# Patient Record
Sex: MALE | Race: WHITE | NOT HISPANIC OR LATINO | Employment: OTHER | URBAN - METROPOLITAN AREA
[De-identification: names, ages, dates, MRNs, and addresses within clinical notes are randomized per-mention and may not be internally consistent; named-entity substitution may affect disease eponyms.]

---

## 2017-12-09 DIAGNOSIS — I10 ESSENTIAL HYPERTENSION: ICD-10-CM

## 2017-12-09 DIAGNOSIS — E78.2 MIXED HYPERLIPIDEMIA: ICD-10-CM

## 2017-12-11 RX ORDER — LOVASTATIN 20 MG/1
TABLET ORAL
Qty: 90 TAB | Refills: 4 | Status: SHIPPED | OUTPATIENT
Start: 2017-12-11 | End: 2019-01-07 | Stop reason: SDUPTHER

## 2017-12-11 RX ORDER — LISINOPRIL 10 MG/1
TABLET ORAL
Qty: 90 TAB | Refills: 4 | Status: SHIPPED | OUTPATIENT
Start: 2017-12-11 | End: 2019-01-07 | Stop reason: SDUPTHER

## 2017-12-16 ENCOUNTER — HOSPITAL ENCOUNTER (OUTPATIENT)
Dept: RADIOLOGY | Facility: MEDICAL CENTER | Age: 62
End: 2017-12-16
Attending: PHYSICIAN ASSISTANT
Payer: COMMERCIAL

## 2017-12-16 ENCOUNTER — OFFICE VISIT (OUTPATIENT)
Dept: URGENT CARE | Facility: CLINIC | Age: 62
End: 2017-12-16
Payer: COMMERCIAL

## 2017-12-16 VITALS
BODY MASS INDEX: 21.28 KG/M2 | OXYGEN SATURATION: 94 % | TEMPERATURE: 99.4 F | SYSTOLIC BLOOD PRESSURE: 140 MMHG | DIASTOLIC BLOOD PRESSURE: 60 MMHG | HEART RATE: 80 BPM | HEIGHT: 66 IN | WEIGHT: 132.4 LBS

## 2017-12-16 DIAGNOSIS — S09.90XA INJURY OF HEAD, INITIAL ENCOUNTER: ICD-10-CM

## 2017-12-16 DIAGNOSIS — S01.01XA LACERATION OF SCALP, INITIAL ENCOUNTER: ICD-10-CM

## 2017-12-16 PROCEDURE — 70450 CT HEAD/BRAIN W/O DYE: CPT

## 2017-12-16 PROCEDURE — 12002 RPR S/N/AX/GEN/TRNK2.6-7.5CM: CPT | Performed by: PHYSICIAN ASSISTANT

## 2017-12-16 RX ORDER — CEPHALEXIN 500 MG/1
500 TABLET ORAL 3 TIMES DAILY
Qty: 15 TAB | Refills: 0 | Status: SHIPPED | OUTPATIENT
Start: 2017-12-16 | End: 2017-12-21

## 2017-12-16 ASSESSMENT — ENCOUNTER SYMPTOMS
VOMITING: 0
NUMBNESS: 0
DIZZINESS: 0
HEADACHES: 1
ABDOMINAL PAIN: 0
LOSS OF CONSCIOUSNESS: 0
SENSORY CHANGE: 0
SHORTNESS OF BREATH: 0
TINGLING: 0
CHILLS: 0
PALPITATIONS: 0
WEAKNESS: 0
ROS SKIN COMMENTS: LACERATION TO SCALP
NAUSEA: 0
BLURRED VISION: 0
FOCAL WEAKNESS: 0
FEVER: 0
COUGH: 0
MYALGIAS: 0
DIARRHEA: 0
DOUBLE VISION: 0
DISORIENTATION: 0
EYE PAIN: 0

## 2017-12-16 NOTE — PATIENT INSTRUCTIONS
Wound Care Instructions    * After 24hrs leaving a wound uncovered helps it stay dry and heal.  If the wound isn't in an area that will get dirty or be rubbed by clothing you don't have to cover it.  If you do need to cover it do so with either an adhesive strip (band-aid) or sterile gauze and adhesive tape.  Change the dressing each day to keep the wound clean and dry.    * If you have steri strips (butterfly band-aids) placed, you need to keep those on until they fall off themselves.    * Antibiotic ointments such as Bacitracin or Neosporin may help keep the wound clean and help with scarring.  But DO NOT use these type of ointments with Dermabond as they interfere with wound healing.     * Keep wound dry for 5-7 days so the stitches or Dermabond have time to set .    * Return to the clinic in 7-10 days for stitch removal.    * You may be prescribed oral antibiotics if risk of infection is very high (such as your wound occurred over 12hrs before closure or you are a diabetic)    * You should have a current tetanus vaccination within the past ten years.    * Call your doctor if any of the following things occur as they may be signs of infection...    **the wound opens    **the wound drains pus    **red streaks develop from near the wound    **you have a fever over 101    **the wound site becomes tender or inflamed

## 2017-12-16 NOTE — PROGRESS NOTES
Subjective:      Magalis Kumari is a 62 y.o. male who presents with Head Injury (Laceration, x1 day)            Head Injury    Incident onset: 14 hours ago  The injury mechanism was a fall. There was no loss of consciousness. The volume of blood lost was minimal. The quality of the pain is described as dull (mild). The pain is mild. The pain has been constant since the injury. Associated symptoms include headaches (mild headache). Pertinent negatives include no blurred vision, disorientation, numbness, tinnitus, vomiting or weakness. He has tried nothing for the symptoms.   Laceration    The incident occurred 12 to 24 hours ago (16 hours ago). The laceration is located on the scalp. The laceration is 5 cm in size. The laceration mechanism is unknown.The pain is mild. He reports no foreign bodies present. His tetanus status is UTD.     Patient admits he was intoxicated at time of injury.    Past Medical History:   Diagnosis Date   • Stented coronary artery 2008    3 vessel     Past Surgical History:   Procedure Laterality Date   • BONE SPUR EXCISION     • STAPEDECTOMY         History reviewed. No pertinent family history.    No Known Allergies    Medications, Allergies, and current problem list reviewed today in Epic      Review of Systems   Constitutional: Negative for chills, fever and malaise/fatigue.   HENT: Negative for tinnitus.    Eyes: Negative for blurred vision, double vision and pain.   Respiratory: Negative for cough and shortness of breath.    Cardiovascular: Negative for chest pain, palpitations and leg swelling.   Gastrointestinal: Negative for abdominal pain, diarrhea, nausea and vomiting.   Musculoskeletal: Negative for myalgias.   Skin:        Laceration to scalp   Neurological: Positive for headaches (mild headache). Negative for dizziness, tingling, sensory change, focal weakness, loss of consciousness, weakness and numbness.     All other systems reviewed and are negative.         "Objective:     /60   Pulse 80   Temp 37.4 °C (99.4 °F)   Ht 1.676 m (5' 5.98\")   Wt 60.1 kg (132 lb 6.4 oz)   SpO2 94%   BMI 21.38 kg/m²      Physical Exam   Constitutional: He is oriented to person, place, and time. He appears well-developed and well-nourished. No distress.   HENT:   Head: Normocephalic. Head is with laceration. Head is without raccoon's eyes and without Jerez's sign.       Eyes: Conjunctivae and EOM are normal. Pupils are equal, round, and reactive to light.   Pulmonary/Chest: Effort normal. No respiratory distress.   Neurological: He is alert and oriented to person, place, and time. No cranial nerve deficit.   CN II-XII intact. Cerebellar function  intact. Motor coordination intact.  strength strong and symmetrical bilaterally. Proprioception intact. Strength 5/5 equal in the upper and lower extremities. Facial features symmetric with equal movement. No focal deficits. Romberg negative      Skin: Skin is warm and dry.   Psychiatric: He has a normal mood and affect. His behavior is normal. Judgment and thought content normal.         Procedure: Laceration Repair  -Risks including bleeding, nerve damage, infection, and poor cosmetic outcome discussed at length. Benefits and alternatives discussed.   -Sterile technique throughout. Wound edges prepped with Betadine.   -Local anesthesia with 2% lidocaine  -Wound irrigated with copious amounts of saline.   -Closed with 5 staples with good wound approximation  -Polysporin  placed  -Patient tolerated well    Reading Physician Reading Date Result Priority   Dhruv Kelsey M.D. 12/16/2017    Narrative       12/16/2017 1:01 PM    HISTORY/REASON FOR EXAM:  Ground-level fall with head injury.      TECHNIQUE/EXAM DESCRIPTION AND NUMBER OF VIEWS:  CT of the head without contrast.    Contiguous 5 mm axial sections were obtained from the skull base through the vertex.    Up to date radiation dose reduction adjustments have been utilized to meet " ALARA standards for radiation dose reduction.    COMPARISON:  None available    FINDINGS:    There is no evidence of extra-axial hemorrhage. No intra-axial hemorrhage is noted. There is no brain swelling or edema. No mass effect or midline shift is noted.  Mild mucosal thickening is noted in the ethmoid air cells. Skin surface staples are noted in the right frontal region.      Impression         NO ACUTE ABNORMALITIES ARE NOTED ON CT SCAN OF THE HEAD.          Assessment/Plan:     1. Injury of head, initial encounter    - CT-HEAD W/O; Future  CT of head negative. Called patient. Discussed warning signs and red flags that warrant immediate follow-up.     2. Laceration of scalp, initial encounter  - suture repair. Staples placed.  - Cephalexin 500 MG Tab; Take 1 Tab by mouth 3 times a day for 5 days.  Dispense: 15 Tab; Refill: 0  - Wound care discussed. Patient given printed instructions on wound care.    Follow-up in 7-10 days for staple removal.     Differential diagnoses, Supportive care, and indications for immediate follow-up discussed with patient.   Instructed to return to clinic or nearest emergency department for any change in condition, further concerns, or worsening of symptoms.    The patient demonstrated a good understanding and agreed with the treatment plan.    Becki Jarquin P.A.-C.

## 2017-12-16 NOTE — LETTER
December 16, 2017         Patient: Magalis Kumari   YOB: 1955   Date of Visit: 12/16/2017           To Whom it May Concern:    Magalis Kumari was seen in my clinic on 12/16/2017. He is excused from work today.    If you have any questions or concerns, please don't hesitate to call.        Sincerely,           Becki Jarquin P.A.-C.  Electronically Signed

## 2017-12-18 ENCOUNTER — PATIENT MESSAGE (OUTPATIENT)
Dept: MEDICAL GROUP | Age: 62
End: 2017-12-18

## 2017-12-18 ENCOUNTER — TELEPHONE (OUTPATIENT)
Dept: MEDICAL GROUP | Age: 62
End: 2017-12-18

## 2017-12-18 NOTE — TELEPHONE ENCOUNTER
60 is not too low. But the 146 is still too high. Just make sure to get up slowly, and not to drink any alcohol. The 10 mg of lisinopril is a low dose, so I would not reduce it any. Make an appt for a sheyla in the next few weeks

## 2017-12-18 NOTE — TELEPHONE ENCOUNTER
From: Magalis Kumari  To: Sami Solares M.D.  Sent: 12/18/2017 12:07 PM PST  Subject: Prescription Question    Hi Dr. Solares, This is a question about the blood pressure medication I am taking. On three separate occasions I have stood up quickly, gotten a little bit dizzy and lost my balance. The last time was on Friday 12-15-17 and I ended up hitting my head and getting five staples. Granted, I had been at a [employer] party and had a few glasses of wine but, on the other times no alcohol was involved. When they took my blood pressure on Saturday at urgent care it was 146/60. Isn't the 60 portion a little low? So, my question is I am wondering if the dosage of my blood pressure medication needs to be adjusted or not. Thanks.

## 2017-12-26 ENCOUNTER — OFFICE VISIT (OUTPATIENT)
Dept: URGENT CARE | Facility: CLINIC | Age: 62
End: 2017-12-26
Payer: COMMERCIAL

## 2017-12-26 VITALS
OXYGEN SATURATION: 95 % | WEIGHT: 130 LBS | TEMPERATURE: 98.4 F | RESPIRATION RATE: 18 BRPM | HEART RATE: 72 BPM | HEIGHT: 65 IN | BODY MASS INDEX: 21.66 KG/M2 | SYSTOLIC BLOOD PRESSURE: 146 MMHG | DIASTOLIC BLOOD PRESSURE: 64 MMHG

## 2017-12-26 DIAGNOSIS — Z48.02 ENCOUNTER FOR STAPLE REMOVAL: Primary | ICD-10-CM

## 2017-12-26 PROCEDURE — 99024 POSTOP FOLLOW-UP VISIT: CPT | Performed by: PHYSICIAN ASSISTANT

## 2017-12-26 NOTE — PATIENT INSTRUCTIONS
Stitches, Staples, or Adhesive Wound Closure  Health care providers use stitches (sutures), staples, and certain glue (skin adhesives) to hold skin together while it heals (wound closure). You may need this treatment after you have surgery or if you cut your skin accidentally. These methods help your skin to heal more quickly and make it less likely that you will have a scar. A wound may take several months to heal completely.  The type of wound you have determines when your wound gets closed. In most cases, the wound is closed as soon as possible (primary skin closure). Sometimes, closure is delayed so the wound can be cleaned and allowed to heal naturally. This reduces the chance of infection. Delayed closure may be needed if your wound:  · Is caused by a bite.  · Happened more than 6 hours ago.  · Involves loss of skin or the tissues under the skin.  · Has dirt or debris in it that cannot be removed.  · Is infected.  WHAT ARE THE DIFFERENT KINDS OF WOUND CLOSURES?  There are many options for wound closure. The one that your health care provider uses depends on how deep and how large your wound is.  Adhesive Glue  To use this type of glue to close a wound, your health care provider holds the edges of the wound together and paints the glue on the surface of your skin. You may need more than one layer of glue. Then the wound may be covered with a light bandage (dressing).  This type of skin closure may be used for small wounds that are not deep (superficial). Using glue for wound closure is less painful than other methods. It does not require a medicine that numbs the area (local anesthetic). This method also leaves nothing to be removed. Adhesive glue is often used for children and on facial wounds.  Adhesive glue cannot be used for wounds that are deep, uneven, or bleeding. It is not used inside of a wound.   Adhesive Strips  These strips are made of sticky (adhesive), porous paper. They are applied across your  skin edges like a regular adhesive bandage. You leave them on until they fall off.  Adhesive strips may be used to close very superficial wounds. They may also be used along with sutures to improve the closure of your skin edges.   Sutures  Sutures are the oldest method of wound closure. Sutures can be made from natural substances, such as silk, or from synthetic materials, such as nylon and steel. They can be made from a material that your body can break down as your wound heals (absorbable), or they can be made from a material that needs to be removed from your skin (nonabsorbable). They come in many different strengths and sizes.  Your health care provider attaches the sutures to a steel needle on one end. Sutures can be passed through your skin, or through the tissues beneath your skin. Then they are tied and cut. Your skin edges may be closed in one continuous stitch or in separate stitches.  Sutures are strong and can be used for all kinds of wounds. Absorbable sutures may be used to close tissues under the skin. The disadvantage of sutures is that they may cause skin reactions that lead to infection. Nonabsorbable sutures need to be removed.  Staples  When surgical staples are used to close a wound, the edges of your skin on both sides of the wound are brought close together. A staple is placed across the wound, and an instrument secures the edges together. Staples are often used to close surgical cuts (incisions).  Staples are faster to use than sutures, and they cause less skin reaction. Staples need to be removed using a tool that bends the staples away from your skin.  HOW DO I CARE FOR MY WOUND CLOSURE?  · Take medicines only as directed by your health care provider.  · If you were prescribed an antibiotic medicine for your wound, finish it all even if you start to feel better.  · Use ointments or creams only as directed by your health care provider.  · Wash your hands with soap and water before and  after touching your wound.  · Do not soak your wound in water. Do not take baths, swim, or use a hot tub until your health care provider approves.  · Ask your health care provider when you can start showering. Cover your wound if directed by your health care provider.  · Do not take out your own sutures or staples.  · Do not pick at your wound. Picking can cause an infection.  · Keep all follow-up visits as directed by your health care provider. This is important.  HOW LONG WILL I HAVE MY WOUND CLOSURE?  · Leave adhesive glue on your skin until the glue peels away.  · Leave adhesive strips on your skin until the strips fall off.  · Absorbable sutures will dissolve within several days.  · Nonabsorbable sutures and staples must be removed. The location of the wound will determine how long they stay in. This can range from several days to a couple of weeks.  WHEN SHOULD I SEEK HELP FOR MY WOUND CLOSURE?  Contact your health care provider if:  · You have a fever.  · You have chills.  · You have drainage, redness, swelling, or pain at your wound.  · There is a bad smell coming from your wound.  · The skin edges of your wound start to separate after your sutures have been removed.  · Your wound becomes thick, raised, and darker in color after your sutures come out (scarring).     This information is not intended to replace advice given to you by your health care provider. Make sure you discuss any questions you have with your health care provider.     Document Released: 09/12/2002 Document Revised: 01/08/2016 Document Reviewed: 05/27/2015  [employer] Patient Education ©2016 [employer]

## 2017-12-26 NOTE — PROGRESS NOTES
HPI:   Presents for staple removal 10 days post procedure. Laceration without pain, discharge or redness.     ROS:   no fever, no sensory loss, no weakness    Exam:   Gen: WDWN in no distress  Wound: well healing laceration. 5 staples removed without difficulty. No evidence of dehiscence or infection.  Neuro: sensory/motor intact     A/P   Laceration  Staple Removal  F/U prn

## 2018-11-13 ENCOUNTER — APPOINTMENT (OUTPATIENT)
Dept: MEDICAL GROUP | Age: 63
End: 2018-11-13
Payer: COMMERCIAL

## 2018-11-28 ENCOUNTER — OFFICE VISIT (OUTPATIENT)
Dept: MEDICAL GROUP | Facility: LAB | Age: 63
End: 2018-11-28
Payer: COMMERCIAL

## 2018-11-28 VITALS
DIASTOLIC BLOOD PRESSURE: 70 MMHG | WEIGHT: 135 LBS | HEART RATE: 84 BPM | HEIGHT: 65 IN | OXYGEN SATURATION: 94 % | SYSTOLIC BLOOD PRESSURE: 162 MMHG | TEMPERATURE: 98.5 F | RESPIRATION RATE: 16 BRPM | BODY MASS INDEX: 22.49 KG/M2

## 2018-11-28 DIAGNOSIS — M54.42 ACUTE LEFT-SIDED LOW BACK PAIN WITH LEFT-SIDED SCIATICA: ICD-10-CM

## 2018-11-28 PROCEDURE — 99214 OFFICE O/P EST MOD 30 MIN: CPT | Performed by: NURSE PRACTITIONER

## 2018-11-28 RX ORDER — METHYLPREDNISOLONE 4 MG/1
TABLET ORAL
Qty: 1 KIT | Refills: 0 | Status: SHIPPED | OUTPATIENT
Start: 2018-11-28 | End: 2019-07-15

## 2018-11-28 RX ORDER — CYCLOBENZAPRINE HCL 10 MG
10 TABLET ORAL EVERY EVENING
Qty: 30 TAB | Refills: 0 | Status: SHIPPED | OUTPATIENT
Start: 2018-11-28 | End: 2018-12-24 | Stop reason: SDUPTHER

## 2018-11-28 ASSESSMENT — PATIENT HEALTH QUESTIONNAIRE - PHQ9: CLINICAL INTERPRETATION OF PHQ2 SCORE: 0

## 2018-11-28 NOTE — PROGRESS NOTES
"Chief Complaint   Patient presents with   • Leg Pain     X 2 weeks ago/ left side   • Back Pain       HPI  Miguelito is a 64 yo male, same d access pt of Dr. Solares.  He is here with complaint of new onset left-sided low back pain that radiates down his left leg, present for 2 weeks.  Tells me that he does have a history of this about 4 years ago which resolved with Flexeril and ibuprofen.  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 Felt himself pull a muscle in his left sided low back with radiating pain down left leg about 2 weeks ago while lifting a heavy table-his pain is obviously still present.  Has tried 800 mg ibuprofen 2 x daily and resting for a week without improvement.  Pain is worse with walking particularly in left leg / thigh. No loss of bowels / bladder or leg weakness.  No other associated symptoms.    Past medical, surgical, family, and social history is reviewed and updated in Epic chart by me today.   Medications and allergies reviewed and updated in Epic chart by me today.     ROS:   As documented in history of present illness above    Exam:  Blood pressure (!) 162/70, pulse 84, temperature 36.9 °C (98.5 °F), resp. rate 16, height 1.651 m (5' 5\"), weight 61.2 kg (135 lb), SpO2 94 %.  Constitutional: Alert, no distress, plus 3 vital signs  Skin:  Warm, dry, no rashes invisible areas  Respiratory: Unlabored respiration.  Cardiovascular: Normal rate and rhythm.  Musculoskeletal: Back: Appearance: normal without bruising or deformity  Palpation: Left-sided lumbar paraspinous tenderness to palpate, no midline tenderness.  ROM: He does have full range of motion of his spine but experiences discomfort reproduced in his left anterior thigh with being lowered into a supine position.  Reflexes - Patellar 2+ bilaterally; Achilles 2+ bilaterally   Psych: Alert, pleasant, well-groomed, normal affect    Assessment / Plan / Medical Decision makin. Acute left-sided low " back pain with left-sided sciatica  -He has already failed prescription strength ibuprofen.  He was started on a Medrol Dosepak and given Flexeril for nighttime.  Discussed topical anti-inflammatories, stretches and heat versus ice.  He will follow-up with Dr. Solares within a week, sooner with any worsening.  Discussed possible imaging and physiatry/PT referral if not improving.  I encouraged him to refrain from lifting, pulling or pushing anything greater than 15 pounds until his pain has resolved.

## 2018-12-25 NOTE — TELEPHONE ENCOUNTER
Was the patient seen in the last year in this department? Yes lov 11/28/18    Does patient have an active prescription for medications requested? No     Received Request Via: Pharmacy

## 2018-12-28 RX ORDER — CYCLOBENZAPRINE HCL 10 MG
TABLET ORAL
Qty: 30 TAB | Refills: 3 | Status: SHIPPED | OUTPATIENT
Start: 2018-12-28 | End: 2019-12-02

## 2019-07-15 ENCOUNTER — HOSPITAL ENCOUNTER (OUTPATIENT)
Dept: RADIOLOGY | Facility: MEDICAL CENTER | Age: 64
End: 2019-07-15
Attending: NURSE PRACTITIONER
Payer: COMMERCIAL

## 2019-07-15 ENCOUNTER — OFFICE VISIT (OUTPATIENT)
Dept: MEDICAL GROUP | Facility: LAB | Age: 64
End: 2019-07-15
Payer: COMMERCIAL

## 2019-07-15 VITALS
HEART RATE: 92 BPM | DIASTOLIC BLOOD PRESSURE: 56 MMHG | SYSTOLIC BLOOD PRESSURE: 132 MMHG | TEMPERATURE: 97.9 F | WEIGHT: 133 LBS | BODY MASS INDEX: 22.16 KG/M2 | RESPIRATION RATE: 16 BRPM | OXYGEN SATURATION: 95 % | HEIGHT: 65 IN

## 2019-07-15 DIAGNOSIS — R05.8 PRODUCTIVE COUGH: ICD-10-CM

## 2019-07-15 DIAGNOSIS — R05.3 CHRONIC COUGH: ICD-10-CM

## 2019-07-15 DIAGNOSIS — Z12.12 SCREENING FOR COLORECTAL CANCER: ICD-10-CM

## 2019-07-15 DIAGNOSIS — R01.1 HEART MURMUR: ICD-10-CM

## 2019-07-15 DIAGNOSIS — Z12.11 SCREENING FOR COLORECTAL CANCER: ICD-10-CM

## 2019-07-15 DIAGNOSIS — I10 ESSENTIAL HYPERTENSION: ICD-10-CM

## 2019-07-15 PROCEDURE — 71046 X-RAY EXAM CHEST 2 VIEWS: CPT

## 2019-07-15 PROCEDURE — 99214 OFFICE O/P EST MOD 30 MIN: CPT | Performed by: NURSE PRACTITIONER

## 2019-07-15 RX ORDER — LOSARTAN POTASSIUM 25 MG/1
25 TABLET ORAL DAILY
Qty: 30 TAB | Refills: 5 | Status: SHIPPED | OUTPATIENT
Start: 2019-07-15 | End: 2019-08-12 | Stop reason: SDUPTHER

## 2019-07-15 ASSESSMENT — PATIENT HEALTH QUESTIONNAIRE - PHQ9: CLINICAL INTERPRETATION OF PHQ2 SCORE: 0

## 2019-07-15 NOTE — PROGRESS NOTES
"Chief Complaint   Patient presents with   • Cough     worse over the last 2 months       HPI  Miguelito is a 63-year-old est male here today with a new compliant of cough for two years, worse over the past month.  He mentions that he has been around increased dust this past month at work due to the [place].  Cough is dry, occasionally productive of white foam sputum.  Worse at nighttime.  Keeps him awake at night.  Has been sleeping in spare room for eight months bc of his cough.  No other symptoms except occasional runny nose.  Denies fever, chills or night sweats.  Tried OTC cough syrup with minimal relief.  Tried friend’s Xanax which did help him sleep.  Current smoker less than ½ pack per day.  No history of asthma or allergies.  Last chest x-ray did show emphysema . He does take lisinopril for HTN.     Past medical, surgical, family, and social history is reviewed and updated in Epic chart by me today.   Medications and allergies reviewed and updated in Epic chart by me today.     ROS:   As documented in history of present illness above    Exam:  /56 (BP Location: Left arm, Patient Position: Sitting, BP Cuff Size: Adult)   Pulse 92   Temp 36.6 °C (97.9 °F)   Resp 16   Ht 1.651 m (5' 5\")   Wt 60.3 kg (133 lb)   SpO2 95%   Constitutional: Alert, no distress, plus 3 vital signs  Skin:  Warm, dry, no rashes invisible areas  Eye: Equal, round and reactive, conjunctiva clear  ENMT: Lips without lesions, good dentition, oropharynx mildly erythematous   Neck: Trachea midline  Respiratory: Unlabored respiration, lungs clear to auscultation, no wheezes, no rhonchi  Cardiovascular: Normal rate and rhythm.  + 3/6 murmur, heard best at left 5th intercostal space  Psych: Alert, pleasant, well-groomed, normal affect    Assessment / Plan / Medical Decision makin. Productive cough  DX-CHEST-2 VIEWS    PULMONARY FUNCTION TESTS -Test requested: Complete Pulmonary Function Test   2. Chronic cough  DX-CHEST-2 VIEWS "    PULMONARY FUNCTION TESTS -Test requested: Complete Pulmonary Function Test   3. Essential hypertension  losartan (COZAAR) 25 MG Tab   4. Heart murmur  EC-ECHOCARDIOGRAM COMPLETE W/O CONT   5. Screening for colorectal cancer  REFERRAL TO GI FOR COLONOSCOPY     Discussed that Lisinopril could be the cause of his cough and will switch to losartan.  Last chest x-ray in 2016 +emphysema, will order new chest x-ray due to length of his symptoms.  Recommended pulmonary function tests for definitive diagnoses considering his smoking history and extended cough.  Recommend echocardiogram for evaluation of new murmur heard today.  F/u in three weeks to go over tests results, recheck blood pressure. If cough is persistent could consider adding ICS/LABA.

## 2019-07-30 ENCOUNTER — HOSPITAL ENCOUNTER (OUTPATIENT)
Dept: CARDIOLOGY | Facility: MEDICAL CENTER | Age: 64
End: 2019-07-30
Attending: NURSE PRACTITIONER
Payer: COMMERCIAL

## 2019-07-30 DIAGNOSIS — R01.1 HEART MURMUR: ICD-10-CM

## 2019-07-30 PROBLEM — I35.0 MODERATE AORTIC STENOSIS: Status: ACTIVE | Noted: 2019-07-30

## 2019-07-30 LAB
LV EJECT FRACT  99904: 65
LV EJECT FRACT MOD 2C 99903: 61.49
LV EJECT FRACT MOD 4C 99902: 54.32
LV EJECT FRACT MOD BP 99901: 58.34

## 2019-07-30 PROCEDURE — 93306 TTE W/DOPPLER COMPLETE: CPT

## 2019-07-30 PROCEDURE — 93306 TTE W/DOPPLER COMPLETE: CPT | Mod: 26 | Performed by: INTERNAL MEDICINE

## 2019-08-06 ENCOUNTER — OFFICE VISIT (OUTPATIENT)
Dept: MEDICAL GROUP | Facility: LAB | Age: 64
End: 2019-08-06
Payer: COMMERCIAL

## 2019-08-06 VITALS
DIASTOLIC BLOOD PRESSURE: 60 MMHG | WEIGHT: 134 LBS | HEART RATE: 72 BPM | SYSTOLIC BLOOD PRESSURE: 160 MMHG | TEMPERATURE: 97.5 F | HEIGHT: 65 IN | BODY MASS INDEX: 22.33 KG/M2 | OXYGEN SATURATION: 95 % | RESPIRATION RATE: 16 BRPM

## 2019-08-06 DIAGNOSIS — F17.200 TOBACCO DEPENDENCE: ICD-10-CM

## 2019-08-06 DIAGNOSIS — I35.0 MODERATE AORTIC STENOSIS: ICD-10-CM

## 2019-08-06 DIAGNOSIS — I10 ESSENTIAL HYPERTENSION: ICD-10-CM

## 2019-08-06 PROCEDURE — 99214 OFFICE O/P EST MOD 30 MIN: CPT | Performed by: NURSE PRACTITIONER

## 2019-08-06 NOTE — PROGRESS NOTES
"    JANET Farley is a 64-year-old established male here to follow-up on his visit from approximately 3 weeks ago in which he had a cough for the past year.  Change lisinopril to losartan.  Chest x-ray was obtained which was negative for pneumonia or any abnormalities beyond hyperinflation.  Ordered pulmonary function testing which has not occurred yet.  I also heard a new murmur at his visit a few weeks ago and echocardiogram was obtained, showing moderate aortic stenosis and he was referred to cardiology - see results below:  No prior study is available for comparison.   Normal left ventricular chamber size.  Left ventricular ejection fraction is visually estimated to be 65%.  Moderate aortic stenosis. Vmax is 3.8  m/s. Transvalvular gradients are   - Peak: 60 mmHg, Mean: 35 mmHg.  Mild to moderate aortic insufficiency.  Normal inferior vena cava size and inspiratory collapse    Daytime cough has improved but still coughing at night periodically.  Tells me his cough is 90% improved.  He has not been checking his blood pressure at home.  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  Continues to smoke.  Denies chest pain, headaches or leg swelling.    Hx of 3 stents placed 8-10 years ago [facility].     Past medical, surgical, family, and social history is reviewed and updated in Epic chart by me today.   Medications and allergies reviewed and updated in Epic chart by me today.     ROS:   As documented in history of present illness above    Exam:  /60 (BP Location: Left arm, Patient Position: Sitting, BP Cuff Size: Adult)   Pulse 72   Temp 36.4 °C (97.5 °F)   Resp 16   Ht 1.651 m (5' 5\")   Wt 60.8 kg (134 lb)   SpO2 95%   Constitutional: Alert, no distress, plus 3 vital signs  Skin:  Warm, dry, no rashes invisible areas  Eye: Equal, round and reactive, conjunctiva clear  ENMT: Lips without lesions, good dentition, oropharynx clear "    Neck: Trachea midline, no masses, no thyromegaly  Respiratory: Unlabored respiration, lungs clear to auscultation, no wheezes, no rhonchi  Cardiovascular: Normal rate and rhythm, no murmur, no edema  Abdomen: Soft, nontender, no masses or hepatosplenomegaly  Psych: Alert, pleasant, well-groomed, normal affect    Assessment / Plan / Medical Decision makin. Moderate aortic stenosis  -Reviewed his echocardiogram with him and discussed the importance of establishing care with cardiology.  He is asymptomatic.    2. Essential hypertension  -Not well controlled today.  He reports a stressful couple of days and this morning.  He took his losartan about an hour ago.  He was given a blood pressure log to check his blood pressure twice daily for the next 2 weeks and email this back to me.  He was previously on only 10 mg of lisinopril but we discussed potentially increasing losartan to 50 mg.  I gave him written parameters on his blood pressure log.  Discussed the use of the emergency medical system with onset chest pain or the worst headache of his life.  Encouraged him to stop smoking and he states he is trying.  Also encouraged a very low sodium diet, daily physical exercise and high water intake.  Follow-up with him in approximately 2 weeks, sooner with blood pressures consistently greater than 140/90 at home.    3. Tobacco dependence  -He was encouraged in the strongest way to stop smoking and he states that he has been cutting back and will try harder to quit.  Reminded him of the effects of tobacco use on his heart and peripheral vascular system.

## 2019-08-06 NOTE — LETTER
BLOOD PRESSURE LOG FOR: Magalis Kumari    DATE/TIME  AM WEIGHT BLOOD  PRESSURE PULSE TIME  PM BLOOD  PRESSURE   PULSE                                                                                                                                                                                                                                        Current Blood Pressure Medications: (dose and frequency)    MEDICATION DOSE FREQUENCY   losartan 25 mg daily                    Please tari any medication change (increase/decrease/new med) with a *.

## 2019-08-12 DIAGNOSIS — I10 ESSENTIAL HYPERTENSION: ICD-10-CM

## 2019-08-12 RX ORDER — LOSARTAN POTASSIUM 50 MG/1
50 TABLET ORAL DAILY
Qty: 30 TAB | Refills: 4 | Status: SHIPPED | OUTPATIENT
Start: 2019-08-12 | End: 2019-09-19

## 2019-09-11 ENCOUNTER — PATIENT MESSAGE (OUTPATIENT)
Dept: MEDICAL GROUP | Facility: LAB | Age: 64
End: 2019-09-11

## 2019-09-11 NOTE — PATIENT COMMUNICATION
Please call Miguelito.  His readings on average are a bit too high.  I would like for him to take 100 mg of losartan every day if he is currently only taking 50 mg.  Please ask him to continue to keep track of his blood pressure and send me numbers again in 2 weeks.  If his readings consistently fall below 100/60, have him go back to 50 mg of losartan.  Please ask if he is about to run out of his 50 mg losartan tablets.

## 2019-09-11 NOTE — TELEPHONE ENCOUNTER
----- Message from Brooklyn Delgado, Med Ass't sent at 9/11/2019  9:40 AM PDT -----  Regarding: FW: Non-Urgent Medical Question  Contact: [phone]      ----- Message -----  From: Magalis Kumari  Sent: 9/11/2019   9:22 AM PDT  To: Adrian Cordova Ma  Subject: Non-Urgent Medical Question                      Jarrett Renee,  Attached are the blood pressure readings for Miguelito.  Please give him a call at [phone].  He is concerned about the readings being higher in the morning.  Thank you.

## 2019-09-19 ENCOUNTER — OFFICE VISIT (OUTPATIENT)
Dept: CARDIOLOGY | Facility: MEDICAL CENTER | Age: 64
End: 2019-09-19
Payer: COMMERCIAL

## 2019-09-19 VITALS
WEIGHT: 133.27 LBS | SYSTOLIC BLOOD PRESSURE: 154 MMHG | BODY MASS INDEX: 22.2 KG/M2 | DIASTOLIC BLOOD PRESSURE: 62 MMHG | HEART RATE: 83 BPM | HEIGHT: 65 IN | OXYGEN SATURATION: 94 %

## 2019-09-19 DIAGNOSIS — I35.0 MODERATE AORTIC STENOSIS: ICD-10-CM

## 2019-09-19 DIAGNOSIS — I10 ESSENTIAL HYPERTENSION: ICD-10-CM

## 2019-09-19 PROCEDURE — 99406 BEHAV CHNG SMOKING 3-10 MIN: CPT | Mod: 25 | Performed by: INTERNAL MEDICINE

## 2019-09-19 PROCEDURE — 99204 OFFICE O/P NEW MOD 45 MIN: CPT | Mod: 25 | Performed by: INTERNAL MEDICINE

## 2019-09-19 PROCEDURE — 93000 ELECTROCARDIOGRAM COMPLETE: CPT | Performed by: INTERNAL MEDICINE

## 2019-09-19 RX ORDER — LOSARTAN POTASSIUM 50 MG/1
50 TABLET ORAL 2 TIMES DAILY
Qty: 60 TAB | Refills: 11 | Status: SHIPPED | OUTPATIENT
Start: 2019-09-19 | End: 2019-12-02

## 2019-09-19 NOTE — PROGRESS NOTES
Cardiology Initial Consultation Note    Date of note:    9/19/2019    Primary Care Provider: MARIA GUADALUPE Sims  Referring Provider: No ref. provider found     Patient Name: Magalis Kumari   YOB: 1955  MRN:              [id number]    Chief Complaint: Aortic stenosis    Magalis Kumari is a 64 y.o. male  patient presented today to discuss her aortic stenosis.  Patient has long-standing history of smoking, peripheral vascular disease with left femoral bruit, hypertension, hyperlipidemia, COPD was recently seen by her primary care physician for persistent cough.  His lisinopril was changed to losartan, he initially felt better but still has persistent cough at night.  Continues to smoke, few cigarettes daily.  Denies chest pain, shortness of breath, dizziness, syncope.    ROS  Positive for arthritis.  No exertional chest pain shortness of breath, no claudication.    All other systems reviewed and discussed using a comprehensive questionnaire and are negative.     Past medical history, family history, social history, allergies and labs are reviewed and updated as needed as documented below.    Past Medical History:   Diagnosis Date   • Stented coronary artery 2008    3 vessel         Past Surgical History:   Procedure Laterality Date   • BONE SPUR EXCISION     • STAPEDECTOMY           Current Outpatient Medications   Medication Sig Dispense Refill   • losartan (COZAAR) 50 MG Tab Take 1 Tab by mouth 2 Times a Day. 60 Tab 11   • lovastatin (MEVACOR) 20 MG Tab TAKE 1 TAB BY MOUTH EVERY  Tab 4   • cyclobenzaprine (FLEXERIL) 10 MG Tab TAKE 1 TABLET BY MOUTH EVERY DAY IN THE EVENING 30 Tab 3   • ibuprofen (MOTRIN) 200 MG Tab Take 200 mg by mouth every 6 hours as needed.     • aspirin (ASA) 81 MG Chew Tab chewable tablet Take 1 Tab by mouth every day. 100 Tab 11   • lisinopril (PRINIVIL) 10 MG Tab TAKE 1 TAB BY MOUTH DAILY 300 Tab 4     No current facility-administered  "medications for this visit.          Allergies   Allergen Reactions   • Seasonal Cough and Runny Nose         No family history of premature coronary disease.    Social History     Socioeconomic History   • Marital status:      Spouse name: Not on file   • Number of children: Not on file   • Years of education: Not on file   • Highest education level: Not on file   Occupational History   • Not on file   Social Needs   • Financial resource strain: Not on file   • Food insecurity:     Worry: Not on file     Inability: Not on file   • Transportation needs:     Medical: Not on file     Non-medical: Not on file   Tobacco Use   • Smoking status: Current Every Day Smoker     Packs/day: 0.50     Types: Cigarettes   • Smokeless tobacco: Never Used   Substance and Sexual Activity   • Alcohol use: Yes     Alcohol/week: 0.0 oz   • Drug use: No   • Sexual activity: Not on file   Lifestyle   • Physical activity:     Days per week: Not on file     Minutes per session: Not on file   • Stress: Not on file   Relationships   • Social connections:     Talks on phone: Not on file     Gets together: Not on file     Attends Holiness service: Not on file     Active member of club or organization: Not on file     Attends meetings of clubs or organizations: Not on file     Relationship status: Not on file   • Intimate partner violence:     Fear of current or ex partner: Not on file     Emotionally abused: Not on file     Physically abused: Not on file     Forced sexual activity: Not on file   Other Topics Concern   • Not on file   Social History Narrative   • Not on file         Physical Exam:  Ambulatory Vitals  /62 (BP Location: Left arm, Patient Position: Sitting, BP Cuff Size: Adult)   Pulse 83   Ht 1.651 m (5' 5\")   Wt 60.5 kg (133 lb 4.3 oz)   SpO2 94%    Oxygen Therapy:  Pulse Oximetry: 94 %  BP Readings from Last 4 Encounters:   09/19/19 154/62   08/06/19 160/60   07/15/19 132/56   11/28/18 (!) 162/70       "       Weight/BMI: Body mass index is 22.18 kg/m².  Wt Readings from Last 4 Encounters:   09/19/19 60.5 kg (133 lb 4.3 oz)   08/06/19 60.8 kg (134 lb)   07/15/19 60.3 kg (133 lb)   11/28/18 61.2 kg (135 lb)       General: Well appearing and in no apparent distress  Head: atrumatic  Eyes: No conjunctival pallor   ENT: normal external appearance of nose and ears  Neck: JVD absent, carotid bruits absent  Lungs: respiratory sounds  normal, additional breath sounds absent  Heart: Regular rhythm,   No palpable thrills on palpation, murmurs absent, no rubs,   Lower extremity edema absent.   Pedal pulses normal  Abdomen: soft, non tender, non distended.  Extremities/MSK: no clubbing, no cyanosis  Neurological: normal orientation, Gait normal   Psychiatric: Appropriate affect, intact judgement and insight  Skin: Warm extremities      Lab Data Review:  Lab Results   Component Value Date/Time    CHOLSTRLTOT 167 11/23/2016 07:56 AM     (H) 11/23/2016 07:56 AM    HDL 41 11/23/2016 07:56 AM    TRIGLYCERIDE 48 11/23/2016 07:56 AM       Lab Results   Component Value Date/Time    SODIUM 135 11/23/2016 07:56 AM    POTASSIUM 4.6 11/23/2016 07:56 AM    CHLORIDE 102 11/23/2016 07:56 AM    CO2 28 11/23/2016 07:56 AM    GLUCOSE 95 11/23/2016 07:56 AM    BUN 16 11/23/2016 07:56 AM    CREATININE 0.68 11/23/2016 07:56 AM     Lab Results   Component Value Date/Time    ALKPHOSPHAT 63 11/23/2016 07:56 AM    ASTSGOT 30 11/23/2016 07:56 AM    ALTSGPT 21 11/23/2016 07:56 AM    TBILIRUBIN 0.5 11/23/2016 07:56 AM      Lab Results   Component Value Date/Time    WBC 4.4 (L) 11/23/2016 07:56 AM       Cardiac Imaging and Procedures Review:    EKG dated 9/19 : My personal interpretation is sinus rhythm frequent PACs.    Echo dated 7/30/2019:   CONCLUSIONS  No prior study is available for comparison.   Normal left ventricular chamber size.  Left ventricular ejection fraction is visually estimated to be 65%.  Moderate aortic stenosis. Vmax is 3.8   m/s. Transvalvular gradients are   - Peak: 60 mmHg, Mean: 35 mmHg.  Mild to moderate aortic insufficiency.  Normal inferior vena cava size and inspiratory collapse.    Medical Decision Makin-year-old male patient with  1.  Moderate to severe aortic stenosis, NYHA class II stage III  2.  Essential hypertension  3.  Hyperlipidemia  4.  COPD  5.  Active smoker  6.  Coronary artery disease with history of PCI, 3 stents in     Patient is doing well without any symptoms.  Visually his aortic valve could be bicuspid.  Advised to have his family members screened as well.  I will have him follow-up in 3 months, repeat echocardiogram in 6 months, sooner if he has symptoms.  His blood pressure is high, increase losartan to 100 mg daily, he would like to take 50 mg twice daily I am okay with that.  Strongly encouraged to quit smoking, counseling is provided for 5 minutes.  Patient said he will try.  Continue aspirin, lovastatin.  No evidence of heart failure and examination, I do not suspect his cough is cardiac related.    Return in about 3 months (around 2019).    This note was dictated using Dragon speech recognition software.    Luiz GUITERREZ  Interventional cardiologist  [facility]  [facility], [address]  [address] [address]  Phone: [phone]  Fax: [phone]

## 2019-09-20 LAB — EKG IMPRESSION: NORMAL

## 2019-12-02 ENCOUNTER — OFFICE VISIT (OUTPATIENT)
Dept: MEDICAL GROUP | Facility: LAB | Age: 64
End: 2019-12-02
Payer: COMMERCIAL

## 2019-12-02 VITALS
RESPIRATION RATE: 16 BRPM | TEMPERATURE: 97.4 F | HEIGHT: 65 IN | OXYGEN SATURATION: 98 % | BODY MASS INDEX: 21.99 KG/M2 | SYSTOLIC BLOOD PRESSURE: 138 MMHG | WEIGHT: 132 LBS | DIASTOLIC BLOOD PRESSURE: 76 MMHG | HEART RATE: 66 BPM

## 2019-12-02 DIAGNOSIS — R05.3 CHRONIC COUGH: ICD-10-CM

## 2019-12-02 DIAGNOSIS — I10 ESSENTIAL HYPERTENSION: ICD-10-CM

## 2019-12-02 DIAGNOSIS — I35.0 MODERATE AORTIC STENOSIS: ICD-10-CM

## 2019-12-02 PROCEDURE — 99214 OFFICE O/P EST MOD 30 MIN: CPT | Performed by: NURSE PRACTITIONER

## 2019-12-02 RX ORDER — AMLODIPINE BESYLATE 5 MG/1
5 TABLET ORAL DAILY
Qty: 30 TAB | Refills: 4 | Status: SHIPPED | OUTPATIENT
Start: 2019-12-02 | End: 2020-03-30 | Stop reason: SDUPTHER

## 2019-12-02 NOTE — PROGRESS NOTES
"Chief Complaint   Patient presents with   • Cough       HPI  Miguelito is a 64-year-old established male here with complaint of persistent cough and non-stop runny nose.  Also regarding HTN and aortic stenosis.  Cough varies from dry to congested.  Lisinopril was changed to losartan several months ago and cough did improve for a few weeks but returned.  He did see cardiology regarding aortic stenosis and has a follow-up in a few weeks with echo in 3 mo.  Per cardiology note, cough likely not related to cardiac health.  Denies extreme fatigue or SOB on exertion.  Not having CP.  Denies leg swelling. BP moderately controlled in 130/80 range with 50 mg losartan bid.  Pm cough syrup helps.   Mucus is clear.    Cough present x a at least year, possibly to  Still smokes.    Denies SOB.   Very physically active at work with good exercise tolerance.      Past medical, surgical, family, and social history is reviewed and updated in Epic chart by me today.   Medications and allergies reviewed and updated in Epic chart by me today.     ROS:   As documented in history of present illness above    Exam:  /76 (BP Location: Left arm, Patient Position: Sitting, BP Cuff Size: Adult)   Pulse 66   Temp 36.3 °C (97.4 °F)   Resp 16   Ht 1.651 m (5' 5\")   Wt 59.9 kg (132 lb)   SpO2 98%   Constitutional: Alert, no distress, plus 3 vital signs  Skin:  Warm, dry, no rashes invisible areas  Eye: Equal, round and reactive, conjunctiva clear  ENMT: Lips without lesions, good dentition, oropharynx clear    Neck: Trachea midline, no masses, no thyromegaly  Respiratory: Unlabored respiration, lungs clear to auscultation, no wheezes, no rhonchi  Cardiovascular: Normal rate and rhythm. + murmur  Psych: Alert, pleasant, well-groomed, normal affect    Assessment:  #1- persistent cough  #2- HTN  #3- aoritc stenosis:     Plan / Medical Decision making:   Stop losartan.  Trial of amlodipine 5 mg at night.  May increase to 10 mg if BP is " consistently above 140/90.  If cough persists - PFT advised.  He will email me in 2 weeks with blood pressure response to discontinue addition of losartan and initiation of amlodipine as well as hopeful cough resolution.  Failed H2 blocker x 10 d (OTC)  Reviewed last cardiology consult with the patient regarding the need for a follow-up in a few weeks with cardiology and echocardiogram 6 months from last.  Discussed symptoms that could be related to aortic stenosis in the future.

## 2019-12-17 ENCOUNTER — TELEPHONE (OUTPATIENT)
Dept: CARDIOLOGY | Facility: MEDICAL CENTER | Age: 64
End: 2019-12-17

## 2019-12-17 NOTE — TELEPHONE ENCOUNTER
Spoke to patient, last time he had labs done it was at a [facility], I confirmed that was a few years ago.

## 2019-12-19 ENCOUNTER — OFFICE VISIT (OUTPATIENT)
Dept: CARDIOLOGY | Facility: MEDICAL CENTER | Age: 64
End: 2019-12-19
Payer: COMMERCIAL

## 2019-12-19 VITALS
DIASTOLIC BLOOD PRESSURE: 60 MMHG | OXYGEN SATURATION: 94 % | BODY MASS INDEX: 21.51 KG/M2 | SYSTOLIC BLOOD PRESSURE: 110 MMHG | WEIGHT: 129.08 LBS | HEIGHT: 65 IN | HEART RATE: 72 BPM

## 2019-12-19 DIAGNOSIS — I73.9 PAD (PERIPHERAL ARTERY DISEASE) (HCC): ICD-10-CM

## 2019-12-19 DIAGNOSIS — F17.200 TOBACCO DEPENDENCE: ICD-10-CM

## 2019-12-19 DIAGNOSIS — I35.0 MODERATE AORTIC STENOSIS: ICD-10-CM

## 2019-12-19 DIAGNOSIS — I25.83 CORONARY ARTERY DISEASE DUE TO LIPID RICH PLAQUE: ICD-10-CM

## 2019-12-19 DIAGNOSIS — E78.2 MIXED HYPERLIPIDEMIA: ICD-10-CM

## 2019-12-19 DIAGNOSIS — I10 ESSENTIAL HYPERTENSION: ICD-10-CM

## 2019-12-19 DIAGNOSIS — I25.10 CORONARY ARTERY DISEASE DUE TO LIPID RICH PLAQUE: ICD-10-CM

## 2019-12-19 PROCEDURE — 99214 OFFICE O/P EST MOD 30 MIN: CPT | Performed by: NURSE PRACTITIONER

## 2019-12-19 ASSESSMENT — ENCOUNTER SYMPTOMS
PALPITATIONS: 0
MYALGIAS: 0
SHORTNESS OF BREATH: 0
ORTHOPNEA: 0
COUGH: 1
PND: 0
ABDOMINAL PAIN: 0
FEVER: 0
CLAUDICATION: 0
DIZZINESS: 0

## 2019-12-19 NOTE — PROGRESS NOTES
Chief Complaint   Patient presents with   • Aortic Stenosis     Previous patient      Subjective:   Magalis Kumari is a 64 y.o. male who presents today for follow up on BP medication changes.    He is a patient of Dr. Dennis in our office.    Hx of CAD with prior stent placements X3 in '08 (unknown vessels), HTN, HLD, current smoker with persistent cough, moderate AS, and PAD.    He presents today with his wife.    He admits to continue to smoke 8-10 cigarettes daily. He is still bothered by a daily cough.    He works at the [employer] and is around dust and animal manure daily. He will retire within a year.    He has no AS symptoms at this time. Denies chest pain, syncope/near syncope/lightheadedness, or shortness of breath.    Past Medical History:   Diagnosis Date   • Stented coronary artery 2008    3 vessel     Past Surgical History:   Procedure Laterality Date   • BONE SPUR EXCISION     • STAPEDECTOMY       History reviewed. No pertinent family history.  Social History     Socioeconomic History   • Marital status:      Spouse name: Not on file   • Number of children: Not on file   • Years of education: Not on file   • Highest education level: Not on file   Occupational History   • Not on file   Social Needs   • Financial resource strain: Not on file   • Food insecurity:     Worry: Not on file     Inability: Not on file   • Transportation needs:     Medical: Not on file     Non-medical: Not on file   Tobacco Use   • Smoking status: Current Every Day Smoker     Packs/day: 0.50     Types: Cigarettes   • Smokeless tobacco: Never Used   Substance and Sexual Activity   • Alcohol use: Yes     Alcohol/week: 0.0 oz   • Drug use: No   • Sexual activity: Not on file   Lifestyle   • Physical activity:     Days per week: Not on file     Minutes per session: Not on file   • Stress: Not on file   Relationships   • Social connections:     Talks on phone: Not on file     Gets together: Not on  "file     Attends Faith service: Not on file     Active member of club or organization: Not on file     Attends meetings of clubs or organizations: Not on file     Relationship status: Not on file   • Intimate partner violence:     Fear of current or ex partner: Not on file     Emotionally abused: Not on file     Physically abused: Not on file     Forced sexual activity: Not on file   Other Topics Concern   • Not on file   Social History Narrative   • Not on file     Allergies   Allergen Reactions   • Seasonal Cough and Runny Nose     Outpatient Encounter Medications as of 12/19/2019   Medication Sig Dispense Refill   • Phenylephrine-Chlorphen-DM (ROBITUSSIN COUGH/ALLERGY PO) Take  by mouth at bedtime as needed.     • amLODIPine (NORVASC) 5 MG Tab Take 1 Tab by mouth every day. At night 30 Tab 4   • lovastatin (MEVACOR) 20 MG Tab TAKE 1 TAB BY MOUTH EVERY  Tab 4   • aspirin (ASA) 81 MG Chew Tab chewable tablet Take 1 Tab by mouth every day. 100 Tab 11   • ibuprofen (MOTRIN) 200 MG Tab Take 200 mg by mouth every 6 hours as needed.       No facility-administered encounter medications on file as of 12/19/2019.      Review of Systems   Constitutional: Negative for fever and malaise/fatigue.   Respiratory: Positive for cough. Negative for shortness of breath.    Cardiovascular: Negative for chest pain, palpitations, orthopnea, claudication, leg swelling and PND.   Gastrointestinal: Negative for abdominal pain.   Musculoskeletal: Negative for myalgias.   Neurological: Negative for dizziness.        Objective:   /60 (BP Location: Left arm, Patient Position: Sitting, BP Cuff Size: Adult)   Pulse 72   Ht 1.651 m (5' 5\")   Wt 58.6 kg (129 lb 1.3 oz)   SpO2 94%   BMI 21.48 kg/m²     Physical Exam   Constitutional: He appears well-developed and well-nourished.   HENT:   Head: Normocephalic and atraumatic.   Eyes: EOM are normal.   Neck: No JVD present.   Cardiovascular: Normal rate, regular rhythm, normal " heart sounds and intact distal pulses.   Pulmonary/Chest: Effort normal and breath sounds normal.   Musculoskeletal: Normal range of motion.         General: No edema.   Skin: Skin is warm and dry.   Psychiatric: He has a normal mood and affect.   Nursing note and vitals reviewed.      Assessment:     1. PAD (peripheral artery disease) (HCC)- left femoral bruit; diminished right dt/pt pulse     2. Coronary artery disease due to lipid rich plaque- coronary stents x3, 2008     3. Moderate aortic stenosis  EC-ECHOCARDIOGRAM COMPLETE W/O CONT   4. Mixed hyperlipidemia  LIPID PANEL    Comp Metabolic Panel   5. Essential hypertension     6. Tobacco dependence       Medical Decision Making:  Today's Assessment / Status / Plan:     1. Moderate AS  -gradients near severe  -no symptoms of AS at this time  -repeat echo in 3 months with follow up in valve clinic  -AS packets for education given to patient    2. CAD with prior stent placements  -no angina or BLANCO  -cont asa, statin  -obtain prior angiogram from [facility] today    3. HLD  -statin  -LDL goal <70 with CAD  -annual labs ordered    4. HTN  -good control on amlodipine  -taken off losartan and lisinopril for persistent cough, cough remains despite med changes    5. Smoker with cough  -recommend PCP evaluation for probable lung disease  -he will follow up on this  -he is not interested in smoking cessation despite importance    6. PAD  -prior femoral bruit  -asymptomatic  -follow clinically    FU in clinic in 3 months with AK with labs and echo    Patient verbalizes understanding and agrees with the plan of care.     Collaborating MD: Sergio FLORES

## 2020-01-16 DIAGNOSIS — E78.2 MIXED HYPERLIPIDEMIA: ICD-10-CM

## 2020-01-16 RX ORDER — LOVASTATIN 20 MG/1
TABLET ORAL
Qty: 30 TAB | Refills: 0 | Status: SHIPPED | OUTPATIENT
Start: 2020-01-16 | End: 2020-02-13

## 2020-02-13 DIAGNOSIS — E78.2 MIXED HYPERLIPIDEMIA: ICD-10-CM

## 2020-02-13 RX ORDER — LOVASTATIN 20 MG/1
TABLET ORAL
Qty: 30 TAB | Refills: 0 | Status: SHIPPED | OUTPATIENT
Start: 2020-02-13 | End: 2020-03-12

## 2020-02-13 NOTE — TELEPHONE ENCOUNTER
Received request via: Pharmacy    Was the patient seen in the last year in this department? No     Does the patient have an active prescription (recently filled or refills available) for medication(s) requested? No

## 2020-03-09 ENCOUNTER — HOSPITAL ENCOUNTER (OUTPATIENT)
Dept: LAB | Facility: MEDICAL CENTER | Age: 65
End: 2020-03-09
Attending: NURSE PRACTITIONER
Payer: COMMERCIAL

## 2020-03-09 DIAGNOSIS — E78.2 MIXED HYPERLIPIDEMIA: ICD-10-CM

## 2020-03-09 PROCEDURE — 80061 LIPID PANEL: CPT

## 2020-03-09 PROCEDURE — 80053 COMPREHEN METABOLIC PANEL: CPT

## 2020-03-09 PROCEDURE — 36415 COLL VENOUS BLD VENIPUNCTURE: CPT

## 2020-03-10 ENCOUNTER — TELEPHONE (OUTPATIENT)
Dept: CARDIOLOGY | Facility: MEDICAL CENTER | Age: 65
End: 2020-03-10

## 2020-03-10 LAB
ALBUMIN SERPL BCP-MCNC: 4 G/DL (ref 3.2–4.9)
ALBUMIN/GLOB SERPL: 0.9 G/DL
ALP SERPL-CCNC: 66 U/L (ref 30–99)
ALT SERPL-CCNC: 39 U/L (ref 2–50)
ANION GAP SERPL CALC-SCNC: 8 MMOL/L (ref 0–11.9)
AST SERPL-CCNC: 49 U/L (ref 12–45)
BILIRUB SERPL-MCNC: 0.6 MG/DL (ref 0.1–1.5)
BUN SERPL-MCNC: 18 MG/DL (ref 8–22)
CALCIUM SERPL-MCNC: 9.5 MG/DL (ref 8.5–10.5)
CHLORIDE SERPL-SCNC: 103 MMOL/L (ref 96–112)
CHOLEST SERPL-MCNC: 117 MG/DL (ref 100–199)
CO2 SERPL-SCNC: 24 MMOL/L (ref 20–33)
CREAT SERPL-MCNC: 0.72 MG/DL (ref 0.5–1.4)
FASTING STATUS PATIENT QL REPORTED: NORMAL
GLOBULIN SER CALC-MCNC: 4.3 G/DL (ref 1.9–3.5)
GLUCOSE SERPL-MCNC: 86 MG/DL (ref 65–99)
HDLC SERPL-MCNC: 29 MG/DL
LDLC SERPL CALC-MCNC: 70 MG/DL
POTASSIUM SERPL-SCNC: 4.1 MMOL/L (ref 3.6–5.5)
PROT SERPL-MCNC: 8.3 G/DL (ref 6–8.2)
SODIUM SERPL-SCNC: 135 MMOL/L (ref 135–145)
TRIGL SERPL-MCNC: 89 MG/DL (ref 0–149)

## 2020-03-10 NOTE — TELEPHONE ENCOUNTER
Received request for dental treatment from [facility].  Form completed by SC.  Faxed to [phone].  Completed fax confirmation received.    Form placed in basket for scanning.

## 2020-03-12 DIAGNOSIS — E78.2 MIXED HYPERLIPIDEMIA: ICD-10-CM

## 2020-03-12 RX ORDER — LOVASTATIN 20 MG/1
TABLET ORAL
Qty: 30 TAB | Refills: 0 | Status: SHIPPED | OUTPATIENT
Start: 2020-03-12 | End: 2020-03-30 | Stop reason: SDUPTHER

## 2020-03-24 ENCOUNTER — HOSPITAL ENCOUNTER (OUTPATIENT)
Dept: CARDIOLOGY | Facility: MEDICAL CENTER | Age: 65
End: 2020-03-24
Attending: NURSE PRACTITIONER
Payer: COMMERCIAL

## 2020-03-24 DIAGNOSIS — I35.0 MODERATE AORTIC STENOSIS: ICD-10-CM

## 2020-03-24 LAB
LV EJECT FRACT  99904: 60
LV EJECT FRACT MOD 4C 99902: 49.29

## 2020-03-24 PROCEDURE — 93306 TTE W/DOPPLER COMPLETE: CPT

## 2020-03-24 PROCEDURE — 93306 TTE W/DOPPLER COMPLETE: CPT | Mod: 26 | Performed by: INTERNAL MEDICINE

## 2020-03-27 DIAGNOSIS — Z01.810 PRE-OPERATIVE CARDIOVASCULAR EXAMINATION: ICD-10-CM

## 2020-03-27 DIAGNOSIS — I35.0 MODERATE AORTIC STENOSIS: ICD-10-CM

## 2020-03-27 DIAGNOSIS — R06.02 SHORTNESS OF BREATH: ICD-10-CM

## 2020-03-27 NOTE — PROGRESS NOTES
Spoke with patient regarding follow up in valve clinic to discuss echo results and proceed with CTS consult and further testing.     Reviewed all such appointments and details for such. Assured patient also that such details for appointments will be sent to [facility] as well.     Patient states understanding of all information, agrees with the plan, and states no further questions at this time. Provided direct contact information and encouraged return call if any questions.

## 2020-03-28 NOTE — PROGRESS NOTES
REFERRING PHYSICIAN: Luiz Dennis MD.    CONSULTING PHYSICIAN: Tolu Anderson MD, FACS.    CHIEF COMPLAINT: Cough.    HISTORY OF PRESENT ILLNESS: The patient is a 64 y.o. male with history of coronary artery disease status post percutaneous coronary intervention in 2008, hypertension, hyperlipidemia, peripheral vascular disease (left femoral bruit), current tobacco abuse and aortic stenosis.  Today, he states that he has had known aortic stenosis for many years.  He is been in his usual state of health with a dry cough that has not gotten better or worse.  He is very active at his job and has no exercise limitations. He denies chest pain/pressure, dyspnea, orthopnea, dizziness, lower extremity edema, syncope, or near syncope.       PAST MEDICAL HISTORY:   Past Medical History:   Diagnosis Date   • Stented coronary artery 2008    3 vessel       PAST SURGICAL HISTORY:   Past Surgical History:   Procedure Laterality Date   • BONE SPUR EXCISION     • STAPEDECTOMY         FAMILY HISTORY:   History reviewed. No pertinent family history.     SOCIAL HISTORY:   Social History     Socioeconomic History   • Marital status:      Spouse name: Not on file   • Number of children: Not on file   • Years of education: Not on file   • Highest education level: Not on file   Occupational History   • Not on file   Social Needs   • Financial resource strain: Not on file   • Food insecurity     Worry: Not on file     Inability: Not on file   • Transportation needs     Medical: Not on file     Non-medical: Not on file   Tobacco Use   • Smoking status: Current Every Day Smoker     Packs/day: 0.50     Types: Cigarettes   • Smokeless tobacco: Never Used   Substance and Sexual Activity   • Alcohol use: Yes     Alcohol/week: 0.0 oz   • Drug use: No   • Sexual activity: Not on file   Lifestyle   • Physical activity     Days per week: Not on file     Minutes per session: Not on file   • Stress: Not on file   Relationships   •  Social connections     Talks on phone: Not on file     Gets together: Not on file     Attends Latter-day service: Not on file     Active member of club or organization: Not on file     Attends meetings of clubs or organizations: Not on file     Relationship status: Not on file   • Intimate partner violence     Fear of current or ex partner: Not on file     Emotionally abused: Not on file     Physically abused: Not on file     Forced sexual activity: Not on file   Other Topics Concern   • Not on file   Social History Narrative   • Not on file       ALLERGIES:   Allergies   Allergen Reactions   • Seasonal Cough and Runny Nose        CURRENT MEDICATIONS:     Current Outpatient Medications:   •  lovastatin (MEVACOR) 20 MG Tab, Take 1 Tab by mouth every day., Disp: 90 Tab, Rfl: 4  •  amLODIPine (NORVASC) 5 MG Tab, Take 1 Tab by mouth every day. At night, Disp: 90 Tab, Rfl: 4  •  Phenylephrine-Chlorphen-DM (ROBITUSSIN COUGH/ALLERGY PO), Take  by mouth at bedtime as needed., Disp: , Rfl:   •  ibuprofen (MOTRIN) 200 MG Tab, Take 200 mg by mouth every 6 hours as needed., Disp: , Rfl:   •  aspirin (ASA) 81 MG Chew Tab chewable tablet, Take 1 Tab by mouth every day., Disp: 100 Tab, Rfl: 11     LABS REVIEWED:  Lab Results   Component Value Date/Time    SODIUM 135 03/09/2020 01:50 PM    POTASSIUM 4.1 03/09/2020 01:50 PM    CHLORIDE 103 03/09/2020 01:50 PM    CO2 24 03/09/2020 01:50 PM    GLUCOSE 86 03/09/2020 01:50 PM    BUN 18 03/09/2020 01:50 PM    CREATININE 0.72 03/09/2020 01:50 PM      No results found for: PROTHROMBTM, INR   Lab Results   Component Value Date/Time    WBC 4.4 (L) 11/23/2016 07:56 AM    RBC 5.18 11/23/2016 07:56 AM    HEMOGLOBIN 15.8 11/23/2016 07:56 AM    HEMATOCRIT 47.2 11/23/2016 07:56 AM    MCV 91.1 11/23/2016 07:56 AM    MCH 30.5 11/23/2016 07:56 AM    MCHC 33.5 (L) 11/23/2016 07:56 AM    MPV 9.9 11/23/2016 07:56 AM    NEUTSPOLYS 46.90 11/23/2016 07:56 AM    LYMPHOCYTES 32.00 11/23/2016 07:56 AM     "MONOCYTES 17.20 (H) 11/23/2016 07:56 AM    EOSINOPHILS 2.80 11/23/2016 07:56 AM    BASOPHILS 0.90 11/23/2016 07:56 AM        IMAGING REVIEWED AND INTERPRETED:    ECHOCARDIOGRAM: 3/24/2020 [facility]  Prior echo 7/30/19; compared to the report of the study done - there   has been progression to severe AS.   Normal left ventricular systolic function.  Left ventricular ejection fraction is visually estimated to be 60%.  Moderate to severe aortic stenosis.  Vmax is 3.99 m/s. Transvalvular gradients are - Peak: 64 mmHg, Mean: 39   mmHg.    ANGIOGRAM: None.      REVIEW OF SYSTEMS:   Review of Systems   Constitutional: Negative.    HENT: Negative.    Eyes: Negative.    Respiratory: Negative.    Cardiovascular: Negative.    Gastrointestinal: Negative.    Genitourinary: Negative.    Musculoskeletal: Negative.    Skin: Negative.    Neurological: Negative.    Endo/Heme/Allergies: Negative.    Psychiatric/Behavioral: Negative.        PHYSICAL EXAMINATION:   Physical Exam  CONSTITUTIONAL:  /70 (BP Location: Right arm, Patient Position: Sitting, BP Cuff Size: Adult)   Pulse 88   Temp 36.3 °C (97.3 °F) (Temporal)   Ht 1.676 m (5' 6\")   Wt 59.4 kg (131 lb)   SpO2 98% .    General appearance: No apparent distress, normal development.  HEENT:  Anicteric sclerae, moist conjunctivae, moist mucous membranes, poor dentition.   NECK:  Supple without jugular venous distention, without lymphadenopathy.    SKIN:   Normal skin turgor, no stasis dermatitis or ulcers noted.  RESPIRATORY:  Clear to auscultation bilaterally, respirations are regular, symmetrical and unlabored.   CARDIAC:  Regular rate and rhythm, systolic murmur grade 3 out of 6.  No carotid bruits. Peripheral pulses palpable.   GASTROINTESTINAL:  Soft, non-distended, non-tender with bowel sounds present, no hepatosplenomegaly.  EXTREMITIES:  No clubbing, cyanosis, or changes consistent with peripheral vascular disease. No peripheral edema or varicosities.  NEUROLOGIC/ " PSYCHIATRIC: Alert and oriented times three. Mood and affect normal.  MUSCULOSKELETAL:  Normal gait and station.      IMPRESSION:  Severe aortic stenosis, coronary artery disease, peripheral vascular disease, tobacco abuse, hypertension.      PLAN:  The patient is a low to intermediate risk candidate for surgical aortic valve replacement.  Considering his age, he is a good candidate for either surgical aortic valve replacement or transcatheter aortic valve replacement (TAVR).  The patient will consider his options and decide.    The procedure, its risks, benefits, potential complications and alternative treatments were discussed with the patient in detail. The operative mortality risk is approximately 2%. The STS mortality risk score is 1.5% and the morbidity and mortality risk score is 10%. The scores were discussed with patient.    Findings and recommendations have been discussed with the patient’s cardiologist, Dr. Dennis.  Thank you for this challenging consultation and participation in the patient’s care.  I will keep you apprised of all future developments.      Sincerely,      Tolu Anderson MD, FACS.

## 2020-03-30 DIAGNOSIS — E78.2 MIXED HYPERLIPIDEMIA: ICD-10-CM

## 2020-03-30 RX ORDER — LOVASTATIN 20 MG/1
20 TABLET ORAL
Qty: 90 TAB | Refills: 4 | Status: SHIPPED | OUTPATIENT
Start: 2020-03-30 | End: 2020-10-19

## 2020-03-30 RX ORDER — AMLODIPINE BESYLATE 5 MG/1
5 TABLET ORAL DAILY
Qty: 90 TAB | Refills: 4 | Status: SHIPPED | OUTPATIENT
Start: 2020-03-30 | End: 2020-10-19 | Stop reason: SDUPTHER

## 2020-03-31 ENCOUNTER — APPOINTMENT (OUTPATIENT)
Dept: CARDIOLOGY | Facility: MEDICAL CENTER | Age: 65
End: 2020-03-31
Payer: COMMERCIAL

## 2020-04-01 ENCOUNTER — OFFICE VISIT (OUTPATIENT)
Dept: CARDIOLOGY | Facility: MEDICAL CENTER | Age: 65
End: 2020-04-01
Payer: COMMERCIAL

## 2020-04-01 ENCOUNTER — DOCUMENTATION (OUTPATIENT)
Dept: CARDIOLOGY | Facility: MEDICAL CENTER | Age: 65
End: 2020-04-01

## 2020-04-01 ENCOUNTER — HOSPITAL ENCOUNTER (OUTPATIENT)
Dept: RADIOLOGY | Facility: MEDICAL CENTER | Age: 65
End: 2020-04-01
Attending: INTERNAL MEDICINE
Payer: COMMERCIAL

## 2020-04-01 ENCOUNTER — OFFICE VISIT (OUTPATIENT)
Dept: CARDIOTHORACIC SURGERY | Facility: MEDICAL CENTER | Age: 65
End: 2020-04-01
Payer: COMMERCIAL

## 2020-04-01 VITALS
HEART RATE: 88 BPM | BODY MASS INDEX: 21.05 KG/M2 | TEMPERATURE: 97.3 F | DIASTOLIC BLOOD PRESSURE: 70 MMHG | OXYGEN SATURATION: 98 % | HEIGHT: 66 IN | WEIGHT: 131 LBS | SYSTOLIC BLOOD PRESSURE: 142 MMHG

## 2020-04-01 VITALS
BODY MASS INDEX: 21.05 KG/M2 | HEART RATE: 89 BPM | HEIGHT: 66 IN | WEIGHT: 131 LBS | DIASTOLIC BLOOD PRESSURE: 62 MMHG | OXYGEN SATURATION: 94 % | SYSTOLIC BLOOD PRESSURE: 128 MMHG | RESPIRATION RATE: 12 BRPM

## 2020-04-01 DIAGNOSIS — I35.0 SEVERE AORTIC STENOSIS: ICD-10-CM

## 2020-04-01 DIAGNOSIS — R06.02 SHORTNESS OF BREATH: ICD-10-CM

## 2020-04-01 DIAGNOSIS — Z01.810 PRE-OPERATIVE CARDIOVASCULAR EXAMINATION: ICD-10-CM

## 2020-04-01 DIAGNOSIS — I35.0 MODERATE AORTIC STENOSIS: ICD-10-CM

## 2020-04-01 PROCEDURE — 93880 EXTRACRANIAL BILAT STUDY: CPT

## 2020-04-01 PROCEDURE — 99205 OFFICE O/P NEW HI 60 MIN: CPT | Performed by: THORACIC SURGERY (CARDIOTHORACIC VASCULAR SURGERY)

## 2020-04-01 PROCEDURE — 99214 OFFICE O/P EST MOD 30 MIN: CPT | Performed by: INTERNAL MEDICINE

## 2020-04-01 SDOH — HEALTH STABILITY: MENTAL HEALTH: HOW MANY STANDARD DRINKS CONTAINING ALCOHOL DO YOU HAVE ON A TYPICAL DAY?: 1 OR 2

## 2020-04-01 ASSESSMENT — ENCOUNTER SYMPTOMS
EYES NEGATIVE: 1
CONSTITUTIONAL NEGATIVE: 1
CARDIOVASCULAR NEGATIVE: 1
MUSCULOSKELETAL NEGATIVE: 1
RESPIRATORY NEGATIVE: 1
PSYCHIATRIC NEGATIVE: 1
GASTROINTESTINAL NEGATIVE: 1
NEUROLOGICAL NEGATIVE: 1

## 2020-04-01 NOTE — PROGRESS NOTES
Cardiology Follow-up Consultation Note    Date of note:    4/1/2020    Primary Care Provider: MARIA GUADALUPE Sims    Name:             Magalis Kumari   YOB: 1955  MRN:               [id number]    CC: Severe aortic stenosis    Patient ID/HPI:   64-year-old male patient with history of coronary artery disease status post PCI in 2008, peripheral vascular disease, hypertension, hyperlipidemia, COPD, current smoking here to discuss options for his severe aortic stenosis.  Initially seen by me 6 months ago he had moderate to severe aortic stenosis at the time.  Repeat echocardiogram in March showed severe aortic stenosis.  He has chronic cough and mild fatigue.  Otherwise he feels well can able to carry on his usual activities without any significant limitations.      ROS    All other systems reviewed and negative.    Past Medical History:   Diagnosis Date   • Hyperlipidemia    • Hypertension    • Stented coronary artery 2008    3 vessel         Past Surgical History:   Procedure Laterality Date   • BONE SPUR EXCISION     • STAPEDECTOMY           Current Outpatient Medications   Medication Sig Dispense Refill   • lovastatin (MEVACOR) 20 MG Tab Take 1 Tab by mouth every day. 90 Tab 4   • amLODIPine (NORVASC) 5 MG Tab Take 1 Tab by mouth every day. At night 90 Tab 4   • Phenylephrine-Chlorphen-DM (ROBITUSSIN COUGH/ALLERGY PO) Take  by mouth at bedtime as needed.     • ibuprofen (MOTRIN) 200 MG Tab Take 200 mg by mouth every 6 hours as needed.     • aspirin (ASA) 81 MG Chew Tab chewable tablet Take 1 Tab by mouth every day. 100 Tab 11     No current facility-administered medications for this visit.          Allergies   Allergen Reactions   • Seasonal Cough and Runny Nose         Family History   Problem Relation Age of Onset   • Heart Disease Mother    • Hypertension Mother    • Diabetes Mother    • No Known Problems Father    • Heart Disease Maternal Uncle    • Psychiatric Illness  Paternal Uncle    • Diabetes Paternal Uncle    • Heart Disease Paternal Uncle          Social History     Socioeconomic History   • Marital status:      Spouse name: Not on file   • Number of children: 1   • Years of education: Not on file   • Highest education level: Not on file   Occupational History   • Not on file   Social Needs   • Financial resource strain: Not on file   • Food insecurity     Worry: Not on file     Inability: Not on file   • Transportation needs     Medical: Not on file     Non-medical: Not on file   Tobacco Use   • Smoking status: Current Every Day Smoker     Packs/day: 0.50     Years: 40.00     Pack years: 20.00     Types: Cigarettes   • Smokeless tobacco: Never Used   Substance and Sexual Activity   • Alcohol use: Yes     Alcohol/week: 0.0 oz     Drinks per session: 1 or 2   • Drug use: No   • Sexual activity: Not on file   Lifestyle   • Physical activity     Days per week: Not on file     Minutes per session: Not on file   • Stress: Not on file   Relationships   • Social connections     Talks on phone: Not on file     Gets together: Not on file     Attends Judaism service: Not on file     Active member of club or organization: Not on file     Attends meetings of clubs or organizations: Not on file     Relationship status: Not on file   • Intimate partner violence     Fear of current or ex partner: Not on file     Emotionally abused: Not on file     Physically abused: Not on file     Forced sexual activity: Not on file   Other Topics Concern   • Not on file   Social History Narrative   • Not on file         Physical Exam:  Ambulatory Vitals  There were no vitals taken for this visit.   Oxygen Therapy:     BP Readings from Last 4 Encounters:   04/01/20 142/70   12/19/19 110/60   12/02/19 138/76   09/19/19 154/62       Weight/BMI: There is no height or weight on file to calculate BMI.  Wt Readings from Last 4 Encounters:   04/01/20 59.4 kg (131 lb)   12/19/19 58.6 kg (129 lb 1.3 oz)     12/02/19 59.9 kg (132 lb)   09/19/19 60.5 kg (133 lb 4.3 oz)       General: Well appearing and in no apparent distress  Head: atrumatic  Eyes: No conjunctival pallor   ENT: normal external appearance of nose and ears  Neck: JVD absent, carotid bruits absent  Lungs: respiratory sounds  normal, additional breath sounds absent  Heart: Regular rhythm,   No palpable thrills on palpation,3 x 6 systolic murmur aortic area , no rubs,   Lower extremity edema absent.   Pedal pulses normal  Abdomen: soft, non tender, non distended.  Extremities/MSK: no clubbing, no cyanosis  Neurological: normal orientation, Gait normal   Psychiatric: Appropriate affect, intact judgement and insight  Skin: Warm extremities        Lab Data Review:  Lab Results   Component Value Date/Time    CHOLSTRLTOT 117 03/09/2020 01:50 PM    LDL 70 03/09/2020 01:50 PM    HDL 29 (A) 03/09/2020 01:50 PM    TRIGLYCERIDE 89 03/09/2020 01:50 PM       Lab Results   Component Value Date/Time    SODIUM 135 03/09/2020 01:50 PM    POTASSIUM 4.1 03/09/2020 01:50 PM    CHLORIDE 103 03/09/2020 01:50 PM    CO2 24 03/09/2020 01:50 PM    GLUCOSE 86 03/09/2020 01:50 PM    BUN 18 03/09/2020 01:50 PM    CREATININE 0.72 03/09/2020 01:50 PM     Lab Results   Component Value Date/Time    ALKPHOSPHAT 66 03/09/2020 01:50 PM    ASTSGOT 49 (H) 03/09/2020 01:50 PM    ALTSGPT 39 03/09/2020 01:50 PM    TBILIRUBIN 0.6 03/09/2020 01:50 PM      Lab Results   Component Value Date/Time    WBC 4.4 (L) 11/23/2016 07:56 AM     Cardiac Imaging and Procedures Review:    EKG dated 9/19 : My personal interpretation is sinus rhythm frequent PACs.     Echo dated 7/30/2019:   CONCLUSIONS  No prior study is available for comparison.   Normal left ventricular chamber size.  Left ventricular ejection fraction is visually estimated to be 65%.  Moderate aortic stenosis. Vmax is 3.8  m/s. Transvalvular gradients are   - Peak: 60 mmHg, Mean: 35 mmHg.  Mild to moderate aortic insufficiency.  Normal  inferior vena cava size and inspiratory collapse.    Echo 3/24/2020  CONCLUSIONS  Prior echo 19; compared to the report of the study done - there   has been progression to severe AS.   Normal left ventricular systolic function.  Left ventricular ejection fraction is visually estimated to be 60%.  Moderate to severe aortic stenosis.  Vmax is 3.99 m/s. Transvalvular gradients are - Peak: 64 mmHg, Mean: 39   mmHg.     Medical Decision Makin-year-old male patient with  1. Severe aortic stenosis, NYHA class II stage C  2.  Essential hypertension  3.  Hyperlipidemia  4.  COPD  5.  Active smoker  6.  Coronary artery disease with history of PCI, 3 stents in     Impression and Plan:  Patient is minimally symptomatic from his aortic stenosis.  We will proceed with work-up.  Depending upon his CT scans, angiogram we will decide surgical versus transcatheter aortic valve replacement.  Discussed in detail regarding treatment options, risks and benefits of each procedures.  Patient is in agreement.  He is minimally symptomatic/asymptomatic from aortic stenosis from history, we will reevaluate him in about 3 months and schedule appropriate procedure.      Luiz GUTIERREZ  Interventional cardiologist  [facility], [address]  [address] [address]  Phone: [phone]  Fax: [phone]

## 2020-04-02 ENCOUNTER — HOSPITAL ENCOUNTER (OUTPATIENT)
Dept: RADIOLOGY | Facility: MEDICAL CENTER | Age: 65
End: 2020-04-02
Attending: INTERNAL MEDICINE
Payer: COMMERCIAL

## 2020-04-02 DIAGNOSIS — Z01.810 PRE-OPERATIVE CARDIOVASCULAR EXAMINATION: ICD-10-CM

## 2020-04-02 DIAGNOSIS — R06.02 SHORTNESS OF BREATH: ICD-10-CM

## 2020-04-02 DIAGNOSIS — I35.0 MODERATE AORTIC STENOSIS: ICD-10-CM

## 2020-04-02 DIAGNOSIS — I35.0 SEVERE AORTIC STENOSIS: ICD-10-CM

## 2020-04-02 PROCEDURE — 71275 CT ANGIOGRAPHY CHEST: CPT

## 2020-04-02 PROCEDURE — 74174 CTA ABD&PLVS W/CONTRAST: CPT

## 2020-04-02 PROCEDURE — 700117 HCHG RX CONTRAST REV CODE 255: Performed by: INTERNAL MEDICINE

## 2020-04-02 RX ADMIN — IOHEXOL 100 ML: 350 INJECTION, SOLUTION INTRAVENOUS at 08:23

## 2020-04-07 NOTE — PROGRESS NOTES
Valve Program Functional Assessment: 20 pre-op    KCCQ12   1a) Showering/bathin  1b) Walking 1 block on ground: 5  1c) Hurrying or joggin  2) Swellin  3) Fatigue: 6  4) Shortness of breath: 7  5) Sleep sitting up: 5  6) Limited enjoyment of life: 5  7) Spend the rest of your life with HF: 1  8a) Hobbies, recreational activities:5  8b) Working or doing household chores:5  8c) Visiting family or friends: 5    5 meter walk test  1) __4.11____ s/5m  2) __4.16____ s/5m  3) __4.23____ s/5m     Strength   1) _22_____ kg  2) _22_____ kg  3) _18_____ kg    SCHILLING ADLs  Patient independently preforms...   - Bathing: Yes   - Dressing: Yes   - Toileting: Yes   - Transferring: Yes   - Continence: Yes   - Feeding: Yes     Living Situation  Patient lives:  with spouse    Mobility Aids   Patient uses:  none    Patients post operative goal: He would like to be able to continue routine daily activities.

## 2020-04-22 ENCOUNTER — TELEPHONE (OUTPATIENT)
Dept: MEDICAL GROUP | Facility: LAB | Age: 65
End: 2020-04-22

## 2020-04-22 NOTE — TELEPHONE ENCOUNTER
----- Message from [place] Dove, Med Ass't sent at 4/22/2020 10:48 AM PDT -----  CTA results sent to PCP Thelma ACEVEDO.

## 2020-04-22 NOTE — TELEPHONE ENCOUNTER
Please call Miguelito verma f/u CT scan for lung nodules in 6 months - please schedule appt for pt to come see me before this (approx 6 mo from now).  Thank you!

## 2020-04-30 ENCOUNTER — TELEPHONE (OUTPATIENT)
Dept: CARDIOLOGY | Facility: MEDICAL CENTER | Age: 65
End: 2020-04-30

## 2020-04-30 NOTE — TELEPHONE ENCOUNTER
Spoke with patient's wife regarding proceeding with scheduling pre-TAVR cardiac cath. Discussed details of procedure.     Patient's wife states she will need to discuss with patient and return call with decision. Reviewed that 5/8 is available for patient's cath. Provided direct contact information to myself, as well as [phone] for procedure . Patients wife states that if patient has questions regarding procedure she will have him call me, or if wanting to just schedule cath he will call procedure .

## 2020-05-01 NOTE — TELEPHONE ENCOUNTER
Patient is scheduled on 5- for a Pre TAVR angio with Dr. Dennis. No meds to stop and patient to check in at 6:00am for a 7:30 procedure. Updated H&P to be done on admit by NP. Pre admit to call patient.

## 2020-05-11 ENCOUNTER — HOSPITAL ENCOUNTER (OUTPATIENT)
Facility: MEDICAL CENTER | Age: 65
End: 2020-05-11
Attending: INTERNAL MEDICINE | Admitting: INTERNAL MEDICINE
Payer: COMMERCIAL

## 2020-05-14 DIAGNOSIS — Z00.6 EXAMINATION OF PARTICIPANT IN CLINICAL TRIAL: ICD-10-CM

## 2020-05-14 DIAGNOSIS — I35.0 SEVERE AORTIC STENOSIS: ICD-10-CM

## 2020-05-18 DIAGNOSIS — Z01.812 PRE-OPERATIVE LABORATORY EXAMINATION: ICD-10-CM

## 2020-05-18 LAB — COVID ORDER STATUS COVID19: NORMAL

## 2020-05-18 PROCEDURE — C9803 HOPD COVID-19 SPEC COLLECT: HCPCS

## 2020-05-19 DIAGNOSIS — Z01.812 PRE-OPERATIVE LABORATORY EXAMINATION: ICD-10-CM

## 2020-05-19 DIAGNOSIS — Z01.810 PRE-OPERATIVE CARDIOVASCULAR EXAMINATION: ICD-10-CM

## 2020-05-19 LAB
ABO GROUP BLD: NORMAL
ALBUMIN SERPL BCP-MCNC: 3.9 G/DL (ref 3.2–4.9)
ALBUMIN/GLOB SERPL: 0.9 G/DL
ALP SERPL-CCNC: 76 U/L (ref 30–99)
ALT SERPL-CCNC: 16 U/L (ref 2–50)
ANION GAP SERPL CALC-SCNC: 11 MMOL/L (ref 7–16)
APTT PPP: 30.4 SEC (ref 24.7–36)
AST SERPL-CCNC: 23 U/L (ref 12–45)
BASOPHILS # BLD AUTO: 2.9 % (ref 0–1.8)
BASOPHILS # BLD: 0.12 K/UL (ref 0–0.12)
BILIRUB SERPL-MCNC: 0.3 MG/DL (ref 0.1–1.5)
BLD GP AB SCN SERPL QL: NORMAL
BUN SERPL-MCNC: 28 MG/DL (ref 8–22)
CALCIUM SERPL-MCNC: 9.3 MG/DL (ref 8.5–10.5)
CHLORIDE SERPL-SCNC: 105 MMOL/L (ref 96–112)
CO2 SERPL-SCNC: 23 MMOL/L (ref 20–33)
CREAT SERPL-MCNC: 0.69 MG/DL (ref 0.5–1.4)
EKG IMPRESSION: NORMAL
EOSINOPHIL # BLD AUTO: 0.17 K/UL (ref 0–0.51)
EOSINOPHIL NFR BLD: 4.3 % (ref 0–6.9)
ERYTHROCYTE [DISTWIDTH] IN BLOOD BY AUTOMATED COUNT: 49.1 FL (ref 35.9–50)
GLOBULIN SER CALC-MCNC: 4.3 G/DL (ref 1.9–3.5)
GLUCOSE SERPL-MCNC: 103 MG/DL (ref 65–99)
HCT VFR BLD AUTO: 39.2 % (ref 42–52)
HGB BLD-MCNC: 12.5 G/DL (ref 14–18)
INR PPP: 1.06 (ref 0.87–1.13)
LYMPHOCYTES # BLD AUTO: 2.18 K/UL (ref 1–4.8)
LYMPHOCYTES NFR BLD: 54.5 % (ref 22–41)
MANUAL DIFF BLD: NORMAL
MCH RBC QN AUTO: 29.1 PG (ref 27–33)
MCHC RBC AUTO-ENTMCNC: 31.9 G/DL (ref 33.7–35.3)
MCV RBC AUTO: 91.2 FL (ref 81.4–97.8)
MONOCYTES # BLD AUTO: 0.65 K/UL (ref 0–0.85)
MONOCYTES NFR BLD AUTO: 16.3 % (ref 0–13.4)
MORPHOLOGY BLD-IMP: NORMAL
NEUTROPHILS # BLD AUTO: 0.88 K/UL (ref 1.82–7.42)
NEUTROPHILS NFR BLD: 22 % (ref 44–72)
NRBC # BLD AUTO: 0 K/UL
NRBC BLD-RTO: 0 /100 WBC
NT-PROBNP SERPL IA-MCNC: 659 PG/ML (ref 0–125)
PLATELET # BLD AUTO: 180 K/UL (ref 164–446)
PLATELET BLD QL SMEAR: NORMAL
PMV BLD AUTO: 10.6 FL (ref 9–12.9)
POTASSIUM SERPL-SCNC: 5.4 MMOL/L (ref 3.6–5.5)
PROT SERPL-MCNC: 8.2 G/DL (ref 6–8.2)
PROTHROMBIN TIME: 14 SEC (ref 12–14.6)
RBC # BLD AUTO: 4.3 M/UL (ref 4.7–6.1)
RBC BLD AUTO: NORMAL
RH BLD: NORMAL
SARS-COV-2 RNA RESP QL NAA+PROBE: NOT DETECTED
SODIUM SERPL-SCNC: 139 MMOL/L (ref 135–145)
SPECIMEN SOURCE: NORMAL
WBC # BLD AUTO: 4 K/UL (ref 4.8–10.8)

## 2020-05-19 PROCEDURE — 80053 COMPREHEN METABOLIC PANEL: CPT

## 2020-05-19 PROCEDURE — 85730 THROMBOPLASTIN TIME PARTIAL: CPT

## 2020-05-19 PROCEDURE — 85610 PROTHROMBIN TIME: CPT

## 2020-05-19 PROCEDURE — 83880 ASSAY OF NATRIURETIC PEPTIDE: CPT

## 2020-05-19 PROCEDURE — 86900 BLOOD TYPING SEROLOGIC ABO: CPT

## 2020-05-19 PROCEDURE — 85007 BL SMEAR W/DIFF WBC COUNT: CPT

## 2020-05-19 PROCEDURE — 86850 RBC ANTIBODY SCREEN: CPT

## 2020-05-19 PROCEDURE — 85027 COMPLETE CBC AUTOMATED: CPT

## 2020-05-19 PROCEDURE — 36415 COLL VENOUS BLD VENIPUNCTURE: CPT

## 2020-05-19 PROCEDURE — 93010 ELECTROCARDIOGRAM REPORT: CPT | Performed by: INTERNAL MEDICINE

## 2020-05-19 PROCEDURE — 93005 ELECTROCARDIOGRAM TRACING: CPT

## 2020-05-19 PROCEDURE — 86901 BLOOD TYPING SEROLOGIC RH(D): CPT

## 2020-05-19 RX ORDER — AMOXICILLIN AND CLAVULANATE POTASSIUM 875; 125 MG/1; MG/1
1 TABLET, FILM COATED ORAL 2 TIMES DAILY
Status: ON HOLD | COMMUNITY
End: 2020-06-22

## 2020-05-19 NOTE — OR NURSING
Erin at [facility] notified that patient had dental extractions on 4/22/20 and is on second Rx of Amoxicillin from dentist.  Erin said that she would inform  Dr Dennis of the above

## 2020-05-20 ENCOUNTER — TELEPHONE (OUTPATIENT)
Dept: CARDIOLOGY | Facility: MEDICAL CENTER | Age: 65
End: 2020-05-20

## 2020-05-20 NOTE — TELEPHONE ENCOUNTER
Spoke with patient's wife who has questions about upcoming cardiac catheterization and TAVR.     She reviews that patient is scheduled for cardiac cath 5/22, and wants to confirm that she is not allowed to accompany patient to such procedure. Explained that due to the [facility] limit the spread of [facility], there is currently a restriction on all visitors within [facility] at this time.     Reviewed the expected length of time procedure and recovery may take for cardiac cath, and assured that a staff member will call wife with anticipated time patient may be available to be picked up after procedure.     Wife also has questions regarding the insurance denial she received for patients TAVR. Explained that per our authorization team, insurance denial was received with request for more information, including the name of the specific device that is anticipated to be used. Assured that such details were sent to authorizations staff, and insurance approval request has been resubmitted. Assured wife that staff will keep her updated with any new information received regarding insurance authorization for TAVR.     Explained also that once insurance approval received, our heart team will review medical records and confirm date for procedure. Explained that as of now, the tentative TAVR date is arranged for June 1, 2020. Assured that once procedure approved and date confirmed, staff will return call with confirmations and pre-op instruction review.    Wife states understanding of all information, states no further questions at this time, and very thankful for assistance today.

## 2020-05-21 ENCOUNTER — TELEPHONE (OUTPATIENT)
Dept: CARDIOLOGY | Facility: MEDICAL CENTER | Age: 65
End: 2020-05-21

## 2020-05-21 NOTE — OR NURSING
COVID-19 Pre-surgery screenin. Do you have an undiagnosed respiratory illness or symptoms such as coughing or sneezing?   a. Onset of Sx   b. Acute vs. chronic respiratory illness    NO     2. Do you have an unexplained fever greater than 100.4 degrees Fahrenheit or 38 degrees Celsius?                    NO     3. Have you had direct exposure to a patient who tested positive for Covid-19?                          NO      4. Have you traveled outside of [place] within the last 14 days?                     NO     Informed of no visitor policy

## 2020-05-21 NOTE — TELEPHONE ENCOUNTER
Left voice message that due to insurance authorization request potentially taking 14 business days, potential TAVR date has been postponed to 6/22/20. Assured that we will update promptly with any new information received. Provided direct contact information and encouraged return call if any questions.

## 2020-05-22 ENCOUNTER — APPOINTMENT (OUTPATIENT)
Dept: CARDIOLOGY | Facility: MEDICAL CENTER | Age: 65
End: 2020-05-22
Attending: INTERNAL MEDICINE
Payer: COMMERCIAL

## 2020-05-22 ENCOUNTER — HOSPITAL ENCOUNTER (OUTPATIENT)
Facility: MEDICAL CENTER | Age: 65
End: 2020-05-22
Attending: INTERNAL MEDICINE | Admitting: INTERNAL MEDICINE
Payer: COMMERCIAL

## 2020-05-22 VITALS
TEMPERATURE: 98.1 F | DIASTOLIC BLOOD PRESSURE: 83 MMHG | SYSTOLIC BLOOD PRESSURE: 163 MMHG | WEIGHT: 128.97 LBS | HEART RATE: 69 BPM | HEIGHT: 66 IN | RESPIRATION RATE: 16 BRPM | BODY MASS INDEX: 20.73 KG/M2 | OXYGEN SATURATION: 95 %

## 2020-05-22 DIAGNOSIS — I35.0 SEVERE AORTIC STENOSIS: ICD-10-CM

## 2020-05-22 DIAGNOSIS — Z01.810 PRE-OPERATIVE CARDIOVASCULAR EXAMINATION: ICD-10-CM

## 2020-05-22 PROCEDURE — 160002 HCHG RECOVERY MINUTES (STAT)

## 2020-05-22 PROCEDURE — 700101 HCHG RX REV CODE 250

## 2020-05-22 PROCEDURE — 700117 HCHG RX CONTRAST REV CODE 255: Performed by: INTERNAL MEDICINE

## 2020-05-22 PROCEDURE — 93454 CORONARY ARTERY ANGIO S&I: CPT | Mod: 26 | Performed by: INTERNAL MEDICINE

## 2020-05-22 PROCEDURE — 75630 X-RAY AORTA LEG ARTERIES: CPT | Mod: 26,59 | Performed by: INTERNAL MEDICINE

## 2020-05-22 PROCEDURE — 700111 HCHG RX REV CODE 636 W/ 250 OVERRIDE (IP)

## 2020-05-22 PROCEDURE — 99152 MOD SED SAME PHYS/QHP 5/>YRS: CPT

## 2020-05-22 PROCEDURE — 99152 MOD SED SAME PHYS/QHP 5/>YRS: CPT | Performed by: INTERNAL MEDICINE

## 2020-05-22 PROCEDURE — 700105 HCHG RX REV CODE 258: Performed by: INTERNAL MEDICINE

## 2020-05-22 RX ORDER — LIDOCAINE HYDROCHLORIDE 20 MG/ML
INJECTION, SOLUTION INFILTRATION; PERINEURAL
Status: COMPLETED
Start: 2020-05-22 | End: 2020-05-22

## 2020-05-22 RX ORDER — MIDAZOLAM HYDROCHLORIDE 1 MG/ML
INJECTION INTRAMUSCULAR; INTRAVENOUS
Status: COMPLETED
Start: 2020-05-22 | End: 2020-05-22

## 2020-05-22 RX ORDER — HEPARIN SODIUM 200 [USP'U]/100ML
INJECTION, SOLUTION INTRAVENOUS
Status: COMPLETED
Start: 2020-05-22 | End: 2020-05-22

## 2020-05-22 RX ORDER — HEPARIN SODIUM,PORCINE 1000/ML
VIAL (ML) INJECTION
Status: COMPLETED
Start: 2020-05-22 | End: 2020-05-22

## 2020-05-22 RX ORDER — VERAPAMIL HYDROCHLORIDE 2.5 MG/ML
INJECTION, SOLUTION INTRAVENOUS
Status: COMPLETED
Start: 2020-05-22 | End: 2020-05-22

## 2020-05-22 RX ORDER — SODIUM CHLORIDE 9 MG/ML
INJECTION, SOLUTION INTRAVENOUS
Status: DISCONTINUED | OUTPATIENT
Start: 2020-05-22 | End: 2020-05-22 | Stop reason: HOSPADM

## 2020-05-22 RX ADMIN — NITROGLYCERIN: 20 INJECTION INTRAVENOUS at 07:30

## 2020-05-22 RX ADMIN — LIDOCAINE HYDROCHLORIDE: 20 INJECTION, SOLUTION INFILTRATION; PERINEURAL at 08:07

## 2020-05-22 RX ADMIN — HEPARIN SODIUM: 1000 INJECTION, SOLUTION INTRAVENOUS; SUBCUTANEOUS at 08:07

## 2020-05-22 RX ADMIN — FENTANYL CITRATE 100 MCG: 0.05 INJECTION, SOLUTION INTRAMUSCULAR; INTRAVENOUS at 08:14

## 2020-05-22 RX ADMIN — SODIUM CHLORIDE: 9 INJECTION, SOLUTION INTRAVENOUS at 06:21

## 2020-05-22 RX ADMIN — HEPARIN SODIUM 2000 UNITS: 200 INJECTION, SOLUTION INTRAVENOUS at 08:12

## 2020-05-22 RX ADMIN — VERAPAMIL HYDROCHLORIDE 2.5 MG: 2.5 INJECTION, SOLUTION INTRAVENOUS at 08:07

## 2020-05-22 RX ADMIN — IOHEXOL 63 ML: 350 INJECTION, SOLUTION INTRAVENOUS at 08:20

## 2020-05-22 RX ADMIN — MIDAZOLAM HYDROCHLORIDE 2 MG: 1 INJECTION, SOLUTION INTRAMUSCULAR; INTRAVENOUS at 08:09

## 2020-05-22 ASSESSMENT — FIBROSIS 4 INDEX: FIB4 SCORE: 2.14

## 2020-05-22 ASSESSMENT — ENCOUNTER SYMPTOMS
CLAUDICATION: 0
ORTHOPNEA: 0
COUGH: 0
PALPITATIONS: 0
PND: 0

## 2020-05-22 NOTE — PROCEDURES
Cardiac Catheterization report    5/22/2020  8:36 AM    Primary Care Provider: MARIA GUADALUPE Sims    Indication for procedure: Severe valvular heart disease    Procedures performed:  ·  Coronary arteriograms  · Abdominal aortogram with iliofemoral run off     Final impression:  Non-obstructive coronary artery disease  Mild disease in iliac arteries.    Recommendations:  Guideline directed medical therapy   Cardiovascular Risk factor modification    Findings:  1.  Left main coronary artery:  Distal left main has 10-20% disease.  2.  Left anterior descending artery:  Prior stent widely patent, no significant disease in LAD. Major diagonal has 40-50% stenosis in proximal portion.  3.  Left circumflex coronary artery:  Normal. Gives One marginal branch, which has no significant disease   4.  Right coronary artery: 50% disease in proximal portion, Mid RCA has two stents with 30-40% instent restenosis.  This is a right dominant system.    Abdominal aortogram showed mild disease bilateral external iliac arteries.      EBL: <10 CC    Specimens: None    Procedure details:  The risks and benefits of cardiac catheterization and possible intervention were explained to the patient including death, heart attack, stroke, and emergency surgery.  The patient verbalized understanding and wished to proceed.  The patient was brought to the cardiac catheterization laboratory in the fasting state and prepped and draped in the usual sterile fashion.  The right wrist was locally anesthetized with lidocaine and the right radial artery was cannulated with 5/6-Danish equipment and standard radial cocktail was given.  Coronary angiography was performed using JR 4 and JL 3.5  diagnostic catheters in the usual fashion, results mentioned below.  Pig tail catheter was used to perform abdominal aortogram with iliofemoral run off.    Once all the views were obtained, all wires and catheters were removed from the patient without difficulty.   A Vasc-Band was placed over the right radial artery and the radial artery sheath was removed without difficulty.      Complications:  None     Sedation time:  I supervised moderate sedation over a trained independent nursing staff,  Sedation Start time: 08:00     Sedation Stop time: 08:19      MATT Juarez  [facility]

## 2020-05-22 NOTE — H&P
Physician H&P    Patient ID:  Magalis Kumari  [id number]  65 y.o. male  1955    History:  Primary Diagnosis: Patient presents to undergo elective coronary angiography for pre-TAVR work-up.    HPI:      65-year-old male with history of CAD status post PCI 2008, hypertension, peripheral vascular disease, hyperlipidemia, COPD, active smoker with severe aortic stenosis presents to undergo coronary angiography for pre-TAVR work-up.      Past Medical History:  has a past medical history of Dental disorder, Dental disorder, Heart valve disease, High cholesterol, Hyperlipidemia, Hypertension, and Stented coronary artery (2008).  Past Surgical History:  has a past surgical history that includes stapedectomy; bone spur excision (1993); and other cardiac surgery (2009).  Past Social History:  reports that he has been smoking cigarettes. He has a 20.00 pack-year smoking history. He has never used smokeless tobacco. He reports current alcohol use. He reports that he does not use drugs.  Past Family History:   Family History   Problem Relation Age of Onset   • Heart Disease Mother    • Hypertension Mother    • Diabetes Mother    • No Known Problems Father    • Heart Disease Maternal Uncle    • Psychiatric Illness Paternal Uncle    • Diabetes Paternal Uncle    • Heart Disease Paternal Uncle      Allergies: Seasonal    Current Medications:  Prior to Admission medications    Medication Sig Start Date End Date Taking? Authorizing Provider   amoxicillin-clavulanate (AUGMENTIN) 875-125 MG Tab Take 1 Tab by mouth 2 times a day. Oral surgery    Physician Outpatient   lovastatin (MEVACOR) 20 MG Tab Take 1 Tab by mouth every day. 3/30/20   Thelma Sanchez, A.P.N.   amLODIPine (NORVASC) 5 MG Tab Take 1 Tab by mouth every day. At night 3/30/20   Thelma Sanchez, A.P.N.   aspirin (ASA) 81 MG Chew Tab chewable tablet Take 1 Tab by mouth every day. 11/22/16   Sami Solares M.D.       Review of Systems:  Review of Systems  "  Respiratory: Negative for cough.    Cardiovascular: Negative for chest pain, palpitations, orthopnea, claudication, leg swelling and PND.     /61   Pulse 64   Temp 36.7 °C (98 °F) (Temporal)   Resp 17   Ht 1.676 m (5' 6\")   Wt 58.5 kg (128 lb 15.5 oz)   SpO2 94%     Physical Examination:  Physical Exam  Cardiovascular:      Rate and Rhythm: Normal rate and regular rhythm.      Pulses: Normal pulses.      Heart sounds: Murmur present. Systolic murmur present.   Pulmonary:      Effort: Pulmonary effort is normal.   Abdominal:      General: Abdomen is flat.   Skin:     General: Skin is warm.   Neurological:      General: No focal deficit present.      Mental Status: He is alert.   Psychiatric:         Mood and Affect: Mood normal.         Impression:    Severe aortic stenosis, NYHA class II stage C  Essential hypertension  Hyperlipidemia  COPD  Active smoker  CAD with history of PCI stents x 3 2008      Plan:      Pre TAVR cardiac cath planned for 5/22/2020    No contrast allergy    N.p.o.    The risks, benefits, and alternatives to coronary angiography with IV sedation were discussed in great detail. Specific risks mentioned include bleeding, infection, kidney damage, allergic reaction, cardiac perforation with possible tamponade requiring pericardiocentesis or possibly open heart surgery. In addition, we discussed that 10% of patients will experience small to moderate bruising at the site of the arterial puncture. Lastly, the risks of heart attack, stroke and death were discussed; the risk of major complications such as heart attack or stroke caused by the angiogram is approximately 1%; the risk of death is approximately 1 in 1000. The patient verbalized understanding of the potential complications and wishes to proceed with this procedure.    PRABHAKAR DiazNKym  5/22/2020  "

## 2020-05-22 NOTE — DISCHARGE INSTRUCTIONS
ACTIVITY: Rest and take it easy for the first 24 hours.  A responsible adult is recommended to remain with you during that time.  It is normal to feel sleepy.  We encourage you to not do anything that requires balance, judgment or coordination.    MILD FLU-LIKE SYMPTOMS ARE NORMAL. YOU MAY EXPERIENCE GENERALIZED MUSCLE ACHES, THROAT IRRITATION, HEADACHE AND/OR SOME NAUSEA.    FOR 24 HOURS DO NOT:  Drive, operate machinery or run household appliances.  Drink beer or alcoholic beverages.   Make important decisions or sign legal documents.    SPECIAL INSTRUCTIONS:       · Do not subject hand/arm to any forceful movements for 24 hours    i.e. supporting weight when rising from the chair or bed.   · Do not drive a car for 24 hours  · You may remove the dressing tomorrow  · You may shower on the day following your procedure.  Do not take a tub bath or submerge the puncture site in water for 3 days following the procedure.  · No Lifting more than 3-5 pounds with affected wrist for 5 days  · Follow up with Dr. Sin*  2-4 weeks.  · Increase fluids for 2 days post procedure.  · Continue all previous medications unless otherwise instructed.    If bleeding should occur following discharge:  · Sit down and apply firm pressure to site with your fingers for 10 minutes  · If the bleeding stops, continue to sit quietly, keeping your wrist straight for 2 hours.  Notify physician as soon as possible ( [phone])  · If bleeding does not stop after 10 minutes, or if there is a large amount of bleeding or spurting, call 911 immediately.  Do not drive yourself to the hospital.  Post Angiogram Groin Care Instructions     INSTRUCTIONS  2. Examine (look and feel) the site of your incision site TODAY so you can recognize changes that should be called to your doctor (see below).  3. Avoid straining either by lifting or pulling objects for 4-5 days. Avoid lifting over 5 pounds.   4. For at least 72 hours, if you should sneeze or  "cough, please hold pressure over your groin area.  5. If you should begin to have oozing from the catheterization site, please hold firm pressure and call your doctor's office immediately.  6. If profuse bleeding occurs from the catheterization site, hold firm pressure and call \"[phone]\" immediately for assistance.  7. Remove bandage after 24 hours.     ACTIVITY  2. Limit activity as instructed by your doctor.  3. No driving or very limited driving with frequent stops for one week.   4. If you must take a long car ride, stop every hour and walk around the car.   5. Warm showers or baths are permitted after the bandage is removed. Avoid hot showers, baths, hot tubs, and swimming for one week.    PLEASE CALL YOUR DOCTOR IF:  2. Temperature elevation occurs.  3. Catheterization site becomes reddened or begins to drain.   4. Bruising appears to be new or not resolving. The bruise may move down your leg. This is normal.  5. The small round lump in the groin increases in size.  6. Any leg numbness, aching, or discomfort (immediately).  7. Increasing discomfort in the leg at the insertion site.  8. Chest pains, even if relieved by Nitroglycerin.    MISCELLANEOUS INSTRUCTIONS  2. Bruising may occur as a result of heart catheterization. Some of the discoloration may travel down the leg, going from blue to green in color.  3. A small round lump under the catheterization site will remain for up to six weeks.  4. If any questions arise call your physician's office. You can also call the [employer] HOTLINE open 24 hours/day, 7 days/week and speak to a nurse at [phone], or toll free at [phone].   5. You should call 911 if you develop problems with breathing or chest pain.      DIET: To avoid nausea, slowly advance diet as tolerated, avoiding spicy or greasy foods for the first day.  Add more substantial food to your diet according to your physician's instructions.  Babies can be fed formula or breast milk as soon as they " are hungry.  INCREASE FLUIDS AND FIBER TO AVOID CONSTIPATION.    SURGICAL DRESSING/BATHING: Keep dressing dry for 24 hours. May remove dressing and shower after 11:00 AM on 5/23, do not need to replace dressing. Do not submerge in water or bathe for 5 days.    FOLLOW-UP APPOINTMENT:  A follow-up appointment should be arranged with your doctor; call to schedule.    You should CALL YOUR PHYSICIAN if you develop:  Fever greater than 101 degrees F.  Pain not relieved by medication, or persistent nausea or vomiting.  Excessive bleeding (blood soaking through dressing) or unexpected drainage from the wound.  Extreme redness or swelling around the incision site, drainage of pus or foul smelling drainage.  Inability to urinate or empty your bladder within 8 hours.  Problems with breathing or chest pain.    You should call 911 if you develop problems with breathing or chest pain.  If you are unable to contact your doctor or surgical center, you should go to the nearest emergency room or urgent care center.  Physician's telephone #: [phone]    If any questions arise, call your doctor.  If your doctor is not available, please feel free to call the Surgical Center at [phone].  The Center is open Monday through Friday from 7AM to 7PM.  You can also call the [employer] HOTLINE open 24 hours/day, 7 days/week and speak to a nurse at [phone], or toll free at [phone].    A registered nurse may call you a few days after your surgery to see how you are doing after your procedure.    MEDICATIONS: Resume taking daily medication.  Take prescribed pain medication with food.  If no medication is prescribed, you may take non-aspirin pain medication if needed.  PAIN MEDICATION CAN BE VERY CONSTIPATING.  Take a stool softener or laxative such as senokot, pericolace, or milk of magnesia if needed.    If your physician has prescribed pain medication that includes Acetaminophen (Tylenol), do not take additional Acetaminophen  (Tylenol) while taking the prescribed medication.    Depression / Suicide Risk    As you are discharged from this [facility] facility, it is important to learn how to keep safe from harming yourself.    Recognize the warning signs:  · Abrupt changes in personality, positive or negative- including increase in energy   · Giving away possessions  · Change in eating patterns- significant weight changes-  positive or negative  · Change in sleeping patterns- unable to sleep or sleeping all the time   · Unwillingness or inability to communicate  · Depression  · Unusual sadness, discouragement and loneliness  · Talk of wanting to die  · Neglect of personal appearance   · Rebelliousness- reckless behavior  · Withdrawal from people/activities they love  · Confusion- inability to concentrate     If you or a loved one observes any of these behaviors or has concerns about self-harm, here's what you can do:  · Talk about it- your feelings and reasons for harming yourself  · Remove any means that you might use to hurt yourself (examples: pills, rope, extension cords, firearm)  · Get professional help from the community (Mental Health, Substance Abuse, psychological counseling)  · Do not be alone:Call your Safe Contact- someone whom you trust who will be there for you.  · Call your local CRISIS HOTLINE 621-1519 or 300-054-9093  · Call your local Children's Mobile Crisis Response Team [place] (645) 135-2396 or www.Plan [employer] Drink  · Call the toll free National Suicide Prevention Hotlines   · National Suicide Prevention Lifeline 231-434-TDHK (4666)  National Hope Line Network 800-SUICIDE (642-1104)Radial Catherization Discharge Instructions

## 2020-05-22 NOTE — OR NURSING
0835: Patient arrived from cath lab s/p left heart cath with RN. Right radial access site; clean, dry, and soft. TR band in place on right wrist. Patient is alert and awake. Updated on POC. Patient tolerating PO intake.    0900: Spouse updated on POC via telephone.    1010: Air completely off TR band with no bleeding or hematoma. TR band removed and replaced with tegaderm and gauze dressing. Site remains clean, dry, and soft.    1025: Handoff report given to HEIDI Wilson. Patient transferred to Phase II. Right radial site remains clean, dry, and soft.

## 2020-05-22 NOTE — OR NURSING
Pt verbalizes readiness for discharge. Instructions reviewed with pt. Pt walked to ride with RN, refused wheelchair. Steady gait. No further needs.

## 2020-05-26 ENCOUNTER — TELEPHONE (OUTPATIENT)
Dept: CARDIOLOGY | Facility: MEDICAL CENTER | Age: 65
End: 2020-05-26

## 2020-05-26 NOTE — TELEPHONE ENCOUNTER
Received a voice message from patient's wife Christine stating that patient's insurance has provided approval for planned TAVR.     Spoke with patient reviewing that our heart team will review his case tomorrow 5/27 at valve conference to determine confirmation for TAVR procedure date.     Additionally, reviewed that COVID-19 testing, may be required to be repeated, as it is needed 5 days prior to procedure.     Assured that we will have confirmation and details when this RN returns call tomorrow 5/27.     Patient states understanding, agrees with the plan, and very thankful for reassurance today.

## 2020-05-27 ENCOUNTER — TELEPHONE (OUTPATIENT)
Dept: CARDIOLOGY | Facility: MEDICAL CENTER | Age: 65
End: 2020-05-27

## 2020-05-27 NOTE — TELEPHONE ENCOUNTER
Spoke with patient explaining that procedure has been postponed until June 22, 2020.     Assured patient that this RN will be contacting to review pre-op instructions and procedure details prior to rescheduled procedure date.     Patient states understanding, agrees with the plan, and thankful for update.

## 2020-06-03 DIAGNOSIS — Z00.6 EXAMINATION OF PARTICIPANT IN CLINICAL TRIAL: ICD-10-CM

## 2020-06-03 DIAGNOSIS — I35.0 SEVERE AORTIC STENOSIS: ICD-10-CM

## 2020-06-10 ENCOUNTER — DOCUMENTATION (OUTPATIENT)
Dept: CARDIOLOGY | Facility: MEDICAL CENTER | Age: 65
End: 2020-06-10

## 2020-06-17 ENCOUNTER — OFFICE VISIT (OUTPATIENT)
Dept: ADMISSIONS | Facility: MEDICAL CENTER | Age: 65
DRG: 267 | End: 2020-06-17
Attending: INTERNAL MEDICINE
Payer: COMMERCIAL

## 2020-06-17 DIAGNOSIS — Z01.812 PRE-OPERATIVE LABORATORY EXAMINATION: ICD-10-CM

## 2020-06-17 LAB
ABO GROUP BLD: NORMAL
ALBUMIN SERPL BCP-MCNC: 3.9 G/DL (ref 3.2–4.9)
ALBUMIN/GLOB SERPL: 0.9 G/DL
ALP SERPL-CCNC: 88 U/L (ref 30–99)
ALT SERPL-CCNC: 20 U/L (ref 2–50)
ANION GAP SERPL CALC-SCNC: 8 MMOL/L (ref 7–16)
ANISOCYTOSIS BLD QL SMEAR: ABNORMAL
APTT PPP: 32.7 SEC (ref 24.7–36)
AST SERPL-CCNC: 29 U/L (ref 12–45)
BASO STIPL BLD QL SMEAR: NORMAL
BASOPHILS # BLD AUTO: 1 % (ref 0–1.8)
BASOPHILS # BLD: 0.04 K/UL (ref 0–0.12)
BILIRUB SERPL-MCNC: 0.3 MG/DL (ref 0.1–1.5)
BLD GP AB SCN SERPL QL: NORMAL
BUN SERPL-MCNC: 19 MG/DL (ref 8–22)
CALCIUM SERPL-MCNC: 8.9 MG/DL (ref 8.5–10.5)
CHLORIDE SERPL-SCNC: 100 MMOL/L (ref 96–112)
CO2 SERPL-SCNC: 23 MMOL/L (ref 20–33)
COVID ORDER STATUS COVID19: NORMAL
CREAT SERPL-MCNC: 0.62 MG/DL (ref 0.5–1.4)
DACRYOCYTES BLD QL SMEAR: NORMAL
EOSINOPHIL # BLD AUTO: 0.11 K/UL (ref 0–0.51)
EOSINOPHIL NFR BLD: 3 % (ref 0–6.9)
ERYTHROCYTE [DISTWIDTH] IN BLOOD BY AUTOMATED COUNT: 44.7 FL (ref 35.9–50)
GLOBULIN SER CALC-MCNC: 4.3 G/DL (ref 1.9–3.5)
GLUCOSE SERPL-MCNC: 98 MG/DL (ref 65–99)
HCT VFR BLD AUTO: 40.8 % (ref 42–52)
HGB BLD-MCNC: 13.1 G/DL (ref 14–18)
INR PPP: 1.03 (ref 0.87–1.13)
LYMPHOCYTES # BLD AUTO: 2.23 K/UL (ref 1–4.8)
LYMPHOCYTES NFR BLD: 62 % (ref 22–41)
MACROCYTES BLD QL SMEAR: ABNORMAL
MANUAL DIFF BLD: NORMAL
MCH RBC QN AUTO: 28.4 PG (ref 27–33)
MCHC RBC AUTO-ENTMCNC: 32.1 G/DL (ref 33.7–35.3)
MCV RBC AUTO: 88.3 FL (ref 81.4–97.8)
MONOCYTES # BLD AUTO: 0.86 K/UL (ref 0–0.85)
MONOCYTES NFR BLD AUTO: 24 % (ref 0–13.4)
MORPHOLOGY BLD-IMP: NORMAL
NEUTROPHILS # BLD AUTO: 0.36 K/UL (ref 1.82–7.42)
NEUTROPHILS NFR BLD: 10 % (ref 44–72)
NRBC # BLD AUTO: 0 K/UL
NRBC BLD-RTO: 0 /100 WBC
NT-PROBNP SERPL IA-MCNC: 730 PG/ML (ref 0–125)
OVALOCYTES BLD QL SMEAR: NORMAL
PLATELET # BLD AUTO: 135 K/UL (ref 164–446)
PLATELET BLD QL SMEAR: NORMAL
PMV BLD AUTO: 9.8 FL (ref 9–12.9)
POIKILOCYTOSIS BLD QL SMEAR: NORMAL
POLYCHROMASIA BLD QL SMEAR: NORMAL
POTASSIUM SERPL-SCNC: 4 MMOL/L (ref 3.6–5.5)
PROT SERPL-MCNC: 8.2 G/DL (ref 6–8.2)
PROTHROMBIN TIME: 13.8 SEC (ref 12–14.6)
RBC # BLD AUTO: 4.62 M/UL (ref 4.7–6.1)
RBC BLD AUTO: PRESENT
RH BLD: NORMAL
SODIUM SERPL-SCNC: 131 MMOL/L (ref 135–145)
WBC # BLD AUTO: 3.6 K/UL (ref 4.8–10.8)

## 2020-06-17 PROCEDURE — 85730 THROMBOPLASTIN TIME PARTIAL: CPT

## 2020-06-17 PROCEDURE — 80053 COMPREHEN METABOLIC PANEL: CPT

## 2020-06-17 PROCEDURE — 86901 BLOOD TYPING SEROLOGIC RH(D): CPT

## 2020-06-17 PROCEDURE — 85007 BL SMEAR W/DIFF WBC COUNT: CPT

## 2020-06-17 PROCEDURE — 86900 BLOOD TYPING SEROLOGIC ABO: CPT

## 2020-06-17 PROCEDURE — 86850 RBC ANTIBODY SCREEN: CPT

## 2020-06-17 PROCEDURE — 83880 ASSAY OF NATRIURETIC PEPTIDE: CPT

## 2020-06-17 PROCEDURE — C9803 HOPD COVID-19 SPEC COLLECT: HCPCS

## 2020-06-17 PROCEDURE — 36415 COLL VENOUS BLD VENIPUNCTURE: CPT

## 2020-06-17 PROCEDURE — 85027 COMPLETE CBC AUTOMATED: CPT

## 2020-06-17 PROCEDURE — U0003 INFECTIOUS AGENT DETECTION BY NUCLEIC ACID (DNA OR RNA); SEVERE ACUTE RESPIRATORY SYNDROME CORONAVIRUS 2 (SARS-COV-2) (CORONAVIRUS DISEASE [COVID-19]), AMPLIFIED PROBE TECHNIQUE, MAKING USE OF HIGH THROUGHPUT TECHNOLOGIES AS DESCRIBED BY CMS-2020-01-R: HCPCS

## 2020-06-17 PROCEDURE — 85610 PROTHROMBIN TIME: CPT

## 2020-06-17 ASSESSMENT — FIBROSIS 4 INDEX: FIB4 SCORE: 2.14

## 2020-06-18 ENCOUNTER — DOCUMENTATION (OUTPATIENT)
Dept: CARDIOLOGY | Facility: MEDICAL CENTER | Age: 65
End: 2020-06-18

## 2020-06-18 LAB
SARS-COV-2 RNA RESP QL NAA+PROBE: NOTDETECTED
SPECIMEN SOURCE: NORMAL

## 2020-06-18 NOTE — PROGRESS NOTES
Valve Conference Heart Team Plan of Care: 6/17/20    Valve candidate: Yes  With Possible open heart: Yes  Access: right iliac  Valve Size: 26 + S3 ultra  Cerebral Protection: none  Anesthesia: GA  Unit: Tele  Incidental findings: none  Clearance needed prior to procedure: none      Plan of care: Proceed with TAVR as planned 6/22/20      Left voice message confirming TAVR 6/22/20. Reviewed patient to be NPO midnight prior and check in 0730. Provided direct contact information and encouraged return call to discuss in detail.

## 2020-06-19 ENCOUNTER — TELEPHONE (OUTPATIENT)
Dept: CARDIOLOGY | Facility: MEDICAL CENTER | Age: 65
End: 2020-06-19

## 2020-06-19 NOTE — TELEPHONE ENCOUNTER
Spoke with patient regarding upcoming TAVR.     Reviewed pre-op instructions in detail including NPO midnight prior and check in 0730 [facility] Surgery Check in.     Reviewed lab results as well, and assured patient no changes to current medical management recommended at this time, however this may be reviewed again during or after hospitalization.     Patient reads back all information, states understanding, state no further questions at this time, and thankful for assistance today.

## 2020-06-22 ENCOUNTER — APPOINTMENT (OUTPATIENT)
Dept: RADIOLOGY | Facility: MEDICAL CENTER | Age: 65
DRG: 267 | End: 2020-06-22
Attending: NURSE PRACTITIONER
Payer: COMMERCIAL

## 2020-06-22 ENCOUNTER — APPOINTMENT (OUTPATIENT)
Dept: CARDIOLOGY | Facility: MEDICAL CENTER | Age: 65
DRG: 267 | End: 2020-06-22
Attending: ANESTHESIOLOGY
Payer: COMMERCIAL

## 2020-06-22 ENCOUNTER — ANESTHESIA EVENT (OUTPATIENT)
Dept: SURGERY | Facility: MEDICAL CENTER | Age: 65
DRG: 267 | End: 2020-06-22
Payer: COMMERCIAL

## 2020-06-22 ENCOUNTER — HOSPITAL ENCOUNTER (INPATIENT)
Facility: MEDICAL CENTER | Age: 65
LOS: 1 days | DRG: 267 | End: 2020-06-23
Attending: INTERNAL MEDICINE | Admitting: INTERNAL MEDICINE
Payer: COMMERCIAL

## 2020-06-22 ENCOUNTER — ANESTHESIA (OUTPATIENT)
Dept: SURGERY | Facility: MEDICAL CENTER | Age: 65
DRG: 267 | End: 2020-06-22
Payer: COMMERCIAL

## 2020-06-22 DIAGNOSIS — Z95.2 S/P TAVR (TRANSCATHETER AORTIC VALVE REPLACEMENT): ICD-10-CM

## 2020-06-22 PROBLEM — R91.1 PULMONARY NODULE: Status: ACTIVE | Noted: 2020-06-22

## 2020-06-22 PROBLEM — K40.90 NON-RECURRENT UNILATERAL INGUINAL HERNIA: Status: ACTIVE | Noted: 2020-06-22

## 2020-06-22 LAB
ACT BLD: 131 SEC (ref 74–137)
ACT BLD: 301 SEC (ref 74–137)
ALBUMIN SERPL BCP-MCNC: 3.7 G/DL (ref 3.2–4.9)
ALBUMIN/GLOB SERPL: 0.9 G/DL
ALP SERPL-CCNC: 75 U/L (ref 30–99)
ALT SERPL-CCNC: 19 U/L (ref 2–50)
ANION GAP SERPL CALC-SCNC: 15 MMOL/L (ref 7–16)
APTT PPP: 31.1 SEC (ref 24.7–36)
AST SERPL-CCNC: 58 U/L (ref 12–45)
BILIRUB SERPL-MCNC: 0.5 MG/DL (ref 0.1–1.5)
BUN SERPL-MCNC: 16 MG/DL (ref 8–22)
CALCIUM SERPL-MCNC: 9 MG/DL (ref 8.5–10.5)
CHLORIDE SERPL-SCNC: 98 MMOL/L (ref 96–112)
CO2 SERPL-SCNC: 21 MMOL/L (ref 20–33)
CREAT SERPL-MCNC: 0.64 MG/DL (ref 0.5–1.4)
EKG IMPRESSION: NORMAL
ERYTHROCYTE [DISTWIDTH] IN BLOOD BY AUTOMATED COUNT: 43.6 FL (ref 35.9–50)
GLOBULIN SER CALC-MCNC: 4 G/DL (ref 1.9–3.5)
GLUCOSE SERPL-MCNC: 127 MG/DL (ref 65–99)
HCT VFR BLD AUTO: 35.6 % (ref 42–52)
HGB BLD-MCNC: 11.9 G/DL (ref 14–18)
MCH RBC QN AUTO: 29.1 PG (ref 27–33)
MCHC RBC AUTO-ENTMCNC: 33.4 G/DL (ref 33.7–35.3)
MCV RBC AUTO: 87 FL (ref 81.4–97.8)
NT-PROBNP SERPL IA-MCNC: 693 PG/ML (ref 0–125)
PLATELET # BLD AUTO: 172 K/UL (ref 164–446)
PMV BLD AUTO: 11.4 FL (ref 9–12.9)
POTASSIUM SERPL-SCNC: 4.1 MMOL/L (ref 3.6–5.5)
PROT SERPL-MCNC: 7.7 G/DL (ref 6–8.2)
RBC # BLD AUTO: 4.09 M/UL (ref 4.7–6.1)
SODIUM SERPL-SCNC: 134 MMOL/L (ref 135–145)
WBC # BLD AUTO: 3 K/UL (ref 4.8–10.8)

## 2020-06-22 PROCEDURE — 33361 REPLACE AORTIC VALVE PERQ: CPT | Mod: 62,Q0 | Performed by: INTERNAL MEDICINE

## 2020-06-22 PROCEDURE — 93010 ELECTROCARDIOGRAM REPORT: CPT | Performed by: INTERNAL MEDICINE

## 2020-06-22 PROCEDURE — 700105 HCHG RX REV CODE 258: Performed by: NURSE PRACTITIONER

## 2020-06-22 PROCEDURE — 700111 HCHG RX REV CODE 636 W/ 250 OVERRIDE (IP): Performed by: ANESTHESIOLOGY

## 2020-06-22 PROCEDURE — B24BZZ4 ULTRASONOGRAPHY OF HEART WITH AORTA, TRANSESOPHAGEAL: ICD-10-PCS | Performed by: INTERNAL MEDICINE

## 2020-06-22 PROCEDURE — 502000 HCHG MISC OR IMPLANTS RC 0278: Performed by: INTERNAL MEDICINE

## 2020-06-22 PROCEDURE — 770020 HCHG ROOM/CARE - TELE (206)

## 2020-06-22 PROCEDURE — 02RF38Z REPLACEMENT OF AORTIC VALVE WITH ZOOPLASTIC TISSUE, PERCUTANEOUS APPROACH: ICD-10-PCS | Performed by: INTERNAL MEDICINE

## 2020-06-22 PROCEDURE — C1751 CATH, INF, PER/CENT/MIDLINE: HCPCS | Performed by: INTERNAL MEDICINE

## 2020-06-22 PROCEDURE — 160048 HCHG OR STATISTICAL LEVEL 1-5: Performed by: INTERNAL MEDICINE

## 2020-06-22 PROCEDURE — 160042 HCHG SURGERY MINUTES - EA ADDL 1 MIN LEVEL 5: Performed by: INTERNAL MEDICINE

## 2020-06-22 PROCEDURE — 160036 HCHG PACU - EA ADDL 30 MINS PHASE I: Performed by: INTERNAL MEDICINE

## 2020-06-22 PROCEDURE — 503000 HCHG SUTURE, OHS: Performed by: INTERNAL MEDICINE

## 2020-06-22 PROCEDURE — C1894 INTRO/SHEATH, NON-LASER: HCPCS | Performed by: INTERNAL MEDICINE

## 2020-06-22 PROCEDURE — 85730 THROMBOPLASTIN TIME PARTIAL: CPT

## 2020-06-22 PROCEDURE — 160009 HCHG ANES TIME/MIN: Performed by: INTERNAL MEDICINE

## 2020-06-22 PROCEDURE — 93925 LOWER EXTREMITY STUDY: CPT

## 2020-06-22 PROCEDURE — 502240 HCHG MISC OR SUPPLY RC 0272: Performed by: INTERNAL MEDICINE

## 2020-06-22 PROCEDURE — 85027 COMPLETE CBC AUTOMATED: CPT

## 2020-06-22 PROCEDURE — A9270 NON-COVERED ITEM OR SERVICE: HCPCS | Performed by: INTERNAL MEDICINE

## 2020-06-22 PROCEDURE — 160031 HCHG SURGERY MINUTES - 1ST 30 MINS LEVEL 5: Performed by: INTERNAL MEDICINE

## 2020-06-22 PROCEDURE — B310ZZZ FLUOROSCOPY OF THORACIC AORTA: ICD-10-PCS | Performed by: INTERNAL MEDICINE

## 2020-06-22 PROCEDURE — 503001 HCHG PERFUSION: Performed by: INTERNAL MEDICINE

## 2020-06-22 PROCEDURE — C1725 CATH, TRANSLUMIN NON-LASER: HCPCS | Performed by: INTERNAL MEDICINE

## 2020-06-22 PROCEDURE — 700105 HCHG RX REV CODE 258: Performed by: INTERNAL MEDICINE

## 2020-06-22 PROCEDURE — 700111 HCHG RX REV CODE 636 W/ 250 OVERRIDE (IP): Performed by: INTERNAL MEDICINE

## 2020-06-22 PROCEDURE — 700102 HCHG RX REV CODE 250 W/ 637 OVERRIDE(OP): Performed by: INTERNAL MEDICINE

## 2020-06-22 PROCEDURE — 160002 HCHG RECOVERY MINUTES (STAT): Performed by: INTERNAL MEDICINE

## 2020-06-22 PROCEDURE — 93005 ELECTROCARDIOGRAM TRACING: CPT | Performed by: NURSE PRACTITIONER

## 2020-06-22 PROCEDURE — B41DZZZ FLUOROSCOPY OF AORTA AND BILATERAL LOWER EXTREMITY ARTERIES: ICD-10-PCS | Performed by: INTERNAL MEDICINE

## 2020-06-22 PROCEDURE — 71045 X-RAY EXAM CHEST 1 VIEW: CPT

## 2020-06-22 PROCEDURE — 36415 COLL VENOUS BLD VENIPUNCTURE: CPT

## 2020-06-22 PROCEDURE — 160035 HCHG PACU - 1ST 60 MINS PHASE I: Performed by: INTERNAL MEDICINE

## 2020-06-22 PROCEDURE — 700102 HCHG RX REV CODE 250 W/ 637 OVERRIDE(OP): Performed by: NURSE PRACTITIONER

## 2020-06-22 PROCEDURE — 700101 HCHG RX REV CODE 250: Performed by: ANESTHESIOLOGY

## 2020-06-22 PROCEDURE — C1883 ADAPT/EXT, PACING/NEURO LEAD: HCPCS | Performed by: INTERNAL MEDICINE

## 2020-06-22 PROCEDURE — 85347 COAGULATION TIME ACTIVATED: CPT | Mod: 91

## 2020-06-22 PROCEDURE — 93308 TTE F-UP OR LMTD: CPT

## 2020-06-22 PROCEDURE — 700111 HCHG RX REV CODE 636 W/ 250 OVERRIDE (IP): Performed by: NURSE PRACTITIONER

## 2020-06-22 PROCEDURE — 33361 REPLACE AORTIC VALVE PERQ: CPT | Mod: 62,Q0 | Performed by: THORACIC SURGERY (CARDIOTHORACIC VASCULAR SURGERY)

## 2020-06-22 PROCEDURE — 83880 ASSAY OF NATRIURETIC PEPTIDE: CPT

## 2020-06-22 PROCEDURE — 700111 HCHG RX REV CODE 636 W/ 250 OVERRIDE (IP)

## 2020-06-22 PROCEDURE — A6402 STERILE GAUZE <= 16 SQ IN: HCPCS | Performed by: INTERNAL MEDICINE

## 2020-06-22 PROCEDURE — C1769 GUIDE WIRE: HCPCS | Performed by: INTERNAL MEDICINE

## 2020-06-22 PROCEDURE — A9270 NON-COVERED ITEM OR SERVICE: HCPCS | Performed by: NURSE PRACTITIONER

## 2020-06-22 PROCEDURE — 500002 HCHG ADHESIVE, DERMABOND: Performed by: INTERNAL MEDICINE

## 2020-06-22 PROCEDURE — C1760 CLOSURE DEV, VASC: HCPCS | Performed by: INTERNAL MEDICINE

## 2020-06-22 PROCEDURE — 80053 COMPREHEN METABOLIC PANEL: CPT

## 2020-06-22 DEVICE — DEVICE CLSR 6FR HMST IMPL SLF STS PLUS ANGIOSEAL (10EA/CA): Type: IMPLANTABLE DEVICE | Status: FUNCTIONAL

## 2020-06-22 DEVICE — IMPLANTABLE DEVICE: Type: IMPLANTABLE DEVICE | Status: FUNCTIONAL

## 2020-06-22 RX ORDER — HYDRALAZINE HYDROCHLORIDE 20 MG/ML
INJECTION INTRAMUSCULAR; INTRAVENOUS
Status: COMPLETED
Start: 2020-06-22 | End: 2020-06-22

## 2020-06-22 RX ORDER — DIPHENHYDRAMINE HCL 25 MG
25 TABLET ORAL NIGHTLY PRN
Status: DISCONTINUED | OUTPATIENT
Start: 2020-06-22 | End: 2020-06-23 | Stop reason: HOSPADM

## 2020-06-22 RX ORDER — HEPARIN SODIUM 5000 [USP'U]/100ML
INJECTION, SOLUTION INTRAVENOUS CONTINUOUS
Status: DISCONTINUED | OUTPATIENT
Start: 2020-06-22 | End: 2020-06-23

## 2020-06-22 RX ORDER — HYDRALAZINE HYDROCHLORIDE 20 MG/ML
5 INJECTION INTRAMUSCULAR; INTRAVENOUS
Status: DISCONTINUED | OUTPATIENT
Start: 2020-06-22 | End: 2020-06-22 | Stop reason: HOSPADM

## 2020-06-22 RX ORDER — ONDANSETRON 2 MG/ML
4 INJECTION INTRAMUSCULAR; INTRAVENOUS
Status: COMPLETED | OUTPATIENT
Start: 2020-06-22 | End: 2020-06-22

## 2020-06-22 RX ORDER — ONDANSETRON 2 MG/ML
4 INJECTION INTRAMUSCULAR; INTRAVENOUS EVERY 4 HOURS PRN
Status: DISCONTINUED | OUTPATIENT
Start: 2020-06-22 | End: 2020-06-23 | Stop reason: HOSPADM

## 2020-06-22 RX ORDER — LOVASTATIN 20 MG/1
20 TABLET ORAL
Status: DISCONTINUED | OUTPATIENT
Start: 2020-06-22 | End: 2020-06-23 | Stop reason: HOSPADM

## 2020-06-22 RX ORDER — DIPHENHYDRAMINE HYDROCHLORIDE 50 MG/ML
12.5 INJECTION INTRAMUSCULAR; INTRAVENOUS
Status: DISCONTINUED | OUTPATIENT
Start: 2020-06-22 | End: 2020-06-22 | Stop reason: HOSPADM

## 2020-06-22 RX ORDER — HYDROMORPHONE HYDROCHLORIDE 1 MG/ML
0.4 INJECTION, SOLUTION INTRAMUSCULAR; INTRAVENOUS; SUBCUTANEOUS
Status: DISCONTINUED | OUTPATIENT
Start: 2020-06-22 | End: 2020-06-22 | Stop reason: HOSPADM

## 2020-06-22 RX ORDER — SODIUM CHLORIDE 9 MG/ML
INJECTION, SOLUTION INTRAVENOUS CONTINUOUS
Status: DISPENSED | OUTPATIENT
Start: 2020-06-22 | End: 2020-06-22

## 2020-06-22 RX ORDER — CLOPIDOGREL BISULFATE 75 MG/1
300 TABLET ORAL ONCE
Status: DISCONTINUED | OUTPATIENT
Start: 2020-06-22 | End: 2020-06-22

## 2020-06-22 RX ORDER — SODIUM CHLORIDE, SODIUM LACTATE, POTASSIUM CHLORIDE, CALCIUM CHLORIDE 600; 310; 30; 20 MG/100ML; MG/100ML; MG/100ML; MG/100ML
INJECTION, SOLUTION INTRAVENOUS CONTINUOUS
Status: DISCONTINUED | OUTPATIENT
Start: 2020-06-22 | End: 2020-06-22

## 2020-06-22 RX ORDER — MEPERIDINE HYDROCHLORIDE 25 MG/ML
6.25 INJECTION INTRAMUSCULAR; INTRAVENOUS; SUBCUTANEOUS
Status: DISCONTINUED | OUTPATIENT
Start: 2020-06-22 | End: 2020-06-22 | Stop reason: HOSPADM

## 2020-06-22 RX ORDER — HALOPERIDOL 5 MG/ML
1 INJECTION INTRAMUSCULAR
Status: DISCONTINUED | OUTPATIENT
Start: 2020-06-22 | End: 2020-06-22 | Stop reason: HOSPADM

## 2020-06-22 RX ORDER — AMLODIPINE BESYLATE 5 MG/1
5 TABLET ORAL EVERY EVENING
Status: DISCONTINUED | OUTPATIENT
Start: 2020-06-22 | End: 2020-06-23 | Stop reason: HOSPADM

## 2020-06-22 RX ORDER — HYDROMORPHONE HYDROCHLORIDE 1 MG/ML
0.1 INJECTION, SOLUTION INTRAMUSCULAR; INTRAVENOUS; SUBCUTANEOUS
Status: DISCONTINUED | OUTPATIENT
Start: 2020-06-22 | End: 2020-06-22 | Stop reason: HOSPADM

## 2020-06-22 RX ORDER — OXYCODONE HCL 5 MG/5 ML
10 SOLUTION, ORAL ORAL
Status: DISCONTINUED | OUTPATIENT
Start: 2020-06-22 | End: 2020-06-22 | Stop reason: HOSPADM

## 2020-06-22 RX ORDER — ACETAMINOPHEN 325 MG/1
650 TABLET ORAL EVERY 6 HOURS PRN
Status: DISCONTINUED | OUTPATIENT
Start: 2020-06-22 | End: 2020-06-23 | Stop reason: HOSPADM

## 2020-06-22 RX ORDER — AMOXICILLIN 250 MG
2 CAPSULE ORAL 2 TIMES DAILY PRN
Status: DISCONTINUED | OUTPATIENT
Start: 2020-06-22 | End: 2020-06-23 | Stop reason: HOSPADM

## 2020-06-22 RX ORDER — HYDROMORPHONE HYDROCHLORIDE 1 MG/ML
0.2 INJECTION, SOLUTION INTRAMUSCULAR; INTRAVENOUS; SUBCUTANEOUS
Status: DISCONTINUED | OUTPATIENT
Start: 2020-06-22 | End: 2020-06-22 | Stop reason: HOSPADM

## 2020-06-22 RX ORDER — MIDAZOLAM HYDROCHLORIDE 1 MG/ML
INJECTION INTRAMUSCULAR; INTRAVENOUS PRN
Status: DISCONTINUED | OUTPATIENT
Start: 2020-06-22 | End: 2020-06-22 | Stop reason: SURG

## 2020-06-22 RX ORDER — SODIUM CHLORIDE, SODIUM LACTATE, POTASSIUM CHLORIDE, CALCIUM CHLORIDE 600; 310; 30; 20 MG/100ML; MG/100ML; MG/100ML; MG/100ML
INJECTION, SOLUTION INTRAVENOUS CONTINUOUS
Status: DISCONTINUED | OUTPATIENT
Start: 2020-06-22 | End: 2020-06-22 | Stop reason: HOSPADM

## 2020-06-22 RX ORDER — REMIFENTANIL HYDROCHLORIDE 1 MG/ML
INJECTION, POWDER, LYOPHILIZED, FOR SOLUTION INTRAVENOUS
Status: DISCONTINUED | OUTPATIENT
Start: 2020-06-22 | End: 2020-06-22 | Stop reason: SURG

## 2020-06-22 RX ORDER — PROTAMINE SULFATE 10 MG/ML
INJECTION, SOLUTION INTRAVENOUS PRN
Status: DISCONTINUED | OUTPATIENT
Start: 2020-06-22 | End: 2020-06-22 | Stop reason: SURG

## 2020-06-22 RX ORDER — HEPARIN SODIUM 1000 [USP'U]/ML
INJECTION, SOLUTION INTRAVENOUS; SUBCUTANEOUS PRN
Status: DISCONTINUED | OUTPATIENT
Start: 2020-06-22 | End: 2020-06-22 | Stop reason: SURG

## 2020-06-22 RX ORDER — CEFAZOLIN SODIUM 1 G/3ML
INJECTION, POWDER, FOR SOLUTION INTRAMUSCULAR; INTRAVENOUS PRN
Status: DISCONTINUED | OUTPATIENT
Start: 2020-06-22 | End: 2020-06-22 | Stop reason: SURG

## 2020-06-22 RX ORDER — HYDRALAZINE HYDROCHLORIDE 20 MG/ML
10 INJECTION INTRAMUSCULAR; INTRAVENOUS
Status: DISCONTINUED | OUTPATIENT
Start: 2020-06-22 | End: 2020-06-23 | Stop reason: HOSPADM

## 2020-06-22 RX ORDER — OXYCODONE HCL 5 MG/5 ML
5 SOLUTION, ORAL ORAL
Status: DISCONTINUED | OUTPATIENT
Start: 2020-06-22 | End: 2020-06-22 | Stop reason: HOSPADM

## 2020-06-22 RX ORDER — BISACODYL 10 MG
10 SUPPOSITORY, RECTAL RECTAL
Status: DISCONTINUED | OUTPATIENT
Start: 2020-06-22 | End: 2020-06-23 | Stop reason: HOSPADM

## 2020-06-22 RX ORDER — CLOPIDOGREL BISULFATE 75 MG/1
75 TABLET ORAL DAILY
Status: DISCONTINUED | OUTPATIENT
Start: 2020-06-23 | End: 2020-06-22

## 2020-06-22 RX ORDER — DIPHENHYDRAMINE HYDROCHLORIDE 50 MG/ML
25 INJECTION INTRAMUSCULAR; INTRAVENOUS EVERY 6 HOURS PRN
Status: DISCONTINUED | OUTPATIENT
Start: 2020-06-22 | End: 2020-06-23 | Stop reason: HOSPADM

## 2020-06-22 RX ORDER — POLYETHYLENE GLYCOL 3350 17 G/17G
1 POWDER, FOR SOLUTION ORAL
Status: DISCONTINUED | OUTPATIENT
Start: 2020-06-22 | End: 2020-06-23 | Stop reason: HOSPADM

## 2020-06-22 RX ADMIN — PROTAMINE SULFATE 30 MG: 10 INJECTION, SOLUTION INTRAVENOUS at 11:54

## 2020-06-22 RX ADMIN — HALOPERIDOL LACTATE 1 MG: 5 INJECTION, SOLUTION INTRAMUSCULAR at 13:15

## 2020-06-22 RX ADMIN — AMLODIPINE BESYLATE 5 MG: 5 TABLET ORAL at 18:27

## 2020-06-22 RX ADMIN — HEPARIN SODIUM 10000 UNITS: 1000 INJECTION, SOLUTION INTRAVENOUS; SUBCUTANEOUS at 11:27

## 2020-06-22 RX ADMIN — HYDRALAZINE HYDROCHLORIDE 5 MG: 20 INJECTION INTRAMUSCULAR; INTRAVENOUS at 12:50

## 2020-06-22 RX ADMIN — HEPARIN SODIUM 900 UNITS/HR: 5000 INJECTION, SOLUTION INTRAVENOUS at 21:18

## 2020-06-22 RX ADMIN — SODIUM CHLORIDE, POTASSIUM CHLORIDE, SODIUM LACTATE AND CALCIUM CHLORIDE: 600; 310; 30; 20 INJECTION, SOLUTION INTRAVENOUS at 09:31

## 2020-06-22 RX ADMIN — POVIDONE-IODINE 15 ML: 10 SOLUTION TOPICAL at 09:31

## 2020-06-22 RX ADMIN — PROPOFOL 55 MCG/KG/MIN: 10 INJECTION, EMULSION INTRAVENOUS at 11:03

## 2020-06-22 RX ADMIN — ONDANSETRON 4 MG: 2 INJECTION INTRAMUSCULAR; INTRAVENOUS at 12:50

## 2020-06-22 RX ADMIN — CEFAZOLIN 2 G: 330 INJECTION, POWDER, FOR SOLUTION INTRAMUSCULAR; INTRAVENOUS at 11:10

## 2020-06-22 RX ADMIN — LOVASTATIN 20 MG: 20 TABLET ORAL at 21:23

## 2020-06-22 RX ADMIN — MIDAZOLAM HYDROCHLORIDE 0.5 MG: 1 INJECTION, SOLUTION INTRAMUSCULAR; INTRAVENOUS at 11:29

## 2020-06-22 RX ADMIN — FENTANYL CITRATE 25 MCG: 50 INJECTION INTRAMUSCULAR; INTRAVENOUS at 13:00

## 2020-06-22 RX ADMIN — MIDAZOLAM HYDROCHLORIDE 0.5 MG: 1 INJECTION, SOLUTION INTRAMUSCULAR; INTRAVENOUS at 11:34

## 2020-06-22 RX ADMIN — DIPHENHYDRAMINE HYDROCHLORIDE 12.5 MG: 50 INJECTION INTRAMUSCULAR; INTRAVENOUS at 13:40

## 2020-06-22 RX ADMIN — MIDAZOLAM HYDROCHLORIDE 1 MG: 1 INJECTION, SOLUTION INTRAMUSCULAR; INTRAVENOUS at 11:22

## 2020-06-22 RX ADMIN — REMIFENTANIL HYDROCHLORIDE 0.05 MCG/KG/MIN: 1 INJECTION, POWDER, LYOPHILIZED, FOR SOLUTION INTRAVENOUS at 11:03

## 2020-06-22 SDOH — HEALTH STABILITY: MENTAL HEALTH: HOW OFTEN DO YOU HAVE 6 OR MORE DRINKS ON ONE OCCASION?: NEVER

## 2020-06-22 SDOH — HEALTH STABILITY: MENTAL HEALTH: HOW OFTEN DO YOU HAVE A DRINK CONTAINING ALCOHOL?: 4 OR MORE TIMES A WEEK

## 2020-06-22 ASSESSMENT — COGNITIVE AND FUNCTIONAL STATUS - GENERAL
MOBILITY SCORE: 24
DAILY ACTIVITIY SCORE: 24
SUGGESTED CMS G CODE MODIFIER MOBILITY: CH
SUGGESTED CMS G CODE MODIFIER DAILY ACTIVITY: CH

## 2020-06-22 ASSESSMENT — LIFESTYLE VARIABLES
TOTAL SCORE: 0
TOTAL SCORE: 0
CONSUMPTION TOTAL: NEGATIVE
EVER HAD A DRINK FIRST THING IN THE MORNING TO STEADY YOUR NERVES TO GET RID OF A HANGOVER: NO
TOTAL SCORE: 0
HAVE PEOPLE ANNOYED YOU BY CRITICIZING YOUR DRINKING: NO
ON A TYPICAL DAY WHEN YOU DRINK ALCOHOL HOW MANY DRINKS DO YOU HAVE: 0
DOES PATIENT WANT TO STOP DRINKING: NO
HAVE YOU EVER FELT YOU SHOULD CUT DOWN ON YOUR DRINKING: NO
ALCOHOL_USE: NO
AVERAGE NUMBER OF DAYS PER WEEK YOU HAVE A DRINK CONTAINING ALCOHOL: 0
EVER_SMOKED: NEVER
EVER FELT BAD OR GUILTY ABOUT YOUR DRINKING: NO
HOW MANY TIMES IN THE PAST YEAR HAVE YOU HAD 5 OR MORE DRINKS IN A DAY: 0

## 2020-06-22 ASSESSMENT — PATIENT HEALTH QUESTIONNAIRE - PHQ9
SUM OF ALL RESPONSES TO PHQ9 QUESTIONS 1 AND 2: 0
2. FEELING DOWN, DEPRESSED, IRRITABLE, OR HOPELESS: NOT AT ALL
1. LITTLE INTEREST OR PLEASURE IN DOING THINGS: NOT AT ALL

## 2020-06-22 ASSESSMENT — PAIN SCALES - GENERAL: PAIN_LEVEL: 0

## 2020-06-22 ASSESSMENT — FIBROSIS 4 INDEX
FIB4 SCORE: 3.22
FIB4 SCORE: 5.18

## 2020-06-22 NOTE — ANESTHESIA PREPROCEDURE EVALUATION
"    Relevant Problems   PULMONARY   (+) Panlobular emphysema (HCC)      CARDIAC   (+) Coronary artery disease due to lipid rich plaque   (+) Essential hypertension   (+) Moderate aortic stenosis   (+) PAD (peripheral artery disease) (HCC)- left femoral bruit; diminished right dt/pt pulse     /67   Pulse 92   Temp 36.5 °C (97.7 °F) (Temporal)   Resp 18   Ht 1.676 m (5' 6\")   Wt 57.3 kg (126 lb 5.2 oz)   SpO2 93%   BMI 20.39 kg/m²     Physical Exam    Airway   Mallampati: II  TM distance: >3 FB  Neck ROM: full       Cardiovascular - normal exam  Rhythm: regular  Rate: normal  (-) murmur     Dental - normal exam           Pulmonary - normal exam  Breath sounds clear to auscultation     Abdominal    Neurological - normal exam                 Anesthesia Plan    ASA 4   ASA physical status 4 criteria: severe valve dysfunction    Plan - MAC             Induction: intravenous    Postoperative Plan: Postoperative administration of opioids is intended.    Pertinent diagnostic labs and testing reviewed    Informed Consent:    Anesthetic plan and risks discussed with patient.    Use of blood products discussed with: patient whom consented to blood products.         "

## 2020-06-22 NOTE — ANESTHESIA PROCEDURE NOTES
Arterial Line  Performed by: Simon Parsons M.D.  Authorized by: Simon Parsons M.D.     Start Time:  6/22/2020 11:05 AM  End Time:  6/22/2020 11:07 AM  Localization: surface landmarks    Patient Location:  OR  Indication: continuous blood pressure monitoring        Catheter Size:  20 G  Seldinger Technique?: Yes    Laterality:  Right  Site:  Radial artery  Line Secured:  Antimicrobial disc, tape and transparent dressing  Events: patient tolerated procedure well with no complications

## 2020-06-22 NOTE — PROGRESS NOTES
2 RN skin check complete.   Devices in place TRINI hose.  Skin assessed under devices intact.  Confirmed pressure ulcers found on NA.  New potential pressure ulcers noted on NA.  The following interventions in place, after bed rest, patient up to chair and walks often.

## 2020-06-22 NOTE — PROGRESS NOTES
Patient transferred up to unit. Patient oriented to unit and use of call light.Patient surgical site dressings in place, clean dry and intact, soft non tender to palpation. Patient alert and oriented NIH completed with no deficits noted. Patient resting comfortable in bed on bed rest.

## 2020-06-22 NOTE — OR SURGEON
Operative Report    PreOp Diagnosis: Symptomatic severe aortic stenosis, peripheral arterial disease, coronary artery disease, hypertension, hyperlipidemia, emphysema    PostOp Diagnosis: Same as above    Procedure(s):  REPLACEMENT, AORTIC VALVE, TRANSCATHETER- WITH 26 mm Jewell CP and S3 valve with +1 preparation  - Wound Class: Clean  ECHOCARDIOGRAM, - Wound Class: Clean    Surgeon(s):  AMY Juarez D.O. Athan Roumanas, M.D.(aaron)    Anesthesiologist/Type of Anesthesia:  Anesthesiologist: Simon Parsons M.D./MAC    Surgical Staff:  Circulator: Jp Garcia R.N.; Saira Mann R.N.  Perfusionist: Ashvin Ma  Scrub Person: Levon Woodruff    Specimens removed if any:  * No specimens in log *    Estimated Blood Loss: Less than 50 cc    Findings: Post procedure echo revealed acceptable gradients, correct positioning, and trivial to mild perivalvular leak    Complications: None immediate    Procedure:    Procedure: After informed consent was obtained, the patient was brought to the operating room.  He was placed in supine position on the operating table.  All bony prominences padded and joints placed in neutral position.  He received preoperative antibiotics.  Conscious sedation was commenced by the anesthesia team after placing all lines and monitoring catheters etc.  Trans-thoracic echocardiography was provided by our anesthesiologist .  Timeout was performed.    The procedure was begun by obtaining primary and secondary access in the femoral arteries.  Primary access was in the right.  Each artery was cannulated under direct ultrasound guidance.  6 Amharic sheath was inserted.  Access was obtained in the left femoral vein in like manner.  Abdominal aortogram was performed with bilateral iliofemoral runoff.  Lunderquist wire was placed in the right sheath and 2 Perclose sutures were placed in standard fashion there.  Through the left femoral vein  access, temporary pacing wire was advanced to the RV apex in usual fashion.  Through the left femoral artery, pigtail catheter was advanced to the aortic root where a root shot was performed.    The right femoral artery was then subsequently serially dilated up to 14 Wolof size.  The Jewell 14 Wolof sheath was then placed and secured with a Ethibond stitch.  Through the Jewell sheath, an AL-1 catheter was advanced to the aortic root.  Straight wire was used to cross the aortic valve.  A stiff Amplatz wire was then positioned the LV apex and the AL-1 removed.  The 26 mm Jewell Safian 3 valve was prepped in +2 fashion, with orientation confirmed, and then advanced over the stiff wire into the aortic position.  Rapid pacing was performed, aortic root angiogram shot, and after checking position of the valve, it was deployed under fluoroscopy.  Initial deployment, the valve was taken up to nominal value with 7 tosha of pressure.  Post procedure, aortic root shot revealed mild perivalvular leak.  The valve was crossed, rapid pacing performed, and the balloon was reexpanded for post dilation.  An additional cc was inflated at 8 tosha of pressure.  Postprocedure echo revealed no significant leak and good positioning with appropriate gradients.    We then proceeded with hemostasis and terminating the procedure.  The right sheath was removed and the Perclose stitches secured.  The left femoral artery was treated with an Angio-Seal device.  Pressure was held on all sites.  All pacing wires and catheters were removed.  The patient tolerated the procedure well, was taken to the PACU in stable condition.          6/22/2020 12:08 PM Robert Medley D.O.

## 2020-06-22 NOTE — ANESTHESIA QCDR
2019 [facility] Clinical Data Registry (for Quality Improvement)     Postoperative nausea/vomiting risk protocol (Adult = 18 yrs and Pediatric 3-17 yrs)- (430 and 463)  General inhalation anesthetic (NOT TIVA) with PONV risk factors: No  Provision of anti-emetic therapy with at least 2 different classes of agents: N/A  Patient DID NOT receive anti-emetic therapy and reason is documented in Medical Record: N/A    Multimodal Pain Management- (477)  Non-emergent surgery AND patient age >= 18: No  Use of Multimodal Pain Management, two or more drugs and/or interventions, NOT including systemic opioids:   Exception: Documented allergy to multiple classes of analgesics:     Smoking Abstinence (404)  Patient is current smoker (cigarette, pipe, e-cig, marijuanna): No  Elective Surgery:   Abstinence instructions provided prior to day of surgery:   Patient abstained from smoking on day of surgery:     Pre-Op Beta-Blocker in Isolated CABG (44)  Isolated CABG AND patient age >= 18: No  Beta-blocker admin within 24 hours of surgical incision:   Exception:of medical reason(s) for not administering beta blocker within 24 hours prior to surgical incision (e.g., not  indicated,other medical reason):     PACU assessment of acute postoperative pain prior to Anesthesia Care End- Applies to Patients Age = 18- (ABG7)  Initial PACU pain score is which of the following: < 7/10  Patient unable to report pain score: N/A    Post-anesthetic transfer of care checklist/protocol to PACU/ICU- (426 and 427)  Upon conclusion of case, patient transferred to which of the following locations: PACU/Non-ICU  Use of transfer checklist/protocol: Yes  Exclusion: Service Performed in Patient Hospital Room (and thus did not require transfer): N/A  Unplanned admission to ICU related to anesthesia service up through end of PACU care- (MD51)  Unplanned admission to ICU (not initially anticipated at anesthesia start time): No

## 2020-06-22 NOTE — OR NURSING
"1435-Transported to his room,AAOX4.Pt. verbalized that nausea is\" better\".Right groin site with small hematoma,manual pressure was applied for 15 min.and pressure dressing applied,no bleeding noted from puncture site.Pt's. wife was updated.  "

## 2020-06-22 NOTE — ANESTHESIA TIME REPORT
Anesthesia Start and Stop Event Times     Date Time Event    6/22/2020 1043 Ready for Procedure     1058 Anesthesia Start     1219 Anesthesia Stop        Responsible Staff  06/22/20    Name Role Begin End    Simon Parsons M.D. Anesth 1058 1219        Preop Diagnosis (Free Text):  Pre-op Diagnosis     SEVERE AORTIC STENOSIS        Preop Diagnosis (Codes):    Post op Diagnosis  Severe aortic stenosis      Premium Reason  Non-Premium    Comments: art line, tte

## 2020-06-22 NOTE — OP REPORT
DATE OF PROCEDURE: 6/22/2020    REFERRING PHYSICIAN: Thelma Sanchez    PROCEDURES performed:  1. Transcatheter aortic valve replacement.  2. Insertion and removal of temporary pacer wire    PRE PROCEDURAL DIAGNOSIS:  1. Severe symptomatic aortic stenosis, NYHA II.    Operators:  CT Surgeon: Dr.Romanaus/    Interventional cardiologist: Dr. Dennis    DESCRIPTION OF PROCEDURE:  After informed consent was signed by the   patient, the patient was brought into the operating room, was prepped and draped in   usual sterile manner.    conscious sedation and   transthoracicechocardiogram was provided by .    Primary Arterial access:   right femoral artery was cannulated using modified Seldinger technique under ultrasound guidance, a 6 Cambodian sheath was inserted.  Abdominal aortogram with bilateral iliofemoral runoff was performed by placing pigtail in descending abdominal aorta and confirmed common femoral arterial access. 2 Perclose sutures were placed, arteriotomy was serially dilated,14 Fr sheath was inserted in the femoral artery over a stiff Lunderquist wire.    Secondary arterial access:   left femoral artery was cannulated with 6 Cambodian sheath.  A pigtail catheter was advanced through 6 Cambodian sheath, aortic root angiogram was performed to determine coplanar view.    Venous Access:   left femoral vein was cannulated with 6 Cambodian sheath, a temporary pacer wire was advanced to RV apex in usual fashion.    Through Jewell self-expandable sheath, AL-1 catheter was advanced to aortic root, straight wire was used to cross the aortic valve, a stiff Amplatz wire was placed in the LV apex and AL-1 catheter was withdrawn. 26 mm Jewell Jose Manuel 3 medardo was prepped with two additional CC of volume, orientation was confirmed.  Sapient 3 valve was slowly advanced over a stiff Amplatz wire to aortic position.  Rapid pacing was achieved using temporary pacer wire, aortic root angiogram was performed and  after confirming good positioning, valve was slowly deployed under fluoroscopic guidance at nominal volume. Echocardiogram and aortic root angiogram showed mild to moderate paravalvular leak. Then valve was post dilated and additional CC of volume was pushed into balloon achieved 7 tosha pressure.  Post procedure echocardiogram showed trace to mild paravalvular leak, good valve positioning.    The patient tolerated the procedure well. At the end of procedure, all pacemaker wire,   pigtail catheters and sheaths were removed.  The primary arterial access site was closed with the PreClose system.  The secondary arterial access was closed via Angio-Seal. The patient was then extubated and transferred to PACU in stable condition.    Complications: none  Specimens: none  Estimated blood loss: <50cc      IMPRESSION:  Successful transcatheter aortic valve replacement using 26 mm Jewell marlys 3 Ultra valve with one additional CC of volume.    RECOMMENDATION:   Guideline directed medical therapy.    Luiz Dennis  Interventional cardiologist

## 2020-06-22 NOTE — ANESTHESIA POSTPROCEDURE EVALUATION
Patient: Magalis Kumari    Procedure Summary     Date:  06/22/20 Room / Location:  [place] [address] / SURGERY [facility]    Anesthesia Start:  1058 Anesthesia Stop:  1219    Procedures:       REPLACEMENT, AORTIC VALVE, TRANSCATHETER- WITH ANY ASSOCIATED PROCEDURES (N/A Groin)      ECHOCARDIOGRAM, (N/A Chest) Diagnosis:  (SEVERE AORTIC STENOSIS)    Surgeon:  Luiz Dennis M.D. Responsible Provider:  Simon Parsons M.D.    Anesthesia Type:  MAC ASA Status:  4          Final Anesthesia Type: MAC  Last vitals  BP   Blood Pressure : 126/67    Temp   36.5 °C (97.7 °F)    Pulse   Pulse: 92   Resp   18    SpO2   93 %      Anesthesia Post Evaluation    Patient location during evaluation: PACU  Patient participation: complete - patient participated  Level of consciousness: awake and alert  Pain score: 0    Airway patency: patent  Anesthetic complications: no  Cardiovascular status: hemodynamically stable  Respiratory status: acceptable  Hydration status: euvolemic    PONV: none           Nurse Pain Score: 0 (NPRS)

## 2020-06-23 ENCOUNTER — APPOINTMENT (OUTPATIENT)
Dept: RADIOLOGY | Facility: MEDICAL CENTER | Age: 65
DRG: 982 | End: 2020-06-23
Attending: EMERGENCY MEDICINE
Payer: COMMERCIAL

## 2020-06-23 ENCOUNTER — HOSPITAL ENCOUNTER (INPATIENT)
Facility: MEDICAL CENTER | Age: 65
LOS: 17 days | DRG: 982 | End: 2020-07-15
Attending: EMERGENCY MEDICINE | Admitting: INTERNAL MEDICINE
Payer: COMMERCIAL

## 2020-06-23 ENCOUNTER — NON-PROVIDER VISIT (OUTPATIENT)
Dept: CARDIOLOGY | Facility: MEDICAL CENTER | Age: 65
End: 2020-06-23
Payer: COMMERCIAL

## 2020-06-23 ENCOUNTER — TELEPHONE (OUTPATIENT)
Dept: CARDIOLOGY | Facility: MEDICAL CENTER | Age: 65
End: 2020-06-23

## 2020-06-23 ENCOUNTER — APPOINTMENT (OUTPATIENT)
Dept: CARDIOLOGY | Facility: MEDICAL CENTER | Age: 65
DRG: 267 | End: 2020-06-23
Attending: NURSE PRACTITIONER
Payer: COMMERCIAL

## 2020-06-23 ENCOUNTER — APPOINTMENT (OUTPATIENT)
Dept: RADIOLOGY | Facility: MEDICAL CENTER | Age: 65
DRG: 267 | End: 2020-06-23
Attending: NURSE PRACTITIONER
Payer: COMMERCIAL

## 2020-06-23 VITALS
HEIGHT: 66 IN | HEART RATE: 87 BPM | OXYGEN SATURATION: 92 % | RESPIRATION RATE: 18 BRPM | WEIGHT: 126.98 LBS | DIASTOLIC BLOOD PRESSURE: 97 MMHG | BODY MASS INDEX: 20.41 KG/M2 | TEMPERATURE: 98.8 F | SYSTOLIC BLOOD PRESSURE: 124 MMHG

## 2020-06-23 DIAGNOSIS — E86.0 DEHYDRATION: ICD-10-CM

## 2020-06-23 DIAGNOSIS — D69.6 THROMBOCYTOPENIA (HCC): ICD-10-CM

## 2020-06-23 DIAGNOSIS — D70.9 NEUTROPENIA, UNSPECIFIED TYPE (HCC): ICD-10-CM

## 2020-06-23 DIAGNOSIS — I49.1 PAC (PREMATURE ATRIAL CONTRACTION): ICD-10-CM

## 2020-06-23 DIAGNOSIS — Z95.2 S/P TAVR (TRANSCATHETER AORTIC VALVE REPLACEMENT): ICD-10-CM

## 2020-06-23 DIAGNOSIS — R00.0 TACHYCARDIA: ICD-10-CM

## 2020-06-23 DIAGNOSIS — M27.2 MANDIBULAR ABSCESS: ICD-10-CM

## 2020-06-23 DIAGNOSIS — D64.9 ANEMIA, UNSPECIFIED TYPE: ICD-10-CM

## 2020-06-23 DIAGNOSIS — I49.9 CARDIAC ARRHYTHMIA, UNSPECIFIED CARDIAC ARRHYTHMIA TYPE: ICD-10-CM

## 2020-06-23 DIAGNOSIS — D72.819 LEUKOPENIA, UNSPECIFIED TYPE: ICD-10-CM

## 2020-06-23 DIAGNOSIS — I49.2 JUNCTIONAL PREMATURE DEPOLARIZATION (HCC): ICD-10-CM

## 2020-06-23 PROBLEM — D61.818 PANCYTOPENIA (HCC): Status: ACTIVE | Noted: 2020-06-23

## 2020-06-23 PROBLEM — E87.20 METABOLIC ACIDOSIS: Status: ACTIVE | Noted: 2020-06-23

## 2020-06-23 LAB
ALBUMIN SERPL BCP-MCNC: 3.4 G/DL (ref 3.2–4.9)
ALBUMIN/GLOB SERPL: 0.9 G/DL
ALP SERPL-CCNC: 65 U/L (ref 30–99)
ALT SERPL-CCNC: 20 U/L (ref 2–50)
ANION GAP SERPL CALC-SCNC: 14 MMOL/L (ref 7–16)
ANION GAP SERPL CALC-SCNC: 15 MMOL/L (ref 7–16)
ANISOCYTOSIS BLD QL SMEAR: ABNORMAL
APTT PPP: 62.9 SEC (ref 24.7–36)
APTT PPP: 63 SEC (ref 24.7–36)
AST SERPL-CCNC: 63 U/L (ref 12–45)
BASOPHILS # BLD AUTO: 0 % (ref 0–1.8)
BASOPHILS # BLD: 0 K/UL (ref 0–0.12)
BILIRUB SERPL-MCNC: 0.5 MG/DL (ref 0.1–1.5)
BUN SERPL-MCNC: 21 MG/DL (ref 8–22)
BUN SERPL-MCNC: 28 MG/DL (ref 8–22)
CALCIUM SERPL-MCNC: 8.7 MG/DL (ref 8.5–10.5)
CALCIUM SERPL-MCNC: 8.7 MG/DL (ref 8.5–10.5)
CHLORIDE SERPL-SCNC: 96 MMOL/L (ref 96–112)
CHLORIDE SERPL-SCNC: 98 MMOL/L (ref 96–112)
CO2 SERPL-SCNC: 19 MMOL/L (ref 20–33)
CO2 SERPL-SCNC: 19 MMOL/L (ref 20–33)
CREAT SERPL-MCNC: 0.59 MG/DL (ref 0.5–1.4)
CREAT SERPL-MCNC: 0.8 MG/DL (ref 0.5–1.4)
EKG IMPRESSION: NORMAL
EOSINOPHIL # BLD AUTO: 0 K/UL (ref 0–0.51)
EOSINOPHIL NFR BLD: 0 % (ref 0–6.9)
ERYTHROCYTE [DISTWIDTH] IN BLOOD BY AUTOMATED COUNT: 42.2 FL (ref 35.9–50)
ERYTHROCYTE [DISTWIDTH] IN BLOOD BY AUTOMATED COUNT: 42.5 FL (ref 35.9–50)
GLOBULIN SER CALC-MCNC: 3.7 G/DL (ref 1.9–3.5)
GLUCOSE SERPL-MCNC: 124 MG/DL (ref 65–99)
GLUCOSE SERPL-MCNC: 126 MG/DL (ref 65–99)
HCT VFR BLD AUTO: 34.1 % (ref 42–52)
HCT VFR BLD AUTO: 34.7 % (ref 42–52)
HGB BLD-MCNC: 11.5 G/DL (ref 14–18)
HGB BLD-MCNC: 11.7 G/DL (ref 14–18)
LV EJECT FRACT  99904: 55
LV EJECT FRACT  99904: 60
LV EJECT FRACT MOD 2C 99903: 33.88
LV EJECT FRACT MOD 4C 99902: 40.22
LV EJECT FRACT MOD BP 99901: 39.47
LYMPHOCYTES # BLD AUTO: 0.97 K/UL (ref 1–4.8)
LYMPHOCYTES NFR BLD: 48.7 % (ref 22–41)
MAGNESIUM SERPL-MCNC: 1.5 MG/DL (ref 1.5–2.5)
MANUAL DIFF BLD: NORMAL
MCH RBC QN AUTO: 28.5 PG (ref 27–33)
MCH RBC QN AUTO: 28.7 PG (ref 27–33)
MCHC RBC AUTO-ENTMCNC: 33.7 G/DL (ref 33.7–35.3)
MCHC RBC AUTO-ENTMCNC: 33.7 G/DL (ref 33.7–35.3)
MCV RBC AUTO: 84.4 FL (ref 81.4–97.8)
MCV RBC AUTO: 85 FL (ref 81.4–97.8)
MONOCYTES # BLD AUTO: 0.37 K/UL (ref 0–0.85)
MONOCYTES NFR BLD AUTO: 18.3 % (ref 0–13.4)
MORPHOLOGY BLD-IMP: NORMAL
NEUTROPHILS # BLD AUTO: 0.64 K/UL (ref 1.82–7.42)
NEUTROPHILS NFR BLD: 29.6 % (ref 44–72)
NEUTS BAND NFR BLD MANUAL: 2.6 % (ref 0–10)
NRBC # BLD AUTO: 0 K/UL
NRBC BLD-RTO: 0 /100 WBC
NT-PROBNP SERPL IA-MCNC: 560 PG/ML (ref 0–125)
PHOSPHATE SERPL-MCNC: 2.8 MG/DL (ref 2.5–4.5)
PLATELET # BLD AUTO: 107 K/UL (ref 164–446)
PLATELET # BLD AUTO: 90 K/UL (ref 164–446)
PLATELET BLD QL SMEAR: NORMAL
PMV BLD AUTO: 11.3 FL (ref 9–12.9)
PMV BLD AUTO: 11.3 FL (ref 9–12.9)
POTASSIUM SERPL-SCNC: 3.7 MMOL/L (ref 3.6–5.5)
POTASSIUM SERPL-SCNC: 3.9 MMOL/L (ref 3.6–5.5)
PROMYELOCYTES NFR BLD MANUAL: 0.9 %
PROT SERPL-MCNC: 7.1 G/DL (ref 6–8.2)
RBC # BLD AUTO: 4.01 M/UL (ref 4.7–6.1)
RBC # BLD AUTO: 4.11 M/UL (ref 4.7–6.1)
RBC BLD AUTO: PRESENT
SODIUM SERPL-SCNC: 130 MMOL/L (ref 135–145)
SODIUM SERPL-SCNC: 131 MMOL/L (ref 135–145)
TROPONIN T SERPL-MCNC: 39 NG/L (ref 6–19)
TSH SERPL DL<=0.005 MIU/L-ACNC: 0.78 UIU/ML (ref 0.38–5.33)
WBC # BLD AUTO: 2 K/UL (ref 4.8–10.8)
WBC # BLD AUTO: 3 K/UL (ref 4.8–10.8)

## 2020-06-23 PROCEDURE — 85027 COMPLETE CBC AUTOMATED: CPT | Mod: 91

## 2020-06-23 PROCEDURE — 93306 TTE W/DOPPLER COMPLETE: CPT

## 2020-06-23 PROCEDURE — 36415 COLL VENOUS BLD VENIPUNCTURE: CPT

## 2020-06-23 PROCEDURE — 83880 ASSAY OF NATRIURETIC PEPTIDE: CPT

## 2020-06-23 PROCEDURE — 71045 X-RAY EXAM CHEST 1 VIEW: CPT

## 2020-06-23 PROCEDURE — 99220 PR INITIAL OBSERVATION CARE,LEVL III: CPT | Mod: 25 | Performed by: INTERNAL MEDICINE

## 2020-06-23 PROCEDURE — 85730 THROMBOPLASTIN TIME PARTIAL: CPT

## 2020-06-23 PROCEDURE — 93306 TTE W/DOPPLER COMPLETE: CPT | Mod: 26 | Performed by: INTERNAL MEDICINE

## 2020-06-23 PROCEDURE — 700105 HCHG RX REV CODE 258: Performed by: INTERNAL MEDICINE

## 2020-06-23 PROCEDURE — G0378 HOSPITAL OBSERVATION PER HR: HCPCS

## 2020-06-23 PROCEDURE — 93005 ELECTROCARDIOGRAM TRACING: CPT

## 2020-06-23 PROCEDURE — 84484 ASSAY OF TROPONIN QUANT: CPT

## 2020-06-23 PROCEDURE — 93010 ELECTROCARDIOGRAM REPORT: CPT | Mod: 77 | Performed by: INTERNAL MEDICINE

## 2020-06-23 PROCEDURE — 84100 ASSAY OF PHOSPHORUS: CPT

## 2020-06-23 PROCEDURE — 93005 ELECTROCARDIOGRAM TRACING: CPT | Performed by: NURSE PRACTITIONER

## 2020-06-23 PROCEDURE — 93010 ELECTROCARDIOGRAM REPORT: CPT | Performed by: INTERNAL MEDICINE

## 2020-06-23 PROCEDURE — A9270 NON-COVERED ITEM OR SERVICE: HCPCS | Performed by: INTERNAL MEDICINE

## 2020-06-23 PROCEDURE — 80048 BASIC METABOLIC PNL TOTAL CA: CPT

## 2020-06-23 PROCEDURE — A9270 NON-COVERED ITEM OR SERVICE: HCPCS | Performed by: NURSE PRACTITIONER

## 2020-06-23 PROCEDURE — 700102 HCHG RX REV CODE 250 W/ 637 OVERRIDE(OP): Performed by: INTERNAL MEDICINE

## 2020-06-23 PROCEDURE — 84443 ASSAY THYROID STIM HORMONE: CPT

## 2020-06-23 PROCEDURE — 93005 ELECTROCARDIOGRAM TRACING: CPT | Performed by: EMERGENCY MEDICINE

## 2020-06-23 PROCEDURE — 99406 BEHAV CHNG SMOKING 3-10 MIN: CPT

## 2020-06-23 PROCEDURE — 85007 BL SMEAR W/DIFF WBC COUNT: CPT

## 2020-06-23 PROCEDURE — 99407 BEHAV CHNG SMOKING > 10 MIN: CPT | Performed by: INTERNAL MEDICINE

## 2020-06-23 PROCEDURE — 99285 EMERGENCY DEPT VISIT HI MDM: CPT

## 2020-06-23 PROCEDURE — 700102 HCHG RX REV CODE 250 W/ 637 OVERRIDE(OP): Performed by: NURSE PRACTITIONER

## 2020-06-23 PROCEDURE — 80053 COMPREHEN METABOLIC PANEL: CPT

## 2020-06-23 PROCEDURE — 83735 ASSAY OF MAGNESIUM: CPT

## 2020-06-23 PROCEDURE — 85027 COMPLETE CBC AUTOMATED: CPT

## 2020-06-23 PROCEDURE — 93000 ELECTROCARDIOGRAM COMPLETE: CPT | Performed by: INTERNAL MEDICINE

## 2020-06-23 PROCEDURE — 93268 ECG RECORD/REVIEW: CPT | Performed by: INTERNAL MEDICINE

## 2020-06-23 RX ORDER — ONDANSETRON 4 MG/1
4 TABLET, ORALLY DISINTEGRATING ORAL EVERY 4 HOURS PRN
Status: DISCONTINUED | OUTPATIENT
Start: 2020-06-23 | End: 2020-07-15 | Stop reason: HOSPADM

## 2020-06-23 RX ORDER — ENALAPRILAT 1.25 MG/ML
1.25 INJECTION INTRAVENOUS EVERY 6 HOURS PRN
Status: DISCONTINUED | OUTPATIENT
Start: 2020-06-23 | End: 2020-07-15 | Stop reason: HOSPADM

## 2020-06-23 RX ORDER — BISACODYL 10 MG
10 SUPPOSITORY, RECTAL RECTAL
Status: DISCONTINUED | OUTPATIENT
Start: 2020-06-23 | End: 2020-07-15 | Stop reason: HOSPADM

## 2020-06-23 RX ORDER — SODIUM CHLORIDE 9 MG/ML
INJECTION, SOLUTION INTRAVENOUS CONTINUOUS
Status: DISCONTINUED | OUTPATIENT
Start: 2020-06-23 | End: 2020-06-26

## 2020-06-23 RX ORDER — ASPIRIN 81 MG/1
81 TABLET ORAL DAILY
Qty: 30 TAB | Refills: 11 | Status: SHIPPED | OUTPATIENT
Start: 2020-06-24 | End: 2020-06-23

## 2020-06-23 RX ORDER — ACETAMINOPHEN 325 MG/1
650 TABLET ORAL EVERY 6 HOURS PRN
Status: DISCONTINUED | OUTPATIENT
Start: 2020-06-23 | End: 2020-07-15 | Stop reason: HOSPADM

## 2020-06-23 RX ORDER — LOVASTATIN 20 MG/1
20 TABLET ORAL
Status: DISCONTINUED | OUTPATIENT
Start: 2020-06-23 | End: 2020-07-15 | Stop reason: HOSPADM

## 2020-06-23 RX ORDER — POLYETHYLENE GLYCOL 3350 17 G/17G
1 POWDER, FOR SOLUTION ORAL
Status: DISCONTINUED | OUTPATIENT
Start: 2020-06-23 | End: 2020-07-15 | Stop reason: HOSPADM

## 2020-06-23 RX ORDER — AMLODIPINE BESYLATE 5 MG/1
5 TABLET ORAL
Status: DISCONTINUED | OUTPATIENT
Start: 2020-06-23 | End: 2020-06-24

## 2020-06-23 RX ORDER — AMOXICILLIN 250 MG
2 CAPSULE ORAL 2 TIMES DAILY
Status: DISCONTINUED | OUTPATIENT
Start: 2020-06-23 | End: 2020-07-15 | Stop reason: HOSPADM

## 2020-06-23 RX ORDER — ONDANSETRON 2 MG/ML
4 INJECTION INTRAMUSCULAR; INTRAVENOUS EVERY 4 HOURS PRN
Status: DISCONTINUED | OUTPATIENT
Start: 2020-06-23 | End: 2020-07-15 | Stop reason: HOSPADM

## 2020-06-23 RX ADMIN — SODIUM CHLORIDE: 9 INJECTION, SOLUTION INTRAVENOUS at 22:25

## 2020-06-23 RX ADMIN — ASPIRIN 81 MG: 81 TABLET, COATED ORAL at 05:56

## 2020-06-23 RX ADMIN — APIXABAN 5 MG: 5 TABLET, FILM COATED ORAL at 22:24

## 2020-06-23 RX ADMIN — LOVASTATIN 20 MG: 20 TABLET ORAL at 22:24

## 2020-06-23 RX ADMIN — AMLODIPINE BESYLATE 5 MG: 5 TABLET ORAL at 22:24

## 2020-06-23 SDOH — HEALTH STABILITY: MENTAL HEALTH: HOW OFTEN DO YOU HAVE A DRINK CONTAINING ALCOHOL?: 2-3 TIMES A WEEK

## 2020-06-23 ASSESSMENT — LIFESTYLE VARIABLES
ON A TYPICAL DAY WHEN YOU DRINK ALCOHOL HOW MANY DRINKS DO YOU HAVE: 2
TOTAL SCORE: 0
EVER_SMOKED: YES
DOES PATIENT WANT TO STOP DRINKING: NO
HAVE YOU EVER FELT YOU SHOULD CUT DOWN ON YOUR DRINKING: NO
ALCOHOL_USE: YES
EVER HAD A DRINK FIRST THING IN THE MORNING TO STEADY YOUR NERVES TO GET RID OF A HANGOVER: NO
DO YOU DRINK ALCOHOL: NO
CONSUMPTION TOTAL: NEGATIVE
TOTAL SCORE: 0
EVER FELT BAD OR GUILTY ABOUT YOUR DRINKING: NO
TOTAL SCORE: 0
TOTAL SCORE: 0
HAVE PEOPLE ANNOYED YOU BY CRITICIZING YOUR DRINKING: NO
HOW MANY TIMES IN THE PAST YEAR HAVE YOU HAD 5 OR MORE DRINKS IN A DAY: 0
EVER HAD A DRINK FIRST THING IN THE MORNING TO STEADY YOUR NERVES TO GET RID OF A HANGOVER: NO
EVER FELT BAD OR GUILTY ABOUT YOUR DRINKING: NO
AVERAGE NUMBER OF DAYS PER WEEK YOU HAVE A DRINK CONTAINING ALCOHOL: 3
HAVE YOU EVER FELT YOU SHOULD CUT DOWN ON YOUR DRINKING: NO
HAVE PEOPLE ANNOYED YOU BY CRITICIZING YOUR DRINKING: NO
DOES PATIENT WANT TO STOP DRINKING: NO
TOTAL SCORE: 0
CONSUMPTION TOTAL: INCOMPLETE
TOTAL SCORE: 0

## 2020-06-23 ASSESSMENT — COGNITIVE AND FUNCTIONAL STATUS - GENERAL
SUGGESTED CMS G CODE MODIFIER MOBILITY: CH
MOBILITY SCORE: 24
DAILY ACTIVITIY SCORE: 24
SUGGESTED CMS G CODE MODIFIER DAILY ACTIVITY: CH

## 2020-06-23 ASSESSMENT — ENCOUNTER SYMPTOMS
FEVER: 0
CONSTIPATION: 0
ABDOMINAL PAIN: 0
PALPITATIONS: 0
MYALGIAS: 0
BLOOD IN STOOL: 1
WEAKNESS: 0
COUGH: 0
LOSS OF CONSCIOUSNESS: 0
HEADACHES: 0
STRIDOR: 0
CHILLS: 0
DIZZINESS: 0
SHORTNESS OF BREATH: 0
SPUTUM PRODUCTION: 0
NAUSEA: 0
VOMITING: 0
FALLS: 0
DIARRHEA: 0
DEPRESSION: 0
TINGLING: 0

## 2020-06-23 ASSESSMENT — PATIENT HEALTH QUESTIONNAIRE - PHQ9
SUM OF ALL RESPONSES TO PHQ9 QUESTIONS 1 AND 2: 0
1. LITTLE INTEREST OR PLEASURE IN DOING THINGS: NOT AT ALL

## 2020-06-23 ASSESSMENT — FIBROSIS 4 INDEX
FIB4 SCORE: 8.82
FIB4 SCORE: 10.49

## 2020-06-23 NOTE — PROGRESS NOTES
Report received at bedside, patient care assumed. Tele box on. Patient resting in bed, updated on POC, patient assisted to the bathroom, no other requests at this time. Fall precautions in place with bed in lowest position, treaded socks on, and call light within reach.

## 2020-06-23 NOTE — ED TRIAGE NOTES
Magalisugo Kumari  65 y.o. male  Chief Complaint   Patient presents with   • Rapid Heart Beat     pt presented to follow-up appt today following valve replacement surgery yesterday; pt on arrival had heart rate was 170, pt advised by MD to present to ED. pt states he was non symptomatic during episodes.    • Sent by MD       Pt to Triage via wheelchair.   Pt is alert and oriented, speaking in full sentences, follows commands and responds appropriately to questions. Resp are even and unlabored. No behavioral indicators of pain.   Pt placed in lobby. Pt educated on triage process. Pt encouraged to alert staff for any changes.

## 2020-06-23 NOTE — RESPIRATORY CARE
" COPD EDUCATION by COPD CLINICAL EDUCATOR  6/23/2020 at 9:37 AM by Mari Del Rosario, RRT     Patient reviewed by COPD education team. Patient does not have a PFT and denies ever having a PFT. Patient refuses to do bedside PFT; states he's tired. Patient also refuses COPD education at this time. Smoking Cessation Intervention and education completed, 3 minutes spent on smoking cessation education with patient    Provided smoking cessation packet with \"Tips to Quit\" and flyer for \"Free Smoking Cessation Classes\".     "

## 2020-06-23 NOTE — TELEPHONE ENCOUNTER
Patient enrolled in the 14 day [employer] Heart monitoring program per OLIVIA Guerrero. In office hookup with Baseline established.  Pending EOS.

## 2020-06-23 NOTE — DISCHARGE INSTRUCTIONS
Discharge Instructions    Discharged to home by car with friend. Discharged via wheelchair, hospital escort: Yes.  Special equipment needed: Not Applicable    Be sure to schedule a follow-up appointment with your primary care doctor or any specialists as instructed.     Discharge Plan:   Diet Plan: Discussed  Activity Level: Discussed  Confirmed Follow up Appointment: Appointment Scheduled  Confirmed Symptoms Management: Discussed  Medication Reconciliation Updated: Yes    I understand that a diet low in cholesterol, fat, and sodium is recommended for good health. Unless I have been given specific instructions below for another diet, I accept this instruction as my diet prescription.   Other diet: Heart Healthy    Special Instructions:   Transcatheter Aortic Valve Replacement (TAVR)    General Instructions:  1. Take Medication as directed.  You will likely need to take aspirin and another blood thinner (antiplatelet medication) for a period.  You'll need to take the aspirin for the rest of your life.  Be sure your doctor knows about all other medicines you take, including over-the-counter medicines and dietary supplements.    Incision Care Instructions:  1. Look and feel the site(s) of your TAVR TODAY so you can recognize changes that should be called to your doctor (see below).  2. It's normal for your incision to be bruised, itchy, or sore while it's healing.  Your incision may take a week or more to heal.  3. Bruising may occur.  Some of the discoloration may travel down the leg.  As it resolves, the color should change from blue to green to yellow-brown.  4. A small, round lump under the TAVR site may remain for up to 6 weeks.  5. Wash your incision site every day with warm water and soap.  Gently pat it dry.  Don't put powder, lotion, or ointment on the incision until it's healed.    Activity:  1. No driving for one week  2. If you must take a long car ride (not as a ), stop every hour and walk around the  car.  3. No lifting or pulling objects over 5 pounds for 4-5 days.  4. Warm showers or baths are permitted after the bandage is removed.  5. Walk regularly.  One of the best ways to get stronger is to walk.  Walk a little more each day.  Take someone with you until you feel OK to walk alone.    When to call your health care provider:  1. You develop a fever.  2. Redness, swelling, bleeding, warmth, or fluid draining at the incision site.  3. Bruising appears to be new or does not look like it is getting better.  4. The small, round lump in the groin in the area of the TAVR site increases in size.    Seek immediate medical help if you have any of the following symptoms:  1. You experience any leg numbness, aching, or discomfort  2. Chest pain or trouble breathing (call 911)  3. Sudden numbness or weakness in your face, arms, or legs (call 911)  4. Bowel movement that is bright red  5. Dizziness or fainting  6. Weight gain of more than 2 pounds in 24 hours or more than 5 pounds in 1 week  7. Swelling in your hands, feet, or ankles  8. Shortness of breath that doesn't get better when you rest  9. Pain that gets worse or doesn't go away  10. Fast or irregular pulse       · Is patient discharged on Warfarin / Coumadin?   No     Depression / Suicide Risk    As you are discharged from this [facility] facility, it is important to learn how to keep safe from harming yourself.    Recognize the warning signs:  · Abrupt changes in personality, positive or negative- including increase in energy   · Giving away possessions  · Change in eating patterns- significant weight changes-  positive or negative  · Change in sleeping patterns- unable to sleep or sleeping all the time   · Unwillingness or inability to communicate  · Depression  · Unusual sadness, discouragement and loneliness  · Talk of wanting to die  · Neglect of personal appearance   · Rebelliousness- reckless behavior  · Withdrawal from people/activities they  love  · Confusion- inability to concentrate     If you or a loved one observes any of these behaviors or has concerns about self-harm, here's what you can do:  · Talk about it- your feelings and reasons for harming yourself  · Remove any means that you might use to hurt yourself (examples: pills, rope, extension cords, firearm)  · Get professional help from the community (Mental Health, Substance Abuse, psychological counseling)  · Do not be alone:Call your Safe Contact- someone whom you trust who will be there for you.  · Call your local CRISIS HOTLINE 991-6208 or 174-058-7593  · Call your local Children's Mobile Crisis Response Team [place] (174) 311-8313 or www.[employer]  · Call the toll free National Suicide Prevention Hotlines   · National Suicide Prevention Lifeline 637-734-HXHD (5411)  · National Hope Line Network 800-SUICIDE (642-7947)

## 2020-06-23 NOTE — PROGRESS NOTES
LE arterial duplex on R side reveals some stenosis and thrombus forming. Per Dr. Dennis, start heparin gtt. Stop plavix for tomorrow. Consider vascular surgery consultation if symptoms worsen or loss of pulse occurs to right side. RN and cardiologist on night call aware of patient's results and plan of care.

## 2020-06-23 NOTE — PROGRESS NOTES
Notified by monitor room that patient appears to be going I/O of junctional. STAT EKG obtained that showed NSR. OLIVIA Guerrero notified. Plan for placement of holter monitor immediately after DC.

## 2020-06-23 NOTE — PROGRESS NOTES
Cardiology Follow Up Progress Note    Date of Service  6/22/2020    I was paged for this patient's arterial ultrasound of the right lower extremity.  He is ambulated out with no pain.  He has intact pulses.  There is no cyanosis or paleness.  He said that he feels the same he has for years.  The ultrasound showed a 75% or greater stenosis of the right common femoral artery with possible fresh thrombus formed.  We started him on heparin.  He will be seen that by the TAVR team in the morning.

## 2020-06-23 NOTE — PROGRESS NOTES
Patient discharged AOx4, PIV removed, tele monitor removed, armband removed. Discharge summary thoroughly reviewed with patient and spouse. Patient and spouse acknowledged that all questions had been addressed and verbalized understanding of provided teaching. All belongings discharged with patient. Patient discharged via wheelchair with hospital escort.      Patient and spouse instructed to go to cardiology office for placement of holter monitor. Both verbalized understanding.

## 2020-06-23 NOTE — DISCHARGE SUMMARY
PRIMARY DISCHARGE DIAGNOSIS: Status post transcatheter aortic valve replacement.    PROCEDURES:    1. Successful transcatheter aortic valve replacement (TAVR) with #26+1 ml Jewell Jose Manuel 3 ultra valve, transfemoral approach under conscious sedation on 6/22/20  2. Intraoperative transesophageal echocardiogram showing results pending  3. Echocardiogram on 6/23/20 showing Prior echo done on 06/22/2020, there is now a TAVR valve present.  Left ventricular ejection fraction is visually estimated to be 60%.  Unable to estimate pulmonary artery pressure due to an inadequate   tricuspid regurgitant jet.  Known TAVR aortic valve.  4. CXR on 6/23/20 showing clear lungs, no effusions noted  5. EKG on 6/23/20 showing SR rate of 70's    HOSPITAL COURSE: The patient is a pleasant 65 year old male with severe symptomatic aortic stenosis, PAD with post-operative stenosis and thrombosis to R leg, HLD, HTN, CAD, and emphysema with current smkoing. Due to the patient's symptoms, the patient underwent successful TAVR described as above. Post-procedure, the patient did well. They didn't require IV diuresis. They were able to ambulate without difficulty. Patient post-operatively complained of some right sided leg pain and numbness, LE arterial STAT ultrasound revealed some thrombosis and stenosis of 75%, he was started on heparin gtt and will be transition to oral anticoagulation today for discharge. His symptoms improved on heparin gtt. They will call if R leg pain occurs or decreased color/pulse to leg at home noted. No further events were noted during their stay. They are now off oxygen and are to be discharged to home with his family.      Patient did get started on oral anticoagulation for post-operative right leg thrombosis and stenosis of 75% in the common femoral. Consider post-op groin check in office on Friday with RN.    DISCHARGE MEDICATIONS:    Miguelito Kumari   Home Medication Instructions ELISSA:[id number]    Printed  on:06/23/20 1118   Medication Information                      amLODIPine (NORVASC) 5 MG Tab  Take 1 Tab by mouth every day. At night             apixaban (ELIQUIS) 5mg Tab  Take 1 Tab by mouth 2 Times a Day. Indications: Thromboembolism secondary to Atrial Fibrillation             aspirin EC 81 MG EC tablet  Take 1 Tab by mouth every day.             lovastatin (MEVACOR) 20 MG Tab  Take 1 Tab by mouth every day.               DISCHARGE INSTRUCTIONS: They are given discharge instructions on potential post-operative complications and symptoms to watch out for. Their groin sites were checked and were clean, dry, and intact. Patient or family to notify us for any complications noted on the discharge instructions. They will follow up with Wm BAKER, on Friday virtually in our cardiology office. They will not get labs before their follow up appointment. They will then follow up with Dr. Dennis with a repeat echocardiogram in one month for post TAVR assessment.

## 2020-06-24 PROBLEM — E87.20 METABOLIC ACIDOSIS: Status: RESOLVED | Noted: 2020-06-23 | Resolved: 2020-06-24

## 2020-06-24 LAB
ANION GAP SERPL CALC-SCNC: 10 MMOL/L (ref 7–16)
ANISOCYTOSIS BLD QL SMEAR: ABNORMAL
BASOPHILS # BLD AUTO: 0.9 % (ref 0–1.8)
BASOPHILS # BLD: 0.02 K/UL (ref 0–0.12)
BUN SERPL-MCNC: 28 MG/DL (ref 8–22)
CALCIUM SERPL-MCNC: 8.2 MG/DL (ref 8.5–10.5)
CHLORIDE SERPL-SCNC: 100 MMOL/L (ref 96–112)
CO2 SERPL-SCNC: 20 MMOL/L (ref 20–33)
CREAT SERPL-MCNC: 0.7 MG/DL (ref 0.5–1.4)
EOSINOPHIL # BLD AUTO: 0 K/UL (ref 0–0.51)
EOSINOPHIL NFR BLD: 0 % (ref 0–6.9)
ERYTHROCYTE [DISTWIDTH] IN BLOOD BY AUTOMATED COUNT: 43 FL (ref 35.9–50)
GLUCOSE SERPL-MCNC: 123 MG/DL (ref 65–99)
HCT VFR BLD AUTO: 31.3 % (ref 42–52)
HGB BLD-MCNC: 10.3 G/DL (ref 14–18)
LYMPHOCYTES # BLD AUTO: 1.31 K/UL (ref 1–4.8)
LYMPHOCYTES NFR BLD: 62.5 % (ref 22–41)
MANUAL DIFF BLD: NORMAL
MCH RBC QN AUTO: 28.5 PG (ref 27–33)
MCHC RBC AUTO-ENTMCNC: 32.9 G/DL (ref 33.7–35.3)
MCV RBC AUTO: 86.7 FL (ref 81.4–97.8)
METAMYELOCYTES NFR BLD MANUAL: 0.9 %
MICROCYTES BLD QL SMEAR: ABNORMAL
MONOCYTES # BLD AUTO: 0.26 K/UL (ref 0–0.85)
MONOCYTES NFR BLD AUTO: 12.5 % (ref 0–13.4)
MORPHOLOGY BLD-IMP: NORMAL
MYELOCYTES NFR BLD MANUAL: 0.9 %
NEUTROPHILS # BLD AUTO: 0.47 K/UL (ref 1.82–7.42)
NEUTROPHILS NFR BLD: 21.4 % (ref 44–72)
NEUTS BAND NFR BLD MANUAL: 0.9 % (ref 0–10)
NRBC # BLD AUTO: 0 K/UL
NRBC BLD-RTO: 0 /100 WBC
PLATELET # BLD AUTO: 79 K/UL (ref 164–446)
PLATELET BLD QL SMEAR: NORMAL
PMV BLD AUTO: 11.8 FL (ref 9–12.9)
POTASSIUM SERPL-SCNC: 3.4 MMOL/L (ref 3.6–5.5)
RBC # BLD AUTO: 3.61 M/UL (ref 4.7–6.1)
RBC BLD AUTO: PRESENT
SODIUM SERPL-SCNC: 130 MMOL/L (ref 135–145)
WBC # BLD AUTO: 2.1 K/UL (ref 4.8–10.8)

## 2020-06-24 PROCEDURE — 700102 HCHG RX REV CODE 250 W/ 637 OVERRIDE(OP): Performed by: INTERNAL MEDICINE

## 2020-06-24 PROCEDURE — 36415 COLL VENOUS BLD VENIPUNCTURE: CPT

## 2020-06-24 PROCEDURE — 85007 BL SMEAR W/DIFF WBC COUNT: CPT

## 2020-06-24 PROCEDURE — G0378 HOSPITAL OBSERVATION PER HR: HCPCS

## 2020-06-24 PROCEDURE — 99226 PR SUBSEQUENT OBSERVATION CARE,LEVEL III: CPT | Performed by: INTERNAL MEDICINE

## 2020-06-24 PROCEDURE — A9270 NON-COVERED ITEM OR SERVICE: HCPCS | Performed by: INTERNAL MEDICINE

## 2020-06-24 PROCEDURE — 80048 BASIC METABOLIC PNL TOTAL CA: CPT

## 2020-06-24 PROCEDURE — 700105 HCHG RX REV CODE 258: Performed by: INTERNAL MEDICINE

## 2020-06-24 PROCEDURE — 85027 COMPLETE CBC AUTOMATED: CPT

## 2020-06-24 RX ORDER — AMLODIPINE BESYLATE 5 MG/1
2.5 TABLET ORAL
Status: DISCONTINUED | OUTPATIENT
Start: 2020-06-24 | End: 2020-07-05

## 2020-06-24 RX ADMIN — APIXABAN 5 MG: 5 TABLET, FILM COATED ORAL at 17:12

## 2020-06-24 RX ADMIN — SODIUM CHLORIDE: 9 INJECTION, SOLUTION INTRAVENOUS at 19:05

## 2020-06-24 RX ADMIN — ASPIRIN 81 MG: 81 TABLET, COATED ORAL at 05:19

## 2020-06-24 RX ADMIN — ACETAMINOPHEN 650 MG: 325 TABLET, FILM COATED ORAL at 17:12

## 2020-06-24 RX ADMIN — ACETAMINOPHEN 650 MG: 325 TABLET, FILM COATED ORAL at 08:29

## 2020-06-24 RX ADMIN — APIXABAN 5 MG: 5 TABLET, FILM COATED ORAL at 05:19

## 2020-06-24 RX ADMIN — LOVASTATIN 20 MG: 20 TABLET ORAL at 20:23

## 2020-06-24 RX ADMIN — AMLODIPINE BESYLATE 2.5 MG: 5 TABLET ORAL at 20:23

## 2020-06-24 RX ADMIN — SODIUM CHLORIDE: 9 INJECTION, SOLUTION INTRAVENOUS at 08:29

## 2020-06-24 RX ADMIN — DOCUSATE SODIUM 50 MG AND SENNOSIDES 8.6 MG 2 TABLET: 8.6; 5 TABLET, FILM COATED ORAL at 05:19

## 2020-06-24 ASSESSMENT — ENCOUNTER SYMPTOMS
CARDIOVASCULAR NEGATIVE: 1
PSYCHIATRIC NEGATIVE: 1
EYES NEGATIVE: 1
NEUROLOGICAL NEGATIVE: 1
GASTROINTESTINAL NEGATIVE: 1
MUSCULOSKELETAL NEGATIVE: 1
RESPIRATORY NEGATIVE: 1

## 2020-06-24 ASSESSMENT — FIBROSIS 4 INDEX
FIB4 SCORE: 10.49
FIB4 SCORE: 11.95

## 2020-06-24 ASSESSMENT — PATIENT HEALTH QUESTIONNAIRE - PHQ9
1. LITTLE INTEREST OR PLEASURE IN DOING THINGS: NOT AT ALL
SUM OF ALL RESPONSES TO PHQ9 QUESTIONS 1 AND 2: 0
2. FEELING DOWN, DEPRESSED, IRRITABLE, OR HOPELESS: NOT AT ALL

## 2020-06-24 NOTE — ASSESSMENT & PLAN NOTE
S/P TAVR on 6/22/2020   . Continue statin  Per cardiology start B12 and folate supplements  Echocardiogram shows no vegetations  Cardiology is following

## 2020-06-24 NOTE — ED NOTES
Med rec complete per pt. Per pt, was told 2 days ago to stop aspirin and will be starting eliquis (pt hasn't taken yet). No ABX taken in the last 2 weeks. NKDA

## 2020-06-24 NOTE — ED PROVIDER NOTES
ED Provider Note    CHIEF COMPLAINT  Chief Complaint   Patient presents with   • Rapid Heart Beat     pt presented to follow-up appt today following valve replacement surgery yesterday; pt on arrival had heart rate was 170, pt advised by MD to present to ED. pt states he was non symptomatic during episodes.    • Sent by MD     Patient is accompanied by his wife    JANET Kumari is a 65 y.o. male with a history of dyslipidemia, hypertension, and coronary artery disease who had an aortic valve replacement yesterday and presents from cardiology clinic for evaluation of rapid heartbeat.    Patient and wife give a history.  Patient's wife states that they were discharged from the hospital today and went over to cardiology clinic to have a Holter monitor placed for PVCs that were noted during the hospitalization.  The patient had walked around quite a bit prior to arriving to the clinic and required a wheelchair to get into the room but denies having had dizziness, chest pain or shortness of breath.    The office staff checked the patient's heart rate and found it to be rapid so they obtained a pulse oximeter which measured the heart rate at 170.  An EKG was obtained but by that time the tachycardia had resolved and did not show up on the EKG.      ALLERGIES  Allergies   Allergen Reactions   • Seasonal Cough and Runny Nose       CURRENT MEDICATIONS  Home Medications     Reviewed by Tabitha Murcia R.N. (Registered Nurse) on 06/23/20 at 1653  Med List Status: Complete   Medication Last Dose Status   amLODIPine (NORVASC) 5 MG Tab  Active   apixaban (ELIQUIS) 5mg Tab  Active   lovastatin (MEVACOR) 20 MG Tab  Active                PAST MEDICAL HISTORY   has a past medical history of Dental disorder, Dental disorder, Heart valve disease, High cholesterol, Hyperlipidemia, Hypertension, and Stented coronary artery (2008).    SURGICAL HISTORY   has a past surgical history that includes stapedectomy (1980s); bone  spur excision (1993); other cardiac surgery (2009); angiogram (05/22/2020); transcatheter aortic valve replacement (N/A, 6/22/2020); and waylon (N/A, 6/22/2020).    SOCIAL HISTORY  Social History     Tobacco Use   • Smoking status: Current Every Day Smoker     Packs/day: 0.50     Years: 45.00     Pack years: 22.50     Types: Cigarettes   • Smokeless tobacco: Never Used   Substance and Sexual Activity   • Alcohol use: Yes     Alcohol/week: 0.0 oz     Frequency: 2-3 times a week     Drinks per session: 1 or 2     Binge frequency: Never     Comment: 1-2 per day   • Drug use: No   • Sexual activity: Not on file       Smoker  Drinks ETOH    REVIEW OF SYSTEMS  See HPI for further details.  All other systems are negative except as above in HPI.    PHYSICAL EXAM  VITAL SIGNS: /61   Pulse 100   Temp 37.2 °C (98.9 °F) (Temporal)   Resp 14   Wt 57.2 kg (126 lb)   SpO2 94%   BMI 20.34 kg/m²     General:  WDWN, nontoxic appearing in NAD; A+Ox3; V/S as above  Skin: warm and dry; good color; no rash  HEENT: NCAT; EOMs intact; PERRL; no scleral icterus   Neck: FROM, no JVD  Cardiovascular: Regular heart rate and rhythm.  No murmurs, rubs, or gallops; pulses 2+ bilaterally radially and DP areas  Lungs: Clear to auscultation with decreased air movement bilaterally.  No wheezes, rhonchi, or rales.   Abdomen: BS present; soft; NTND; no rebound, guarding, or rigidity.  No organomegaly or pulsatile mass  Extremities: CHILDRESS x 4; Ace wrap over the right radial area; hematoma noted in the right inguinal region with no active bleeding;  no pedal edema; neg Apurva's  Neurologic: CNs III-XII grossly intact; speech clear; distal sensation intact; strength 5/5 UE/LEs  Psychiatric: Appropriate affect, normal mood    LABS  Results for orders placed or performed during the hospital encounter of 06/23/20   CBC WITH DIFFERENTIAL   Result Value Ref Range    WBC 2.0 (LL) 4.8 - 10.8 K/uL    RBC 4.11 (L) 4.70 - 6.10 M/uL    Hemoglobin 11.7  (L) 14.0 - 18.0 g/dL    Hematocrit 34.7 (L) 42.0 - 52.0 %    MCV 84.4 81.4 - 97.8 fL    MCH 28.5 27.0 - 33.0 pg    MCHC 33.7 33.7 - 35.3 g/dL    RDW 42.5 35.9 - 50.0 fL    Platelet Count 90 (L) 164 - 446 K/uL    MPV 11.3 9.0 - 12.9 fL    Nucleated RBC 0.00 /100 WBC    NRBC (Absolute) 0.00 K/uL   Basic Metabolic Panel   Result Value Ref Range    Sodium 130 (L) 135 - 145 mmol/L    Potassium 3.9 3.6 - 5.5 mmol/L    Chloride 96 96 - 112 mmol/L    Co2 19 (L) 20 - 33 mmol/L    Glucose 124 (H) 65 - 99 mg/dL    Bun 28 (H) 8 - 22 mg/dL    Creatinine 0.80 0.50 - 1.40 mg/dL    Calcium 8.7 8.5 - 10.5 mg/dL    Anion Gap 15.0 7.0 - 16.0   MAGNESIUM   Result Value Ref Range    Magnesium 1.5 1.5 - 2.5 mg/dL   PHOSPHORUS   Result Value Ref Range    Phosphorus 2.8 2.5 - 4.5 mg/dL   TROPONIN   Result Value Ref Range    Troponin T 39 (H) 6 - 19 ng/L   ESTIMATED GFR   Result Value Ref Range    GFR If African American >60 >60 mL/min/1.73 m 2    GFR If Non African American >60 >60 mL/min/1.73 m 2   EKG (NOW)   Result Value Ref Range    Report       [facility] Emergency Dept.    Test Date:  2020  Pt Name:    FADIA LANDIS              Department: ER  MRN:        [id number]                      Room:  Gender:     Male                         Technician: [id number]  :        1955                   Requested By:ER TRIAGE PROTOCOL  Order #:    [id number]                    Reading MD: SAIGE SALGADO MD    Measurements  Intervals                                Axis  Rate:       91                           P:          74  CT:         140                          QRS:        81  QRSD:       84                           T:          63  QT:         344  QTc:        424    Interpretive Statements  SINUS RHYTHM  VENTRICULAR PREMATURE COMPLEX  BORDERLINE RIGHT AXIS DEVIATION  MINIMAL ST DEPRESSION, INFERIOR LEADS  Compared to ECG 2020 16:06:40  Ventricular premature complex(es) now present  ST (T wave) deviation  now present  Sinus arrhythmia no longer present  T-wave abnormality no longer present  Electro nically Signed On 6- 17:34:35 PDT by JEANNE TOLEDO MD           IMAGING  DX-CHEST-PORTABLE (1 VIEW)   Final Result      No acute cardiac or pulmonary abnormalities are identified.        MEDICAL RECORD  I have reviewed patient's medical record and pertinent results are listed below.      COURSE & MEDICAL DECISION MAKING  I have reviewed any medical record information, laboratory studies and radiographic results as noted.    Magalis Kumari is a 65 y.o. male who presents for evaluation of asymptomatic tachycardia.    Appropriate PPE was worn at all times while interacting with the patient, including goggles, N95 mask, and surgical mask.    EKG from the outpatient office was reviewed.  No tachycardia noted.    EKG obtained here demonstrated a PVC but no tachycardia    7:10 PM  CBC is resulted and demonstrates leukopenia, stable anemia, thrombocytopenia (and absolute neutropenia on 6/17 of 360.)  Chemistry panel, TSH and troponin are still pending.  Dr. Dobson will follow up on these results  Patient will need admission for telemetry monitoring and further work-up.  Paging the hospitalist.    7:18 PM  Discussed with Dr. Rodriguez who agrees to hospitalize the patient.  Pt advised.    FINAL IMPRESSION  1. Tachycardia     2. Leukopenia, unspecified type     3. Neutropenia, unspecified type (HCC)     4. Anemia, unspecified type     5. Thrombocytopenia (HCC)       Electronically signed by: Jeanne Toledo M.D., 6/23/2020 5:34 PM

## 2020-06-24 NOTE — PROGRESS NOTES
Pt arrived to tele 7 via fauzia with ACLS RN. Pt walked self to bed. Tele monitor placed and monitor room called. Rhythm verified. VSS. Pt resting comfortably in bed. Will administer medications per MAR and continue to monitor.

## 2020-06-24 NOTE — ASSESSMENT & PLAN NOTE
Continue Norvasc    Added lisinopril 10mg po daily 7/7    Atenolol dose decreased due to bradycardia

## 2020-06-24 NOTE — ASSESSMENT & PLAN NOTE
Heart rate has been stable, and in sinus rhythm  BUN was elevated on admission, resolved with hydration  Monitor on telemetry

## 2020-06-24 NOTE — PROGRESS NOTES
[facility] Medicine Daily Progress Note    Date of Service  6/24/2020    Chief Complaint  65 y.o. male admitted 6/23/2020 with tachycardia.  He had a TAVR on 6/22/2020, and was discharged home on 6/23/2020.  He returned to the ER due to persistent tachycardia.    He has a history of pancytopenia which is worsening, and has been hesitant to do a bone marrow biopsy.  He has tobacco dependence, essential hypertension, hyperlipidemia.    Hospital Course  Home Eliquis and aspirin were resumed.  Heart rate has been stable.    Consultants/Specialty  None    Code Status  Full code    Disposition  Home when stable    Review of Systems  Review of Systems   Constitutional: Positive for malaise/fatigue.   HENT: Negative.    Eyes: Negative.    Respiratory: Negative.    Cardiovascular: Negative.    Gastrointestinal: Negative.    Genitourinary: Negative.    Musculoskeletal: Negative.    Skin: Negative.    Neurological: Negative.    Endo/Heme/Allergies: Negative.    Psychiatric/Behavioral: Negative.         Physical Exam  Temp:  [36.2 °C (97.1 °F)-37.9 °C (100.2 °F)] 37.9 °C (100.2 °F)  Pulse:  [] 93  Resp:  [14-29] 22  BP: ()/(59-72) 99/60  SpO2:  [90 %-97 %] 92 %    Physical Exam  Constitutional:       General: He is not in acute distress.     Appearance: He is not ill-appearing.   HENT:      Head: Normocephalic.      Nose: Nose normal.      Mouth/Throat:      Mouth: Mucous membranes are moist.   Eyes:      Pupils: Pupils are equal, round, and reactive to light.   Neck:      Musculoskeletal: Normal range of motion.   Cardiovascular:      Rate and Rhythm: Normal rate.   Pulmonary:      Effort: Pulmonary effort is normal.   Abdominal:      Palpations: Abdomen is soft.   Musculoskeletal:      Right lower leg: No edema.      Left lower leg: No edema.   Skin:     General: Skin is warm and dry.   Neurological:      General: No focal deficit present.      Mental Status: He is alert.   Psychiatric:         Mood and Affect: Mood  normal.         Behavior: Behavior normal.         Fluids    Intake/Output Summary (Last 24 hours) at 6/24/2020 1128  Last data filed at 6/24/2020 0900  Gross per 24 hour   Intake 120 ml   Output --   Net 120 ml       Laboratory  Recent Labs     06/23/20 0326 06/23/20 1816 06/24/20  0411   WBC 3.0* 2.0* 2.1*   RBC 4.01* 4.11* 3.61*   HEMOGLOBIN 11.5* 11.7* 10.3*   HEMATOCRIT 34.1* 34.7* 31.3*   MCV 85.0 84.4 86.7   MCH 28.7 28.5 28.5   MCHC 33.7 33.7 32.9*   RDW 42.2 42.5 43.0   PLATELETCT 107* 90* 79*   MPV 11.3 11.3 11.8     Recent Labs     06/23/20 0326 06/23/20 1816 06/24/20  0411   SODIUM 131* 130* 130*   POTASSIUM 3.7 3.9 3.4*   CHLORIDE 98 96 100   CO2 19* 19* 20   GLUCOSE 126* 124* 123*   BUN 21 28* 28*   CREATININE 0.59 0.80 0.70   CALCIUM 8.7 8.7 8.2*     Recent Labs     06/22/20 2116 06/23/20 0326 06/23/20  1037   APTT 31.1 62.9* 63.0*               Imaging  DX-CHEST-PORTABLE (1 VIEW)   Final Result      No acute cardiac or pulmonary abnormalities are identified.           Assessment/Plan  * Tachycardia- (present on admission)  Assessment & Plan  Heart rate has been stable, and in sinus rhythm  BUN was elevated on admission, continue IV hydration  Monitor on telemetry    Pancytopenia (HCC)- (present on admission)  Assessment & Plan  Appears to have worsened over the last few months  Patient has been hesitant to have a bone marrow biopsy  Aspirin and Eliquis were continued  Recommend outpatient hematology follow-up    S/P TAVR (transcatheter aortic valve replacement)- (present on admission)  Assessment & Plan  S/P TAVR on 6/22/2020  Resume outpatient cardiology follow-up    Mixed hyperlipidemia- (present on admission)  Assessment & Plan  Continue home lovastatin    Essential hypertension- (present on admission)  Assessment & Plan  Blood pressure has been on the low side  Continue Norvasc at a lower dose with parameters    Panlobular emphysema (HCC)- (present on admission)  Assessment & Plan  History  of tobacco use  Chest x-ray from admission shows no acute cardiopulmonary process    Tobacco dependence- (present on admission)  Assessment & Plan  Patient has been counseled on tobacco cessation  Nicotine replacement options were provided       VTE prophylaxis: On Eliquis

## 2020-06-24 NOTE — PROGRESS NOTES
Lab called with critical absolute neutrophil count of 0.47. Critical lab result read back.    Dr. Gupta notified of critical lab result at 0805.  Critical lab result read back by Dr. Gupta.    Will place on neutropenic precautions.

## 2020-06-24 NOTE — RESPIRATORY CARE
COPD EDUCATION by COPD CLINICAL EDUCATOR  6/24/2020 at 8:11 AM by Mari Del Rosario, RRT     Patient seen by COPD educator 6/23/2020.

## 2020-06-24 NOTE — PROGRESS NOTES
2 RN Skin Check    2 RN skin check complete with HEIDI Paul  Devices in place: n/a.  Skin assessed under devices: N\A.  Confirmed pressure ulcers found on: n/a.  New potential pressure ulcers noted on n/a . Wound consult placed N/A.  The following interventions in place Pillows.

## 2020-06-24 NOTE — H&P
Hospital Medicine History & Physical Note    Date of Service  6/23/2020    Primary Care Physician  MARIA GUADALUPE Sims    Code Status  Full Code    Chief Complaint  Chief Complaint   Patient presents with   • Rapid Heart Beat     pt presented to follow-up appt today following valve replacement surgery yesterday; pt on arrival had heart rate was 170, pt advised by MD to present to ED. pt states he was non symptomatic during episodes.    • Sent by MD       History of Presenting Illness  65 y.o. male who presented 6/23/2020 with tachycardia.  Patient did just have a aortic valve replacement done yesterday, was sent home later in the day.  He was noted to have an arrhythmia during the hospital stay so we did follow-up with cardiology today.  The plan was to place a monitor on him.  While there, he was noted to have a fast heart rate, EKG was done however it had resolved.  Deemed transient however it was advised he had closer monitoring so he presented to the emergency department.  Upon arrival, labs were obtained which showed pancytopenia which is worsening.  I did ask him if he had any bleeding in his stool or urine, he stated yesterday he did note some brown blood in his stool once however this resolved this morning.  He does state he has had good oral intake but does complain of some fatigue.  He denied any fever, chills, shortness of breath.  He also denied any palpitations.  I did discussed the case including labs with the ER physician.    Review of Systems  Review of Systems   Constitutional: Positive for malaise/fatigue. Negative for chills and fever.   HENT: Negative for congestion.    Respiratory: Negative for cough, sputum production, shortness of breath and stridor.    Cardiovascular: Negative for chest pain, palpitations and leg swelling.   Gastrointestinal: Positive for blood in stool (once). Negative for abdominal pain, constipation, diarrhea, nausea and vomiting.   Genitourinary: Negative for  dysuria and urgency.   Musculoskeletal: Negative for falls and myalgias.   Neurological: Negative for dizziness, tingling, loss of consciousness, weakness and headaches.   Psychiatric/Behavioral: Negative for depression and suicidal ideas.   All other systems reviewed and are negative.      Past Medical History   has a past medical history of Dental disorder, Dental disorder, Heart valve disease, High cholesterol, Hyperlipidemia, Hypertension, and Stented coronary artery (2008).    Surgical History   has a past surgical history that includes stapedectomy (1980s); bone spur excision (1993); other cardiac surgery (2009); angiogram (05/22/2020); transcatheter aortic valve replacement (N/A, 6/22/2020); and waylon (N/A, 6/22/2020).     Family History  family history includes Diabetes in his mother and paternal uncle; Heart Disease in his maternal uncle, mother, and paternal uncle; Hypertension in his mother; No Known Problems in his father; Psychiatric Illness in his paternal uncle.     Social History   reports that he has been smoking cigarettes. He has a 22.50 pack-year smoking history. He has never used smokeless tobacco. He reports current alcohol use. He reports that he does not use drugs.    Allergies  Allergies   Allergen Reactions   • Seasonal Cough and Runny Nose       Medications  Prior to Admission Medications   Prescriptions Last Dose Informant Patient Reported? Taking?   amLODIPine (NORVASC) 5 MG Tab 6/22/2020 at  Patient No No   Sig: Take 1 Tab by mouth every day. At night   apixaban (ELIQUIS) 5mg Tab NEW RX at NEW RX  No No   Sig: Take 1 Tab by mouth 2 Times a Day. Indications: Thromboembolism secondary to Atrial Fibrillation   aspirin 81 MG tablet Stopped at 6/21/20  Yes Yes   Sig: Take 81 mg by mouth every day.   lovastatin (MEVACOR) 20 MG Tab 6/22/2020 at  Patient No No   Sig: Take 1 Tab by mouth every day.      Facility-Administered Medications: None       Physical Exam  Temp:  [37.2 °C (98.9 °F)]  37.2 °C (98.9 °F)  Pulse:  [] 82  Resp:  [14-29] 29  BP: (110-137)/(61-69) 117/69  SpO2:  [93 %-97 %] 94 %    Physical Exam  Vitals signs and nursing note reviewed.   Constitutional:       General: He is not in acute distress.     Appearance: He is well-developed. He is not toxic-appearing or diaphoretic.   HENT:      Head: Normocephalic and atraumatic.      Right Ear: External ear normal.      Left Ear: External ear normal.      Nose: Nose normal. No congestion or rhinorrhea.      Mouth/Throat:      Mouth: Mucous membranes are dry.      Pharynx: No oropharyngeal exudate.   Eyes:      General:         Right eye: No discharge.         Left eye: No discharge.      Extraocular Movements: Extraocular movements intact.   Neck:      Musculoskeletal: Normal range of motion and neck supple. No edema or erythema.      Trachea: No tracheal deviation.   Cardiovascular:      Rate and Rhythm: Normal rate. Rhythm irregular.      Heart sounds: Murmur present. No friction rub. No gallop.    Pulmonary:      Effort: Pulmonary effort is normal. No respiratory distress.      Breath sounds: Normal breath sounds. No stridor. No wheezing or rales.   Chest:      Chest wall: No tenderness.   Abdominal:      General: Bowel sounds are normal. There is no distension.      Palpations: Abdomen is soft.      Tenderness: There is no abdominal tenderness.   Musculoskeletal: Normal range of motion.         General: No tenderness.      Right lower leg: No edema.      Left lower leg: No edema.   Lymphadenopathy:      Cervical: No cervical adenopathy.   Skin:     General: Skin is warm and dry.      Coloration: Skin is not jaundiced.      Findings: No erythema or rash.   Neurological:      General: No focal deficit present.      Mental Status: He is alert and oriented to person, place, and time.      Cranial Nerves: No cranial nerve deficit.   Psychiatric:         Mood and Affect: Mood normal.         Behavior: Behavior normal.         Thought  Content: Thought content normal.         Judgment: Judgment normal.         Laboratory:  Recent Labs     06/22/20  1225 06/23/20  0326 06/23/20  1816   WBC 3.0* 3.0* 2.0*   RBC 4.09* 4.01* 4.11*   HEMOGLOBIN 11.9* 11.5* 11.7*   HEMATOCRIT 35.6* 34.1* 34.7*   MCV 87.0 85.0 84.4   MCH 29.1 28.7 28.5   MCHC 33.4* 33.7 33.7   RDW 43.6 42.2 42.5   PLATELETCT 172 107* 90*   MPV 11.4 11.3 11.3     Recent Labs     06/22/20  1811 06/23/20  0326 06/23/20  1816   SODIUM 134* 131* 130*   POTASSIUM 4.1 3.7 3.9   CHLORIDE 98 98 96   CO2 21 19* 19*   GLUCOSE 127* 126* 124*   BUN 16 21 28*   CREATININE 0.64 0.59 0.80   CALCIUM 9.0 8.7 8.7     Recent Labs     06/22/20  1811 06/23/20  0326 06/23/20  1816   ALTSGPT 19 20  --    ASTSGOT 58* 63*  --    ALKPHOSPHAT 75 65  --    TBILIRUBIN 0.5 0.5  --    GLUCOSE 127* 126* 124*     Recent Labs     06/22/20  2116 06/23/20  0326 06/23/20  1037   APTT 31.1 62.9* 63.0*     Recent Labs     06/22/20  1811 06/23/20  0326   NTPROBNP 693* 560*         Recent Labs     06/23/20  1816   TROPONINT 39*       Imaging:  DX-CHEST-PORTABLE (1 VIEW)   Final Result      No acute cardiac or pulmonary abnormalities are identified.            Assessment/Plan:    * Tachycardia- (present on admission)  Assessment & Plan  -Initially on my exam, he was in sinus rhythm with a normal rate, I did go back in to check on him at that point in time he did have mild tachycardia with some irregularity, it would go back and forth between possible A. fib as well as sinus  -Continue to monitor on telemetry  -Possibly due to dehydration    Pancytopenia (HCC)- (present on admission)  Assessment & Plan  -Somewhat chronic but it is worsening  -No sign of infection  -I did discuss the possibility of a bone marrow biopsy however he is hesitant, I did tell him I would not order it yet, he can discuss it with my partner tomorrow, may require outpatient bone marrow biopsy  -At this point in time, I will continue his Eliquis and  aspirin  -Repeat CBC in the morning    S/P TAVR (transcatheter aortic valve replacement)- (present on admission)  Assessment & Plan  -Still with some fatigue but no shortness of breath, normal chest x-ray  -Continue outpatient follow-up    Mixed hyperlipidemia- (present on admission)  Assessment & Plan  -Continue home statin    Essential hypertension- (present on admission)  Assessment & Plan  -Continue home amlodipine  -PRN enalapril  -Adjust as needed    Metabolic acidosis- (present on admission)  Assessment & Plan  -Due to dehydration  -Start IV fluids  -Repeat BMP in the morning    Panlobular emphysema (HCC)- (present on admission)  Assessment & Plan  -I did personally review his chest x-ray, noted no acute cardiopulmonary process    Tobacco dependence- (present on admission)  Assessment & Plan  -Tobacco cessation counseling and education provided for more than 11 minutes. Nicotine replacement options provided including patch, and further medical treatments including Wellbutrin and chantix.  As well as over the counter options of lozenges and gum  -He denied the need for a patch

## 2020-06-24 NOTE — ED NOTES
Lab called with critical result of WBC 2.0 at 1851. Critical lab result read back to lab.   Dr. Alberts notified of critical lab result at 1853.  Critical lab result read back by Dr. Alberts.

## 2020-06-24 NOTE — ASSESSMENT & PLAN NOTE
Present for a few months, unclear etiology    abx as per id    Hematology/oncology were consulted 6/28/2020  Bone marrow biopsy - results reviewed by me- does not state anything conclusive      Transfuse plt on 7/3- to prepare for surgery on mandible

## 2020-06-25 LAB
ANION GAP SERPL CALC-SCNC: 11 MMOL/L (ref 7–16)
ANISOCYTOSIS BLD QL SMEAR: ABNORMAL
BASO STIPL BLD QL SMEAR: NORMAL
BASOPHILS # BLD AUTO: 2.6 % (ref 0–1.8)
BASOPHILS # BLD: 0.07 K/UL (ref 0–0.12)
BUN SERPL-MCNC: 20 MG/DL (ref 8–22)
CALCIUM SERPL-MCNC: 7.7 MG/DL (ref 8.5–10.5)
CHLORIDE SERPL-SCNC: 103 MMOL/L (ref 96–112)
CO2 SERPL-SCNC: 19 MMOL/L (ref 20–33)
CREAT SERPL-MCNC: 0.53 MG/DL (ref 0.5–1.4)
EKG IMPRESSION: NORMAL
EOSINOPHIL # BLD AUTO: 0 K/UL (ref 0–0.51)
EOSINOPHIL NFR BLD: 0 % (ref 0–6.9)
ERYTHROCYTE [DISTWIDTH] IN BLOOD BY AUTOMATED COUNT: 43.6 FL (ref 35.9–50)
ERYTHROCYTE [SEDIMENTATION RATE] IN BLOOD BY WESTERGREN METHOD: 45 MM/HOUR (ref 0–20)
FIBRINOGEN PPP-MCNC: 344 MG/DL (ref 215–460)
FOLATE SERPL-MCNC: 19.4 NG/ML
GLUCOSE SERPL-MCNC: 123 MG/DL (ref 65–99)
HCT VFR BLD AUTO: 29.4 % (ref 42–52)
HGB BLD-MCNC: 9.7 G/DL (ref 14–18)
LYMPHOCYTES # BLD AUTO: 1.53 K/UL (ref 1–4.8)
LYMPHOCYTES NFR BLD: 58.8 % (ref 22–41)
MANUAL DIFF BLD: NORMAL
MCH RBC QN AUTO: 28.6 PG (ref 27–33)
MCHC RBC AUTO-ENTMCNC: 33 G/DL (ref 33.7–35.3)
MCV RBC AUTO: 86.7 FL (ref 81.4–97.8)
MICROCYTES BLD QL SMEAR: ABNORMAL
MONOCYTES # BLD AUTO: 0.48 K/UL (ref 0–0.85)
MONOCYTES NFR BLD AUTO: 18.4 % (ref 0–13.4)
MORPHOLOGY BLD-IMP: NORMAL
NEUTROPHILS # BLD AUTO: 0.53 K/UL (ref 1.82–7.42)
NEUTROPHILS NFR BLD: 20.2 % (ref 44–72)
NRBC # BLD AUTO: 0 K/UL
NRBC BLD-RTO: 0 /100 WBC
OVALOCYTES BLD QL SMEAR: NORMAL
PLATELET # BLD AUTO: 57 K/UL (ref 164–446)
PLATELET BLD QL SMEAR: NORMAL
PMV BLD AUTO: 13 FL (ref 9–12.9)
POIKILOCYTOSIS BLD QL SMEAR: NORMAL
POLYCHROMASIA BLD QL SMEAR: NORMAL
POTASSIUM SERPL-SCNC: 3.2 MMOL/L (ref 3.6–5.5)
RBC # BLD AUTO: 3.39 M/UL (ref 4.7–6.1)
RBC BLD AUTO: PRESENT
RHEUMATOID FACT SER IA-ACNC: 40 IU/ML (ref 0–14)
SODIUM SERPL-SCNC: 133 MMOL/L (ref 135–145)
TROPONIN T SERPL-MCNC: 34 NG/L (ref 6–19)
VIT B12 SERPL-MCNC: 428 PG/ML (ref 211–911)
WBC # BLD AUTO: 2.6 K/UL (ref 4.8–10.8)

## 2020-06-25 PROCEDURE — 86038 ANTINUCLEAR ANTIBODIES: CPT

## 2020-06-25 PROCEDURE — 700105 HCHG RX REV CODE 258: Performed by: INTERNAL MEDICINE

## 2020-06-25 PROCEDURE — 82746 ASSAY OF FOLIC ACID SERUM: CPT

## 2020-06-25 PROCEDURE — 84484 ASSAY OF TROPONIN QUANT: CPT

## 2020-06-25 PROCEDURE — 85652 RBC SED RATE AUTOMATED: CPT

## 2020-06-25 PROCEDURE — A9270 NON-COVERED ITEM OR SERVICE: HCPCS | Performed by: INTERNAL MEDICINE

## 2020-06-25 PROCEDURE — G0378 HOSPITAL OBSERVATION PER HR: HCPCS

## 2020-06-25 PROCEDURE — 85384 FIBRINOGEN ACTIVITY: CPT

## 2020-06-25 PROCEDURE — 85007 BL SMEAR W/DIFF WBC COUNT: CPT

## 2020-06-25 PROCEDURE — 85027 COMPLETE CBC AUTOMATED: CPT

## 2020-06-25 PROCEDURE — 82607 VITAMIN B-12: CPT

## 2020-06-25 PROCEDURE — 700102 HCHG RX REV CODE 250 W/ 637 OVERRIDE(OP): Performed by: INTERNAL MEDICINE

## 2020-06-25 PROCEDURE — 99226 PR SUBSEQUENT OBSERVATION CARE,LEVEL III: CPT | Performed by: INTERNAL MEDICINE

## 2020-06-25 PROCEDURE — 36415 COLL VENOUS BLD VENIPUNCTURE: CPT

## 2020-06-25 PROCEDURE — 84155 ASSAY OF PROTEIN SERUM: CPT

## 2020-06-25 PROCEDURE — 84165 PROTEIN E-PHORESIS SERUM: CPT

## 2020-06-25 PROCEDURE — 86431 RHEUMATOID FACTOR QUANT: CPT

## 2020-06-25 PROCEDURE — 80048 BASIC METABOLIC PNL TOTAL CA: CPT

## 2020-06-25 RX ORDER — ZOLPIDEM TARTRATE 5 MG/1
5 TABLET ORAL NIGHTLY PRN
Status: DISCONTINUED | OUTPATIENT
Start: 2020-06-25 | End: 2020-07-15 | Stop reason: HOSPADM

## 2020-06-25 RX ORDER — TRAZODONE HYDROCHLORIDE 50 MG/1
50 TABLET ORAL NIGHTLY PRN
Status: COMPLETED | OUTPATIENT
Start: 2020-06-25 | End: 2020-06-25

## 2020-06-25 RX ADMIN — SODIUM CHLORIDE: 9 INJECTION, SOLUTION INTRAVENOUS at 04:43

## 2020-06-25 RX ADMIN — ASPIRIN 81 MG: 81 TABLET, COATED ORAL at 04:43

## 2020-06-25 RX ADMIN — TRAZODONE HYDROCHLORIDE 50 MG: 50 TABLET ORAL at 20:51

## 2020-06-25 RX ADMIN — SODIUM CHLORIDE: 9 INJECTION, SOLUTION INTRAVENOUS at 17:20

## 2020-06-25 RX ADMIN — LOVASTATIN 20 MG: 20 TABLET ORAL at 20:52

## 2020-06-25 RX ADMIN — APIXABAN 5 MG: 5 TABLET, FILM COATED ORAL at 17:20

## 2020-06-25 RX ADMIN — APIXABAN 5 MG: 5 TABLET, FILM COATED ORAL at 04:43

## 2020-06-25 ASSESSMENT — FIBROSIS 4 INDEX: FIB4 SCORE: 16.56

## 2020-06-25 NOTE — PROGRESS NOTES
Assumed care this am from night shift RN. Patient awake and in the bathroom, no complaints of pain or signs of distress. Patient AxO 4, O2 @ room air, VSS. Call bell and personal items within reach, bed locked in low position. Hourly rounding in place. Tele box in place, monitor room notified.

## 2020-06-25 NOTE — PROGRESS NOTES
Received bedside report from RN, pt care assumed, VSS, pt assessment complete. Pt AAOx4, 0/10 pain at this time. No signs of acute distress noted at this time. POC discussed with pt and verbalizes no questions. Pt denies any additional needs at this time. Bed in lowest position, pt educated on fall risk and verbalized understanding, call light within reach, hourly rounding initiated.

## 2020-06-25 NOTE — CARE PLAN
Problem: Bowel/Gastric:  Goal: Normal bowel function is maintained or improved  Outcome: PROGRESSING AS EXPECTED  Intervention: Educate patient and significant other/support system about diet, fluid intake, medications and activity to promote bowel function  Note: Patient maintaining adequate hydration and oral intake. Patient reports BM in the last 24 hrs. Bowel protocol in place.        Problem: Knowledge Deficit  Goal: Knowledge of disease process/condition, treatment plan, diagnostic tests, and medications will improve  Outcome: PROGRESSING AS EXPECTED  Intervention: Explain information regarding disease process/condition, treatment plan, diagnostic tests, and medications and document in education  Note: Plan of care explained to patient. Patient verbalized understanding and has no further questions.

## 2020-06-25 NOTE — DISCHARGE PLANNING
Care Transition Team Assessment    RN KAY spoke with patient to complete the transition assessment.  Patient lives in a 1st floor apartment with his wife in [place]. Verified that information on the face sheet is correct.  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  He uses the [facility] on [address] for his medications.    He denies any difficulty with ADLs/IADLs.      DC Plan:  Patient plans on going home w/ no needs; unsure if he will have any further needs on dc.  Is going to have a bone marrow biopsy tomorrow and may need outpatient services pending the results of that.    Information Source  Orientation : Oriented x 4  Information Given By: Patient  Who is responsible for making decisions for patient? : Patient    Readmission Evaluation  Is this a readmission?: Yes - unplanned readmission    Elopement Risk  Legal Hold: No  Ambulatory or Self Mobile in Wheelchair: Yes  Disoriented: No  Psychiatric Symptoms: None  History of Wandering: No  Elopement this Admit: No  Vocalizing Wanting to Leave: No  Displays Behaviors, Body Language Wanting to Leave: No-Not at Risk for Elopement  Elopement Risk: Not at Risk for Elopement    Interdisciplinary Discharge Planning  Primary Care Physician: OLIVIA Pulido  Lives with - Patient's Self Care Capacity: Spouse  Patient or legal guardian wants to designate a caregiver (see row info): No  Support Systems: Spouse / Significant Other  Housing / Facility: 1 Story Apartment / Condo  Do You Take your Prescribed Medications Regularly: Yes  Able to Return to Previous ADL's: Yes  Mobility Issues: No  Prior Services: None  Patient Expects to be Discharged to:: home  Assistance Needed: No  Durable Medical Equipment: Not Applicable    Discharge Preparedness  What is your plan after discharge?: Home with help  What are your discharge supports?: Spouse  Prior Functional Level: Ambulatory, Drives Self, Independent with Activities of  Daily Living, Independent with Medication Management  Difficulity with ADLs: None  Difficulity with IADLs: None    Functional Assesment  Prior Functional Level: Ambulatory, Drives Self, Independent with Activities of Daily Living, Independent with Medication Management    Finances  Financial Barriers to Discharge: No  Prescription Coverage: Yes    Vision / Hearing Impairment  Vision Impairment : No  Right Eye Vision: Impaired, Wears Glasses  Left Eye Vision: Impaired, Wears Glasses  Hearing Impairment : No         Advance Directive  Advance Directive?: None  Advance Directive offered?: AD Booklet refused    Domestic Abuse  Have you ever been the victim of abuse or violence?: No  Physical Abuse or Sexual Abuse: No  Verbal Abuse or Emotional Abuse: No  Possible Abuse Reported to:: Not Applicable    Psychological Assessment  History of Substance Abuse: None  History of Psychiatric Problems: No  Non-compliant with Treatment: No    Discharge Risks or Barriers  Discharge risks or barriers?: No    Anticipated Discharge Information  Anticipated discharge disposition: Discharge needs currently unknown  Discharge Address: [place]  [address]  Discharge Contact Phone Number: [phone]

## 2020-06-25 NOTE — PROGRESS NOTES
[facility] Medicine Daily Progress Note    Date of Service  6/25/2020    Chief Complaint  65 y.o. male admitted 6/23/2020 with tachycardia.  He had a TAVR on 6/22/2020, and was discharged home on 6/23/2020.  He returned to the ER due to persistent tachycardia.     He has a history of pancytopenia which is worsening, and has been hesitant to do a bone marrow biopsy.  He has tobacco dependence, essential hypertension, hyperlipidemia.     Hospital Course  Home Eliquis and aspirin were resumed.  Heart rate has been stable.  Continues to have a decline in hemoglobin and platelets.  WBCs have stabilized, ANC is 0.53 today (from 0.47)     Consultants/Specialty  None     Code Status  Full code     Disposition  Home when stable     Review of Systems  Review of Systems   Constitutional: Positive for malaise/fatigue.   HENT: Negative.    Eyes: Negative.    Respiratory: Negative.    Cardiovascular: Negative.    Gastrointestinal: Negative.    Genitourinary: Negative.    Musculoskeletal: Negative.    Skin: Negative.    Neurological: Negative.    Endo/Heme/Allergies: Negative.    Psychiatric/Behavioral: Negative.         Physical Exam  Temp:  [36.7 °C (98 °F)-38.5 °C (101.3 °F)] 37 °C (98.6 °F)  Pulse:  [78-85] 80  Resp:  [18-19] 18  BP: (104-137)/(58-85) 118/62  SpO2:  [91 %-95 %] 95 %    Physical Exam  Constitutional:       General: He is not in acute distress.     Appearance: He is not ill-appearing.   HENT:      Head: Normocephalic.      Nose: Nose normal.      Mouth/Throat:      Mouth: Mucous membranes are moist.   Eyes:      Pupils: Pupils are equal, round, and reactive to light.   Neck:      Musculoskeletal: Normal range of motion.   Cardiovascular:      Rate and Rhythm: Normal rate.   Pulmonary:      Effort: Pulmonary effort is normal.   Abdominal:      Palpations: Abdomen is soft.   Musculoskeletal:      Right lower leg: No edema.      Left lower leg: No edema.   Skin:     General: Skin is warm and dry.   Neurological:       General: No focal deficit present.      Mental Status: He is alert.   Psychiatric:         Mood and Affect: Mood normal.         Behavior: Behavior normal.     Fluids    Intake/Output Summary (Last 24 hours) at 6/25/2020 1425  Last data filed at 6/25/2020 1300  Gross per 24 hour   Intake 1640 ml   Output --   Net 1640 ml       Laboratory  Recent Labs     06/23/20  1816 06/24/20  0411 06/25/20  0359   WBC 2.0* 2.1* 2.6*   RBC 4.11* 3.61* 3.39*   HEMOGLOBIN 11.7* 10.3* 9.7*   HEMATOCRIT 34.7* 31.3* 29.4*   MCV 84.4 86.7 86.7   MCH 28.5 28.5 28.6   MCHC 33.7 32.9* 33.0*   RDW 42.5 43.0 43.6   PLATELETCT 90* 79* 57*   MPV 11.3 11.8 13.0*     Recent Labs     06/23/20  1816 06/24/20  0411 06/25/20  0359   SODIUM 130* 130* 133*   POTASSIUM 3.9 3.4* 3.2*   CHLORIDE 96 100 103   CO2 19* 20 19*   GLUCOSE 124* 123* 123*   BUN 28* 28* 20   CREATININE 0.80 0.70 0.53   CALCIUM 8.7 8.2* 7.7*     Recent Labs     06/22/20  2116 06/23/20  0326 06/23/20  1037   APTT 31.1 62.9* 63.0*               Imaging  DX-CHEST-PORTABLE (1 VIEW)   Final Result      No acute cardiac or pulmonary abnormalities are identified.           Assessment/Plan  * Tachycardia- (present on admission)  Assessment & Plan  Heart rate has been stable, and in sinus rhythm  BUN was elevated on admission, resolved with hydration  Monitor on telemetry    Pancytopenia (HCC)- (present on admission)  Assessment & Plan  Present for a few months, unclear etiology  Briefly discussed with Dr. Chacon (hematology/oncology)  Fibrinogen, B12, folic acid, SPEP, ESR, RF, SEEMA have been ordered  Complete abdominal ultrasound has been ordered for tomorrow morning  Follow CMP and CBC    S/P TAVR (transcatheter aortic valve replacement)- (present on admission)  Assessment & Plan  S/P TAVR on 6/22/2020  On aspirin, Eliquis, statin  Resume outpatient cardiology follow-up    Mixed hyperlipidemia- (present on admission)  Assessment & Plan  Continue home lovastatin    Essential hypertension-  (present on admission)  Assessment & Plan  Blood pressure has been on the low side  Continue Norvasc at a lower dose with parameters    Panlobular emphysema (HCC)- (present on admission)  Assessment & Plan  History of tobacco use  Chest x-ray from admission shows no acute cardiopulmonary process    Tobacco dependence- (present on admission)  Assessment & Plan  Patient has been counseled on tobacco cessation  Nicotine replacement options were provided       VTE prophylaxis: On Eliquis

## 2020-06-26 ENCOUNTER — APPOINTMENT (OUTPATIENT)
Dept: RADIOLOGY | Facility: MEDICAL CENTER | Age: 65
DRG: 982 | End: 2020-06-26
Attending: INTERNAL MEDICINE
Payer: COMMERCIAL

## 2020-06-26 PROBLEM — R00.0 TACHYCARDIA: Status: RESOLVED | Noted: 2020-06-23 | Resolved: 2020-06-26

## 2020-06-26 LAB
ALBUMIN SERPL BCP-MCNC: 2.6 G/DL (ref 3.2–4.9)
ALBUMIN/GLOB SERPL: 0.8 G/DL
ALP SERPL-CCNC: 43 U/L (ref 30–99)
ALT SERPL-CCNC: 15 U/L (ref 2–50)
ANION GAP SERPL CALC-SCNC: 5 MMOL/L (ref 7–16)
ANISOCYTOSIS BLD QL SMEAR: ABNORMAL
AST SERPL-CCNC: 37 U/L (ref 12–45)
BASO STIPL BLD QL SMEAR: NORMAL
BASOPHILS # BLD AUTO: 1 % (ref 0–1.8)
BASOPHILS # BLD: 0.03 K/UL (ref 0–0.12)
BILIRUB SERPL-MCNC: 0.6 MG/DL (ref 0.1–1.5)
BUN SERPL-MCNC: 15 MG/DL (ref 8–22)
CALCIUM SERPL-MCNC: 7.8 MG/DL (ref 8.5–10.5)
CHLORIDE SERPL-SCNC: 104 MMOL/L (ref 96–112)
CO2 SERPL-SCNC: 20 MMOL/L (ref 20–33)
CREAT SERPL-MCNC: 0.5 MG/DL (ref 0.5–1.4)
EOSINOPHIL # BLD AUTO: 0 K/UL (ref 0–0.51)
EOSINOPHIL NFR BLD: 0 % (ref 0–6.9)
ERYTHROCYTE [DISTWIDTH] IN BLOOD BY AUTOMATED COUNT: 42.6 FL (ref 35.9–50)
GLOBULIN SER CALC-MCNC: 3.2 G/DL (ref 1.9–3.5)
GLUCOSE SERPL-MCNC: 118 MG/DL (ref 65–99)
HCT VFR BLD AUTO: 25.5 % (ref 42–52)
HGB BLD-MCNC: 8.6 G/DL (ref 14–18)
LYMPHOCYTES # BLD AUTO: 1.2 K/UL (ref 1–4.8)
LYMPHOCYTES NFR BLD: 47.9 % (ref 22–41)
MACROCYTES BLD QL SMEAR: ABNORMAL
MAGNESIUM SERPL-MCNC: 1.4 MG/DL (ref 1.5–2.5)
MANUAL DIFF BLD: NORMAL
MCH RBC QN AUTO: 28.6 PG (ref 27–33)
MCHC RBC AUTO-ENTMCNC: 33.7 G/DL (ref 33.7–35.3)
MCV RBC AUTO: 84.7 FL (ref 81.4–97.8)
MICROCYTES BLD QL SMEAR: ABNORMAL
MONOCYTES # BLD AUTO: 0.34 K/UL (ref 0–0.85)
MONOCYTES NFR BLD AUTO: 13.5 % (ref 0–13.4)
MORPHOLOGY BLD-IMP: NORMAL
MYELOCYTES NFR BLD MANUAL: 2.1 %
NEUTROPHILS # BLD AUTO: 0.86 K/UL (ref 1.82–7.42)
NEUTROPHILS NFR BLD: 34.4 % (ref 44–72)
NRBC # BLD AUTO: 0 K/UL
NRBC BLD-RTO: 0 /100 WBC
OVALOCYTES BLD QL SMEAR: NORMAL
PLATELET # BLD AUTO: 45 K/UL (ref 164–446)
PLATELET BLD QL SMEAR: NORMAL
PLATELETS.RETICULATED NFR BLD AUTO: 7.8 K/UL (ref 0.6–13.1)
PMV BLD AUTO: 11.5 FL (ref 9–12.9)
POIKILOCYTOSIS BLD QL SMEAR: NORMAL
POLYCHROMASIA BLD QL SMEAR: NORMAL
POTASSIUM SERPL-SCNC: 2.9 MMOL/L (ref 3.6–5.5)
POTASSIUM SERPL-SCNC: 4 MMOL/L (ref 3.6–5.5)
PROMYELOCYTES NFR BLD MANUAL: 1 %
PROT SERPL-MCNC: 5.8 G/DL (ref 6–8.2)
RBC # BLD AUTO: 3.01 M/UL (ref 4.7–6.1)
RBC BLD AUTO: PRESENT
SODIUM SERPL-SCNC: 129 MMOL/L (ref 135–145)
WBC # BLD AUTO: 2.5 K/UL (ref 4.8–10.8)

## 2020-06-26 PROCEDURE — 96365 THER/PROPH/DIAG IV INF INIT: CPT

## 2020-06-26 PROCEDURE — 85007 BL SMEAR W/DIFF WBC COUNT: CPT

## 2020-06-26 PROCEDURE — A9270 NON-COVERED ITEM OR SERVICE: HCPCS | Performed by: INTERNAL MEDICINE

## 2020-06-26 PROCEDURE — 96367 TX/PROPH/DG ADDL SEQ IV INF: CPT

## 2020-06-26 PROCEDURE — 700102 HCHG RX REV CODE 250 W/ 637 OVERRIDE(OP): Performed by: INTERNAL MEDICINE

## 2020-06-26 PROCEDURE — 93926 LOWER EXTREMITY STUDY: CPT | Mod: 26,RT | Performed by: INTERNAL MEDICINE

## 2020-06-26 PROCEDURE — G0378 HOSPITAL OBSERVATION PER HR: HCPCS

## 2020-06-26 PROCEDURE — 96366 THER/PROPH/DIAG IV INF ADDON: CPT

## 2020-06-26 PROCEDURE — 36415 COLL VENOUS BLD VENIPUNCTURE: CPT

## 2020-06-26 PROCEDURE — 87040 BLOOD CULTURE FOR BACTERIA: CPT | Mod: 91

## 2020-06-26 PROCEDURE — 84132 ASSAY OF SERUM POTASSIUM: CPT

## 2020-06-26 PROCEDURE — 85055 RETICULATED PLATELET ASSAY: CPT

## 2020-06-26 PROCEDURE — 83735 ASSAY OF MAGNESIUM: CPT

## 2020-06-26 PROCEDURE — 85027 COMPLETE CBC AUTOMATED: CPT

## 2020-06-26 PROCEDURE — 700111 HCHG RX REV CODE 636 W/ 250 OVERRIDE (IP): Performed by: NURSE PRACTITIONER

## 2020-06-26 PROCEDURE — 76700 US EXAM ABDOM COMPLETE: CPT

## 2020-06-26 PROCEDURE — 700105 HCHG RX REV CODE 258: Performed by: INTERNAL MEDICINE

## 2020-06-26 PROCEDURE — 80053 COMPREHEN METABOLIC PANEL: CPT

## 2020-06-26 PROCEDURE — 93926 LOWER EXTREMITY STUDY: CPT | Mod: RT

## 2020-06-26 PROCEDURE — 99217 PR OBSERVATION CARE DISCHARGE: CPT | Performed by: INTERNAL MEDICINE

## 2020-06-26 RX ORDER — POTASSIUM CHLORIDE 20 MEQ/1
40 TABLET, EXTENDED RELEASE ORAL ONCE
Status: COMPLETED | OUTPATIENT
Start: 2020-06-26 | End: 2020-06-26

## 2020-06-26 RX ORDER — MAGNESIUM SULFATE HEPTAHYDRATE 40 MG/ML
4 INJECTION, SOLUTION INTRAVENOUS ONCE
Status: COMPLETED | OUTPATIENT
Start: 2020-06-26 | End: 2020-06-26

## 2020-06-26 RX ORDER — POTASSIUM CHLORIDE 7.45 MG/ML
10 INJECTION INTRAVENOUS
Status: COMPLETED | OUTPATIENT
Start: 2020-06-26 | End: 2020-06-26

## 2020-06-26 RX ADMIN — LOVASTATIN 20 MG: 20 TABLET ORAL at 20:28

## 2020-06-26 RX ADMIN — POTASSIUM CHLORIDE 40 MEQ: 1500 TABLET, EXTENDED RELEASE ORAL at 11:55

## 2020-06-26 RX ADMIN — POTASSIUM CHLORIDE 10 MEQ: 7.46 INJECTION, SOLUTION INTRAVENOUS at 13:03

## 2020-06-26 RX ADMIN — MAGNESIUM SULFATE IN WATER 4 G: 40 INJECTION, SOLUTION INTRAVENOUS at 16:56

## 2020-06-26 RX ADMIN — ACETAMINOPHEN 650 MG: 325 TABLET, FILM COATED ORAL at 20:28

## 2020-06-26 RX ADMIN — SODIUM CHLORIDE: 9 INJECTION, SOLUTION INTRAVENOUS at 03:43

## 2020-06-26 RX ADMIN — POTASSIUM CHLORIDE 10 MEQ: 7.46 INJECTION, SOLUTION INTRAVENOUS at 14:31

## 2020-06-26 RX ADMIN — AMLODIPINE BESYLATE 2.5 MG: 5 TABLET ORAL at 20:27

## 2020-06-26 RX ADMIN — POTASSIUM CHLORIDE 10 MEQ: 7.46 INJECTION, SOLUTION INTRAVENOUS at 11:56

## 2020-06-26 ASSESSMENT — ENCOUNTER SYMPTOMS
STRIDOR: 0
CHOKING: 0
COUGH: 0
SHORTNESS OF BREATH: 0
WHEEZING: 0
APNEA: 0
CHEST TIGHTNESS: 0

## 2020-06-26 NOTE — PROGRESS NOTES
Cardiology Follow Up Progress Note    Date of Service  6/26/2020    Attending Physician  Jacki Gupta M.D.    Chief Complaint   Asymptomatic tachycardia, was sent from cardiology office to ER, patient is status post transcatheter aortic valve replacement 6/22/2020, labs revealed worsening pancytopenia    HPI  Miguelito Kumari is a 65 y.o. male admitted 6/23/2020 with asymptomatic tachycardia, heart rate 170s, he was discharged on 6/22/2020 , went over to cardiology clinic to have a Holter monitor placed for PVCs that were noted during the hospitalization, he had walk quite a bit prior to arriving to the clinic and required a wheelchair to get in the room but denies having any dizziness chest pain shortness of breath, staff checked the patient's heart rate and pulse oximetry measured the heart rate at 170, EKG was obtained but by that time tachycardia had resolved and did not capture on EKG..      Past medical history significant for recent transcatheter aortic valve replacement 6/22/2020, hyperlipidemia, hypertension, emphysema, tobacco use.    Interim Events    6/26/20 no overnight cardiac events, sinus rhythm rare PVCs, denies any symptoms, encouraged ambulation prior to discharge, wishes to go home today with close follow-up with cardiology next week, bilateral groin site ecchymotic, bruised, mild hematoma, stable.    Review of Systems  Review of Systems   Respiratory: Negative for apnea, cough, choking, chest tightness, shortness of breath, wheezing and stridor.    Cardiovascular: Negative for chest pain and leg swelling.       Vital signs in last 24 hours  Temp:  [37 °C (98.6 °F)-37.7 °C (99.8 °F)] 37.1 °C (98.8 °F)  Pulse:  [71-87] 72  Resp:  [16-18] 17  BP: ()/(62-70) 112/64  SpO2:  [93 %-97 %] 97 %    Physical Exam  Physical Exam  Cardiovascular:      Rate and Rhythm: Normal rate and regular rhythm.   Pulmonary:      Effort: Pulmonary effort is normal.   Abdominal:      General: Abdomen is flat.    Skin:     General: Skin is warm.      Comments: Bilateral groin bruising, ecchymotic, soft to touch, good circulation, denies pain with activity.   Neurological:      General: No focal deficit present.      Mental Status: He is alert.   Psychiatric:         Mood and Affect: Mood normal.         Lab Review  Lab Results   Component Value Date/Time    WBC 2.5 (LL) 06/26/2020 03:17 AM    RBC 3.01 (L) 06/26/2020 03:17 AM    HEMOGLOBIN 8.6 (L) 06/26/2020 03:17 AM    HEMATOCRIT 25.5 (L) 06/26/2020 03:17 AM    MCV 84.7 06/26/2020 03:17 AM    MCH 28.6 06/26/2020 03:17 AM    MCHC 33.7 06/26/2020 03:17 AM    MPV 11.5 06/26/2020 03:17 AM      Lab Results   Component Value Date/Time    SODIUM 129 (L) 06/26/2020 03:17 AM    POTASSIUM 2.9 (L) 06/26/2020 03:17 AM    CHLORIDE 104 06/26/2020 03:17 AM    CO2 20 06/26/2020 03:17 AM    GLUCOSE 118 (H) 06/26/2020 03:17 AM    BUN 15 06/26/2020 03:17 AM    CREATININE 0.50 06/26/2020 03:17 AM      Lab Results   Component Value Date/Time    ASTSGOT 37 06/26/2020 03:17 AM    ALTSGPT 15 06/26/2020 03:17 AM     Lab Results   Component Value Date/Time    CHOLSTRLTOT 117 03/09/2020 01:50 PM    LDL 70 03/09/2020 01:50 PM    HDL 29 (A) 03/09/2020 01:50 PM    TRIGLYCERIDE 89 03/09/2020 01:50 PM    TROPONINT 34 (H) 06/25/2020 01:49 PM       No results for input(s): NTPROBNP in the last 72 hours.    Cardiac Imaging and Procedures Review  EKG: Sinus rhythm, PVCs    Echocardiogram:   6/23/2020  Left ventricular ejection fraction is visually estimated to be 60%.  Unable to estimate pulmonary artery pressure due to an inadequate   tricuspid regurgitant jet.  Known TAVR aortic valve.     Cardiac Catheterization: 5/22/2020 non-obstructive coronary artery disease  Mild disease in iliac arteries.      Findings:  1.  Left main coronary artery:  Distal left main has 10-20% disease.  2.  Left anterior descending artery:  Prior stent widely patent, no significant disease in LAD. Major diagonal has 40-50%  stenosis in proximal portion.  3.  Left circumflex coronary artery:  Normal. Gives One marginal branch, which has no significant disease   4.  Right coronary artery: 50% disease in proximal portion, Mid RCA has two stents with 30-40% instent restenosis.  This is a right dominant system.     Abdominal aortogram showed mild disease bilateral external iliac arteries.      Imaging  Chest X-Ray:  No acute cardiac or pulmonary abnormalities are identified.    Stress Test:  n/a    Assessment/Plan    Symptomatic severe aortic stenosis, NYHA class II  -Status post transcatheter aortic valve replacement 6/22/2020 (#26+1 mL Jewell marlys 3 ultra valve )  -Postop course significant for right common femoral artery nonocclusive thrombus likely from Perclose devices without any evidence of limb ischemia, to be treated conservatively, was started on Eliquis, currently on hold secondary to thrombocytopenia, will evaluate as an outpatient.  -Repeat CBC in 3 days, will arrange  -Plan for repeat ultrasound of the lower extremity as an outpatient.      Hyponatremia  -Stop IV fluids  -Repeat labs on Monday    Hypokalemia  -Replete    Leukopenia, repeat labs on Monday, follow-up with hematologist next week, appreciate primary team's assist.  Neutropenia, as above  Anemia, as above no active bleeding  Thrombocytopenia, no active bleeding  -Continue holding aspirin and Eliquis on discharge.    Peripheral arterial disease   -stable repeat   -ultrasound as an outpatient      CAD  -no obstructive CAD based on angiography 5/22/2020    Hypertension  - well controlled, this afternoon 125/70, on amlodipine    Hyperlipidemia  - LDL 70, on Mevacor    Emphysema    Tobacco abuse  - well-versed on the importance of stop smoking.      Plan for discharge this afternoon  Replete potassium prior to discharge  Repeat magnesium prior to discharge  Repeat CBC, CMP in 3 days  Ambulate prior to discharge  Follow-up appointment with our cardiology office on  7/1/2020 at 10:45 AM            Please contact me with any questions.    MARÍA Diaz.   Cardiologist, [facility]  [phone]

## 2020-06-26 NOTE — PROGRESS NOTES
Assumed care this am from night shift RN. Patient getting abdominal ultrasound. Patient AxO 4, O2 @ room air, VSS. Call bell and personal items within reach, bed locked in low position. Hourly rounding in place. Tele box in place, monitor room notified.

## 2020-06-26 NOTE — PROGRESS NOTES
Brief post tavr care note:  Platelets continues to trend down. Please stop aspirin.  Check limited ultrasound of right groin to follow on clot femoral artery if there is resolution, eliquis can be stopped.   None

## 2020-06-26 NOTE — CARE PLAN
Problem: Bowel/Gastric:  Goal: Normal bowel function is maintained or improved  Outcome: PROGRESSING AS EXPECTED  Intervention: Educate patient and significant other/support system about signs and symptoms of constipation and interventions to implement  Note: Patient maintaining adequate hydration and oral intake. Patient reports BM in the last 24 hrs. Bowel protocol in place.        Problem: Knowledge Deficit  Goal: Knowledge of disease process/condition, treatment plan, diagnostic tests, and medications will improve  Outcome: PROGRESSING AS EXPECTED  Intervention: Explain information regarding disease process/condition, treatment plan, diagnostic tests, and medications and document in education  Note: Plan of care explained to patient. Patient verbalized understanding and has no further questions.

## 2020-06-26 NOTE — PROGRESS NOTES
Critical lab of platelets 45 reported to MD Espinoza. No new orders placed, will continue to monitor.

## 2020-06-26 NOTE — DISCHARGE SUMMARY
Discharge Summary    CHIEF COMPLAINT ON ADMISSION  Chief Complaint   Patient presents with   • Rapid Heart Beat     pt presented to follow-up appt today following valve replacement surgery yesterday; pt on arrival had heart rate was 170, pt advised by MD to present to ED. pt states he was non symptomatic during episodes.    • Sent by MD       Reason for Admission  irregular heart rate     Admission Date  6/23/2020    CODE STATUS  Full Code    HPI & HOSPITAL COURSE  65 y.o. male admitted 6/23/2020 with tachycardia.  He had a TAVR on 6/22/2020, and was discharged home on 6/23/2020.  He returned to the ER due to persistent tachycardia.     He has a history of pancytopenia which is worsening, and has been hesitant to do a bone marrow biopsy. He has tobacco dependence, essential hypertension, hyperlipidemia.     Home Eliquis and aspirin were initially resumed, discontinued today.  Heart rate has been stable.      Tachycardia  Patient's heart rate has been stable.  He received IV fluid hydration.  He was evaluated by cardiology.  He has been cleared for home discharge with outpatient cardiology follow-up.  Aspirin and Eliquis have been stopped.    Pancytopenia  Patient has a known history of pancytopenia.  His platelets and hemoglobin dropped after aggressive hydration for suspected dehydration and elevated BUN.  Patient stated he has has not had hematology/oncology work-up as an outpatient.  This was briefly discussed with oncology.  Abdominal ultrasound from 6/25/2020 showed no acute abnormalities.  Patient has been hesitant to have a bone marrow biopsy, is agreeable to having it done as an outpatient.  Meanwhile, SPEP, smooth muscle antibodies, SEEMA, rheumatoid factor, B12 and folate levels, fibrinogen labs were ordered.  Patient has been advised to follow-up with his primary care physician on Monday for follow-up on labs and and further hematological work-up.    Patient wishes to go home and states that he will  follow-up with his primary care physician on Monday, as well as hematology/oncology within the next few days as soon as possible.    Therefore, he is discharged in fair and stable condition to home with close outpatient follow-up.    Discharge Date  6/26/20    FOLLOW UP ITEMS POST DISCHARGE  Labs to be done by primary care physician on June 29, 2020.  Follow-up with primary care physician on June 29, 2020.  Follow-up with hematology as soon as possible within 1 week.    DISCHARGE DIAGNOSES  Principal Problem (Resolved):    Tachycardia POA: Yes  Active Problems:    Pancytopenia (HCC) POA: Yes    Essential hypertension POA: Yes    Mixed hyperlipidemia POA: Yes    S/P TAVR (transcatheter aortic valve replacement) POA: Yes      Overview: Echo 2019    Tobacco dependence POA: Yes    Panlobular emphysema (HCC) POA: Yes      FOLLOW UP  Future Appointments   Date Time Provider Department Center   7/1/2020 10:45 AM Violeta Villagomez P.A.-C. [facility] None   10/19/2020  7:00 AM MARIA GUADALUPE Sims [facility] None     No follow-up provider specified.    MEDICATIONS ON DISCHARGE     Medication List      CONTINUE taking these medications      Instructions   amLODIPine 5 MG Tabs  Commonly known as:  NORVASC   Doctor's comments:  Stopping losartan  Take 1 Tab by mouth every day. At night  Dose:  5 mg     lovastatin 20 MG Tabs  Commonly known as:  MEVACOR   Take 1 Tab by mouth every day.  Dose:  20 mg        STOP taking these medications    apixaban 5mg Tabs  Commonly known as:  ELIQUIS     aspirin 81 MG tablet            Allergies  Allergies   Allergen Reactions   • Seasonal Cough and Runny Nose       DIET  Orders Placed This Encounter   Procedures   • Diet Order Cardiac     Standing Status:   Standing     Number of Occurrences:   1     Order Specific Question:   Diet:     Answer:   Cardiac [6]       ACTIVITY  Light duty.  Per cardiology recommendations      CONSULTATIONS  Cardiology    PROCEDURES  None    LABORATORY  Lab Results    Component Value Date    SODIUM 129 (L) 06/26/2020    POTASSIUM 2.9 (L) 06/26/2020    CHLORIDE 104 06/26/2020    CO2 20 06/26/2020    GLUCOSE 118 (H) 06/26/2020    BUN 15 06/26/2020    CREATININE 0.50 06/26/2020        Lab Results   Component Value Date    WBC 2.5 (LL) 06/26/2020    HEMOGLOBIN 8.6 (L) 06/26/2020    HEMATOCRIT 25.5 (L) 06/26/2020    PLATELETCT 45 (LL) 06/26/2020        Total time of the discharge process exceeds 35 minutes.

## 2020-06-27 ENCOUNTER — APPOINTMENT (OUTPATIENT)
Dept: RADIOLOGY | Facility: MEDICAL CENTER | Age: 65
DRG: 982 | End: 2020-06-27
Attending: INTERNAL MEDICINE
Payer: COMMERCIAL

## 2020-06-27 PROBLEM — R50.9 FEVER: Status: ACTIVE | Noted: 2020-06-27

## 2020-06-27 LAB
ALBUMIN SERPL BCP-MCNC: 2.9 G/DL (ref 3.2–4.9)
ALBUMIN/GLOB SERPL: 0.9 G/DL
ALP SERPL-CCNC: 50 U/L (ref 30–99)
ALT SERPL-CCNC: 20 U/L (ref 2–50)
ANION GAP SERPL CALC-SCNC: 8 MMOL/L (ref 7–16)
ANISOCYTOSIS BLD QL SMEAR: ABNORMAL
APPEARANCE UR: CLEAR
AST SERPL-CCNC: 43 U/L (ref 12–45)
BACTERIA #/AREA URNS HPF: NEGATIVE /HPF
BASOPHILS # BLD AUTO: 0.9 % (ref 0–1.8)
BASOPHILS # BLD: 0.02 K/UL (ref 0–0.12)
BILIRUB SERPL-MCNC: 0.7 MG/DL (ref 0.1–1.5)
BILIRUB UR QL STRIP.AUTO: NEGATIVE
BUN SERPL-MCNC: 12 MG/DL (ref 8–22)
CALCIUM SERPL-MCNC: 7.9 MG/DL (ref 8.5–10.5)
CHLORIDE SERPL-SCNC: 102 MMOL/L (ref 96–112)
CO2 SERPL-SCNC: 22 MMOL/L (ref 20–33)
COLOR UR: ABNORMAL
CREAT SERPL-MCNC: 0.52 MG/DL (ref 0.5–1.4)
EOSINOPHIL # BLD AUTO: 0 K/UL (ref 0–0.51)
EOSINOPHIL NFR BLD: 0 % (ref 0–6.9)
EPI CELLS #/AREA URNS HPF: NEGATIVE /HPF
ERYTHROCYTE [DISTWIDTH] IN BLOOD BY AUTOMATED COUNT: 43.1 FL (ref 35.9–50)
GLOBULIN SER CALC-MCNC: 3.4 G/DL (ref 1.9–3.5)
GLUCOSE SERPL-MCNC: 120 MG/DL (ref 65–99)
GLUCOSE UR STRIP.AUTO-MCNC: NEGATIVE MG/DL
HCT VFR BLD AUTO: 29.3 % (ref 42–52)
HGB BLD-MCNC: 9.9 G/DL (ref 14–18)
HYALINE CASTS #/AREA URNS LPF: ABNORMAL /LPF
KETONES UR STRIP.AUTO-MCNC: NEGATIVE MG/DL
LEUKOCYTE ESTERASE UR QL STRIP.AUTO: NEGATIVE
LYMPHOCYTES # BLD AUTO: 1.02 K/UL (ref 1–4.8)
LYMPHOCYTES NFR BLD: 50.9 % (ref 22–41)
MANUAL DIFF BLD: NORMAL
MCH RBC QN AUTO: 28.9 PG (ref 27–33)
MCHC RBC AUTO-ENTMCNC: 33.8 G/DL (ref 33.7–35.3)
MCV RBC AUTO: 85.4 FL (ref 81.4–97.8)
METAMYELOCYTES NFR BLD MANUAL: 0.9 %
MICRO URNS: ABNORMAL
MICROCYTES BLD QL SMEAR: ABNORMAL
MONOCYTES # BLD AUTO: 0.33 K/UL (ref 0–0.85)
MONOCYTES NFR BLD AUTO: 16.7 % (ref 0–13.4)
MORPHOLOGY BLD-IMP: NORMAL
MYELOCYTES NFR BLD MANUAL: 0.9 %
NEUTROPHILS # BLD AUTO: 0.58 K/UL (ref 1.82–7.42)
NEUTROPHILS NFR BLD: 28.9 % (ref 44–72)
NITRITE UR QL STRIP.AUTO: NEGATIVE
NRBC # BLD AUTO: 0 K/UL
NRBC BLD-RTO: 0 /100 WBC
NUCLEAR IGG SER QL IA: NORMAL
PH UR STRIP.AUTO: 5.5 [PH] (ref 5–8)
PLATELET # BLD AUTO: 45 K/UL (ref 164–446)
PLATELET BLD QL SMEAR: NORMAL
PLATELETS.RETICULATED NFR BLD AUTO: 10.7 K/UL (ref 0.6–13.1)
PMV BLD AUTO: 10.9 FL (ref 9–12.9)
POLYCHROMASIA BLD QL SMEAR: NORMAL
POTASSIUM SERPL-SCNC: 3.5 MMOL/L (ref 3.6–5.5)
PROMYELOCYTES NFR BLD MANUAL: 0.9 %
PROT SERPL-MCNC: 6.3 G/DL (ref 6–8.2)
PROT UR QL STRIP: 100 MG/DL
RBC # BLD AUTO: 3.43 M/UL (ref 4.7–6.1)
RBC # URNS HPF: ABNORMAL /HPF
RBC BLD AUTO: PRESENT
RBC UR QL AUTO: ABNORMAL
SODIUM SERPL-SCNC: 132 MMOL/L (ref 135–145)
SP GR UR STRIP.AUTO: 1.02
UROBILINOGEN UR STRIP.AUTO-MCNC: 1 MG/DL
WBC # BLD AUTO: 2 K/UL (ref 4.8–10.8)
WBC #/AREA URNS HPF: ABNORMAL /HPF

## 2020-06-27 PROCEDURE — 71046 X-RAY EXAM CHEST 2 VIEWS: CPT

## 2020-06-27 PROCEDURE — 700105 HCHG RX REV CODE 258: Performed by: INTERNAL MEDICINE

## 2020-06-27 PROCEDURE — 700102 HCHG RX REV CODE 250 W/ 637 OVERRIDE(OP): Performed by: HOSPITALIST

## 2020-06-27 PROCEDURE — A9270 NON-COVERED ITEM OR SERVICE: HCPCS | Performed by: NURSE PRACTITIONER

## 2020-06-27 PROCEDURE — A9270 NON-COVERED ITEM OR SERVICE: HCPCS | Performed by: INTERNAL MEDICINE

## 2020-06-27 PROCEDURE — 99205 OFFICE O/P NEW HI 60 MIN: CPT | Performed by: INTERNAL MEDICINE

## 2020-06-27 PROCEDURE — 96367 TX/PROPH/DG ADDL SEQ IV INF: CPT

## 2020-06-27 PROCEDURE — 700111 HCHG RX REV CODE 636 W/ 250 OVERRIDE (IP): Performed by: INTERNAL MEDICINE

## 2020-06-27 PROCEDURE — 700105 HCHG RX REV CODE 258

## 2020-06-27 PROCEDURE — G0378 HOSPITAL OBSERVATION PER HR: HCPCS

## 2020-06-27 PROCEDURE — 85055 RETICULATED PLATELET ASSAY: CPT

## 2020-06-27 PROCEDURE — 36415 COLL VENOUS BLD VENIPUNCTURE: CPT

## 2020-06-27 PROCEDURE — 80053 COMPREHEN METABOLIC PANEL: CPT

## 2020-06-27 PROCEDURE — 85027 COMPLETE CBC AUTOMATED: CPT

## 2020-06-27 PROCEDURE — 85007 BL SMEAR W/DIFF WBC COUNT: CPT

## 2020-06-27 PROCEDURE — 700102 HCHG RX REV CODE 250 W/ 637 OVERRIDE(OP): Performed by: INTERNAL MEDICINE

## 2020-06-27 PROCEDURE — 700102 HCHG RX REV CODE 250 W/ 637 OVERRIDE(OP): Performed by: NURSE PRACTITIONER

## 2020-06-27 PROCEDURE — 87799 DETECT AGENT NOS DNA QUANT: CPT

## 2020-06-27 PROCEDURE — 99226 PR SUBSEQUENT OBSERVATION CARE,LEVEL III: CPT | Performed by: INTERNAL MEDICINE

## 2020-06-27 PROCEDURE — A9270 NON-COVERED ITEM OR SERVICE: HCPCS | Performed by: HOSPITALIST

## 2020-06-27 PROCEDURE — 81001 URINALYSIS AUTO W/SCOPE: CPT

## 2020-06-27 RX ORDER — SODIUM CHLORIDE 9 MG/ML
INJECTION, SOLUTION INTRAVENOUS
Status: COMPLETED
Start: 2020-06-27 | End: 2020-06-27

## 2020-06-27 RX ORDER — ACETAMINOPHEN 325 MG/1
650 TABLET ORAL ONCE
Status: COMPLETED | OUTPATIENT
Start: 2020-06-27 | End: 2020-06-27

## 2020-06-27 RX ORDER — POTASSIUM CHLORIDE 20 MEQ/1
40 TABLET, EXTENDED RELEASE ORAL ONCE
Status: COMPLETED | OUTPATIENT
Start: 2020-06-27 | End: 2020-06-27

## 2020-06-27 RX ADMIN — ACETAMINOPHEN 650 MG: 325 TABLET, FILM COATED ORAL at 02:01

## 2020-06-27 RX ADMIN — LOVASTATIN 20 MG: 20 TABLET ORAL at 21:21

## 2020-06-27 RX ADMIN — ACETAMINOPHEN 650 MG: 325 TABLET, FILM COATED ORAL at 03:23

## 2020-06-27 RX ADMIN — ACETAMINOPHEN 650 MG: 325 TABLET, FILM COATED ORAL at 15:12

## 2020-06-27 RX ADMIN — AMLODIPINE BESYLATE 2.5 MG: 5 TABLET ORAL at 21:21

## 2020-06-27 RX ADMIN — CEFEPIME 2 G: 2 INJECTION, POWDER, FOR SOLUTION INTRAVENOUS at 14:22

## 2020-06-27 RX ADMIN — ACETAMINOPHEN 650 MG: 325 TABLET, FILM COATED ORAL at 23:25

## 2020-06-27 RX ADMIN — CEFEPIME 2 G: 2 INJECTION, POWDER, FOR SOLUTION INTRAVENOUS at 21:22

## 2020-06-27 RX ADMIN — ZOLPIDEM TARTRATE 5 MG: 5 TABLET ORAL at 21:21

## 2020-06-27 RX ADMIN — SODIUM CHLORIDE 250 ML: 9 INJECTION, SOLUTION INTRAVENOUS at 21:22

## 2020-06-27 RX ADMIN — POTASSIUM CHLORIDE 40 MEQ: 1500 TABLET, EXTENDED RELEASE ORAL at 13:29

## 2020-06-27 ASSESSMENT — ENCOUNTER SYMPTOMS
PSYCHIATRIC NEGATIVE: 1
WEAKNESS: 0
MYALGIAS: 0
CHILLS: 1
HEADACHES: 0
FEVER: 1
CONSTIPATION: 0
NEUROLOGICAL NEGATIVE: 1
ABDOMINAL PAIN: 0
VOMITING: 0
COUGH: 1
MUSCULOSKELETAL NEGATIVE: 1
NAUSEA: 0
CARDIOVASCULAR NEGATIVE: 1
RESPIRATORY NEGATIVE: 1
NERVOUS/ANXIOUS: 1
GASTROINTESTINAL NEGATIVE: 1
SPUTUM PRODUCTION: 0
DOUBLE VISION: 0
BLURRED VISION: 0
CONSTITUTIONAL NEGATIVE: 1
DIARRHEA: 0
EYES NEGATIVE: 1
SHORTNESS OF BREATH: 0

## 2020-06-27 NOTE — PROGRESS NOTES
Pt had no labs ordered was suppose to leave 6-26-20 evening, was febrile all night. Informed  New lab orders.

## 2020-06-27 NOTE — PROGRESS NOTES
Hospital Medicine Daily Progress Note    Date of Service  6/27/2020    Chief Complaint  65 y.o. male admitted 6/23/2020 with tachycardia.  He had a TAVR on 6/22/2020, and was discharged home on 6/23/2020.  He returned to the ER due to persistent tachycardia.     He has a history of pancytopenia which is worsening, and has been hesitant to do a bone marrow biopsy.  He has tobacco dependence, essential hypertension, hyperlipidemia.     Hospital Course  Home Eliquis and aspirin were discontinued.  He received aggressive hydration, his heart rate stabilized, he continued to have decline in his hemoglobin and platelets. WBC stabilized.  I briefly discussed his pancytopenia with hem/onc.  Abdominal ultrasound from 6/25/2020 showed no acute abnormalities.  SPEP, smooth muscle antibodies, SEEMA, rheumatoid factor, B12 and folate levels, fibrinogen were ordered.  Patient wished to go home and agreed follow-up with hematology for bone marrow biopsy and further work-up.  He was discharged to go home on 6/26/2020.    Interval Problem Update  Prior to discharge last night, patient was noted to have a fever of 103.6.  Discharge was held. Fever resolved after a dose of Tylenol.  His ANC was 0.58, hemoglobin and platelets were stable overnight.  Heart rate and blood pressure was stable.      B12, folate, and fibrinogen levels were normal.  Rheumatoid factor was 40, ESR was 45, LFTs were unremarkable.    Consultants/Specialty  Cardiology  Infectious disease    Code Status  Full code    Disposition  Home when stable    Review of Systems  Review of Systems   Constitutional: Negative.    HENT: Negative.    Eyes: Negative.    Respiratory: Negative.    Cardiovascular: Negative.    Gastrointestinal: Negative.    Genitourinary: Negative.    Musculoskeletal: Negative.    Skin: Negative.    Neurological: Negative.    Endo/Heme/Allergies: Negative.    Psychiatric/Behavioral: Negative.         Physical Exam  Temp:  [36.5 °C (97.7 °F)-39.8 °C  (103.6 °F)] 39.2 °C (102.5 °F)  Pulse:  [] 109  Resp:  [17-20] 20  BP: (124-160)/(55-92) 160/84  SpO2:  [91 %-95 %] 91 %    Physical Exam  Constitutional:       General: He is not in acute distress.     Appearance: He is not ill-appearing.   HENT:      Head: Normocephalic.      Nose: Nose normal.      Mouth/Throat:      Mouth: Mucous membranes are moist.     Comments: Lump underneath chin area  Eyes:      Pupils: Pupils are equal, round, and reactive to light.   Neck:      Musculoskeletal: Normal range of motion.   Cardiovascular:      Rate and Rhythm: Normal rate.   Pulmonary:      Effort: Pulmonary effort is normal.   Abdominal:      Palpations: Abdomen is soft.   Musculoskeletal:      Right lower leg: No edema.      Left lower leg: No edema.   Skin:     General: Hematoma in groin area  Neurological:      General: No focal deficit present.      Mental Status: He is alert.   Psychiatric:         Mood and Affect: Mood normal.         Behavior: Behavior normal.   Fluids    Intake/Output Summary (Last 24 hours) at 6/27/2020 1646  Last data filed at 6/27/2020 0900  Gross per 24 hour   Intake 120 ml   Output --   Net 120 ml       Laboratory  Recent Labs     06/25/20  0359 06/26/20  0317 06/27/20  0610   WBC 2.6* 2.5* 2.0*   RBC 3.39* 3.01* 3.43*   HEMOGLOBIN 9.7* 8.6* 9.9*   HEMATOCRIT 29.4* 25.5* 29.3*   MCV 86.7 84.7 85.4   MCH 28.6 28.6 28.9   MCHC 33.0* 33.7 33.8   RDW 43.6 42.6 43.1   PLATELETCT 57* 45* 45*   MPV 13.0* 11.5 10.9     Recent Labs     06/25/20  0359 06/26/20  0317 06/26/20  1658 06/27/20  0610   SODIUM 133* 129*  --  132*   POTASSIUM 3.2* 2.9* 4.0 3.5*   CHLORIDE 103 104  --  102   CO2 19* 20  --  22   GLUCOSE 123* 118*  --  120*   BUN 20 15  --  12   CREATININE 0.53 0.50  --  0.52   CALCIUM 7.7* 7.8*  --  7.9*                 Imaging  DX-CHEST-2 VIEWS   Final Result      No acute cardiopulmonary abnormality.      US-EXTREMITY ARTERY LOWER UNILAT RIGHT   Final Result      US-ABDOMEN COMPLETE  SURVEY   Final Result      Prominent trabeculated bladder wall, suggesting possible chronic bladder outlet obstruction. Unremarkable findings otherwise.         DX-CHEST-PORTABLE (1 VIEW)   Final Result      No acute cardiac or pulmonary abnormalities are identified.           Assessment/Plan  * Fever  Assessment & Plan  Episode of fever last night, unknown etiology  Blood cultures x2, UA, chest x-ray ordered  Infectious disease were consulted, cefepime was empirically started  IR has been consulted to evaluate and possibly aspirate lump underneath the skin  Labs for HIV, CMV, and parvovirus were sent by ID  Monitor closely    Pancytopenia (HCC)- (present on admission)  Assessment & Plan  Present for a few months, unclear etiology  Briefly discussed with Dr. Chacon (hematology/oncology)  Fibrinogen, B12, folic acid were normal.  RF was 40, ESR was 45  SPEP, SEEMA , and smooth muscle antibodies are pending  Bone marrow biopsy has been ordered for tomorrow  Abdominal ultrasound was unremarkable     S/P TAVR (transcatheter aortic valve replacement)- (present on admission)  Assessment & Plan  S/P TAVR on 6/22/2020  Eliquis and aspirin was stopped .  Continue statin  Per cardiology start B12 and folate supplements  Resume outpatient cardiology follow-up    Mixed hyperlipidemia- (present on admission)  Assessment & Plan  Continue home Lovastatin    Essential hypertension- (present on admission)  Assessment & Plan  Blood pressure has been on the low side  Continue Norvasc at a lower dose with parameters    Panlobular emphysema (HCC)- (present on admission)  Assessment & Plan  History of tobacco use  Chest x-ray from admission shows no acute cardiopulmonary process    Tobacco dependence- (present on admission)  Assessment & Plan  Patient has been counseled on tobacco cessation  Nicotine replacement options were provided       VTE prophylaxis: SCD's

## 2020-06-27 NOTE — PROGRESS NOTES
Pt temp 103.6, Tylenol given, Ice packs under arms behind neck and in between feet. Will reassess in half an hour

## 2020-06-27 NOTE — PROGRESS NOTES
I received page from RN that patient found to have significant fever of 103.6 and he received Tylenol and his fever resolved.  He does not have any shortness of breath or any source of infection.  I ordered blood culture.  I am holding antibiotic for now if he continued to have fever due to his low neutrophil count he may need antibiotic.

## 2020-06-27 NOTE — CONSULTS
Consults   INFECTIOUS DISEASES INPATIENT CONSULT NOTE     Date of Service: 6/27/2020    Consult Requested By: Jacki Gupta M.D.    Reason for Consultation: Fevers, pancytopenia     History of Present Illness:   Magalis Kumari is a 65 y.o.  admitted 6/23/2020. Pt has a past medical history of TAVR on 6/22/2020 and pancytopenia which he has had some work-up but no bone marrow biopsy as yet.  He has had a decreased WBC since 2016 with neutropenia.  He also has some mild anemia and thrombocytopenia that is also chronic but worsened recently.  He presented the ER complaining of tachycardia the day after he been sent home after his TAVR surgery.     Hospital Course:   Patient was admitted and was initially febrile and then again spiked a fever to 103.6 on 6/26.  Initial work-up for pancytopenia was started and plan was to complete this with heme-onc as an outpatient.  Blood cultures were obtained 6/26.  Ultrasound abdomen on 6/25 with trabeculated bladder wall suggesting possible chronic bladder outlet obstruction but otherwise unremarkable.  Ultrasound lower extremity with noted stenosis of the right common femoral artery report is known no evidence of hematoma or pseudoaneurysm in right groin.  Chest x-rays have been unremarkable.  He is not been on any antibiotics and plan was for discharge and follow-up as an outpatient but he again spiked fevers and also reported soaking sweats last night.      Review Of Systems:  Review of Systems   Constitutional: Positive for chills, fever and malaise/fatigue.   HENT: Negative for hearing loss and tinnitus.    Eyes: Negative for blurred vision and double vision.   Respiratory: Positive for cough. Negative for sputum production and shortness of breath.    Cardiovascular: Negative for chest pain and leg swelling.   Gastrointestinal: Negative for abdominal pain, constipation, diarrhea, nausea and vomiting.   Genitourinary: Negative for dysuria.   Musculoskeletal: Negative  for joint pain and myalgias.   Skin: Negative for rash.   Neurological: Negative for weakness and headaches.   Psychiatric/Behavioral: The patient is nervous/anxious.          PMH:   Past Medical History:   Diagnosis Date   • Dental disorder     upper lower  dentures   • Dental disorder     dental extraction 4/22/20   • Heart valve disease     aortic   • High cholesterol    • Hyperlipidemia    • Hypertension    • Stented coronary artery 2008    3 vessel       PSH:  Past Surgical History:   Procedure Laterality Date   • TRANSCATHETER AORTIC VALVE REPLACEMENT N/A 6/22/2020    Procedure: REPLACEMENT, AORTIC VALVE, TRANSCATHETER- WITH ANY ASSOCIATED PROCEDURES;  Surgeon: Luiz Dennis M.D.;  Location: SURGERY [facility];  Service: Cardiac   • JOON N/A 6/22/2020    Procedure: ECHOCARDIOGRAM,;  Surgeon: Luiz Dennis M.D.;  Location: SURGERY [facility];  Service: Cardiac   • ANGIOGRAM  05/22/2020   • OTHER CARDIAC SURGERY  2009    stents x3   • BONE SPUR EXCISION  1993    left ankle   • STAPEDECTOMY  1980s       FAMILY HX:  Family History   Problem Relation Age of Onset   • Heart Disease Mother    • Hypertension Mother    • Diabetes Mother    • No Known Problems Father    • Heart Disease Maternal Uncle    • Psychiatric Illness Paternal Uncle    • Diabetes Paternal Uncle    • Heart Disease Paternal Uncle        SOCIAL HX:  Social History     Socioeconomic History   • Marital status:      Spouse name: Not on file   • Number of children: 1   • Years of education: Not on file   • Highest education level: Not on file   Occupational History   • Not on file   Social Needs   • Financial resource strain: Not on file   • Food insecurity     Worry: Not on file     Inability: Not on file   • Transportation needs     Medical: Not on file     Non-medical: Not on file   Tobacco Use   • Smoking status: Current Every Day Smoker     Packs/day: 0.50     Years: 45.00     Pack years: 22.50     Types: Cigarettes      • Smokeless tobacco: Never Used   Substance and Sexual Activity   • Alcohol use: Yes     Alcohol/week: 0.0 oz     Frequency: 2-3 times a week     Drinks per session: 1 or 2     Binge frequency: Never     Comment: 1-2 per day   • Drug use: No   • Sexual activity: Not on file   Lifestyle   • Physical activity     Days per week: Not on file     Minutes per session: Not on file   • Stress: Not on file   Relationships   • Social connections     Talks on phone: Not on file     Gets together: Not on file     Attends Evangelical service: Not on file     Active member of club or organization: Not on file     Attends meetings of clubs or organizations: Not on file     Relationship status: Not on file   • Intimate partner violence     Fear of current or ex partner: Not on file     Emotionally abused: Not on file     Physically abused: Not on file     Forced sexual activity: Not on file   Other Topics Concern   • Not on file   Social History Narrative   • Not on file     Social History     Tobacco Use   Smoking Status Current Every Day Smoker   • Packs/day: 0.50   • Years: 45.00   • Pack years: 22.50   • Types: Cigarettes   Smokeless Tobacco Never Used     Social History     Substance and Sexual Activity   Alcohol Use Yes   • Alcohol/week: 0.0 oz   • Frequency: 2-3 times a week   • Drinks per session: 1 or 2   • Binge frequency: Never    Comment: 1-2 per day       Allergies/Intolerances:  Allergies   Allergen Reactions   • Seasonal Cough and Runny Nose       History reviewed with the patient     Other Current Medications:    Current Facility-Administered Medications:   •  potassium chloride SA (Kdur) tablet 40 mEq, 40 mEq, Oral, Once, Jovan Lopez, A.P.N.  •  zolpidem (AMBIEN) tablet 5 mg, 5 mg, Oral, HS PRN, Jacki Gupta M.D.  •  amLODIPine (NORVASC) tablet 2.5 mg, 2.5 mg, Oral, QHS, Jacki Gupta M.D., 2.5 mg at 06/26/20 2027  •  lovastatin (MEVACOR) tablet 20 mg, 20 mg, Oral, QHS, Delio Rodriguez D.O., 20 mg at 06/26/20  "  •  senna-docusate (PERICOLACE or SENOKOT S) 8.6-50 MG per tablet 2 Tab, 2 Tab, Oral, BID, 2 Tab at 20 0519 **AND** polyethylene glycol/lytes (MIRALAX) PACKET 1 Packet, 1 Packet, Oral, QDAY PRN **AND** magnesium hydroxide (MILK OF MAGNESIA) suspension 30 mL, 30 mL, Oral, QDAY PRN **AND** bisacodyl (DULCOLAX) suppository 10 mg, 10 mg, Rectal, QDAY PRN, Delio Rodriguez D.O.  •  acetaminophen (TYLENOL) tablet 650 mg, 650 mg, Oral, Q6HRS PRN, KENNEDI DobbsO., 650 mg at 20 0201  •  enalaprilat (VASOTEC) injection 1.25 mg, 1.25 mg, Intravenous, Q6HRS PRN, Delio Rodriguez D.O.  •  ondansetron (ZOFRAN) syringe/vial injection 4 mg, 4 mg, Intravenous, Q4HRS PRN, Delio Rodriguez D.O.  •  ondansetron (ZOFRAN ODT) dispertab 4 mg, 4 mg, Oral, Q4HRS PRN, Delio Rodriguez D.O.  [unfilled]    Most Recent Vital Signs:  /77   Pulse 91   Temp 37.4 °C (99.3 °F) (Temporal)   Resp 18   Ht 1.676 m (5' 6\")   Wt 61.3 kg (135 lb 2.3 oz)   SpO2 94%   BMI 21.81 kg/m²   Temp  Av.4 °C (99.4 °F)  Min: 36.2 °C (97.1 °F)  Max: 39.8 °C (103.6 °F)    Physical Exam:  Physical Exam   Constitutional: He is oriented to person, place, and time and well-developed, well-nourished, and in no distress. No distress.   HENT:   Head: Normocephalic and atraumatic.   Right Ear: External ear normal.   Left Ear: External ear normal.   Nose: Nose normal.   Lump below chin, tender to palpation, no significant warmth or drainage   Eyes: Pupils are equal, round, and reactive to light. Conjunctivae and EOM are normal.   Cardiovascular: Normal rate and regular rhythm.   Murmur heard.  Pulmonary/Chest: Effort normal and breath sounds normal.   Abdominal: Soft. Bowel sounds are normal. He exhibits no distension. There is no abdominal tenderness. There is no rebound and no guarding.   Musculoskeletal:         General: Tenderness and edema present.      Comments: Groin with bruising and edema secondary to his prior " procedure, mild tenderness, no warmth or drainage   Neurological: He is alert and oriented to person, place, and time.   Skin: Skin is warm and dry. He is not diaphoretic.   Psychiatric: Affect and judgment normal.           Pertinent Lab Results:  Recent Labs     06/25/20  0359 06/26/20 0317 06/27/20  0610   WBC 2.6* 2.5* 2.0*      Recent Labs     06/25/20  0359 06/26/20 0317 06/27/20  0610   HEMOGLOBIN 9.7* 8.6* 9.9*   HEMATOCRIT 29.4* 25.5* 29.3*   MCV 86.7 84.7 85.4   MCH 28.6 28.6 28.9   MACROCYTOSIS  --  1+  --    ANISOCYTOSIS 1+ 1+ 1+   PLATELETCT 57* 45* 45*         Recent Labs     06/25/20  0359 06/26/20 0317 06/26/20  1658 06/27/20  0610   SODIUM 133* 129*  --  132*   POTASSIUM 3.2* 2.9* 4.0 3.5*   CHLORIDE 103 104  --  102   CO2 19* 20  --  22   CREATININE 0.53 0.50  --  0.52        Recent Labs     06/26/20 0317 06/27/20  0610   ALBUMIN 2.6* 2.9*        Pertinent Micro:  Results     Procedure Component Value Units Date/Time    URINALYSIS [[id number]]  (Abnormal) Collected:  06/27/20 1230    Order Status:  Completed Specimen:  Urine, Clean Catch Updated:  06/27/20 1257     Color DK Yellow     Character Clear     Specific Gravity 1.019     Ph 5.5     Glucose Negative mg/dL      Ketones Negative mg/dL      Protein 100 mg/dL      Bilirubin Negative     Urobilinogen, Urine 1.0     Nitrite Negative     Leukocyte Esterase Negative     Occult Blood Small     Micro Urine Req Microscopic    Narrative:       [id number] MARYLUAMIRAH MARILEE MOHSEN    BLOOD CULTURE [[id number]] Collected:  06/26/20 2146    Order Status:  Completed Specimen:  Blood from Peripheral Updated:  06/27/20 0815     Significant Indicator NEG     Source BLD     Site PERIPHERAL     Culture Result No Growth  Note: Blood cultures are incubated for 5 days and  are monitored continuously.Positive blood cultures  are called to the RN and reported as soon as  they are identified.      Narrative:       Protective  Per Hospital Policy: Only change  "Specimen Src: to \"Line\" if  specified by physician order.  Left Forearm/Arm    BLOOD CULTURE [[id number]] Collected:  06/26/20 2159    Order Status:  Completed Specimen:  Blood from Peripheral Updated:  06/27/20 0815     Significant Indicator NEG     Source BLD     Site PERIPHERAL     Culture Result No Growth  Note: Blood cultures are incubated for 5 days and  are monitored continuously.Positive blood cultures  are called to the RN and reported as soon as  they are identified.      Narrative:       Protective  Per Hospital Policy: Only change Specimen Src: to \"Line\" if  specified by physician order.  Right Forearm/Arm        No results found for: BLOODCULTU, BLDCULT, BCHOLD     Studies:  Dx-chest-2 Views    Result Date: 6/27/2020 6/27/2020 11:36 AM HISTORY/REASON FOR EXAM:  Fever TECHNIQUE/EXAM DESCRIPTION AND NUMBER OF VIEWS: Two views of the chest. COMPARISON:  6/23/2020. FINDINGS: LUNGS: Slight hyperinflation. Chronic slightly hyperdense nodule in the right midlung, likely benign granuloma. No focal consolidation. No effusions. PNEUMOTHORAX: None. LINES AND TUBES: None. MEDIASTINUM: No cardiomegaly. TAVR. LAD stent. Atherosclerosis. MUSCULOSKELETAL STRUCTURES: No acute displaced fracture.     No acute cardiopulmonary abnormality.    Dx-chest-portable (1 View)    Result Date: 6/23/2020 6/23/2020 5:44 PM HISTORY/REASON FOR EXAM:  tachycardia. TECHNIQUE/EXAM DESCRIPTION AND NUMBER OF VIEWS: Single portable view of the chest. COMPARISON: Exam from 6:42 AM FINDINGS: Heart size is within normal limits. No pulmonary infiltrates or consolidations are noted. No pleural abnormalities are noted. There is an old left seventh rib fracture.     No acute cardiac or pulmonary abnormalities are identified.    Dx-chest-portable (1 View)    Result Date: 6/23/2020 6/23/2020 6:05 AM HISTORY/REASON FOR EXAM: Postop TAVR. TECHNIQUE/EXAM DESCRIPTION AND NUMBER OF VIEWS: Single portable view of the chest. COMPARISON: 6/22/2020 " FINDINGS: LUNGS: Clear. No effusions. PNEUMOTHORAX: None. LINES AND TUBES: None. MEDIASTINUM: Stable cardiac silhouette. MUSCULOSKELETAL STRUCTURES: Unchanged.     Clear lungs. No pleural effusions.    Dx-chest-portable (1 View)    Result Date: 6/22/2020 6/22/2020 12:50 PM HISTORY/REASON FOR EXAM:  Status post aortic valve replacement. TECHNIQUE/EXAM DESCRIPTION AND NUMBER OF VIEWS: Single portable view of the chest. COMPARISON: 7/15/2019 FINDINGS: Single portable view of the chest demonstrates a normal cardiac silhouette and mediastinal contours. Calcification is in the aorta. There is emphysematous change. The lungs are hyperexpanded. No confluent opacity, pleural fluid, or pneumothorax. No suspicious bony lesions.     No acute findings status post TAVR    Us-abdomen Complete Survey    Result Date: 6/26/2020 6/26/2020 6:27 AM HISTORY/REASON FOR EXAM:  Abnormal Labs Pain TECHNIQUE/EXAM DESCRIPTION AND NUMBER OF VIEWS:  Complete abdomen survey. COMPARISON: None FINDINGS: The liver is normal in contour. There is no evidence of solid mass lesion. The liver measures 14.57 cm. The gallbladder is normal. There is no evidence of cholelithiasis. The gallbladder wall thickness measures 2.50 mm. There is no pericholecystic fluid. The common duct measures 3.40 mm. The visualized pancreas is unremarkable. The visualized aorta is normal in caliber. Intrahepatic IVC is patent. The portal vein is patent with hepatopetal flow. The MPV measures 1.22 cm. The right kidney measures 11.19 cm. The left kidney measures 11.17 cm. There is no hydronephrosis. The spleen measures 10.41 cm maximally. The bladder is trabeculated, which could indicate chronic bladder outlet obstruction. There is no ascites.     Prominent trabeculated bladder wall, suggesting possible chronic bladder outlet obstruction. Unremarkable findings otherwise.     Us-extremity Artery Lower Bilat    Result Date: 6/22/2020  Lower Extremity  Arterial Duplex Report   Vascular Laboratory  CONCLUSIONS  Right.  Echolucent thrombus partially fills the common femoral artery causing a  high grade stenosis. Velocities are consistent with > 75% stenosis.  Mild plaque visualized in the superficial femoral and popliteal arteries  without evidence of hemodynamically significant stenosis.  Monophasic waveforms demonstrated throughout the right lower extremity.  Left.  Mild plaque is seen in the common femoral, femoral, and popliteal arteries  with no elevated velocities to indicate hemodynamically significant  stenosis.  FADIA LANDIS  Exam Date:     2020 17:34  Room #:     Inpatient  Priority:     Stat  Ht (in):             Wt (lb):  Ordering Physician:        ANTHONY PIPER  Referring Physician:       ANTHONY PIPER  Sonographer:               Ewa Curtis RVT  Study Type:                Complete Bilateral  Technical Quality:         Adequate  Age:    65    Gender:     M  MRN:    [id number]  :    1955      BSA:  Indications:     Pain in leg, unspecified  CPT Codes:       40295  ICD Codes:       M79.606  History:         Pain in legs s/p TAVR. No prior duplex.  Limitations:                RIGHT  Waveform        Peak Systolic Velocity (cm/s)                  Prox    Prox-Mid  Mid    Mid-Dist  Distal  Monophasic                        307              143     CFA  Monophasic      55                                         PFA  Monophasic      53                51               34      SFA  Monophasic                        30                       POP  Monophasic      22                                 32      AT  Monophasic      19                                 11      PT  Monophasic      37                                 20      EVGENY                LEFT  Waveform        Peak Systolic Velocity (cm/s)                  Prox    Prox-Mid  Mid    Mid-Dist  Distal  Triphasic                         187                       CFA  Biphasic        78                                         PFA  Biphasic        103               142              106     SFA  Biphasic                          57                       POP  Biphasic        83                                 110     AT  Biphasic        76                                 24      PT  Biphasic        61                                 24      EVGENY  FINDINGS  Right.  Echolucent material consistent with thrombus partially fills the common  femoral artery causing a high grade stenosis. Stenosis of the common  femoral artery. Velocities are consistent with > 75% stenosis.  Mild plaque visualized in the femoral and popliteal arteries without  evidence of hemodynamic significance.  Monophasic waveforms demonstrated throughout the right lower extremity.  Left.  Mild plaque is seen in the common femoral, femoral, and popliteal arteries  with no elevated velocities to indicate hemodynamic significance.  Waveforms are biphasic throughout the left lower extremity.  Demetria Narvaez  (Electronically Signed)  Final Date:      22 June 2020                   18:45  Amended:         22 June 2020 19:58    Us-extremity Artery Lower Unilat Right    Result Date: 6/26/2020  Lower Extremity  Arterial Duplex Report  Vascular Laboratory  CONCLUSIONS  Pinching of the common femoral artery from perclose device closure.  Velcoity consistent with >75% stenosis. Intraluminal clot seen in prior  study is not readily seen.  FADIA LANDIS  Exam Date:     06/26/2020 07:49  Room #:     Inpatient  Priority:     Routine  Ht (in):             Wt (lb):  Ordering Physician:        MELVIN SIMONS  Referring Physician:       MELVIN SIMONS  Sonographer:               Blank Calles RVT  Study Type:                Limited Unilateral  Technical Quality:         Adequate  Age:    65    Gender:     M  MRN:     [id number]  :    1955      BSA:  Indications:     Unspecified atherosclerosis of native arteries of                   extremities, right leg  CPT Codes:       69286  ICD Codes:       I70.201  History:         Right common femoral steosis.  Limitations:                RIGHT  Waveform        Peak Systolic Velocity (cm/s)                  Prox    Prox-Mid  Mid    Mid-Dist  Distal  Monophasic      42                384              230     CFA  Biphasic        97                                         PFA  Biphasic        55                                         SFA                                                             POP                                                             AT                                                             PT                                                             EVGENY                LEFT  Waveform        Peak Systolic Velocity (cm/s)                  Prox    Prox-Mid  Mid    Mid-Dist  Distal                                                             CFA                                                             PFA                                                             SFA                                                             POP                                                             AT                                                             PT                                                             EVGENY  FINDINGS  Limited evaluation of the right common femoral artery known stenosis.  Right.  Stenosis of the mid common femoral artery. Velocities are consistent with >  75%stenosis.  Distal flow is turbulent and damped.  No evidence of hematoma or psuedoaneuysm seen at the right groin.  Luiz Dennis MD  (Electronically Signed)  Final Date:      2020                   08:49    Ec-echocardiogram Complete W/o Cont    Result Date: 2020  Transthoracic Echo Report Echocardiography Laboratory CONCLUSIONS Prior echo done on  2020, there is now a TAVR valve present. Left ventricular ejection fraction is visually estimated to be 60%. Unable to estimate pulmonary artery pressure due to an inadequate tricuspid regurgitant jet. Known TAVR aortic valve. FADIA LANDIS Exam Date:         2020                    03:46 Exam Location:     Inpatient Priority:          Routine Ordering Physician:        ANTHONY PIPER Referring Physician:       [id number] Cintron Sonographer:               JOJO Ash Age:    65     Gender:    Mal                           e MRN:    [id number] :    1955 BSA:    1.64   Ht (in):    66     Wt (lb):    126 Exam Type:     Complete Indications:     Prosthetic Valve ICD Codes:       V43.3 CPT Codes:       15791 BP:   142    /   83     HR:   70 Technical Quality:       Fair MEASUREMENTS  (Male / Female) Normal Values 2D ECHO LV Diastolic Diameter PLAX        6 cm                  4.2 - 5.9 / 3.9 - 5.3 cm LV Systolic Diameter PLAX         3.7 cm                2.1 - 4.0 cm IVS Diastolic Thickness           0.71 cm               LVPW Diastolic Thickness          0.7 cm                LVOT Diameter                     1.5 cm                Estimated LV Ejection Fraction    60 %                  LV Ejection Fraction MOD BP       39.5 %                >= 55  % LV Ejection Fraction MOD 4C       40.2 %                LV Ejection Fraction MOD 2C       33.9 %                IVC Diameter                      1.8 cm                DOPPLER AV Peak Velocity                  0.99 m/s              AV Peak Gradient                  3.9 mmHg              AV Mean Gradient                  2.7 mmHg              LVOT Peak Velocity                1.1 m/s               AV Area Cont Eq vti               1.9 cm2               Mitral E Point Velocity           0.63 m/s              Mitral E to A Ratio               1.5                   MV Pressure Half Time             75.3 ms               MV Area PHT                        2.9 cm2               MV Deceleration Time              260 ms                * Indicates values subject to auto-interpretation LV EF:  60    % FINDINGS Left Ventricle Left ventricle is mildly dilated. Normal left ventricular wall thickness. Normal left ventricular systolic function. Left ventricular ejection fraction is visually estimated to be 60%. Normal regional wall motion. Normal diastolic function. Right Ventricle Normal right ventricular size. Normal right ventricular systolic function. Right Atrium Normal right atrial size. Normal inferior vena cava size and inspiratory collapse. Left Atrium Normal left atrial size. Left atrial volume index is 26 mL/sq m. Mitral Valve Calcification of the mitral valve leaflets. No mitral stenosis. Trace mitral regurgitation. Aortic Valve Known TAVR aortic valve. Vmax is 1 m/s. Transvalvular gradients are - Peak: 3 mmHg, Mean: 2 mmHg. Tricuspid Valve Structurally normal tricuspid valve. No tricuspid stenosis. Trace tricuspid regurgitation. Right atrial pressure is estimated to be 8 mmHg. Unable to estimate pulmonary artery pressure due to an inadequate tricuspid regurgitant jet. Pulmonic Valve The pulmonic valve is not well visualized. Pericardium No pericardial effusion seen. Aorta Normal aortic root for body surface area. Ascending aorta diameter is 2.4 cm. Brenden Hasnen MD (Electronically Signed) Final Date:     23 June 2020                 06:41    Ec-echocardiogram Ltd W/o Cont    Result Date: 6/23/2020  Transthoracic Echo Report Echocardiography Laboratory CONCLUSIONS Intraoperative TTE during TAVR.  Baseline images show mildly reduced LV function with EF =55%.  Calcified aortic valve leaflets. Severe aortic stenosis.  Post deployment images show preserved biventricular function.  A 26 mm Jewell Jose Manuel Ultra stented bioprosthetic valve is seen in the aortic position with normal leaflet motion, trace significant paravalvular leak, mean gradent of 5 mm Hg  and calculated effective orifice area of 2.5 cm2.  Findings communicated at the time of exam. FADIA LANDIS Exam Date:         2020                    11:17 Exam Location:     Inpatient Priority:          Routine Ordering Physician:        SIMON PARSONS Referring Physician: Sonographer:               Antwan La RDCS Age:    65     Gender:    Jordy                           e MRN:    [id number] :    1955 BSA:    1.64   Ht (in):    66     Wt (lb):    126 Exam Type:     Limited Indications:     Pre-Op Surgery ICD Codes:       V72.83 CPT Codes:       26222 BP:          /          HR: Technical Quality:       Fair MEASUREMENTS  (Male / Female) Normal Values 2D ECHO Estimated LV Ejection Fraction    55 %                  * Indicates values subject to auto-interpretation LV EF:  55    % FINDINGS Left Ventricle The left ventricle was normal in size and function. Right Ventricle The right ventricle was normal in size and function. Right Atrium The right atrium is normal in size. Left Atrium The left atrium is normal in size. Mitral Valve Structurally normal mitral valve without significant stenosis or regurgitation. Aortic Valve Calcified aortic valve leaflets. Severe aortic stenosis. Tricuspid Valve Structurally normal tricuspid valve without significant stenosis or regurgitation. Pulmonic Valve Structurally normal pulmonic valve without significant stenosis or regurgitation. Pericardium Normal pericardium without effusion. Aorta The aortic root is normal. Simon Parsons (Electronically Signed) Final Date:     2020                 16:26 Amended:        2020 16:29      ASSESSMENT/PLAN:     65 y.o.  admitted 2020. Pt has a past medical history of TAVR on 2020 and pancytopenia which he has had some work-up but no bone marrow biopsy as yet.  He has had a decreased WBC since 2016 with neutropenia.  He also has some mild anemia and thrombocytopenia that is also chronic but worsened  recently.  He presented the ER complaining of tachycardia the day after he been sent home after his TAVR surgery.     Hospital Course:   Patient was admitted and was initially febrile and 6/20 4-1.3 and then again spiked a fever to 103.6 on 6/26.  Initial work-up for pancytopenia was started and plan was to complete this with heme-onc as an outpatient.  Blood cultures were obtained 6/26.  Ultrasound abdomen on 6/25 with trabeculated bladder wall suggesting possible chronic bladder outlet obstruction but otherwise unremarkable.  Ultrasound lower extremity with noted stenosis of the right common femoral artery report is known - no evidence of hematoma or pseudoaneurysm in right groin.  Chest x-rays have been unremarkable.  He is not been on any antibiotics and plan was for discharge and follow-up as an outpatient but he again spiked fevers and also reported soaking sweats last night.    Problem List    Neutropenic fever-  today-concern for malignancy and/or infection  -Work-up initiated by hospitalist with SPEP, smooth muscle antibodies, SEEMA, rheumatoid factor, B12 and folate levels as well as a fibrinogen.   -ESR 45  - RA elevated at 40  -Folate and B12 within normal limits  -UA unremarkable in 6/27  -Blood cultures on 6/26 no growth to date  -We will send additional work-up with HIV, CMV and parvovirus    Pancytopenia-unknown etiology    Lump below chin, tender, prior infection   -Reports oral surgery at the end of April and he was on antibiotics due to infection for approximately 1 month.  There had resolved but now with new swelling  Antibiotic allergies: None known     Plan     --- Initiated cefepime  --- Follow-up blood cultures  --- Image lump below her chin and may need drainage-this is done please obtain cultures  --- If fevers continue will need CT chest abdomen pelvis  --- Recommend heme-onc consult and bone marrow biopsy  --- Follow-up HIV screen, CMV PCR and parvovirus PCR      Plan of care  discussed with YESSENIA Gupta M.D.. Will continue to follow    Ruthie Avitia M.D.

## 2020-06-27 NOTE — CARE PLAN
Problem: Communication  Goal: The ability to communicate needs accurately and effectively will improve  Outcome: PROGRESSING AS EXPECTED     Problem: Safety  Goal: Will remain free from injury  Outcome: PROGRESSING AS EXPECTED  Goal: Will remain free from falls  Outcome: PROGRESSING AS EXPECTED     Problem: Infection  Goal: Will remain free from infection  Outcome: PROGRESSING AS EXPECTED     Problem: Discharge Barriers/Planning  Goal: Patient's continuum of care needs will be met  Outcome: PROGRESSING SLOWER THAN EXPECTED

## 2020-06-28 ENCOUNTER — APPOINTMENT (OUTPATIENT)
Dept: RADIOLOGY | Facility: MEDICAL CENTER | Age: 65
DRG: 982 | End: 2020-06-28
Attending: INTERNAL MEDICINE
Payer: COMMERCIAL

## 2020-06-28 LAB
ANION GAP SERPL CALC-SCNC: 8 MMOL/L (ref 7–16)
ANISOCYTOSIS BLD QL SMEAR: ABNORMAL
BASOPHILS # BLD AUTO: 0.9 % (ref 0–1.8)
BASOPHILS # BLD: 0.02 K/UL (ref 0–0.12)
BUN SERPL-MCNC: 12 MG/DL (ref 8–22)
CALCIUM SERPL-MCNC: 8.2 MG/DL (ref 8.5–10.5)
CHLORIDE SERPL-SCNC: 99 MMOL/L (ref 96–112)
CO2 SERPL-SCNC: 22 MMOL/L (ref 20–33)
CREAT SERPL-MCNC: 0.66 MG/DL (ref 0.5–1.4)
EOSINOPHIL # BLD AUTO: 0 K/UL (ref 0–0.51)
EOSINOPHIL NFR BLD: 0 % (ref 0–6.9)
ERYTHROCYTE [DISTWIDTH] IN BLOOD BY AUTOMATED COUNT: 42.9 FL (ref 35.9–50)
GLUCOSE SERPL-MCNC: 116 MG/DL (ref 65–99)
HCT VFR BLD AUTO: 28.2 % (ref 42–52)
HGB BLD-MCNC: 9.5 G/DL (ref 14–18)
HIV 1+2 AB+HIV1 P24 AG SERPL QL IA: NORMAL
LYMPHOCYTES # BLD AUTO: 2.03 K/UL (ref 1–4.8)
LYMPHOCYTES NFR BLD: 81.2 % (ref 22–41)
MACROCYTES BLD QL SMEAR: ABNORMAL
MAGNESIUM SERPL-MCNC: 1.4 MG/DL (ref 1.5–2.5)
MANUAL DIFF BLD: NORMAL
MCH RBC QN AUTO: 28.4 PG (ref 27–33)
MCHC RBC AUTO-ENTMCNC: 33.7 G/DL (ref 33.7–35.3)
MCV RBC AUTO: 84.4 FL (ref 81.4–97.8)
MICROCYTES BLD QL SMEAR: ABNORMAL
MONOCYTES # BLD AUTO: 0.3 K/UL (ref 0–0.85)
MONOCYTES NFR BLD AUTO: 12 % (ref 0–13.4)
MORPHOLOGY BLD-IMP: NORMAL
NEUTROPHILS # BLD AUTO: 0.15 K/UL (ref 1.82–7.42)
NEUTROPHILS NFR BLD: 6 % (ref 44–72)
NRBC # BLD AUTO: 0 K/UL
NRBC BLD-RTO: 0 /100 WBC
OVALOCYTES BLD QL SMEAR: NORMAL
PLATELET # BLD AUTO: 45 K/UL (ref 164–446)
PLATELET BLD QL SMEAR: NORMAL
PLATELETS.RETICULATED NFR BLD AUTO: 12.4 K/UL (ref 0.6–13.1)
POIKILOCYTOSIS BLD QL SMEAR: NORMAL
POTASSIUM SERPL-SCNC: 3.8 MMOL/L (ref 3.6–5.5)
RBC # BLD AUTO: 3.34 M/UL (ref 4.7–6.1)
RBC BLD AUTO: PRESENT
SMUDGE CELLS BLD QL SMEAR: NORMAL
SODIUM SERPL-SCNC: 129 MMOL/L (ref 135–145)
WBC # BLD AUTO: 2.5 K/UL (ref 4.8–10.8)

## 2020-06-28 PROCEDURE — 96366 THER/PROPH/DIAG IV INF ADDON: CPT

## 2020-06-28 PROCEDURE — 36415 COLL VENOUS BLD VENIPUNCTURE: CPT

## 2020-06-28 PROCEDURE — 700105 HCHG RX REV CODE 258: Performed by: INTERNAL MEDICINE

## 2020-06-28 PROCEDURE — 83735 ASSAY OF MAGNESIUM: CPT

## 2020-06-28 PROCEDURE — 700111 HCHG RX REV CODE 636 W/ 250 OVERRIDE (IP): Performed by: INTERNAL MEDICINE

## 2020-06-28 PROCEDURE — G0475 HIV COMBINATION ASSAY: HCPCS

## 2020-06-28 PROCEDURE — 99233 SBSQ HOSP IP/OBS HIGH 50: CPT | Performed by: INTERNAL MEDICINE

## 2020-06-28 PROCEDURE — A9270 NON-COVERED ITEM OR SERVICE: HCPCS | Performed by: INTERNAL MEDICINE

## 2020-06-28 PROCEDURE — 700117 HCHG RX CONTRAST REV CODE 255: Performed by: INTERNAL MEDICINE

## 2020-06-28 PROCEDURE — 700102 HCHG RX REV CODE 250 W/ 637 OVERRIDE(OP): Performed by: INTERNAL MEDICINE

## 2020-06-28 PROCEDURE — 85007 BL SMEAR W/DIFF WBC COUNT: CPT

## 2020-06-28 PROCEDURE — 700111 HCHG RX REV CODE 636 W/ 250 OVERRIDE (IP): Performed by: NURSE PRACTITIONER

## 2020-06-28 PROCEDURE — 74177 CT ABD & PELVIS W/CONTRAST: CPT

## 2020-06-28 PROCEDURE — 85055 RETICULATED PLATELET ASSAY: CPT

## 2020-06-28 PROCEDURE — 96367 TX/PROPH/DG ADDL SEQ IV INF: CPT

## 2020-06-28 PROCEDURE — 70491 CT SOFT TISSUE NECK W/DYE: CPT

## 2020-06-28 PROCEDURE — 80048 BASIC METABOLIC PNL TOTAL CA: CPT

## 2020-06-28 PROCEDURE — 770020 HCHG ROOM/CARE - TELE (206)

## 2020-06-28 PROCEDURE — 85027 COMPLETE CBC AUTOMATED: CPT

## 2020-06-28 PROCEDURE — 87799 DETECT AGENT NOS DNA QUANT: CPT

## 2020-06-28 RX ORDER — MAGNESIUM SULFATE HEPTAHYDRATE 40 MG/ML
4 INJECTION, SOLUTION INTRAVENOUS ONCE
Status: COMPLETED | OUTPATIENT
Start: 2020-06-28 | End: 2020-06-28

## 2020-06-28 RX ORDER — SODIUM CHLORIDE, SODIUM LACTATE, POTASSIUM CHLORIDE, CALCIUM CHLORIDE 600; 310; 30; 20 MG/100ML; MG/100ML; MG/100ML; MG/100ML
INJECTION, SOLUTION INTRAVENOUS CONTINUOUS
Status: DISCONTINUED | OUTPATIENT
Start: 2020-06-28 | End: 2020-06-30

## 2020-06-28 RX ORDER — SODIUM CHLORIDE 9 MG/ML
INJECTION, SOLUTION INTRAVENOUS
Status: COMPLETED
Start: 2020-06-28 | End: 2020-06-28

## 2020-06-28 RX ADMIN — VANCOMYCIN HYDROCHLORIDE 1500 MG: 500 INJECTION, POWDER, LYOPHILIZED, FOR SOLUTION INTRAVENOUS at 18:02

## 2020-06-28 RX ADMIN — ACETAMINOPHEN 650 MG: 325 TABLET, FILM COATED ORAL at 12:03

## 2020-06-28 RX ADMIN — CEFEPIME 2 G: 2 INJECTION, POWDER, FOR SOLUTION INTRAVENOUS at 05:03

## 2020-06-28 RX ADMIN — AMLODIPINE BESYLATE 2.5 MG: 5 TABLET ORAL at 21:15

## 2020-06-28 RX ADMIN — LOVASTATIN 20 MG: 20 TABLET ORAL at 21:15

## 2020-06-28 RX ADMIN — CEFEPIME 2 G: 2 INJECTION, POWDER, FOR SOLUTION INTRAVENOUS at 14:15

## 2020-06-28 RX ADMIN — SODIUM CHLORIDE, POTASSIUM CHLORIDE, SODIUM LACTATE AND CALCIUM CHLORIDE: 600; 310; 30; 20 INJECTION, SOLUTION INTRAVENOUS at 16:50

## 2020-06-28 RX ADMIN — ZOLPIDEM TARTRATE 5 MG: 5 TABLET ORAL at 21:15

## 2020-06-28 RX ADMIN — IOHEXOL 80 ML: 350 INJECTION, SOLUTION INTRAVENOUS at 16:52

## 2020-06-28 RX ADMIN — MAGNESIUM SULFATE IN WATER 4 G: 40 INJECTION, SOLUTION INTRAVENOUS at 16:52

## 2020-06-28 RX ADMIN — CEFEPIME 2 G: 2 INJECTION, POWDER, FOR SOLUTION INTRAVENOUS at 21:14

## 2020-06-28 ASSESSMENT — ENCOUNTER SYMPTOMS
CHEST TIGHTNESS: 0
NAUSEA: 0
CHILLS: 1
VOMITING: 0
CHOKING: 0
MYALGIAS: 1
SHORTNESS OF BREATH: 0
STRIDOR: 0
DIARRHEA: 0
APNEA: 0
ABDOMINAL PAIN: 0
CONSTIPATION: 0
WHEEZING: 0
FEVER: 1
SPUTUM PRODUCTION: 0
COUGH: 0

## 2020-06-28 ASSESSMENT — FIBROSIS 4 INDEX: FIB4 SCORE: 14.32

## 2020-06-28 NOTE — PROGRESS NOTES
"Pharmacy Vancomycin Kinetics     Magalis Kumari is a 65 y.o. male on vancomycin day # 1 for an indication of febrile neutropenia.    Anticipated Duration of Therapy:  TBD, remains neutropenic    Current dosing:  · Vancomycin 1500 mg IV loading dose on   · Vancomycin 1100 mg IV every 12 hours    History of Present Illness:   Admitted on 2020 for tachycardia, found to be pancytopenic with ongoing fevers.  Treatment initiated for febrile neutropenia on  with cefepime and ID consulted.  Fevers continuing despite cefepime - vancomycin added today.  Infectious and oncologic workup ongoing.  Pertinent past medical history per chart.    Antimicrobial history for admission:   · Cefepime  - current  · Vancomycin  - current    Allergies: Seasonal     Concerns for renal function: No significant concerns noted    Pertinent cultures to date:   ·  Blood NGTD    MRSA nares swab ordered: N/A    Recent Labs     20  0610 20  0412   WBC 2.5* 2.0* 2.5*   NEUTSPOLYS 34.40* 28.90* 6.00*     Recent Labs     20  0610 20  0412   BUN 15 12 12   CREATININE 0.50 0.52 0.66   ALBUMIN 2.6* 2.9*  --      No results for input(s): VANCOTROUGH, VANCOPEAK, VANCORANDOM in the last 72 hours.    Intake/Output Summary (Last 24 hours) at 2020 1626  Last data filed at 2020 0343  Gross per 24 hour   Intake 240 ml   Output --   Net 240 ml      /63   Pulse 100   Temp 37.9 °C (100.3 °F) (Temporal)   Resp 16   Ht 1.676 m (5' 6\")   Wt 61.3 kg (135 lb 2.3 oz)   SpO2 91%  Temp (24hrs), Av.7 °C (99.9 °F), Min:36.7 °C (98 °F), Max:39.2 °C (102.5 °F)      Assessment and Plan:  1. Dosing regimen and changes: New start vancomycin   2. Next vancomycin level: In 2-3 days, not yet ordered  3. Goal trough: 16-20 mcg/mL for indication of febrile neutropenia  4. Comments and plan: New start vancomycin for ongoing fevers despite cefepime.  Worsening neutropenia.  ID " consulted.  Pharmacy to follow daily during therapy.    Thank you!  Kemi Diop, PharmD, BCCCP

## 2020-06-28 NOTE — PROGRESS NOTES
Hospital Medicine Daily Progress Note    Date of Service  6/28/2020    Chief Complaint  65 y.o. male admitted 6/23/2020 with tachycardia.  He had a TAVR on 6/22/2020, and was discharged home on 6/23/2020.  He returned to the ER due to persistent tachycardia.     He has a history of pancytopenia which is worsening, and has been hesitant to do a bone marrow biopsy.  He has tobacco dependence, essential hypertension, hyperlipidemia.     Hospital Course  Home Eliquis and aspirin were discontinued.  He received aggressive hydration, his heart rate stabilized, he continued to have decline in his hemoglobin and platelets. WBC stabilized.  I briefly discussed his pancytopenia with hem/onc.  Abdominal ultrasound from 6/25/2020 showed no acute abnormalities.  SPEP, smooth muscle antibodies, SEEMA, rheumatoid factor, B12 and folate levels, fibrinogen were ordered.  Patient wished to go home and agreed follow-up with hematology for bone marrow biopsy and further work-up.  He was discharged to go home on 6/26/2020, but developed a fever of 103.6.    Infectious disease were consulted due to fever and neutropenia.  Cefepime was empirically started    B12, folate, and fibrinogen levels were normal.  Rheumatoid factor was 40, ESR was 45, LFTs were unremarkable, SEEMA was undetectable, HIV was nonreactive.     Interval Problem Update  ANC has has reduced to 0.15 today.  Continues to have intermittent fever.  Blood cultures x2 from 6/26/2020 have been negative.  Chest x-ray from 6/27/2020 was unremarkable.  UA showed no signs of UTI.  CT soft tissue neck has been ordered to evaluate mass underneath chin (CT chest abdomen and pelvis have also been added).    Consultants/Specialty  Cardiology  Infectious disease  Oncology     Code Status  Full code     Disposition  Home when stable     Review of Systems  Review of Systems   Constitutional: Negative.    HENT: Negative.    Eyes: Negative.    Respiratory: Negative.    Cardiovascular: Negative.     Gastrointestinal: Negative.    Genitourinary: Negative.    Musculoskeletal: Negative.    Skin: Negative.    Neurological: Negative.    Endo/Heme/Allergies: Negative.    Psychiatric/Behavioral: Negative.      Physical Exam  Temp:  [36.7 °C (98 °F)-39.2 °C (102.5 °F)] 37.9 °C (100.3 °F)  Pulse:  [] 100  Resp:  [16-22] 16  BP: (120-160)/(62-84) 120/63  SpO2:  [90 %-93 %] 91 %    Constitutional:       General: He is not in acute distress.     Appearance: He is not ill-appearing.   HENT:      Head: Normocephalic.      Nose: Nose normal.      Mouth/Throat:      Mouth: Mucous membranes are moist.     Comments: Lump underneath chin area  Eyes:      Pupils: Pupils are equal, round, and reactive to light.   Neck:      Musculoskeletal: Normal range of motion.   Cardiovascular:      Rate and Rhythm: Normal rate.   Pulmonary:      Effort: Pulmonary effort is normal.   Abdominal:      Palpations: Abdomen is soft.   Musculoskeletal:      Right lower leg: No edema.      Left lower leg: No edema.   Skin:     General: Hematoma in groin area  Neurological:      General: No focal deficit present.      Mental Status: He is alert.   Psychiatric:         Mood and Affect: Mood normal.         Behavior: Behavior normal.     Fluids    Intake/Output Summary (Last 24 hours) at 6/28/2020 1350  Last data filed at 6/28/2020 0343  Gross per 24 hour   Intake 240 ml   Output --   Net 240 ml       Laboratory  Recent Labs     06/26/20  0317 06/27/20  0610 06/28/20  0412   WBC 2.5* 2.0* 2.5*   RBC 3.01* 3.43* 3.34*   HEMOGLOBIN 8.6* 9.9* 9.5*   HEMATOCRIT 25.5* 29.3* 28.2*   MCV 84.7 85.4 84.4   MCH 28.6 28.9 28.4   MCHC 33.7 33.8 33.7   RDW 42.6 43.1 42.9   PLATELETCT 45* 45* 45*   MPV 11.5 10.9  --      Recent Labs     06/26/20  0317 06/26/20  1658 06/27/20  0610 06/28/20  0412   SODIUM 129*  --  132* 129*   POTASSIUM 2.9* 4.0 3.5* 3.8   CHLORIDE 104  --  102 99   CO2 20  --  22 22   GLUCOSE 118*  --  120* 116*   BUN 15  --  12 12    CREATININE 0.50  --  0.52 0.66   CALCIUM 7.8*  --  7.9* 8.2*                   Imaging  DX-CHEST-2 VIEWS   Final Result      No acute cardiopulmonary abnormality.      US-EXTREMITY ARTERY LOWER UNILAT RIGHT   Final Result      US-ABDOMEN COMPLETE SURVEY   Final Result      Prominent trabeculated bladder wall, suggesting possible chronic bladder outlet obstruction. Unremarkable findings otherwise.         DX-CHEST-PORTABLE (1 VIEW)   Final Result      No acute cardiac or pulmonary abnormalities are identified.      IR-CONSULT AND TREAT    (Results Pending)   CT-SOFT TISSUE NECK WITH    (Results Pending)   CT-CHEST,ABDOMEN,PELVIS WITH    (Results Pending)        Assessment/Plan  * Fever  Assessment & Plan  Fever with neutropenia. Blood cultures x2, UA, chest x-ray were unremarkable   Infectious disease were consulted, cefepime was empirically started 6/27/2020  IR has been consulted to evaluate and possibly aspirate lump underneath the skin  CT soft tissue and neck, chest abdomen and pelvis with contrast has been ordered  HIV was non-reactive, CMV and parvovirus are pending  Monitor closely    Pancytopenia (HCC)- (present on admission)  Assessment & Plan  Present for a few months, unclear etiology  Briefly discussed with Dr. Chacon (hematology/oncology) a few days ago  LFT's, fibrinogen, B12, folic acid, SEEMA were normal.  RF was 40, ESR was 45  SPEP, and smooth muscle antibodies are pending  Infectious disease were consulted, cefepime started  ANC has further reduced today (0.15)  Bone marrow biopsy has been ordered   Abdominal ultrasound was unremarkable   Oncology have been consulted for input    S/P TAVR (transcatheter aortic valve replacement)- (present on admission)  Assessment & Plan  S/P TAVR on 6/22/2020  Eliquis and aspirin was stopped .  Continue statin  Per cardiology start B12 and folate supplements  Resume outpatient cardiology follow-up    Mixed hyperlipidemia- (present on admission)  Assessment &  Plan  Continue home Lovastatin    Essential hypertension- (present on admission)  Assessment & Plan  Blood pressure has been on the low side  Continue Norvasc at a lower dose with parameters    Panlobular emphysema (HCC)- (present on admission)  Assessment & Plan  History of tobacco use  Chest x-ray from admission shows no acute cardiopulmonary process    Tobacco dependence- (present on admission)  Assessment & Plan  Patient has been counseled on tobacco cessation  Nicotine replacement options were provided       VTE prophylaxis: SCD's

## 2020-06-28 NOTE — PROGRESS NOTES
Received bedside report from RN, pt care assumed, VSS, pt assessment complete. Pt AAOx4 with no complaint of pain at this time. No signs of acute distress noted at this time. POC discussed with pt and verbalizes no questions. Pt denies any additional needs at this time. Bed in lowest position, patient up self, pt educated on fall risk and verbalized understanding, call light within reach, hourly rounding initiated.

## 2020-06-28 NOTE — PROGRESS NOTES
Cardiology Follow Up Progress Note    Date of Service  6/28/2020    Attending Physician  Jacki Gupta M.D.    Chief Complaint   Asymptomatic tachycardia, was sent from cardiology office to ER, patient is status post transcatheter aortic valve replacement 6/22/2020, labs revealed worsening pancytopenia    HPI  Miguelito Kumari is a 65 y.o. male admitted 6/23/2020 with asymptomatic tachycardia, heart rate 170s, he was discharged on 6/22/2020 , went over to cardiology clinic to have a Holter monitor placed for PVCs that were noted during the hospitalization, he had walk quite a bit prior to arriving to the clinic and required a wheelchair to get in the room but denies having any dizziness chest pain shortness of breath, staff checked the patient's heart rate and pulse oximetry measured the heart rate at 170, EKG was obtained but by that time tachycardia had resolved and did not capture on EKG..      Past medical history significant for recent transcatheter aortic valve replacement 6/22/2020, hyperlipidemia, hypertension, emphysema, tobacco use.    Interim Events    6/28/20 discharge on hold secondary to neutropenic fever, appreciate ID, plan for CT soft tissue neck, chest, abdomen, pelvis, likely bone marrow biopsy in a.m., hematology consult pending.    6/26/20 no overnight cardiac events, sinus rhythm rare PVCs, denies any symptoms, encouraged ambulation prior to discharge, wishes to go home today with close follow-up with cardiology next week, bilateral groin site ecchymotic, bruised, mild hematoma, stable.    Review of Systems  Review of Systems   Respiratory: Negative for apnea, cough, choking, chest tightness, shortness of breath, wheezing and stridor.    Cardiovascular: Negative for chest pain and leg swelling.       Vital signs in last 24 hours  Temp:  [36.7 °C (98 °F)-39.2 °C (102.5 °F)] 37.9 °C (100.3 °F)  Pulse:  [] 100  Resp:  [16-22] 16  BP: (120-160)/(62-84) 120/63  SpO2:  [90 %-93 %] 91  %    Physical Exam  Physical Exam  Cardiovascular:      Rate and Rhythm: Normal rate and regular rhythm.   Pulmonary:      Effort: Pulmonary effort is normal.   Abdominal:      General: Abdomen is flat.   Skin:     General: Skin is warm.      Comments: Bilateral groin bruising, ecchymotic, soft to touch, good circulation, denies pain with activity.   Neurological:      General: No focal deficit present.      Mental Status: He is alert.   Psychiatric:         Mood and Affect: Mood normal.         Lab Review  Lab Results   Component Value Date/Time    WBC 2.5 (LL) 06/28/2020 04:12 AM    RBC 3.34 (L) 06/28/2020 04:12 AM    HEMOGLOBIN 9.5 (L) 06/28/2020 04:12 AM    HEMATOCRIT 28.2 (L) 06/28/2020 04:12 AM    MCV 84.4 06/28/2020 04:12 AM    MCH 28.4 06/28/2020 04:12 AM    MCHC 33.7 06/28/2020 04:12 AM    MPV 10.9 06/27/2020 06:10 AM      Lab Results   Component Value Date/Time    SODIUM 129 (L) 06/28/2020 04:12 AM    POTASSIUM 3.8 06/28/2020 04:12 AM    CHLORIDE 99 06/28/2020 04:12 AM    CO2 22 06/28/2020 04:12 AM    GLUCOSE 116 (H) 06/28/2020 04:12 AM    BUN 12 06/28/2020 04:12 AM    CREATININE 0.66 06/28/2020 04:12 AM      Lab Results   Component Value Date/Time    ASTSGOT 43 06/27/2020 06:10 AM    ALTSGPT 20 06/27/2020 06:10 AM     Lab Results   Component Value Date/Time    CHOLSTRLTOT 117 03/09/2020 01:50 PM    LDL 70 03/09/2020 01:50 PM    HDL 29 (A) 03/09/2020 01:50 PM    TRIGLYCERIDE 89 03/09/2020 01:50 PM    TROPONINT 34 (H) 06/25/2020 01:49 PM       No results for input(s): NTPROBNP in the last 72 hours.    Cardiac Imaging and Procedures Review  EKG: Sinus rhythm, PVCs    Echocardiogram:   6/23/2020  Left ventricular ejection fraction is visually estimated to be 60%.  Unable to estimate pulmonary artery pressure due to an inadequate   tricuspid regurgitant jet.  Known TAVR aortic valve.     Cardiac Catheterization: 5/22/2020 non-obstructive coronary artery disease  Mild disease in iliac  arteries.      Findings:  1.  Left main coronary artery:  Distal left main has 10-20% disease.  2.  Left anterior descending artery:  Prior stent widely patent, no significant disease in LAD. Major diagonal has 40-50% stenosis in proximal portion.  3.  Left circumflex coronary artery:  Normal. Gives One marginal branch, which has no significant disease   4.  Right coronary artery: 50% disease in proximal portion, Mid RCA has two stents with 30-40% instent restenosis.  This is a right dominant system.     Abdominal aortogram showed mild disease bilateral external iliac arteries.      Imaging  Chest X-Ray:  No acute cardiac or pulmonary abnormalities are identified.    Stress Test:  n/a    Assessment/Plan    Discharge on hold secondary to neutropenic fever, appreciate ID, plan for CT soft tissue neck, chest, abdomen, pelvis, likely bone marrow biopsy in a.m., hematology consult pending. replete MG    Symptomatic severe aortic stenosis, NYHA class II  -Status post transcatheter aortic valve replacement 6/22/2020 (#26+1 mL Jewell marlys 3 ultra valve )  -Postop course significant for right common femoral artery nonocclusive thrombus likely from Perclose devices without any evidence of limb ischemia, to be treated conservatively, was started on Eliquis, currently on hold secondary to thrombocytopenia, will evaluate as an outpatient.  -Repeat CBC in 3 days, will arrange  -Plan for repeat ultrasound of the lower extremity as an outpatient.      CAD  -no obstructive CAD based on angiography 5/22/2020    Hypertension  - well controlled, this afternoon 120/60, on amlodipine    Hyperlipidemia  - LDL 70, on Mevacor    Emphysema    Tobacco abuse  - well-versed on the importance of stop smoking.              Please contact me with any questions.    MARÍA Diaz.   Cardiologist, [facility]  [phone]

## 2020-06-28 NOTE — CARE PLAN
Problem: Communication  Goal: The ability to communicate needs accurately and effectively will improve  Outcome: PROGRESSING AS EXPECTED     Problem: Safety  Goal: Will remain free from injury  Outcome: PROGRESSING AS EXPECTED  Goal: Will remain free from falls  Outcome: PROGRESSING AS EXPECTED     Problem: Infection  Goal: Will remain free from infection  Outcome: PROGRESSING AS EXPECTED     Problem: Venous Thromboembolism (VTW)/Deep Vein Thrombosis (DVT) Prevention:  Goal: Patient will participate in Venous Thrombosis (VTE)/Deep Vein Thrombosis (DVT)Prevention Measures  Outcome: PROGRESSING AS EXPECTED     Problem: Bowel/Gastric:  Goal: Normal bowel function is maintained or improved  Outcome: PROGRESSING AS EXPECTED  Goal: Will not experience complications related to bowel motility  Outcome: PROGRESSING AS EXPECTED     Problem: Knowledge Deficit  Goal: Knowledge of disease process/condition, treatment plan, diagnostic tests, and medications will improve  Outcome: PROGRESSING AS EXPECTED  Goal: Knowledge of the prescribed therapeutic regimen will improve  Outcome: PROGRESSING AS EXPECTED     Problem: Discharge Barriers/Planning  Goal: Patient's continuum of care needs will be met  Outcome: PROGRESSING AS EXPECTED     Problem: Psychosocial Needs:  Goal: Level of anxiety will decrease  Outcome: MET

## 2020-06-28 NOTE — PROGRESS NOTES
"INTERVENTIONAL RADIOLOGY PROCEDURE CONSULT - SHORT NOTE    Procedure Requested: EVALUATE MASS UNDER CHIN \"CONSULT AND TREAT\"    Date of request: 6/28/2020    Date of consultation: 6/28/2020    Requesting Provider: KATH    Summary:  FEVERS, [email or web address] UNDER CHIN.     Imaging Findings:  NO CURRENT IMAGING OF NECK/FACE    Interventional Radiology Recommendation:  CT OF THE NECK SOFT TISSUES WITH CONTRAST TO EVALUATE \"LUMP\" UNDER CHIN.        6/28/2020 10:42 AM Arsenio Tineo M.D.      "

## 2020-06-28 NOTE — PROGRESS NOTES
"2330: Notified by CNA that patient's temperature was 102.5 F oral and 102 F temporal, PRN tylenol administered. Patient denies chills, does not appear to be diaphoretic.  0012: Recheck of temp is 100.8 temporal. Patient reports feeling \"a little warm\", ice packs administered.  0115: Recheck of temp is 98.3 temporal. Patient reports feeling more comfortable.  "

## 2020-06-28 NOTE — PROGRESS NOTES
Infectious Disease Progress Note    Author: Ruthie Avitia M.D. Date & Time of service: 2020  12:58 PM    Chief Complaint:  Fevers, pancytopenia      Interval History:    Review of Systems:  Review of Systems   Constitutional: Positive for chills, fever and malaise/fatigue.   Respiratory: Negative for cough, sputum production and wheezing.    Cardiovascular: Negative for chest pain.   Gastrointestinal: Negative for abdominal pain, constipation, diarrhea, nausea and vomiting.   Musculoskeletal: Positive for myalgias.       Hemodynamics:  Temp (24hrs), Av.9 °C (100.3 °F), Min:36.7 °C (98 °F), Max:39.2 °C (102.5 °F)  Temperature: 37.9 °C (100.2 °F)(rn notified)  Pulse  Av.4  Min: 55  Max: 109   Blood Pressure : 124/62       Physical Exam:  Physical Exam  Constitutional:       Appearance: Normal appearance.   HENT:      Head:      Comments: Lump under chin, soft, mild tenderness palpation, erythema, no warmth, no drainage  Cardiovascular:      Rate and Rhythm: Normal rate and regular rhythm.      Heart sounds: Normal heart sounds.   Pulmonary:      Effort: Pulmonary effort is normal.      Breath sounds: Normal breath sounds.   Abdominal:      General: Abdomen is flat. Bowel sounds are normal.      Palpations: Abdomen is soft.   Musculoskeletal: Normal range of motion.      Right lower leg: No edema.      Left lower leg: No edema.   Skin:     General: Skin is warm and dry.   Neurological:      General: No focal deficit present.      Mental Status: He is alert and oriented to person, place, and time.   Psychiatric:         Mood and Affect: Mood normal.         Behavior: Behavior normal.         Meds:    Current Facility-Administered Medications:   •  cefepime  •  zolpidem  •  amLODIPine  •  lovastatin  •  senna-docusate **AND** polyethylene glycol/lytes **AND** magnesium hydroxide **AND** bisacodyl  •  acetaminophen  •  enalaprilat  •  ondansetron  •  ondansetron    Labs:  Recent Labs      06/26/20 0317 06/27/20  0610 06/28/20  0412   WBC 2.5* 2.0* 2.5*   RBC 3.01* 3.43* 3.34*   HEMOGLOBIN 8.6* 9.9* 9.5*   HEMATOCRIT 25.5* 29.3* 28.2*   MCV 84.7 85.4 84.4   MCH 28.6 28.9 28.4   RDW 42.6 43.1 42.9   PLATELETCT 45* 45* 45*   MPV 11.5 10.9  --    NEUTSPOLYS 34.40* 28.90* 6.00*   LYMPHOCYTES 47.90* 50.90* 81.20*   MONOCYTES 13.50* 16.70* 12.00   EOSINOPHILS 0.00 0.00 0.00   BASOPHILS 1.00 0.90 0.90   RBCMORPHOLO Present Present Present     Recent Labs     06/26/20 0317 06/26/20  1658 06/27/20  0610 06/28/20  0412   SODIUM 129*  --  132* 129*   POTASSIUM 2.9* 4.0 3.5* 3.8   CHLORIDE 104  --  102 99   CO2 20  --  22 22   GLUCOSE 118*  --  120* 116*   BUN 15  --  12 12     Recent Labs     06/26/20 0317 06/27/20 0610 06/28/20  0412   ALBUMIN 2.6* 2.9*  --    TBILIRUBIN 0.6 0.7  --    ALKPHOSPHAT 43 50  --    TOTPROTEIN 5.8* 6.3  --    ALTSGPT 15 20  --    ASTSGOT 37 43  --    CREATININE 0.50 0.52 0.66       Imaging:  Dx-chest-2 Views    Result Date: 6/27/2020 6/27/2020 11:36 AM HISTORY/REASON FOR EXAM:  Fever TECHNIQUE/EXAM DESCRIPTION AND NUMBER OF VIEWS: Two views of the chest. COMPARISON:  6/23/2020. FINDINGS: LUNGS: Slight hyperinflation. Chronic slightly hyperdense nodule in the right midlung, likely benign granuloma. No focal consolidation. No effusions. PNEUMOTHORAX: None. LINES AND TUBES: None. MEDIASTINUM: No cardiomegaly. TAVR. LAD stent. Atherosclerosis. MUSCULOSKELETAL STRUCTURES: No acute displaced fracture.     No acute cardiopulmonary abnormality.    Dx-chest-portable (1 View)    Result Date: 6/23/2020 6/23/2020 5:44 PM HISTORY/REASON FOR EXAM:  tachycardia. TECHNIQUE/EXAM DESCRIPTION AND NUMBER OF VIEWS: Single portable view of the chest. COMPARISON: Exam from 6:42 AM FINDINGS: Heart size is within normal limits. No pulmonary infiltrates or consolidations are noted. No pleural abnormalities are noted. There is an old left seventh rib fracture.     No acute cardiac or pulmonary  abnormalities are identified.    Dx-chest-portable (1 View)    Result Date: 6/23/2020 6/23/2020 6:05 AM HISTORY/REASON FOR EXAM: Postop TAVR. TECHNIQUE/EXAM DESCRIPTION AND NUMBER OF VIEWS: Single portable view of the chest. COMPARISON: 6/22/2020 FINDINGS: LUNGS: Clear. No effusions. PNEUMOTHORAX: None. LINES AND TUBES: None. MEDIASTINUM: Stable cardiac silhouette. MUSCULOSKELETAL STRUCTURES: Unchanged.     Clear lungs. No pleural effusions.    Dx-chest-portable (1 View)    Result Date: 6/22/2020 6/22/2020 12:50 PM HISTORY/REASON FOR EXAM:  Status post aortic valve replacement. TECHNIQUE/EXAM DESCRIPTION AND NUMBER OF VIEWS: Single portable view of the chest. COMPARISON: 7/15/2019 FINDINGS: Single portable view of the chest demonstrates a normal cardiac silhouette and mediastinal contours. Calcification is in the aorta. There is emphysematous change. The lungs are hyperexpanded. No confluent opacity, pleural fluid, or pneumothorax. No suspicious bony lesions.     No acute findings status post TAVR    Us-abdomen Complete Survey    Result Date: 6/26/2020 6/26/2020 6:27 AM HISTORY/REASON FOR EXAM:  Abnormal Labs Pain TECHNIQUE/EXAM DESCRIPTION AND NUMBER OF VIEWS:  Complete abdomen survey. COMPARISON: None FINDINGS: The liver is normal in contour. There is no evidence of solid mass lesion. The liver measures 14.57 cm. The gallbladder is normal. There is no evidence of cholelithiasis. The gallbladder wall thickness measures 2.50 mm. There is no pericholecystic fluid. The common duct measures 3.40 mm. The visualized pancreas is unremarkable. The visualized aorta is normal in caliber. Intrahepatic IVC is patent. The portal vein is patent with hepatopetal flow. The MPV measures 1.22 cm. The right kidney measures 11.19 cm. The left kidney measures 11.17 cm. There is no hydronephrosis. The spleen measures 10.41 cm maximally. The bladder is trabeculated, which could indicate chronic bladder outlet obstruction. There is no  ascites.     Prominent trabeculated bladder wall, suggesting possible chronic bladder outlet obstruction. Unremarkable findings otherwise.     Us-extremity Artery Lower Bilat    Result Date: 2020  Lower Extremity  Arterial Duplex Report  Vascular Laboratory  CONCLUSIONS  Right.  Echolucent thrombus partially fills the common femoral artery causing a  high grade stenosis. Velocities are consistent with > 75% stenosis.  Mild plaque visualized in the superficial femoral and popliteal arteries  without evidence of hemodynamically significant stenosis.  Monophasic waveforms demonstrated throughout the right lower extremity.  Left.  Mild plaque is seen in the common femoral, femoral, and popliteal arteries  with no elevated velocities to indicate hemodynamically significant  stenosis.  FADIA LANDIS  Exam Date:     2020 17:34  Room #:     Inpatient  Priority:     Stat  Ht (in):             Wt (lb):  Ordering Physician:        ANTHONY PIPER  Referring Physician:       ANTHONY PIPER  Sonographer:               Ewa Curtis RVT  Study Type:                Complete Bilateral  Technical Quality:         Adequate  Age:    65    Gender:     M  MRN:    [id number]  :    1955      BSA:  Indications:     Pain in leg, unspecified  CPT Codes:       95571  ICD Codes:       M79.606  History:         Pain in legs s/p TAVR. No prior duplex.  Limitations:                RIGHT  Waveform        Peak Systolic Velocity (cm/s)                  Prox    Prox-Mid  Mid    Mid-Dist  Distal  Monophasic                        307              143     CFA  Monophasic      55                                         PFA  Monophasic      53                51               34      SFA  Monophasic                        30                       POP  Monophasic      22                                 32      AT  Monophasic      19                                 11       PT  Monophasic      37                                 20      EVGENY                LEFT  Waveform        Peak Systolic Velocity (cm/s)                  Prox    Prox-Mid  Mid    Mid-Dist  Distal  Triphasic                         187                      CFA  Biphasic        78                                         PFA  Biphasic        103               142              106     SFA  Biphasic                          57                       POP  Biphasic        83                                 110     AT  Biphasic        76                                 24      PT  Biphasic        61                                 24      EVGNEY  FINDINGS  Right.  Echolucent material consistent with thrombus partially fills the common  femoral artery causing a high grade stenosis. Stenosis of the common  femoral artery. Velocities are consistent with > 75% stenosis.  Mild plaque visualized in the femoral and popliteal arteries without  evidence of hemodynamic significance.  Monophasic waveforms demonstrated throughout the right lower extremity.  Left.  Mild plaque is seen in the common femoral, femoral, and popliteal arteries  with no elevated velocities to indicate hemodynamic significance.  Waveforms are biphasic throughout the left lower extremity.  Demetria Narvaez  (Electronically Signed)  Final Date:      22 June 2020                   18:45  Amended:         22 June 2020 19:58    Us-extremity Artery Lower Unilat Right    Result Date: 6/26/2020  Lower Extremity  Arterial Duplex Report  Vascular Laboratory  CONCLUSIONS  Pinching of the common femoral artery from perclose device closure.  Velcoity consistent with >75% stenosis. Intraluminal clot seen in prior  study is not readily seen.  FADIA LANDIS  Exam Date:     06/26/2020 07:49  Room #:     Inpatient  Priority:     Routine  Ht (in):             Wt (lb):  Ordering Physician:        MELVIN SIMONS  Referring Physician:        MELVIN SIMONS  Sonographer:               Blank Calles RVT  Study Type:                Limited Unilateral  Technical Quality:         Adequate  Age:    65    Gender:     M  MRN:    [id number]  :    1955      BSA:  Indications:     Unspecified atherosclerosis of native arteries of                   extremities, right leg  CPT Codes:       47311  ICD Codes:       I70.201  History:         Right common femoral steosis.  Limitations:                RIGHT  Waveform        Peak Systolic Velocity (cm/s)                  Prox    Prox-Mid  Mid    Mid-Dist  Distal  Monophasic      42                384              230     CFA  Biphasic        97                                         PFA  Biphasic        55                                         SFA                                                             POP                                                             AT                                                             PT                                                             EVGENY                LEFT  Waveform        Peak Systolic Velocity (cm/s)                  Prox    Prox-Mid  Mid    Mid-Dist  Distal                                                             CFA                                                             PFA                                                             SFA                                                             POP                                                             AT                                                             PT                                                             EVGENY  FINDINGS  Limited evaluation of the right common femoral artery known stenosis.  Right.  Stenosis of the mid common femoral artery. Velocities are consistent with >  75%stenosis.  Distal flow is turbulent and damped.  No evidence of hematoma or psuedoaneuysm seen at the right groin.   Luiz Dennis MD  (Electronically Signed)  Final Date:      2020                   08:49    Ec-echocardiogram Complete W/o Cont    Result Date: 2020  Transthoracic Echo Report Echocardiography Laboratory CONCLUSIONS Prior echo done on 2020, there is now a TAVR valve present. Left ventricular ejection fraction is visually estimated to be 60%. Unable to estimate pulmonary artery pressure due to an inadequate tricuspid regurgitant jet. Known TAVR aortic valve. FADIA LANDIS Exam Date:         2020                    03:46 Exam Location:     Inpatient Priority:          Routine Ordering Physician:        ANTHONY PIPER Referring Physician:       [id number] Case Sonographer:               JOJO Ash Age:    65     Gender:    Mal                           e MRN:    [id number] :    1955 BSA:    1.64   Ht (in):    66     Wt (lb):    126 Exam Type:     Complete Indications:     Prosthetic Valve ICD Codes:       V43.3 CPT Codes:       43046 BP:   142    /   83     HR:   70 Technical Quality:       Fair MEASUREMENTS  (Male / Female) Normal Values 2D ECHO LV Diastolic Diameter PLAX        6 cm                  4.2 - 5.9 / 3.9 - 5.3 cm LV Systolic Diameter PLAX         3.7 cm                2.1 - 4.0 cm IVS Diastolic Thickness           0.71 cm               LVPW Diastolic Thickness          0.7 cm                LVOT Diameter                     1.5 cm                Estimated LV Ejection Fraction    60 %                  LV Ejection Fraction MOD BP       39.5 %                >= 55  % LV Ejection Fraction MOD 4C       40.2 %                LV Ejection Fraction MOD 2C       33.9 %                IVC Diameter                      1.8 cm                DOPPLER AV Peak Velocity                  0.99 m/s              AV Peak Gradient                  3.9 mmHg              AV Mean Gradient                  2.7 mmHg              LVOT Peak Velocity                1.1 m/s                AV Area Cont Eq vti               1.9 cm2               Mitral E Point Velocity           0.63 m/s              Mitral E to A Ratio               1.5                   MV Pressure Half Time             75.3 ms               MV Area PHT                       2.9 cm2               MV Deceleration Time              260 ms                * Indicates values subject to auto-interpretation LV EF:  60    % FINDINGS Left Ventricle Left ventricle is mildly dilated. Normal left ventricular wall thickness. Normal left ventricular systolic function. Left ventricular ejection fraction is visually estimated to be 60%. Normal regional wall motion. Normal diastolic function. Right Ventricle Normal right ventricular size. Normal right ventricular systolic function. Right Atrium Normal right atrial size. Normal inferior vena cava size and inspiratory collapse. Left Atrium Normal left atrial size. Left atrial volume index is 26 mL/sq m. Mitral Valve Calcification of the mitral valve leaflets. No mitral stenosis. Trace mitral regurgitation. Aortic Valve Known TAVR aortic valve. Vmax is 1 m/s. Transvalvular gradients are - Peak: 3 mmHg, Mean: 2 mmHg. Tricuspid Valve Structurally normal tricuspid valve. No tricuspid stenosis. Trace tricuspid regurgitation. Right atrial pressure is estimated to be 8 mmHg. Unable to estimate pulmonary artery pressure due to an inadequate tricuspid regurgitant jet. Pulmonic Valve The pulmonic valve is not well visualized. Pericardium No pericardial effusion seen. Aorta Normal aortic root for body surface area. Ascending aorta diameter is 2.4 cm. Brenden Hansen MD (Electronically Signed) Final Date:     23 June 2020                 06:41    Ec-echocardiogram Ltd W/o Cont    Result Date: 6/23/2020  Transthoracic Echo Report Echocardiography Laboratory CONCLUSIONS Intraoperative TTE during TAVR.  Baseline images show mildly reduced LV function with EF =55%.  Calcified aortic valve leaflets. Severe aortic  stenosis.  Post deployment images show preserved biventricular function.  A 26 mm Jewell Jose Manuel Ultra stented bioprosthetic valve is seen in the aortic position with normal leaflet motion, trace significant paravalvular leak, mean gradent of 5 mm Hg and calculated effective orifice area of 2.5 cm2.  Findings communicated at the time of exam. FADIA LANDIS Exam Date:         2020                    11:17 Exam Location:     Inpatient Priority:          Routine Ordering Physician:        SIMON PARSONS Referring Physician: Sonographer:               Antwan La RDCS Age:    65     Gender:    Mal                           e MRN:    [id number] :    1955 BSA:    1.64   Ht (in):    66     Wt (lb):    126 Exam Type:     Limited Indications:     Pre-Op Surgery ICD Codes:       V72.83 CPT Codes:       08398 BP:          /          HR: Technical Quality:       Fair MEASUREMENTS  (Male / Female) Normal Values 2D ECHO Estimated LV Ejection Fraction    55 %                  * Indicates values subject to auto-interpretation LV EF:  55    % FINDINGS Left Ventricle The left ventricle was normal in size and function. Right Ventricle The right ventricle was normal in size and function. Right Atrium The right atrium is normal in size. Left Atrium The left atrium is normal in size. Mitral Valve Structurally normal mitral valve without significant stenosis or regurgitation. Aortic Valve Calcified aortic valve leaflets. Severe aortic stenosis. Tricuspid Valve Structurally normal tricuspid valve without significant stenosis or regurgitation. Pulmonic Valve Structurally normal pulmonic valve without significant stenosis or regurgitation. Pericardium Normal pericardium without effusion. Aorta The aortic root is normal. Simon Parsons (Electronically Signed) Final Date:     2020                 16:26 Amended:        2020 16:29      Micro:  Results     Procedure Component Value Units Date/Time    FLUID  "CULTURE W/GRAM STAIN [[id number]]     Order Status:  No result Specimen:  Other Body Fluid     URINALYSIS [[id number]]  (Abnormal) Collected:  06/27/20 1230    Order Status:  Completed Specimen:  Urine, Clean Catch Updated:  06/27/20 1257     Color DK Yellow     Character Clear     Specific Gravity 1.019     Ph 5.5     Glucose Negative mg/dL      Ketones Negative mg/dL      Protein 100 mg/dL      Bilirubin Negative     Urobilinogen, Urine 1.0     Nitrite Negative     Leukocyte Esterase Negative     Occult Blood Small     Micro Urine Req Microscopic    Narrative:       [id number] JANET CONNOLLY    BLOOD CULTURE [[id number]] Collected:  06/26/20 2146    Order Status:  Completed Specimen:  Blood from Peripheral Updated:  06/27/20 0815     Significant Indicator NEG     Source BLD     Site PERIPHERAL     Culture Result No Growth  Note: Blood cultures are incubated for 5 days and  are monitored continuously.Positive blood cultures  are called to the RN and reported as soon as  they are identified.      Narrative:       Protective  Per Hospital Policy: Only change Specimen Src: to \"Line\" if  specified by physician order.  Left Forearm/Arm    BLOOD CULTURE [[id number]] Collected:  06/26/20 2159    Order Status:  Completed Specimen:  Blood from Peripheral Updated:  06/27/20 0815     Significant Indicator NEG     Source BLD     Site PERIPHERAL     Culture Result No Growth  Note: Blood cultures are incubated for 5 days and  are monitored continuously.Positive blood cultures  are called to the RN and reported as soon as  they are identified.      Narrative:       Protective  Per Hospital Policy: Only change Specimen Src: to \"Line\" if  specified by physician order.  Right Forearm/Arm          Assessment:  Active Hospital Problems    Diagnosis   • *Fever [R50.9]   • Pancytopenia (HCC) [D61.818]   • S/P TAVR (transcatheter aortic valve replacement) [Z95.2]   • Essential hypertension [I10]   • Mixed hyperlipidemia [E78.2]   • " Panlobular emphysema (HCC) [J43.1]   • Tobacco dependence [F17.200]     Interval 24 hours:      102.5 overnight , 100.4 today, O2 RA  Labs reviewed  Studies reviewed  Micro reviewed    Plan is for CT neck to assess lump under chin and will also obtain CT chest abdomen and pelvis to further evaluate for lymphadenopathy or malignancy.     Pt with no specific complaints.  He continues to have fevers so will add vancomycin to cefepime for now.      ASSESSMENT/PLAN:      65 y.o.  admitted 6/23/2020. Pt has a past medical history of TAVR on 6/22/2020 and pancytopenia which he has had some work-up but no bone marrow biopsy as yet.  He has had a decreased WBC since 2016 with neutropenia.  He also has some mild anemia and thrombocytopenia that is also chronic but worsened recently.  He presented the ER complaining of tachycardia the day after he been sent home after his TAVR surgery.      Hospital Course:   Patient was admitted and was initially febrile and 6/20 4-1.3 and then again spiked a fever to 103.6 on 6/26.  Initial work-up for pancytopenia was started and plan was to complete this with heme-onc as an outpatient.  Blood cultures were obtained 6/26.  Ultrasound abdomen on 6/25 with trabeculated bladder wall suggesting possible chronic bladder outlet obstruction but otherwise unremarkable.  Ultrasound lower extremity with noted stenosis of the right common femoral artery report is known - no evidence of hematoma or pseudoaneurysm in right groin.  Chest x-rays have been unremarkable.  He is not been on any antibiotics and plan was for discharge and follow-up as an outpatient but he again spiked fevers and also reported soaking sweats last night.     Problem List     Neutropenic fever- ANC dropped to 150 -concern for malignancy and/or infection  -Work-up initiated by hospitalist with SPEP, smooth muscle antibodies, SEEMA, rheumatoid factor, B12 and folate levels as well as a fibrinogen.   -ESR 45  - RA elevated at  40  -Folate and B12 within normal limits  -UA unremarkable in 6/27  -Blood cultures on 6/26 no growth to date  -We will send additional work-up with HIV, CMV and parvovirus  -HIV negative      Pancytopenia-unknown etiology     Lump below chin, tender, prior infection   -Reports oral surgery at the end of April and he was on antibiotics due to infection for approximately 1 month.  There had resolved but now with new swelling  Antibiotic allergies: None known      Plan      --- Continue cefepime, will add vancomycin as fevers ongoing  --- Follow-up blood cultures  --- Image chain/lower jaw  and may need drainage- if this is done please obtain cultures  --- CT chest/abdomen/pelvis  --- Recommend heme-onc consult and bone marrow biopsy  --- Follow-up CMV PCR and parvovirus PCR - pending         Plan of care discussed with Dr. Gupta. Will continue to follow

## 2020-06-28 NOTE — CONSULTS
DATE OF SERVICE:  06/28/2020    INPATIENT ONCOLOGY CONSULTATION    REASON FOR CONSULT:  Pancytopenia.    HISTORY OF PRESENT ILLNESS:  The patient is a very nice 65-year-old man with a   history of hypertension, hyperlipidemia, CAD, PAD, seasonal allergies, and   aortic stenosis treated with TAVR on 06/22/2020, who has been admitted now for   neutropenic fever in the setting of pancytopenia.  We have been asked to   consult on the case.    Recently on 06/22/2020, he came to our hospital and had a TAVR procedure for   severe aortic stenosis.  We did have some blood work done on that day that   showed a white blood cell count of 3.0, hemoglobin 11.9, hematocrit 35.6% with   platelets of 172.  He actually had blood work done on 06/17/2020 that showed   he actually had similar blood counts, although his ANC was 0.36.    He underwent the procedure successfully.  It sounds like he developed a right   common femoral artery nonocclusive thrombus related to the procedure and was   put on the Eliquis.  He was doing well in the postop setting as an outpatient   on 06/23/2020 when he followed up with his cardiologist.  He was found to be   tachycardic with a spell of his heart rate being 170s.  This was transient and   it did resolve.  They could not find any arrhythmia on EKG.  It was decided   to admit him to the hospital on 06/23/2020 for further monitoring and   surveillance.  He was admitted to the hospital and since that time, his white   blood cell count has ranged between 2-3, with an ANC ranging between   0.15-0.86.  He also was noted to have platelet counts that were dropping.  His   platelets on the day of admission on 06/23/2020 were 107.  Since that time,   they have dropped down steadily to about 45.    While he was in the hospital, he underwent some a preliminary workup.  He had   an ultrasound of the abdomen on 06/26/2020 that was unremarkable with a normal   spleen and liver.  He had some prominent trabeculation  of the bladder wall.    He had an ESR that was elevated at 45, a rheumatoid factor that was elevated   at 40, but a negative SEEMA.  His vitamin B12 was normal at 428.  His folate was   normal at 20.  His TSH was normal at 0.776.  He had HIV antibodies, which   were negative.    The plan was to set him up with an outpatient bone marrow biopsy for further   workup.  He was discharged from the hospital on 06/26/2020 and was   asymptomatic and feeling well.    Within 24 hours, he came back to the hospital with neutropenic fever as well   as a very high white blood cell count.  His temperature was up to 103.6 and he   was admitted for neutropenic fever workup.  He had an infectious disease   consult and they have started him on cefepime and vancomycin.  He has a   swollen area under his chin.  He says that he had oral surgery done in April   and at times, he has had some swelling in this area.  Right now, he has a very   swollen lymph node in this area that is very tender.  He is going to get a CT   scan of the neck done later today.    Given his pancytopenia, I have been asked to consult on the case.    Looking back in his lab history, we have some lab work done in 11/2016 that   showed a normal CBC with a white count of 4.4, ANC 2.0, hemoglobin 15.8,   platelets 214 with a normal differential.  We do not have any labs done on him   until 05/19/2020 and his white count was 4.0, but his ANC was 0.88 with 22%   neutrophils, 55% lymphocytes, 16% monocytes, 4% eosinophils, 3% basophils with   a hemoglobin of 12.5 and platelets of 180.  By 06/17/2020, his white count   was 3.6 with an ANC of 0.36 with 10% neutrophils, 62% lymphocytes, 24%   monocytes, with a hemoglobin of 13.1 and platelets of 135.    PAST MEDICAL HISTORY:  1.  Hypertension.  2.  Hyperlipidemia.  3.  Coronary artery disease -- he had a history of a stent in 2008.  4.  Severe aortic stenosis -- treated with TAVR procedure on 06/22/2020.  5.  Seasonal  allergies.  6.  Peripheral arterial disease.    PAST SURGICAL HISTORY:  1.  Stapedectomy..  2.  Bone spur removal.  3.  TAVR repair on 06/22/2020.  4.  Oral surgery.    ALLERGIES:  No known drug allergies.    MEDICATIONS:  Here in the hospital:  1.  Amlodipine 2.5 mg at bedtime.  2.  Cefepime 2 g IV q.8 hours.  3.  Lovastatin 20 mg at bedtime.  4.  Vancomycin IV.  5.  Bowel regimen p.r.n.  6.  Ondansetron p.r.n.  7.  Ambien p.r.n.    FAMILY HISTORY:  He denies any family history of cancers.    SOCIAL HISTORY:  He was born in [place].  He lives in [place] with   his wife, Christine.  He has no children.  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  He smoked for 45 years at half a pack per day and continues to smoke.    He drinks 2-3 days per week, and with each session usually has 1-2 drinks of   beer or wine.  He denies any marijuana or IV drug use or illicit drug use.    REVIEW OF SYSTEMS:  The review of systems is positive for illness listed above   with the swollen, tender area below his chin in the midline, but really no   other symptoms or complaints.  A complete review of systems per the AMA and   CMS criteria was negative.    PHYSICAL EXAMINATION:  VITAL SIGNS:  His height is 5 feet 6 inches, his weight is 135 pounds, his   T-max is 102.5, pulse of 100, blood pressure 120/63, respirations 16, satting   91% on room air.  GENERAL:  No acute distress.  Pleasant gentleman, appears stated age.  HEENT:  NCAT.  Sclerae are anicteric.  Conjunctivae clear.  His oropharynx is   clear.  He is edentulous.  No mucositis or ulcerations.  NECK:  Supple, nontender, no elevated JVP, no carotid bruits, no thyromegaly.  CHEST:  Clear to auscultation and percussion bilaterally.  No wheeze, rales,   or rhonchi.  CARDIOVASCULAR:  Regular rate and rhythm.  No murmurs, gallops or rubs.    Normal S1, S2.  ABDOMEN:  Soft, nontender, nondistended.  No hepatosplenomegaly.  No guarding,   rebound, no masses.  LYMPH NODES:  No  supraclavicular, axillary or inguinal lymphadenopathy.  He   has no cervical lymph nodes, but has a submandibular lymph node in the midline   that is somewhat swollen to 2-3 cm and is tender to the touch.  EXTREMITIES:  No cyanosis, clubbing or edema.  Full range of motion.  No joint   swelling.  SKIN:  No rashes, bruising, petechiae, ulcerations, nonhealing wounds.  NEUROLOGIC:  Cranial nerves II-XII are intact.  He has intact sensation to   light touch throughout.  He moves all 4 extremities appropriately.  PSYCHIATRIC:  He is alert and oriented x3.  He has normal mood and affect and   concentration.    LABORATORY DATA:  White blood cell count 2.5, hemoglobin 9.5, hematocrit   28.2%, platelets 45 with 6% neutrophils, 81% lymphocytes, 12% monocytes, 0%   eosinophils with an ANC of 0.15.    Sodium 129, potassium 3.8, chloride 99, CO2 of 22, BUN 12, creatinine 0.66,   glucose 116, calcium 8.2, AST 43, ALT 20, alkaline phosphatase 50, bilirubin   0.7, albumin 2.9, protein 6.3, magnesium 1.4.  Urinalysis is negative for   nitrite or leukocyte esterase.    ASSESSMENT AND PLAN:  The patient a very nice 65-year-old man with a history   of hypertension, hyperlipidemia, coronary artery disease, severe aortic   stenosis (treated with transcatheter aortic valve replacement on 06/22/2020),    seasonal allergies, and peripheral arterial disease, who has unexplained   pancytopenia at least dating back to 05/2020.  The pancytopenia has been   somewhat progressive over this time.  Since his admission on 06/23/2020, his   white count has ranged between 2-3 with an ANC of 0.15-0.86 with platelet   counts ranging between 45 and 107 (with a gradual decline over the past couple   of weeks).  He has been admitted now with neutropenic fever.  He is on   broad-spectrum antibiotics.  We have been asked to consult on the case.    I spent some time talking with the patient and his wife about what is going   on.  He has pancytopenia, but most  prominent feature of this is the low white   blood cell count.  There is a broad differential in terms of etiology.  I   think ultimately we will need a bone marrow biopsy to rule out an underlying   bone marrow disorder.  We talked about different bone marrow disorders.  He   understands that our goal is to get the bone marrow biopsy done on Monday.    Hopefully, by Wednesday, we will have some preliminary results and be able to   formulate a plan in terms of what might be going on in the bone marrow.    Otherwise, he needs to remain in the hospital until that time on IV   antibiotics with close monitoring.    The suspicion is that he may have an underlying bone marrow disorder.  I guess   it could be an autoimmune process going on.  His rheumatoid factor was   elevated at 40, but he has no other syndromic symptoms including no swollen   joints or unusual rashes.  He could have an autoimmune neutropenia, but this   will also be in the setting of an autoimmune thrombocytopenia.  This would be   somewhat of a diagnosis of exclusion if the bone marrow biopsy was totally   normal.    At this point, he will get the bone marrow biopsy done tomorrow.  He will stay   on broad spectrum antibiotics for his neutropenic fever.  We will continue to   follow the cultures.    In terms of neutropenic fever, he has a swollen, tender lymph node in the   submandibular area.  He does not have any abscess or abnormality in the mouth.    We will see what the CT scan of the neck shows.  My guess is this is the   most likely source of the infection.  We will continue to follow along.    Infectious disease is seeing him.    Thank you very much for the referral of this nice gentleman.       ____________________________________     MD LUCIA Hughes / JAMAR    DD:  06/28/2020 16:14:36  DT:  06/28/2020 16:58:27    D#:  [id number]  Job#:  [id number]

## 2020-06-29 ENCOUNTER — APPOINTMENT (OUTPATIENT)
Dept: RADIOLOGY | Facility: MEDICAL CENTER | Age: 65
DRG: 982 | End: 2020-06-29
Attending: INTERNAL MEDICINE
Payer: COMMERCIAL

## 2020-06-29 ENCOUNTER — APPOINTMENT (OUTPATIENT)
Dept: CARDIOLOGY | Facility: MEDICAL CENTER | Age: 65
DRG: 982 | End: 2020-06-29
Attending: INTERNAL MEDICINE
Payer: COMMERCIAL

## 2020-06-29 LAB
ALBUMIN SERPL ELPH-MCNC: 2.67 G/DL (ref 3.75–5.01)
ALPHA1 GLOB SERPL ELPH-MCNC: 0.45 G/DL (ref 0.19–0.46)
ALPHA2 GLOB SERPL ELPH-MCNC: 0.49 G/DL (ref 0.48–1.05)
ANION GAP SERPL CALC-SCNC: 12 MMOL/L (ref 7–16)
ANISOCYTOSIS BLD QL SMEAR: ABNORMAL
B-GLOBULIN SERPL ELPH-MCNC: 0.8 G/DL (ref 0.48–1.1)
BACTERIA WND AEROBE CULT: NORMAL
BASOPHILS # BLD AUTO: 0 % (ref 0–1.8)
BASOPHILS # BLD: 0 K/UL (ref 0–0.12)
BUN SERPL-MCNC: 16 MG/DL (ref 8–22)
BURR CELLS BLD QL SMEAR: NORMAL
CALCIUM SERPL-MCNC: 7.7 MG/DL (ref 8.5–10.5)
CHLORIDE SERPL-SCNC: 97 MMOL/L (ref 96–112)
CO2 SERPL-SCNC: 21 MMOL/L (ref 20–33)
CREAT SERPL-MCNC: 0.63 MG/DL (ref 0.5–1.4)
EOSINOPHIL # BLD AUTO: 0 K/UL (ref 0–0.51)
EOSINOPHIL NFR BLD: 0 % (ref 0–6.9)
ERYTHROCYTE [DISTWIDTH] IN BLOOD BY AUTOMATED COUNT: 43.3 FL (ref 35.9–50)
GAMMA GLOB SERPL ELPH-MCNC: 1.48 G/DL (ref 0.62–1.51)
GLUCOSE SERPL-MCNC: 122 MG/DL (ref 65–99)
GRAM STN SPEC: NORMAL
HCT VFR BLD AUTO: 26.7 % (ref 42–52)
HGB BLD-MCNC: 8.8 G/DL (ref 14–18)
INTERPRETATION SERPL IFE-IMP: ABNORMAL
LV EJECT FRACT  99904: 65
LV EJECT FRACT MOD 2C 99903: 51.34
LV EJECT FRACT MOD 4C 99902: 56.94
LV EJECT FRACT MOD BP 99901: 56.09
LYMPHOCYTES # BLD AUTO: 2.45 K/UL (ref 1–4.8)
LYMPHOCYTES NFR BLD: 66.1 % (ref 22–41)
MACROCYTES BLD QL SMEAR: ABNORMAL
MANUAL DIFF BLD: NORMAL
MCH RBC QN AUTO: 28.2 PG (ref 27–33)
MCHC RBC AUTO-ENTMCNC: 33 G/DL (ref 33.7–35.3)
MCV RBC AUTO: 85.6 FL (ref 81.4–97.8)
MICROCYTES BLD QL SMEAR: ABNORMAL
MONOCLON BAND OBS SERPL: ABNORMAL
MONOCYTES # BLD AUTO: 0.58 K/UL (ref 0–0.85)
MONOCYTES NFR BLD AUTO: 15.6 % (ref 0–13.4)
MORPHOLOGY BLD-IMP: NORMAL
NEUTROPHILS # BLD AUTO: 0.58 K/UL (ref 1.82–7.42)
NEUTROPHILS NFR BLD: 15.6 % (ref 44–72)
NRBC # BLD AUTO: 0 K/UL
NRBC BLD-RTO: 0 /100 WBC
OVALOCYTES BLD QL SMEAR: NORMAL
PATH REV: NORMAL
PATH REV: NORMAL
PATHOLOGY CONSULT NOTE: NORMAL
PATHOLOGY STUDY: ABNORMAL
PLATELET # BLD AUTO: 56 K/UL (ref 164–446)
POIKILOCYTOSIS BLD QL SMEAR: NORMAL
POLYCHROMASIA BLD QL SMEAR: NORMAL
POTASSIUM SERPL-SCNC: 3.6 MMOL/L (ref 3.6–5.5)
PROT SERPL-MCNC: 5.9 G/DL (ref 6.3–8.2)
RBC # BLD AUTO: 3.12 M/UL (ref 4.7–6.1)
RBC BLD AUTO: NORMAL
SCHISTOCYTES BLD QL SMEAR: NORMAL
SIGNIFICANT IND 70042: NORMAL
SIGNIFICANT IND 70042: NORMAL
SITE SITE: NORMAL
SITE SITE: NORMAL
SMUDGE CELLS BLD QL SMEAR: NORMAL
SODIUM SERPL-SCNC: 130 MMOL/L (ref 135–145)
SOURCE SOURCE: NORMAL
SOURCE SOURCE: NORMAL
WBC # BLD AUTO: 3.7 K/UL (ref 4.8–10.8)
WBC OTHER NFR BLD MANUAL: 2.8 %

## 2020-06-29 PROCEDURE — 88369 M/PHMTRC ALYSISHQUANT/SEMIQ: CPT

## 2020-06-29 PROCEDURE — 88341 IMHCHEM/IMCYTCHM EA ADD ANTB: CPT | Mod: 91

## 2020-06-29 PROCEDURE — A9270 NON-COVERED ITEM OR SERVICE: HCPCS | Performed by: INTERNAL MEDICINE

## 2020-06-29 PROCEDURE — 38222 DX BONE MARROW BX & ASPIR: CPT | Performed by: HOSPITALIST

## 2020-06-29 PROCEDURE — 36415 COLL VENOUS BLD VENIPUNCTURE: CPT

## 2020-06-29 PROCEDURE — 99233 SBSQ HOSP IP/OBS HIGH 50: CPT | Performed by: INTERNAL MEDICINE

## 2020-06-29 PROCEDURE — 88323 CONSLTJ&REPRT MATRL PREP SLD: CPT

## 2020-06-29 PROCEDURE — 87205 SMEAR GRAM STAIN: CPT

## 2020-06-29 PROCEDURE — 88313 SPECIAL STAINS GROUP 2: CPT | Mod: 91

## 2020-06-29 PROCEDURE — 99152 MOD SED SAME PHYS/QHP 5/>YRS: CPT | Mod: 59 | Performed by: HOSPITALIST

## 2020-06-29 PROCEDURE — 10160 PNXR ASPIR ABSC HMTMA BULLA: CPT

## 2020-06-29 PROCEDURE — 93306 TTE W/DOPPLER COMPLETE: CPT | Mod: 26 | Performed by: INTERNAL MEDICINE

## 2020-06-29 PROCEDURE — 700102 HCHG RX REV CODE 250 W/ 637 OVERRIDE(OP): Performed by: INTERNAL MEDICINE

## 2020-06-29 PROCEDURE — 700111 HCHG RX REV CODE 636 W/ 250 OVERRIDE (IP)

## 2020-06-29 PROCEDURE — 94770 HCHG CO2 EXPIRED GAS DETERMINATION: CPT

## 2020-06-29 PROCEDURE — 80048 BASIC METABOLIC PNL TOTAL CA: CPT

## 2020-06-29 PROCEDURE — 88360 TUMOR IMMUNOHISTOCHEM/MANUAL: CPT

## 2020-06-29 PROCEDURE — 88342 IMHCHEM/IMCYTCHM 1ST ANTB: CPT

## 2020-06-29 PROCEDURE — 85007 BL SMEAR W/DIFF WBC COUNT: CPT

## 2020-06-29 PROCEDURE — 88311 DECALCIFY TISSUE: CPT

## 2020-06-29 PROCEDURE — 87075 CULTR BACTERIA EXCEPT BLOOD: CPT

## 2020-06-29 PROCEDURE — 93306 TTE W/DOPPLER COMPLETE: CPT

## 2020-06-29 PROCEDURE — 700105 HCHG RX REV CODE 258: Performed by: INTERNAL MEDICINE

## 2020-06-29 PROCEDURE — 88305 TISSUE EXAM BY PATHOLOGIST: CPT

## 2020-06-29 PROCEDURE — 85027 COMPLETE CBC AUTOMATED: CPT

## 2020-06-29 PROCEDURE — 07DR3ZX EXTRACTION OF ILIAC BONE MARROW, PERCUTANEOUS APPROACH, DIAGNOSTIC: ICD-10-PCS | Performed by: HOSPITALIST

## 2020-06-29 PROCEDURE — 700111 HCHG RX REV CODE 636 W/ 250 OVERRIDE (IP): Performed by: INTERNAL MEDICINE

## 2020-06-29 PROCEDURE — 94760 N-INVAS EAR/PLS OXIMETRY 1: CPT

## 2020-06-29 PROCEDURE — 88341 IMHCHEM/IMCYTCHM EA ADD ANTB: CPT

## 2020-06-29 PROCEDURE — 88368 INSITU HYBRIDIZATION MANUAL: CPT

## 2020-06-29 PROCEDURE — 770020 HCHG ROOM/CARE - TELE (206)

## 2020-06-29 PROCEDURE — 87070 CULTURE OTHR SPECIMN AEROBIC: CPT

## 2020-06-29 PROCEDURE — 80500 HCHG CLINICAL PATH CONSULT-LIMITED: CPT

## 2020-06-29 RX ORDER — MIDAZOLAM HYDROCHLORIDE 1 MG/ML
INJECTION INTRAMUSCULAR; INTRAVENOUS
Status: COMPLETED
Start: 2020-06-29 | End: 2020-06-29

## 2020-06-29 RX ORDER — OXYCODONE HYDROCHLORIDE 10 MG/1
10 TABLET ORAL
Status: ACTIVE | OUTPATIENT
Start: 2020-06-29 | End: 2020-06-30

## 2020-06-29 RX ORDER — OXYCODONE HYDROCHLORIDE 5 MG/1
5 TABLET ORAL
Status: DISPENSED | OUTPATIENT
Start: 2020-06-29 | End: 2020-06-30

## 2020-06-29 RX ORDER — MIDAZOLAM HYDROCHLORIDE 1 MG/ML
2-5 INJECTION INTRAMUSCULAR; INTRAVENOUS ONCE
Status: COMPLETED | OUTPATIENT
Start: 2020-06-29 | End: 2020-06-29

## 2020-06-29 RX ADMIN — FENTANYL CITRATE 50 MCG: 50 INJECTION, SOLUTION INTRAMUSCULAR; INTRAVENOUS at 12:39

## 2020-06-29 RX ADMIN — MIDAZOLAM HYDROCHLORIDE 2 MG: 1 INJECTION INTRAMUSCULAR; INTRAVENOUS at 12:39

## 2020-06-29 RX ADMIN — SODIUM CHLORIDE, POTASSIUM CHLORIDE, SODIUM LACTATE AND CALCIUM CHLORIDE: 600; 310; 30; 20 INJECTION, SOLUTION INTRAVENOUS at 21:23

## 2020-06-29 RX ADMIN — LOVASTATIN 20 MG: 20 TABLET ORAL at 20:05

## 2020-06-29 RX ADMIN — CEFEPIME 2 G: 2 INJECTION, POWDER, FOR SOLUTION INTRAVENOUS at 15:31

## 2020-06-29 RX ADMIN — CEFEPIME 2 G: 2 INJECTION, POWDER, FOR SOLUTION INTRAVENOUS at 21:23

## 2020-06-29 RX ADMIN — MIDAZOLAM HYDROCHLORIDE 2 MG: 1 INJECTION, SOLUTION INTRAMUSCULAR; INTRAVENOUS at 12:39

## 2020-06-29 RX ADMIN — VANCOMYCIN HYDROCHLORIDE 1100 MG: 500 INJECTION, POWDER, LYOPHILIZED, FOR SOLUTION INTRAVENOUS at 17:42

## 2020-06-29 RX ADMIN — ACETAMINOPHEN 650 MG: 325 TABLET, FILM COATED ORAL at 00:28

## 2020-06-29 RX ADMIN — CEFEPIME 2 G: 2 INJECTION, POWDER, FOR SOLUTION INTRAVENOUS at 05:07

## 2020-06-29 RX ADMIN — AMLODIPINE BESYLATE 2.5 MG: 5 TABLET ORAL at 20:05

## 2020-06-29 RX ADMIN — ACETAMINOPHEN 650 MG: 325 TABLET, FILM COATED ORAL at 20:08

## 2020-06-29 RX ADMIN — VANCOMYCIN HYDROCHLORIDE 1100 MG: 500 INJECTION, POWDER, LYOPHILIZED, FOR SOLUTION INTRAVENOUS at 05:39

## 2020-06-29 ASSESSMENT — ENCOUNTER SYMPTOMS
CHILLS: 1
HEADACHES: 0
CONSTIPATION: 0
COUGH: 0
NAUSEA: 0
VOMITING: 0
WHEEZING: 0
SPUTUM PRODUCTION: 0
FEVER: 1
DIARRHEA: 0
MYALGIAS: 1
DIZZINESS: 0
ABDOMINAL PAIN: 0

## 2020-06-29 ASSESSMENT — FIBROSIS 4 INDEX: FIB4 SCORE: 11.5

## 2020-06-29 NOTE — PROGRESS NOTES
Infectious Disease Progress Note    Author: Vera Berry M.D. Date & Time of service: 2020  8:24 AM    Chief Complaint:  Fevers, pancytopenia      Interval History:   T-max 102.9 WBC 3.7 patient remains neutropenic .  CT of the neck shows a 2.9 cm collection adjacent to the fracture site of the mid mandibular body.  An infected hematoma cannot be ruled out.  CT scan shows possible splenic infarcts and gallbladder wall thickening with pericholecystic fluid.  Patient continues to have fevers and chills.  He states his chin lesion is more painful and has increased in size.  Plan for bone marrow biopsy today    review of Systems:  Review of Systems   Constitutional: Positive for chills, fever and malaise/fatigue.   Respiratory: Negative for cough, sputum production and wheezing.    Cardiovascular: Negative for chest pain.   Gastrointestinal: Negative for abdominal pain, constipation, diarrhea, nausea and vomiting.   Genitourinary: Negative for dysuria.   Musculoskeletal: Positive for myalgias.   Neurological: Negative for dizziness and headaches.   All other systems reviewed and are negative.      Hemodynamics:  Temp (24hrs), Av.9 °C (100.2 °F), Min:36.9 °C (98.4 °F), Max:39.4 °C (102.9 °F)  Temperature: 37.5 °C (99.5 °F)  Pulse  Av.8  Min: 55  Max: 110   Blood Pressure : 139/70       Physical Exam:  Physical Exam  Vitals signs and nursing note reviewed.   Constitutional:       Appearance: Normal appearance.   HENT:      Head:      Comments: Lump under chin, soft, mild tenderness palpation, erythema, no warmth, no drainage     Mouth/Throat:      Mouth: Mucous membranes are moist.      Pharynx: No oropharyngeal exudate.   Eyes:      Extraocular Movements: Extraocular movements intact.      Conjunctiva/sclera: Conjunctivae normal.      Pupils: Pupils are equal, round, and reactive to light.   Cardiovascular:      Rate and Rhythm: Normal rate and regular rhythm.      Heart sounds: Normal  heart sounds.   Pulmonary:      Effort: Pulmonary effort is normal.      Breath sounds: Normal breath sounds.   Abdominal:      General: Abdomen is flat. Bowel sounds are normal.      Palpations: Abdomen is soft.   Musculoskeletal: Normal range of motion.      Right lower leg: No edema.      Left lower leg: No edema.   Skin:     General: Skin is warm and dry.   Neurological:      General: No focal deficit present.      Mental Status: He is alert and oriented to person, place, and time.   Psychiatric:         Mood and Affect: Mood normal.         Behavior: Behavior normal.         Meds:    Current Facility-Administered Medications:   •  MD Alert...Vancomycin per Pharmacy  •  LR  •  vancomycin  •  cefepime  •  zolpidem  •  amLODIPine  •  lovastatin  •  senna-docusate **AND** polyethylene glycol/lytes **AND** magnesium hydroxide **AND** bisacodyl  •  acetaminophen  •  enalaprilat  •  ondansetron  •  ondansetron    Labs:  Recent Labs     06/27/20 0610 06/28/20 0412 06/29/20 0411   WBC 2.0* 2.5* 3.7*   RBC 3.43* 3.34* 3.12*   HEMOGLOBIN 9.9* 9.5* 8.8*   HEMATOCRIT 29.3* 28.2* 26.7*   MCV 85.4 84.4 85.6   MCH 28.9 28.4 28.2   RDW 43.1 42.9 43.3   PLATELETCT 45* 45* 56*   MPV 10.9  --   --    NEUTSPOLYS 28.90* 6.00* 15.60*   LYMPHOCYTES 50.90* 81.20* 66.10*   MONOCYTES 16.70* 12.00 15.60*   EOSINOPHILS 0.00 0.00 0.00   BASOPHILS 0.90 0.90 0.00   RBCMORPHOLO Present Present See Note     Recent Labs     06/27/20  0610 06/28/20  0412 06/29/20  0411   SODIUM 132* 129* 130*   POTASSIUM 3.5* 3.8 3.6   CHLORIDE 102 99 97   CO2 22 22 21   GLUCOSE 120* 116* 122*   BUN 12 12 16     Recent Labs     06/27/20  0610 06/28/20  0412 06/29/20 0411   ALBUMIN 2.9*  --   --    TBILIRUBIN 0.7  --   --    ALKPHOSPHAT 50  --   --    TOTPROTEIN 6.3  --   --    ALTSGPT 20  --   --    ASTSGOT 43  --   --    CREATININE 0.52 0.66 0.63       Imaging:  Dx-chest-2 Views    Result Date: 6/27/2020 6/27/2020 11:36 AM HISTORY/REASON FOR EXAM:  Fever  TECHNIQUE/EXAM DESCRIPTION AND NUMBER OF VIEWS: Two views of the chest. COMPARISON:  6/23/2020. FINDINGS: LUNGS: Slight hyperinflation. Chronic slightly hyperdense nodule in the right midlung, likely benign granuloma. No focal consolidation. No effusions. PNEUMOTHORAX: None. LINES AND TUBES: None. MEDIASTINUM: No cardiomegaly. TAVR. LAD stent. Atherosclerosis. MUSCULOSKELETAL STRUCTURES: No acute displaced fracture.     No acute cardiopulmonary abnormality.    Dx-chest-portable (1 View)    Result Date: 6/23/2020 6/23/2020 5:44 PM HISTORY/REASON FOR EXAM:  tachycardia. TECHNIQUE/EXAM DESCRIPTION AND NUMBER OF VIEWS: Single portable view of the chest. COMPARISON: Exam from 6:42 AM FINDINGS: Heart size is within normal limits. No pulmonary infiltrates or consolidations are noted. No pleural abnormalities are noted. There is an old left seventh rib fracture.     No acute cardiac or pulmonary abnormalities are identified.    Dx-chest-portable (1 View)    Result Date: 6/23/2020 6/23/2020 6:05 AM HISTORY/REASON FOR EXAM: Postop TAVR. TECHNIQUE/EXAM DESCRIPTION AND NUMBER OF VIEWS: Single portable view of the chest. COMPARISON: 6/22/2020 FINDINGS: LUNGS: Clear. No effusions. PNEUMOTHORAX: None. LINES AND TUBES: None. MEDIASTINUM: Stable cardiac silhouette. MUSCULOSKELETAL STRUCTURES: Unchanged.     Clear lungs. No pleural effusions.    Dx-chest-portable (1 View)    Result Date: 6/22/2020 6/22/2020 12:50 PM HISTORY/REASON FOR EXAM:  Status post aortic valve replacement. TECHNIQUE/EXAM DESCRIPTION AND NUMBER OF VIEWS: Single portable view of the chest. COMPARISON: 7/15/2019 FINDINGS: Single portable view of the chest demonstrates a normal cardiac silhouette and mediastinal contours. Calcification is in the aorta. There is emphysematous change. The lungs are hyperexpanded. No confluent opacity, pleural fluid, or pneumothorax. No suspicious bony lesions.     No acute findings status post TAVR    Us-abdomen Complete  Survey    Result Date: 6/26/2020 6/26/2020 6:27 AM HISTORY/REASON FOR EXAM:  Abnormal Labs Pain TECHNIQUE/EXAM DESCRIPTION AND NUMBER OF VIEWS:  Complete abdomen survey. COMPARISON: None FINDINGS: The liver is normal in contour. There is no evidence of solid mass lesion. The liver measures 14.57 cm. The gallbladder is normal. There is no evidence of cholelithiasis. The gallbladder wall thickness measures 2.50 mm. There is no pericholecystic fluid. The common duct measures 3.40 mm. The visualized pancreas is unremarkable. The visualized aorta is normal in caliber. Intrahepatic IVC is patent. The portal vein is patent with hepatopetal flow. The MPV measures 1.22 cm. The right kidney measures 11.19 cm. The left kidney measures 11.17 cm. There is no hydronephrosis. The spleen measures 10.41 cm maximally. The bladder is trabeculated, which could indicate chronic bladder outlet obstruction. There is no ascites.     Prominent trabeculated bladder wall, suggesting possible chronic bladder outlet obstruction. Unremarkable findings otherwise.     Us-extremity Artery Lower Bilat    Result Date: 6/22/2020  Lower Extremity  Arterial Duplex Report  Vascular Laboratory  CONCLUSIONS  Right.  Echolucent thrombus partially fills the common femoral artery causing a  high grade stenosis. Velocities are consistent with > 75% stenosis.  Mild plaque visualized in the superficial femoral and popliteal arteries  without evidence of hemodynamically significant stenosis.  Monophasic waveforms demonstrated throughout the right lower extremity.  Left.  Mild plaque is seen in the common femoral, femoral, and popliteal arteries  with no elevated velocities to indicate hemodynamically significant  stenosis.  FADIA LANDIS  Exam Date:     06/22/2020 17:34  Room #:     Inpatient  Priority:     Stat  Ht (in):             Wt (lb):  Ordering Physician:        ANTHONY PIPER  Referring Physician:       VERONIQUE  ANTHONY MACARIO  Sonographer:               Ewa Curtis RVT  Study Type:                Complete Bilateral  Technical Quality:         Adequate  Age:    65    Gender:     M  MRN:    [id number]  :    1955      BSA:  Indications:     Pain in leg, unspecified  CPT Codes:       69026  ICD Codes:       M79.606  History:         Pain in legs s/p TAVR. No prior duplex.  Limitations:                RIGHT  Waveform        Peak Systolic Velocity (cm/s)                  Prox    Prox-Mid  Mid    Mid-Dist  Distal  Monophasic                        307              143     CFA  Monophasic      55                                         PFA  Monophasic      53                51               34      SFA  Monophasic                        30                       POP  Monophasic      22                                 32      AT  Monophasic      19                                 11      PT  Monophasic      37                                 20      EVGENY                LEFT  Waveform        Peak Systolic Velocity (cm/s)                  Prox    Prox-Mid  Mid    Mid-Dist  Distal  Triphasic                         187                      CFA  Biphasic        78                                         PFA  Biphasic        103               142              106     SFA  Biphasic                          57                       POP  Biphasic        83                                 110     AT  Biphasic        76                                 24      PT  Biphasic        61                                 24      EVGENY  FINDINGS  Right.  Echolucent material consistent with thrombus partially fills the common  femoral artery causing a high grade stenosis. Stenosis of the common  femoral artery. Velocities are consistent with > 75% stenosis.  Mild plaque visualized in the femoral and popliteal arteries without  evidence of hemodynamic significance.  Monophasic waveforms demonstrated throughout the right  lower extremity.  Left.  Mild plaque is seen in the common femoral, femoral, and popliteal arteries  with no elevated velocities to indicate hemodynamic significance.  Waveforms are biphasic throughout the left lower extremity.  Demetria Narvaez  (Electronically Signed)  Final Date:      2020                   18:45  Amended:         2020 19:58    Us-extremity Artery Lower Unilat Right    Result Date: 2020  Lower Extremity  Arterial Duplex Report  Vascular Laboratory  CONCLUSIONS  Pinching of the common femoral artery from perclose device closure.  Velcoity consistent with >75% stenosis. Intraluminal clot seen in prior  study is not readily seen.  FADIA LANDIS  Exam Date:     2020 07:49  Room #:     Inpatient  Priority:     Routine  Ht (in):             Wt (lb):  Ordering Physician:        MELVIN SIMONS  Referring Physician:       MELVIN SIMONS  Sonographer:               Blank Calles RVT  Study Type:                Limited Unilateral  Technical Quality:         Adequate  Age:    65    Gender:     M  MRN:    [id number]  :    1955      BSA:  Indications:     Unspecified atherosclerosis of native arteries of                   extremities, right leg  CPT Codes:       61348  ICD Codes:       I70.201  History:         Right common femoral steosis.  Limitations:                RIGHT  Waveform        Peak Systolic Velocity (cm/s)                  Prox    Prox-Mid  Mid    Mid-Dist  Distal  Monophasic      42                384              230     CFA  Biphasic        97                                         PFA  Biphasic        55                                         SFA                                                             POP                                                             AT                                                             PT                                                              EVGENY                LEFT  Waveform        Peak Systolic Velocity (cm/s)                  Prox    Prox-Mid  Mid    Mid-Dist  Distal                                                             CFA                                                             PFA                                                             SFA                                                             POP                                                             AT                                                             PT                                                             EVGENY  FINDINGS  Limited evaluation of the right common femoral artery known stenosis.  Right.  Stenosis of the mid common femoral artery. Velocities are consistent with >  75%stenosis.  Distal flow is turbulent and damped.  No evidence of hematoma or psuedoaneuysm seen at the right groin.  Luiz Dennis MD  (Electronically Signed)  Final Date:      2020                   08:49    Ec-echocardiogram Complete W/o Cont    Result Date: 2020  Transthoracic Echo Report Echocardiography Laboratory CONCLUSIONS Prior echo done on 2020, there is now a TAVR valve present. Left ventricular ejection fraction is visually estimated to be 60%. Unable to estimate pulmonary artery pressure due to an inadequate tricuspid regurgitant jet. Known TAVR aortic valve. FADIA LANDIS Exam Date:         2020                    03:46 Exam Location:     Inpatient Priority:          Routine Ordering Physician:        ANTHONY PIPER Referring Physician:       [id number] Cintron Sonographer:               JOJO Ash Age:    65     Gender:    Mal                           e MRN:    [id number] :    1955 BSA:    1.64   Ht (in):    66     Wt (lb):    126 Exam Type:     Complete Indications:     Prosthetic Valve ICD Codes:       V43.3 CPT Codes:       58661 BP:   142    /   83     HR:   70 Technical  Quality:       Fair MEASUREMENTS  (Male / Female) Normal Values 2D ECHO LV Diastolic Diameter PLAX        6 cm                  4.2 - 5.9 / 3.9 - 5.3 cm LV Systolic Diameter PLAX         3.7 cm                2.1 - 4.0 cm IVS Diastolic Thickness           0.71 cm               LVPW Diastolic Thickness          0.7 cm                LVOT Diameter                     1.5 cm                Estimated LV Ejection Fraction    60 %                  LV Ejection Fraction MOD BP       39.5 %                >= 55  % LV Ejection Fraction MOD 4C       40.2 %                LV Ejection Fraction MOD 2C       33.9 %                IVC Diameter                      1.8 cm                DOPPLER AV Peak Velocity                  0.99 m/s              AV Peak Gradient                  3.9 mmHg              AV Mean Gradient                  2.7 mmHg              LVOT Peak Velocity                1.1 m/s               AV Area Cont Eq vti               1.9 cm2               Mitral E Point Velocity           0.63 m/s              Mitral E to A Ratio               1.5                   MV Pressure Half Time             75.3 ms               MV Area PHT                       2.9 cm2               MV Deceleration Time              260 ms                * Indicates values subject to auto-interpretation LV EF:  60    % FINDINGS Left Ventricle Left ventricle is mildly dilated. Normal left ventricular wall thickness. Normal left ventricular systolic function. Left ventricular ejection fraction is visually estimated to be 60%. Normal regional wall motion. Normal diastolic function. Right Ventricle Normal right ventricular size. Normal right ventricular systolic function. Right Atrium Normal right atrial size. Normal inferior vena cava size and inspiratory collapse. Left Atrium Normal left atrial size. Left atrial volume index is 26 mL/sq m. Mitral Valve Calcification of the mitral valve leaflets. No mitral stenosis. Trace mitral regurgitation.  Aortic Valve Known TAVR aortic valve. Vmax is 1 m/s. Transvalvular gradients are - Peak: 3 mmHg, Mean: 2 mmHg. Tricuspid Valve Structurally normal tricuspid valve. No tricuspid stenosis. Trace tricuspid regurgitation. Right atrial pressure is estimated to be 8 mmHg. Unable to estimate pulmonary artery pressure due to an inadequate tricuspid regurgitant jet. Pulmonic Valve The pulmonic valve is not well visualized. Pericardium No pericardial effusion seen. Aorta Normal aortic root for body surface area. Ascending aorta diameter is 2.4 cm. Brenden Hansen MD (Electronically Signed) Final Date:     2020                 06:41    Ec-echocardiogram Ltd W/o Cont    Result Date: 2020  Transthoracic Echo Report Echocardiography Laboratory CONCLUSIONS Intraoperative TTE during TAVR.  Baseline images show mildly reduced LV function with EF =55%.  Calcified aortic valve leaflets. Severe aortic stenosis.  Post deployment images show preserved biventricular function.  A 26 mm Jewell Jose Manuel Ultra stented bioprosthetic valve is seen in the aortic position with normal leaflet motion, trace significant paravalvular leak, mean gradent of 5 mm Hg and calculated effective orifice area of 2.5 cm2.  Findings communicated at the time of exam. FADIA LANDIS Exam Date:         2020                    11:17 Exam Location:     Inpatient Priority:          Routine Ordering Physician:        CLEOPATRA JONES Referring Physician: Sonographer:               Antwan La RDCS Age:    65     Gender:    Mal                           e MRN:    [id number] :    1955 BSA:    1.64   Ht (in):    66     Wt (lb):    126 Exam Type:     Limited Indications:     Pre-Op Surgery ICD Codes:       V72.83 CPT Codes:       37477 BP:          /          HR: Technical Quality:       Fair MEASUREMENTS  (Male / Female) Normal Values 2D ECHO Estimated LV Ejection Fraction    55 %                  * Indicates values subject to  auto-interpretation LV EF:  55    % FINDINGS Left Ventricle The left ventricle was normal in size and function. Right Ventricle The right ventricle was normal in size and function. Right Atrium The right atrium is normal in size. Left Atrium The left atrium is normal in size. Mitral Valve Structurally normal mitral valve without significant stenosis or regurgitation. Aortic Valve Calcified aortic valve leaflets. Severe aortic stenosis. Tricuspid Valve Structurally normal tricuspid valve without significant stenosis or regurgitation. Pulmonic Valve Structurally normal pulmonic valve without significant stenosis or regurgitation. Pericardium Normal pericardium without effusion. Aorta The aortic root is normal. Simon X Issa (Electronically Signed) Final Date:     23 June 2020                 16:26 Amended:        23 June 2020 16:29      Micro:  Results     Procedure Component Value Units Date/Time    FLUID CULTURE W/GRAM STAIN [[id number]]     Order Status:  No result Specimen:  Other Body Fluid     URINALYSIS [[id number]]  (Abnormal) Collected:  06/27/20 1230    Order Status:  Completed Specimen:  Urine, Clean Catch Updated:  06/27/20 1257     Color DK Yellow     Character Clear     Specific Gravity 1.019     Ph 5.5     Glucose Negative mg/dL      Ketones Negative mg/dL      Protein 100 mg/dL      Bilirubin Negative     Urobilinogen, Urine 1.0     Nitrite Negative     Leukocyte Esterase Negative     Occult Blood Small     Micro Urine Req Microscopic    Narrative:       [id number] JANET CONNOLLY    BLOOD CULTURE [[id number]] Collected:  06/26/20 2146    Order Status:  Completed Specimen:  Blood from Peripheral Updated:  06/27/20 0815     Significant Indicator NEG     Source BLD     Site PERIPHERAL     Culture Result No Growth  Note: Blood cultures are incubated for 5 days and  are monitored continuously.Positive blood cultures  are called to the RN and reported as soon as  they are identified.      Narrative:    "    Protective  Per Hospital Policy: Only change Specimen Src: to \"Line\" if  specified by physician order.  Left Forearm/Arm    BLOOD CULTURE [[id number]] Collected:  06/26/20 2159    Order Status:  Completed Specimen:  Blood from Peripheral Updated:  06/27/20 0815     Significant Indicator NEG     Source BLD     Site PERIPHERAL     Culture Result No Growth  Note: Blood cultures are incubated for 5 days and  are monitored continuously.Positive blood cultures  are called to the RN and reported as soon as  they are identified.      Narrative:       Protective  Per Hospital Policy: Only change Specimen Src: to \"Line\" if  specified by physician order.  Right Forearm/Arm          Assessment:  Active Hospital Problems    Diagnosis   • *Fever [R50.9]   • Pancytopenia (HCC) [D61.818]   • S/P TAVR (transcatheter aortic valve replacement) [Z95.2]   • Essential hypertension [I10]   • Mixed hyperlipidemia [E78.2]   • Panlobular emphysema (HCC) [J43.1]   • Tobacco dependence [F17.200]          ASSESSMENT/PLAN:      65 y.o.  admitted 6/23/2020. Pt has a past medical history of TAVR on 6/22/2020 and pancytopenia which he has had some work-up but no bone marrow biopsy as yet.  He has had a decreased WBC since 2016 with neutropenia.  He also has some mild anemia and thrombocytopenia that is also chronic but worsened recently.  He presented the ER complaining of tachycardia the day after he been sent home after his TAVR surgery.      Hospital Course:   Patient was admitted and was initially febrile and 6/20 4-1.3 and then again spiked a fever to 103.6 on 6/26.  Initial work-up for pancytopenia was started and plan was to complete this with heme-onc as an outpatient.  Blood cultures were obtained 6/26.  Ultrasound abdomen on 6/25 with trabeculated bladder wall suggesting possible chronic bladder outlet obstruction but otherwise unremarkable.  Ultrasound lower extremity with noted stenosis of the right common femoral artery report is " known - no evidence of hematoma or pseudoaneurysm in right groin.  Chest x-rays have been unremarkable.  He is not been on any antibiotics and plan was for discharge and follow-up as an outpatient but he again spiked fevers and also reported soaking sweats last night.     Problem List   Neutropenic fever- ANC dropped to 150 -concern for malignancy and/or infection  Remains febrile T-max 102.9  -Work-up initiated by hospitalist with SPEP, smooth muscle antibodies, SEEMA, rheumatoid factor, B12 and folate levels as well as a fibrinogen.   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)  TTE-negative  S/p TAVR on 6/22/2020 for severe aortic stenosis  Patient needs a JOON given TAVR and concern for splenic infarcts to definitively rule out infective endocarditis    Pancytopenia-unknown etiology  Evaluated by oncology-plan for bone marrow biopsy  CMV PCR- pending  Parvovirus PCR- pending    Lump below chin, tender, prior infection   Fracture of mid mandibular body  Abscess  Possible infected hematoma-2.9 cm collection subjacent to fracture site noted on CT scan  Recommend drainage if feasible  -Reports oral surgery at the end of April and he was on antibiotics (augmentin) due to infection for approximately 1 month.  There had resolved but now with new swelling       Plan of care discussed with Dr. Gupta. Will continue to follow

## 2020-06-29 NOTE — PROGRESS NOTES
[facility] Daily Progress Note    Date of Service  6/29/2020    Chief Complaint  65 y.o. male admitted 6/23/2020 with tachycardia.  He had a TAVR on 6/22/2020, and was discharged home on 6/23/2020.  He returned to the ER due to persistent tachycardia.     He has a history of pancytopenia which is worsening, and has been hesitant to do a bone marrow biopsy.  He has tobacco dependence, essential hypertension, hyperlipidemia.     Hospital Course  Home Eliquis and aspirin were discontinued.  Heart rate stabilized. He continued to have decline in his hemoglobin and platelets. WBC stabilized. Briefly discussed with hem/onc.  Abdominal ultrasound from 6/25/2020 showed no acute abnormalities.  SPEP, smooth muscle antibodies, SEEMA, rheumatoid factor, B12 and folate levels, fibrinogen were ordered.  Patient wished to go home and agreed follow-up with hematology for bone marrow biopsy and further work-up.  He was discharged to go home on 6/26/2020, but developed a fever of 103.6.     Infectious disease were consulted on 6/27/2020 due to fever and neutropenia, Cefepime was empirically started.     B12, folate, and fibrinogen levels were normal.  Rheumatoid factor was 40, ESR was 45, LFTs were unremarkable, SEEMA was undetectable, HIV was nonreactive.      Interval Problem Update  ANC is 0.58 today.  Continues to have intermittent fever. UA showed no signs of UTI. Blood cultures x 2 have been negative.  CT soft tissue neck from 6/28/2020 showed acute versus subacute fracture of the mid mandibular body, 2.9 cm collection adjacent to the fracture site possible hematoma.  IR were consulted for needle biopsy.  CT chest/abdomen/pelvis from 6/28/2020 showed hypodensities in the spleen with concern for splenic infarct.    ID are concerned about endocarditis (recent TAVR).  Discussed with cardiology for JOON.  Per cardiology it may be too soon for endocarditis from recent TAVR, echocardiogram was  recommended.     Consultants/Specialty  Cardiology  Infectious disease  Oncology     Code Status  Full code     Disposition  Home when stable     Review of Systems  Review of Systems   Constitutional: Negative.    HENT: Negative.    Eyes: Negative.    Respiratory: Negative.    Cardiovascular: Negative.    Gastrointestinal: Negative.    Genitourinary: Negative.    Musculoskeletal: Negative.    Skin: Negative.    Neurological: Negative.    Endo/Heme/Allergies: Negative.    Psychiatric/Behavioral: Negative.         Physical Exam  Temp:  [36.9 °C (98.4 °F)-39.4 °C (102.9 °F)] 37.6 °C (99.6 °F)  Pulse:  [] 98  Resp:  [16-30] 30  BP: ()/(55-74) 114/74  SpO2:  [91 %-98 %] 98 %    Constitutional:       General: He is not in acute distress.     Appearance: He is not ill-appearing.   HENT:      Head: Normocephalic.      Nose: Nose normal.      Mouth/Throat:      Mouth: Mucous membranes are moist.     Comments: Enlarging mass/lump underneath chin area  Eyes:      Pupils: Pupils are equal, round, and reactive to light.   Neck:      Musculoskeletal: Normal range of motion.   Cardiovascular:      Rate and Rhythm: Normal rate.   Pulmonary:      Effort: Pulmonary effort is normal.   Abdominal:      Palpations: Abdomen is soft.   Musculoskeletal:      Right lower leg: No edema.      Left lower leg: No edema.   Skin:     General: Hematoma in groin area  Neurological:      General: No focal deficit present.      Mental Status: He is alert.   Psychiatric:         Mood and Affect: Mood normal.         Behavior: Behavior normal.     Fluids    Intake/Output Summary (Last 24 hours) at 6/29/2020 1302  Last data filed at 6/29/2020 0800  Gross per 24 hour   Intake 1760 ml   Output --   Net 1760 ml       Laboratory  Recent Labs     06/27/20  0610 06/28/20  0412 06/29/20  0411   WBC 2.0* 2.5* 3.7*   RBC 3.43* 3.34* 3.12*   HEMOGLOBIN 9.9* 9.5* 8.8*   HEMATOCRIT 29.3* 28.2* 26.7*   MCV 85.4 84.4 85.6   MCH 28.9 28.4 28.2   MCHC 33.8  33.7 33.0*   RDW 43.1 42.9 43.3   PLATELETCT 45* 45* 56*   MPV 10.9  --   --      Recent Labs     06/27/20  0610 06/28/20  0412 06/29/20  0411   SODIUM 132* 129* 130*   POTASSIUM 3.5* 3.8 3.6   CHLORIDE 102 99 97   CO2 22 22 21   GLUCOSE 120* 116* 122*   BUN 12 12 16   CREATININE 0.52 0.66 0.63   CALCIUM 7.9* 8.2* 7.7*                   Imaging  CT-SOFT TISSUE NECK WITH   Final Result         1. Acute versus subacute fracture of the mid mandibular body.   2. A 2.9 cm collection subjacent to the fracture site, likely hematoma. Infected hematoma can be considered in the appropriate clinical settings.   3. No cervical lymphadenopathy.   4. Moderate mucosal thickening of the left maxillary sinus, likely odontogenic in nature, related to impacted remaining maxillary tooth.      CT-CHEST,ABDOMEN,PELVIS WITH   Final Result      Hypodensities in the spleen worrisome for splenic infarcts.      Trace nonspecific free fluid in the pelvis.      2.2 x 1.7 cm left common femoral artery pseudoaneurysm.      Bowel containing right inguinal hernia without evidence of obstruction.      Atherosclerotic plaque within an ectatic aorta.      Gallbladder wall thickening/pericholecystic fluid. Further evaluation can be performed with right upper quadrant ultrasound.      Small hypodense left renal lesions are too small to characterize but likely represent small cysts.      Emphysematous changes.      Irregular nodular opacity along the minor fissure measuring 8 mm.      4.7 mm right middle lobe pulmonary nodule.      Mild bibasilar and lingular atelectasis.      Emphysematous changes.      Sequelae of prior granulomatous exposure.      Right hilar lymph node is borderline enlarged and nonspecific, possibly reactive..      Enlarged periportal lymph nodes are nonspecific.      Fleischner Society pulmonary nodule recommendations:   Low Risk: CT at 3-6 months, then consider CT at 18-24 months      High Risk: CT at 3-6 months, then at 18-24  months      Comments: Use most suspicious nodule as guide to management. Follow-up intervals may vary according to size and risk.      Low Risk - Minimal or absent history of smoking and of other known risk factors.      High Risk - History of smoking or of other known risk factors.      Note: These recommendations do not apply to lung cancer screening, patients with immunosuppression, or patients with known primary cancer.      Fleischner Society 2017 Guidelines for Management of Incidentally Detected Pulmonary Nodules in Adults      DX-CHEST-2 VIEWS   Final Result      No acute cardiopulmonary abnormality.      US-EXTREMITY ARTERY LOWER UNILAT RIGHT   Final Result      US-ABDOMEN COMPLETE SURVEY   Final Result      Prominent trabeculated bladder wall, suggesting possible chronic bladder outlet obstruction. Unremarkable findings otherwise.         DX-CHEST-PORTABLE (1 VIEW)   Final Result      No acute cardiac or pulmonary abnormalities are identified.      CT-NEEDLE BX-LYMPH NODE    (Results Pending)        Assessment/Plan  * Fever  Assessment & Plan  Fever with neutropenia. Blood cultures x2, UA, chest x-ray were unremarkable   Infectious disease were consulted, cefepime was empirically started 6/27/2020  HIV was non-reactive, CMV and parvovirus are pending  Cardiology think it is likely to soon for endocarditis from recent TAVR  Patient to have needle biopsy of mass underneath chin today  Echocardiogram ordered  Monitor closely    Pancytopenia (HCC)- (present on admission)  Assessment & Plan  Present for a few months, unclear etiology  Briefly discussed with Dr. Chacon (hematology/oncology) a few days ago.  LFT's, fibrinogen, B12, folic acid, SEEMA were normal.  RF was 40, ESR was 45  SPEP was unremarkable, smooth muscle antibodies are pending  Infectious disease were consulted, cefepime started 6/27/2020  Hematology/oncology were consulted 6/28/2020  Bone marrow biopsy has been ordered today  Will await further  recommendations    S/P TAVR (transcatheter aortic valve replacement)- (present on admission)  Assessment & Plan  S/P TAVR on 6/22/2020  Eliquis and aspirin was stopped . Continue statin  Per cardiology start B12 and folate supplements  Echocardiogram ordered  Cardiology is following    Mixed hyperlipidemia- (present on admission)  Assessment & Plan  Continue home Lovastatin    Essential hypertension- (present on admission)  Assessment & Plan  Blood pressure has been on the low side  Continue Norvasc at a lower dose with parameters    Panlobular emphysema (HCC)- (present on admission)  Assessment & Plan  History of tobacco use  Chest x-ray from admission shows no acute cardiopulmonary process    Tobacco dependence- (present on admission)  Assessment & Plan  Patient has been counseled on tobacco cessation  Nicotine replacement options were provided      VTE prophylaxis: SCD's

## 2020-06-29 NOTE — OR SURGEON
Immediate Post- Operative Note        PostOp Diagnosis: Submandibular Abscess      Procedure(s): Ultrasound guided Aspiration Yielded PUS      Estimated Blood Loss: Less than 5 ml        Complications: None            6/29/2020     1:26 PM     Barrera Jenkins M.D.

## 2020-06-29 NOTE — CARE PLAN
Problem: Safety  Goal: Will remain free from injury  Outcome: PROGRESSING AS EXPECTED  Note: Patient's risk for injury and falls assessed. Appropriate safety precautions in place. Patient educated to utilize call light for needs. Patient verbalizes understanding.      Problem: Bowel/Gastric:  Goal: Will not experience complications related to bowel motility  Outcome: PROGRESSING AS EXPECTED  Flowsheets  Taken 6/28/2020 0800 by Sandy Freeman R.N.  Last BM: 06/28/20  Taken 6/28/2020 1650 by Sandy Freeman RFIDEL.  Number of Times Stooled: 1  Note: Patient bowel function is assessed. Education provided on diet, fluid intake and activities that promote bowel function. Toileting at scheduled intervals in place for patient. Patient verbalizes understanding.

## 2020-06-29 NOTE — PROGRESS NOTES
Pt underwent an US guided submandibular fluid aspiration performed by Dr Jenkins. No sedation given. No adverse reactions detected. Patient appeared to tolerate procedure well. 2 mls pink, thick fluid aspirated from area, sent to microbiology. Bandaid to procedure site cdi. Report given to Adilene GORDON, patient transported by this RN to [place] [id number].

## 2020-06-29 NOTE — PROGRESS NOTES
Monitor Summary    .12/.08/.32  Sinus rhythm with sinus arrhythmia  HR     12 hour chart check complete

## 2020-06-29 NOTE — PROGRESS NOTES
Handoff report received. Assumed patient care. PT is reclined in bed, AAOx4, no complaints of pain or discomfort. Tele box is on, POC discussed with PT and all questions addressed. Safety precautions in place. Call light and personal belongings within reach. Educated to call for assistance if needed.

## 2020-06-29 NOTE — PROCEDURES
REFERRING PHYSICIAN:  DR. Chacon     INDICATION FOR PROCEDURE:  Pancytopenia       PROCEDURE PERFORMED:  Bone marrow aspiration and biopsy with conscious    sedation.     Informed consent was obtained from a fully awake patient.  The patient accepts   risks, benefits and alternatives.     NARRATIVE:  After informed consent was obtained, the patient was placed in a    prone position.  He was monitored according to conscious sedation protocol,    the patient was then premedicated with 2 mg of Versed and 50 mcg of fentanyl.     The left posterior iliac crest was identified, dressed, draped, and    sterilized in the usual surgical fashion.  Using sterile technique the    patient's skin was then anesthetized with 1% lidocaine down to the periosteum.   The patient was then approached with a bone marrow aspiration needle and a    non-heparinized as well as heparinized sample was obtained without difficulty.   Following, the patient was approached with a [employer]  bone marrow    biopsy needle, the bone marrow was biopsied without difficulty.    A sufficient sample was obtained and given to the histology tech.    The patient tolerated the procedure well.    Estimated time of Conscious Sedation Procedure:  14 minutes.  COMPLICATIONS:  None.     ESTIMATED BLOOD LOSS:  Minimal.     The patient is monitored after procedure on the Telemetry Unit

## 2020-06-29 NOTE — PROGRESS NOTES
Late entry:    Patient spiked a fever at 0020 of 102.7 oral. Patient administered tylenol with a 1 hour follow-up oral temp of 102.9f. Patients sides, arms and chest packed with ice packs.    Follow-up temp at 0330 is 99.8f.

## 2020-06-29 NOTE — PROGRESS NOTES
"Pharmacy Kinetics 65 y.o. male on vancomycin day # 2 2020    Currently on Vancomycin 1100 mg IV q12h  Provider specified end date: TBD    Indication for Treatment: Neuropenic fever with unknown source of infx    Pertinent history per medical record: Admitted on 2020 for tachycardia, found to be pancytopenic with ongoing fevers, along with a submandibular mass. Treatment initiated for febrile neutropenia on  with cefepime and ID consulted. Fevers continuing despite cefepime - vancomycin added . Infectious and oncologic workup ongoing - bone marrow biopsy and submandibular fluid aspiration on . He has no prior hx of vancomycin dosing found in Epic.    Other antibiotics: Cefepime 2 g IV q8h    Allergies: Seasonal     Concerns for renal function: age, IV contrast     Pertinent cultures to date:    Blood x2: NGTD   Submandibular fluid: in process      Recent Labs     20  0610 20  0412 20  0411   WBC 2.0* 2.5* 3.7*   NEUTSPOLYS 28.90* 6.00* 15.60*     Recent Labs     20  0610 20  0412 20  0411   BUN 12 12 16   CREATININE 0.52 0.66 0.63   ALBUMIN 2.9*  --   --        Intake/Output Summary (Last 24 hours) at 2020 1507  Last data filed at 2020 0800  Gross per 24 hour   Intake 1760 ml   Output --   Net 1760 ml      /74   Pulse 98   Temp 37.6 °C (99.6 °F) (Temporal)   Resp (!) 30   Ht 1.676 m (5' 6\")   Wt 60.4 kg (133 lb 2.5 oz)   SpO2 98%  Temp (24hrs), Av.8 °C (100.1 °F), Min:36.9 °C (98.4 °F), Max:39.4 °C (102.9 °F)      A/P   1. Vancomycin dose change: Continue 1100 mg IV q12h (@ 0600 & 1800)  2. Next vancomycin level: Tomorrow at 0530, before the 4th total dose  3. Goal trough: 16-20 mcg/mL  4. Comments: Pt still febrile overnight (39.4C) but resolved with acetaminophen. Pt's BP is soft and he is intermittently tachycardic with increased respirations. He is currently requiring 2L O2, which is new. Pancytopenia continues, but " ANC improved today at 580 (was 150 yesterday). BM biopsy and aspiration of chin fluid collection today. Await results to help guide therapy. Will check a trough tomorrow as he approaches steady state    Esperanza Méndez, PharmD, BCPS

## 2020-06-30 LAB
ANION GAP SERPL CALC-SCNC: 9 MMOL/L (ref 7–16)
ANISOCYTOSIS BLD QL SMEAR: ABNORMAL
BASOPHILS # BLD AUTO: 0 % (ref 0–1.8)
BASOPHILS # BLD: 0 K/UL (ref 0–0.12)
BUN SERPL-MCNC: 15 MG/DL (ref 8–22)
CALCIUM SERPL-MCNC: 7.7 MG/DL (ref 8.5–10.5)
CHLORIDE SERPL-SCNC: 99 MMOL/L (ref 96–112)
CO2 SERPL-SCNC: 22 MMOL/L (ref 20–33)
CREAT SERPL-MCNC: 0.53 MG/DL (ref 0.5–1.4)
EOSINOPHIL # BLD AUTO: 0 K/UL (ref 0–0.51)
EOSINOPHIL NFR BLD: 0 % (ref 0–6.9)
ERYTHROCYTE [DISTWIDTH] IN BLOOD BY AUTOMATED COUNT: 44.9 FL (ref 35.9–50)
GLUCOSE SERPL-MCNC: 114 MG/DL (ref 65–99)
HCT VFR BLD AUTO: 26.4 % (ref 42–52)
HGB BLD-MCNC: 8.8 G/DL (ref 14–18)
LYMPHOCYTES # BLD AUTO: 3.02 K/UL (ref 1–4.8)
LYMPHOCYTES NFR BLD: 67 % (ref 22–41)
MACROCYTES BLD QL SMEAR: ABNORMAL
MANUAL DIFF BLD: NORMAL
MCH RBC QN AUTO: 28.5 PG (ref 27–33)
MCHC RBC AUTO-ENTMCNC: 33.3 G/DL (ref 33.7–35.3)
MCV RBC AUTO: 85.4 FL (ref 81.4–97.8)
METAMYELOCYTES NFR BLD MANUAL: 0.9 %
MICROCYTES BLD QL SMEAR: ABNORMAL
MONOCYTES # BLD AUTO: 0.56 K/UL (ref 0–0.85)
MONOCYTES NFR BLD AUTO: 12.5 % (ref 0–13.4)
MORPHOLOGY BLD-IMP: NORMAL
NEUTROPHILS # BLD AUTO: 0.89 K/UL (ref 1.82–7.42)
NEUTROPHILS NFR BLD: 18.8 % (ref 44–72)
NEUTS BAND NFR BLD MANUAL: 0.9 % (ref 0–10)
NRBC # BLD AUTO: 0 K/UL
NRBC BLD-RTO: 0 /100 WBC
OVALOCYTES BLD QL SMEAR: NORMAL
PLATELET # BLD AUTO: 52 K/UL (ref 164–446)
PLATELET BLD QL SMEAR: NORMAL
POIKILOCYTOSIS BLD QL SMEAR: NORMAL
POTASSIUM SERPL-SCNC: 3.3 MMOL/L (ref 3.6–5.5)
RBC # BLD AUTO: 3.09 M/UL (ref 4.7–6.1)
RBC BLD AUTO: PRESENT
SODIUM SERPL-SCNC: 130 MMOL/L (ref 135–145)
VANCOMYCIN TROUGH SERPL-MCNC: 7.8 UG/ML (ref 10–20)
WBC # BLD AUTO: 4.5 K/UL (ref 4.8–10.8)

## 2020-06-30 PROCEDURE — 700105 HCHG RX REV CODE 258: Performed by: HOSPITALIST

## 2020-06-30 PROCEDURE — A9270 NON-COVERED ITEM OR SERVICE: HCPCS | Performed by: HOSPITALIST

## 2020-06-30 PROCEDURE — 36415 COLL VENOUS BLD VENIPUNCTURE: CPT

## 2020-06-30 PROCEDURE — A9270 NON-COVERED ITEM OR SERVICE: HCPCS | Performed by: INTERNAL MEDICINE

## 2020-06-30 PROCEDURE — 85007 BL SMEAR W/DIFF WBC COUNT: CPT

## 2020-06-30 PROCEDURE — 700102 HCHG RX REV CODE 250 W/ 637 OVERRIDE(OP): Performed by: INTERNAL MEDICINE

## 2020-06-30 PROCEDURE — 99233 SBSQ HOSP IP/OBS HIGH 50: CPT | Performed by: INTERNAL MEDICINE

## 2020-06-30 PROCEDURE — 700111 HCHG RX REV CODE 636 W/ 250 OVERRIDE (IP): Performed by: INTERNAL MEDICINE

## 2020-06-30 PROCEDURE — 700102 HCHG RX REV CODE 250 W/ 637 OVERRIDE(OP): Performed by: RADIOLOGY

## 2020-06-30 PROCEDURE — 700105 HCHG RX REV CODE 258: Performed by: INTERNAL MEDICINE

## 2020-06-30 PROCEDURE — A9270 NON-COVERED ITEM OR SERVICE: HCPCS | Performed by: RADIOLOGY

## 2020-06-30 PROCEDURE — 80048 BASIC METABOLIC PNL TOTAL CA: CPT

## 2020-06-30 PROCEDURE — 700102 HCHG RX REV CODE 250 W/ 637 OVERRIDE(OP): Performed by: HOSPITALIST

## 2020-06-30 PROCEDURE — 80202 ASSAY OF VANCOMYCIN: CPT

## 2020-06-30 PROCEDURE — 99233 SBSQ HOSP IP/OBS HIGH 50: CPT | Performed by: HOSPITALIST

## 2020-06-30 PROCEDURE — 770020 HCHG ROOM/CARE - TELE (206)

## 2020-06-30 PROCEDURE — 700111 HCHG RX REV CODE 636 W/ 250 OVERRIDE (IP): Performed by: HOSPITALIST

## 2020-06-30 PROCEDURE — 85027 COMPLETE CBC AUTOMATED: CPT

## 2020-06-30 RX ORDER — POTASSIUM CHLORIDE 20 MEQ/1
40 TABLET, EXTENDED RELEASE ORAL ONCE
Status: COMPLETED | OUTPATIENT
Start: 2020-06-30 | End: 2020-06-30

## 2020-06-30 RX ADMIN — CEFEPIME 2 G: 2 INJECTION, POWDER, FOR SOLUTION INTRAVENOUS at 13:10

## 2020-06-30 RX ADMIN — POTASSIUM CHLORIDE 40 MEQ: 1500 TABLET, EXTENDED RELEASE ORAL at 11:40

## 2020-06-30 RX ADMIN — ZOLPIDEM TARTRATE 5 MG: 5 TABLET ORAL at 21:52

## 2020-06-30 RX ADMIN — ACETAMINOPHEN 650 MG: 325 TABLET, FILM COATED ORAL at 21:52

## 2020-06-30 RX ADMIN — LOVASTATIN 20 MG: 20 TABLET ORAL at 21:52

## 2020-06-30 RX ADMIN — VANCOMYCIN HYDROCHLORIDE 1250 MG: 500 INJECTION, POWDER, LYOPHILIZED, FOR SOLUTION INTRAVENOUS at 16:26

## 2020-06-30 RX ADMIN — OXYCODONE HYDROCHLORIDE 5 MG: 5 TABLET ORAL at 13:17

## 2020-06-30 RX ADMIN — CEFEPIME 2 G: 2 INJECTION, POWDER, FOR SOLUTION INTRAVENOUS at 05:27

## 2020-06-30 RX ADMIN — CEFEPIME 2 G: 2 INJECTION, POWDER, FOR SOLUTION INTRAVENOUS at 21:52

## 2020-06-30 RX ADMIN — VANCOMYCIN HYDROCHLORIDE 1100 MG: 500 INJECTION, POWDER, LYOPHILIZED, FOR SOLUTION INTRAVENOUS at 06:07

## 2020-06-30 RX ADMIN — AMLODIPINE BESYLATE 2.5 MG: 5 TABLET ORAL at 21:52

## 2020-06-30 ASSESSMENT — ENCOUNTER SYMPTOMS
COUGH: 0
TREMORS: 0
ORTHOPNEA: 0
DIARRHEA: 0
PHOTOPHOBIA: 0
FEVER: 1
WHEEZING: 0
BACK PAIN: 0
CHILLS: 1
CHILLS: 0
DOUBLE VISION: 0
BLURRED VISION: 0
HEARTBURN: 0
MYALGIAS: 0
MYALGIAS: 1
NECK PAIN: 0
CLAUDICATION: 0
ABDOMINAL PAIN: 0
DIZZINESS: 0
PALPITATIONS: 0
CONSTIPATION: 0
WEIGHT LOSS: 0
DEPRESSION: 0
BRUISES/BLEEDS EASILY: 0
HEADACHES: 0
SPUTUM PRODUCTION: 0
NAUSEA: 0
VOMITING: 0
FOCAL WEAKNESS: 0

## 2020-06-30 ASSESSMENT — PATIENT HEALTH QUESTIONNAIRE - PHQ9
1. LITTLE INTEREST OR PLEASURE IN DOING THINGS: NOT AT ALL
2. FEELING DOWN, DEPRESSED, IRRITABLE, OR HOPELESS: NOT AT ALL
SUM OF ALL RESPONSES TO PHQ9 QUESTIONS 1 AND 2: 0

## 2020-06-30 ASSESSMENT — FIBROSIS 4 INDEX: FIB4 SCORE: 12.39

## 2020-06-30 ASSESSMENT — LIFESTYLE VARIABLES: SUBSTANCE_ABUSE: 0

## 2020-06-30 NOTE — CARE PLAN
Problem: Communication  Goal: The ability to communicate needs accurately and effectively will improve  Outcome: PROGRESSING AS EXPECTED     Problem: Safety  Goal: Will remain free from injury  Outcome: PROGRESSING AS EXPECTED     Problem: Infection  Goal: Will remain free from infection  Outcome: PROGRESSING AS EXPECTED     Problem: Venous Thromboembolism (VTW)/Deep Vein Thrombosis (DVT) Prevention:  Goal: Patient will participate in Venous Thrombosis (VTE)/Deep Vein Thrombosis (DVT)Prevention Measures  Outcome: PROGRESSING AS EXPECTED     Problem: Bowel/Gastric:  Goal: Normal bowel function is maintained or improved  Outcome: PROGRESSING AS EXPECTED     Problem: Knowledge Deficit  Goal: Knowledge of disease process/condition, treatment plan, diagnostic tests, and medications will improve  Outcome: PROGRESSING AS EXPECTED

## 2020-06-30 NOTE — PROGRESS NOTES
[facility] Medicine Daily Progress Note    Date of Service  6/30/2020    Chief Complaint  65 y.o. male admitted 6/23/2020 with tachycardia.  He had a TAVR on 6/22/2020, and was discharged home on 6/23/2020.  He returned to the ER due to persistent tachycardia.     He has a history of pancytopenia which is worsening, and has been hesitant to do a bone marrow biopsy.  He has tobacco dependence, essential hypertension, hyperlipidemia.       Hospital Course    Home Eliquis and aspirin were discontinued.  Heart rate stabilized. He continued to have decline in his hemoglobin and platelets. WBC stabilized. Briefly discussed with hem/onc.  Abdominal ultrasound from 6/25/2020 showed no acute abnormalities.  SPEP, smooth muscle antibodies, SEEMA, rheumatoid factor, B12 and folate levels, fibrinogen were ordered.  Patient wished to go home and agreed follow-up with hematology for bone marrow biopsy and further work-up.  He was discharged to go home on 6/26/2020, but developed a fever of 103.6.     Infectious disease were consulted on 6/27/2020 due to fever and neutropenia, Cefepime was empirically started.     B12, folate, and fibrinogen levels were normal.  Rheumatoid factor was 40, ESR was 45, LFTs were unremarkable, SEEMA was undetectable, HIV was nonreactive.      Interval Problem Update    Case d/w with Dr. Berry of ID    Low Potassium of 3.3    Sodium 130    Patient states states he feels great    Fever last night at 8 PM    Leukopenia but no longer neutropenic    Low plts 52, no signs of bleeding      Chin fluid collection has been drained and cultures negative to date      echoCardiogram shows no vegetations on valves    Consultants/Specialty  ID    Code Status  full    Disposition  home    Review of Systems  Review of Systems   Constitutional: Positive for fever. Negative for chills, malaise/fatigue and weight loss.   HENT: Negative for ear discharge, hearing loss and tinnitus.    Eyes: Negative for blurred vision, double  vision and photophobia.   Respiratory: Negative for cough and sputum production.    Cardiovascular: Negative for palpitations, orthopnea and claudication.   Gastrointestinal: Negative for abdominal pain, heartburn, nausea and vomiting.   Genitourinary: Negative for frequency and urgency.   Musculoskeletal: Negative for back pain, myalgias and neck pain.   Neurological: Negative for dizziness, tremors, focal weakness and headaches.   Endo/Heme/Allergies: Does not bruise/bleed easily.   Psychiatric/Behavioral: Negative for depression and substance abuse.        Physical Exam  Temp:  [36.7 °C (98.1 °F)-38.8 °C (101.9 °F)] 37.2 °C (99 °F)  Pulse:  [] 77  Resp:  [18-30] 18  BP: (106-138)/(62-74) 121/66  SpO2:  [90 %-98 %] 93 %    Physical Exam  Constitutional:       Appearance: Normal appearance. He is not diaphoretic.   HENT:      Head: Atraumatic.      Mouth/Throat:      Mouth: Mucous membranes are moist.   Eyes:      General: No scleral icterus.     Extraocular Movements: Extraocular movements intact.      Pupils: Pupils are equal, round, and reactive to light.   Neck:      Musculoskeletal: Normal range of motion and neck supple.   Cardiovascular:      Rate and Rhythm: Normal rate.      Heart sounds: No murmur. No gallop.    Pulmonary:      Effort: Pulmonary effort is normal. No respiratory distress.      Breath sounds: No stridor. No wheezing.   Chest:      Chest wall: No tenderness.   Abdominal:      General: Abdomen is flat. There is no distension.      Tenderness: There is no guarding.      Hernia: No hernia is present.   Musculoskeletal: Normal range of motion.         General: No swelling, tenderness, deformity or signs of injury.      Right lower leg: No edema.   Skin:     General: Skin is warm.      Capillary Refill: Capillary refill takes 2 to 3 seconds.      Coloration: Skin is not jaundiced or pale.      Findings: No bruising or rash.   Neurological:      General: No focal deficit present.      Mental  Status: He is alert and oriented to person, place, and time.      Cranial Nerves: No cranial nerve deficit.      Deep Tendon Reflexes: Reflexes normal.   Psychiatric:         Mood and Affect: Mood normal.         Fluids    Intake/Output Summary (Last 24 hours) at 6/30/2020 1027  Last data filed at 6/29/2020 2153  Gross per 24 hour   Intake 1430 ml   Output --   Net 1430 ml       Laboratory  Recent Labs     06/28/20 0412 06/29/20  0411   WBC 2.5* 3.7*   RBC 3.34* 3.12*   HEMOGLOBIN 9.5* 8.8*   HEMATOCRIT 28.2* 26.7*   MCV 84.4 85.6   MCH 28.4 28.2   MCHC 33.7 33.0*   RDW 42.9 43.3   PLATELETCT 45* 56*     Recent Labs     06/28/20 0412 06/29/20 0411 06/30/20  0550   SODIUM 129* 130* 130*   POTASSIUM 3.8 3.6 3.3*   CHLORIDE 99 97 99   CO2 22 21 22   GLUCOSE 116* 122* 114*   BUN 12 16 15   CREATININE 0.66 0.63 0.53   CALCIUM 8.2* 7.7* 7.7*                   Imaging  EC-ECHOCARDIOGRAM COMPLETE W/O CONT   Final Result      IR-CYST ASPIRATION-MISC   Final Result         ULTRASOUND GUIDED ASPIRATION OF SUBMANDIBULAR ABSCESS..      CT-SOFT TISSUE NECK WITH   Final Result         1. Acute versus subacute fracture of the mid mandibular body.   2. A 2.9 cm collection subjacent to the fracture site, likely hematoma. Infected hematoma can be considered in the appropriate clinical settings.   3. No cervical lymphadenopathy.   4. Moderate mucosal thickening of the left maxillary sinus, likely odontogenic in nature, related to impacted remaining maxillary tooth.      CT-CHEST,ABDOMEN,PELVIS WITH   Final Result      Hypodensities in the spleen worrisome for splenic infarcts.      Trace nonspecific free fluid in the pelvis.      2.2 x 1.7 cm left common femoral artery pseudoaneurysm.      Bowel containing right inguinal hernia without evidence of obstruction.      Atherosclerotic plaque within an ectatic aorta.      Gallbladder wall thickening/pericholecystic fluid. Further evaluation can be performed with right upper quadrant  ultrasound.      Small hypodense left renal lesions are too small to characterize but likely represent small cysts.      Emphysematous changes.      Irregular nodular opacity along the minor fissure measuring 8 mm.      4.7 mm right middle lobe pulmonary nodule.      Mild bibasilar and lingular atelectasis.      Emphysematous changes.      Sequelae of prior granulomatous exposure.      Right hilar lymph node is borderline enlarged and nonspecific, possibly reactive..      Enlarged periportal lymph nodes are nonspecific.      Fleischner Society pulmonary nodule recommendations:   Low Risk: CT at 3-6 months, then consider CT at 18-24 months      High Risk: CT at 3-6 months, then at 18-24 months      Comments: Use most suspicious nodule as guide to management. Follow-up intervals may vary according to size and risk.      Low Risk - Minimal or absent history of smoking and of other known risk factors.      High Risk - History of smoking or of other known risk factors.      Note: These recommendations do not apply to lung cancer screening, patients with immunosuppression, or patients with known primary cancer.      Fleischner Society 2017 Guidelines for Management of Incidentally Detected Pulmonary Nodules in Adults      DX-CHEST-2 VIEWS   Final Result      No acute cardiopulmonary abnormality.      US-EXTREMITY ARTERY LOWER UNILAT RIGHT   Final Result      US-ABDOMEN COMPLETE SURVEY   Final Result      Prominent trabeculated bladder wall, suggesting possible chronic bladder outlet obstruction. Unremarkable findings otherwise.         DX-CHEST-PORTABLE (1 VIEW)   Final Result      No acute cardiac or pulmonary abnormalities are identified.           Assessment/Plan  * Fever- (present on admission)  Assessment & Plan  Fever with neutropenia ALthough neutropenia has resolved    Still febrile    Follow all cultures    Discussed with MEMO Berry    Pancytopenia (HCC)- (present on admission)  Assessment & Plan  Present  for a few months, unclear etiology  Briefly discussed with Dr. Chacon (hematology/oncology) a few days ago.  LFT's, fibrinogen, B12, folic acid, SEEMA were normal.  RF was 40, ESR was 45  SPEP was unremarkable, smooth muscle antibodies are pending  Infectious disease were consulted, cefepime started 6/27/2020  Hematology/oncology were consulted 6/28/2020  Bone marrow biopsy done  Will await further recommendations    S/P TAVR (transcatheter aortic valve replacement)- (present on admission)  Assessment & Plan  S/P TAVR on 6/22/2020   . Continue statin  Per cardiology start B12 and folate supplements  Echocardiogram shows no vegetations  Cardiology is following    Mixed hyperlipidemia- (present on admission)  Assessment & Plan  Continue home Lovastatin    Essential hypertension- (present on admission)  Assessment & Plan    Continue Norvasc    Panlobular emphysema (HCC)- (present on admission)  Assessment & Plan  History of tobacco use  Chest x-ray from admission shows no acute cardiopulmonary process    Tobacco dependence- (present on admission)  Assessment & Plan  Patient has been counseled on tobacco cessation  Nicotine replacement options were provided       VTE prophylaxis: scd      Check cm cbc, bmp

## 2020-06-30 NOTE — PROGRESS NOTES
"Pharmacy Kinetics 65 y.o. 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 Treatment initiated for febrile neutropenia on  with cefepime and ID consulted**. Fevers continuing despite cefepime - vancomycin added . Infectious and oncologic workup ongoing - bone marrow biopsy and submandibular fluid aspiration on . He has no prior hx of vancomycin dosing found in Epic. ID suspects infected hematoma w/ concern for malignancy.     Other antibiotics: Cefepime 2 g IV q8h     Allergies: Seasonal      Concerns for renal function: age, IV contrast      Pertinent cultures to date:    Blood x2: NGTD   Submandibular fluid: in process    MRSA nares swab if pneumonia is a concern: N/A    Recent Labs     20  0412 20  0411 20  0550   WBC 2.5* 3.7* 4.5*   NEUTSPOLYS 6.00* 15.60* 18.80*   BANDSSTABS  --   --  0.90     Recent Labs     20  0412 20  0411 20  0550   BUN 12 16 15   CREATININE 0.66 0.63 0.53     Recent Labs     20  0550   VANCOTROUGH 7.8*       Intake/Output Summary (Last 24 hours) at 2020 1126  Last data filed at 2020 2153  Gross per 24 hour   Intake 1430 ml   Output --   Net 1430 ml      /66   Pulse 77   Temp 37.2 °C (99 °F) (Temporal)   Resp 18   Ht 1.676 m (5' 6\")   Wt 60.4 kg (133 lb 2.5 oz)   SpO2 93%  Temp (24hrs), Av.6 °C (99.6 °F), Min:36.7 °C (98.1 °F), Max:38.8 °C (101.9 °F)      A/P   1. Vancomycin dose change: 1250mg IV q12h  (0600, 1800)  2. Next vancomycin level: ~2-3 days  (not ordered)  3. Goal trough: 16-20 mcg/mL  4. Comments: No leukocytosis, leukopenia improving but had a fever overnight. Source of " infection is still under investigation - continue higher trough goal. Renal function appears stable per renal indices. Trough level is subtherapeutic - increase to dose to ~20mg/kg per protocol (250mg incremental dosing protocol). Recommend a repeat trough level in a few days to assess accumulation and dose appropriateness. Pharmacy to continue to follow ID recommendations.      Isi Scott, PharmD., BCPS

## 2020-06-30 NOTE — PROGRESS NOTES
Report received. Assumed care. Pt in bed awake. A/O x4. VSS. Responds appropriately. Denies pain, SOB. Assessment complete. Tele monitor on. Dressings to bilateral groin area in place, intact with scant serosanguaneous drainage. Dressing under the chin place, cdi. Bruising/hematoma noted to bilateral hips and arms. Discussed POC, monitor VS/labs, IV antbx, lab results, mobility, safety, DC planning, pt verbalizes understanding. Call light and belongings within reach.  Bed in the lowest position. Treaded socks in place. Hourly rounding in progress. Will continue to monitor .

## 2020-06-30 NOTE — CARE PLAN
Problem: Safety  Goal: Will remain free from injury  Outcome: PROGRESSING AS EXPECTED  Note: Treaded socks in place, bed in the lowest position, call light and belongings within reach, pt call for assistance appropriately      Problem: Knowledge Deficit  Goal: Knowledge of disease process/condition, treatment plan, diagnostic tests, and medications will improve  Outcome: PROGRESSING AS EXPECTED  Note: Educated pt on POC, all questions answered, hourly rounding in progress

## 2020-06-30 NOTE — PROGRESS NOTES
Infectious Disease Progress Note    Author: Vera Berry M.D. Date & Time of service: 2020  7:57 AM    Chief Complaint:  Fevers, pancytopenia      Interval History:   T-max 102.9 WBC 3.7 patient remains neutropenic .  CT of the neck shows a 2.9 cm collection adjacent to the fracture site of the mid mandibular body.  An infected hematoma cannot be ruled out.  CT scan shows possible splenic infarcts and gallbladder wall thickening with pericholecystic fluid.  Patient continues to have fevers and chills.  He states his chin lesion is more painful and has increased in size.  Plan for bone marrow biopsy today   T-max 101.9, remains febrile.  Had bone biopsy yesterday and aspiration of submandibular abscess.  He did not sleep well overnight as he was very warm.  No new symptoms today    review of Systems:  Review of Systems   Constitutional: Positive for chills, fever and malaise/fatigue.   Respiratory: Negative for cough, sputum production and wheezing.    Cardiovascular: Negative for chest pain.   Gastrointestinal: Negative for abdominal pain, constipation, diarrhea, nausea and vomiting.   Genitourinary: Negative for dysuria.   Musculoskeletal: Positive for myalgias.   Neurological: Negative for dizziness and headaches.   All other systems reviewed and are negative.      Hemodynamics:  Temp (24hrs), Av.6 °C (99.7 °F), Min:36.7 °C (98.1 °F), Max:38.8 °C (101.9 °F)  Temperature: 36.7 °C (98.1 °F)  Pulse  Av  Min: 55  Max: 110   Blood Pressure : 138/62       Physical Exam:  Physical Exam  Vitals signs and nursing note reviewed.   Constitutional:       Appearance: Normal appearance.   HENT:      Head:      Comments: Lump under chin, soft, mild tenderness palpation, erythema, no warmth, no active drainage     Mouth/Throat:      Mouth: Mucous membranes are moist.      Pharynx: No oropharyngeal exudate.   Eyes:      Extraocular Movements: Extraocular movements intact.      Conjunctiva/sclera:  Conjunctivae normal.      Pupils: Pupils are equal, round, and reactive to light.   Cardiovascular:      Rate and Rhythm: Normal rate and regular rhythm.      Heart sounds: Normal heart sounds.   Pulmonary:      Effort: Pulmonary effort is normal.      Breath sounds: Normal breath sounds.   Abdominal:      General: Abdomen is flat. Bowel sounds are normal.      Palpations: Abdomen is soft.   Musculoskeletal: Normal range of motion.      Right lower leg: No edema.      Left lower leg: No edema.   Skin:     General: Skin is warm and dry.   Neurological:      General: No focal deficit present.      Mental Status: He is alert and oriented to person, place, and time.   Psychiatric:         Mood and Affect: Mood normal.         Behavior: Behavior normal.      Comments: Pleasant         Meds:    Current Facility-Administered Medications:   •  oxyCODONE immediate-release  •  oxyCODONE immediate release  •  MD Alert...Vancomycin per Pharmacy  •  LR  •  vancomycin  •  cefepime  •  zolpidem  •  amLODIPine  •  lovastatin  •  senna-docusate **AND** polyethylene glycol/lytes **AND** magnesium hydroxide **AND** bisacodyl  •  acetaminophen  •  enalaprilat  •  ondansetron  •  ondansetron    Labs:  Recent Labs     06/28/20 0412 06/29/20  0411   WBC 2.5* 3.7*   RBC 3.34* 3.12*   HEMOGLOBIN 9.5* 8.8*   HEMATOCRIT 28.2* 26.7*   MCV 84.4 85.6   MCH 28.4 28.2   RDW 42.9 43.3   PLATELETCT 45* 56*   NEUTSPOLYS 6.00* 15.60*   LYMPHOCYTES 81.20* 66.10*   MONOCYTES 12.00 15.60*   EOSINOPHILS 0.00 0.00   BASOPHILS 0.90 0.00   RBCMORPHOLO Present See Note     Recent Labs     06/28/20  0412 06/29/20  0411 06/30/20  0550   SODIUM 129* 130* 130*   POTASSIUM 3.8 3.6 3.3*   CHLORIDE 99 97 99   CO2 22 21 22   GLUCOSE 116* 122* 114*   BUN 12 16 15     Recent Labs     06/28/20  0412 06/29/20  0411 06/30/20  0550   CREATININE 0.66 0.63 0.53       Imaging:  Dx-chest-2 Views    Result Date: 6/27/2020 6/27/2020 11:36 AM HISTORY/REASON FOR EXAM:  Fever  TECHNIQUE/EXAM DESCRIPTION AND NUMBER OF VIEWS: Two views of the chest. COMPARISON:  6/23/2020. FINDINGS: LUNGS: Slight hyperinflation. Chronic slightly hyperdense nodule in the right midlung, likely benign granuloma. No focal consolidation. No effusions. PNEUMOTHORAX: None. LINES AND TUBES: None. MEDIASTINUM: No cardiomegaly. TAVR. LAD stent. Atherosclerosis. MUSCULOSKELETAL STRUCTURES: No acute displaced fracture.     No acute cardiopulmonary abnormality.    Dx-chest-portable (1 View)    Result Date: 6/23/2020 6/23/2020 5:44 PM HISTORY/REASON FOR EXAM:  tachycardia. TECHNIQUE/EXAM DESCRIPTION AND NUMBER OF VIEWS: Single portable view of the chest. COMPARISON: Exam from 6:42 AM FINDINGS: Heart size is within normal limits. No pulmonary infiltrates or consolidations are noted. No pleural abnormalities are noted. There is an old left seventh rib fracture.     No acute cardiac or pulmonary abnormalities are identified.    Dx-chest-portable (1 View)    Result Date: 6/23/2020 6/23/2020 6:05 AM HISTORY/REASON FOR EXAM: Postop TAVR. TECHNIQUE/EXAM DESCRIPTION AND NUMBER OF VIEWS: Single portable view of the chest. COMPARISON: 6/22/2020 FINDINGS: LUNGS: Clear. No effusions. PNEUMOTHORAX: None. LINES AND TUBES: None. MEDIASTINUM: Stable cardiac silhouette. MUSCULOSKELETAL STRUCTURES: Unchanged.     Clear lungs. No pleural effusions.    Dx-chest-portable (1 View)    Result Date: 6/22/2020 6/22/2020 12:50 PM HISTORY/REASON FOR EXAM:  Status post aortic valve replacement. TECHNIQUE/EXAM DESCRIPTION AND NUMBER OF VIEWS: Single portable view of the chest. COMPARISON: 7/15/2019 FINDINGS: Single portable view of the chest demonstrates a normal cardiac silhouette and mediastinal contours. Calcification is in the aorta. There is emphysematous change. The lungs are hyperexpanded. No confluent opacity, pleural fluid, or pneumothorax. No suspicious bony lesions.     No acute findings status post TAVR    Us-abdomen Complete  Survey    Result Date: 6/26/2020 6/26/2020 6:27 AM HISTORY/REASON FOR EXAM:  Abnormal Labs Pain TECHNIQUE/EXAM DESCRIPTION AND NUMBER OF VIEWS:  Complete abdomen survey. COMPARISON: None FINDINGS: The liver is normal in contour. There is no evidence of solid mass lesion. The liver measures 14.57 cm. The gallbladder is normal. There is no evidence of cholelithiasis. The gallbladder wall thickness measures 2.50 mm. There is no pericholecystic fluid. The common duct measures 3.40 mm. The visualized pancreas is unremarkable. The visualized aorta is normal in caliber. Intrahepatic IVC is patent. The portal vein is patent with hepatopetal flow. The MPV measures 1.22 cm. The right kidney measures 11.19 cm. The left kidney measures 11.17 cm. There is no hydronephrosis. The spleen measures 10.41 cm maximally. The bladder is trabeculated, which could indicate chronic bladder outlet obstruction. There is no ascites.     Prominent trabeculated bladder wall, suggesting possible chronic bladder outlet obstruction. Unremarkable findings otherwise.     Us-extremity Artery Lower Bilat    Result Date: 6/22/2020  Lower Extremity  Arterial Duplex Report  Vascular Laboratory  CONCLUSIONS  Right.  Echolucent thrombus partially fills the common femoral artery causing a  high grade stenosis. Velocities are consistent with > 75% stenosis.  Mild plaque visualized in the superficial femoral and popliteal arteries  without evidence of hemodynamically significant stenosis.  Monophasic waveforms demonstrated throughout the right lower extremity.  Left.  Mild plaque is seen in the common femoral, femoral, and popliteal arteries  with no elevated velocities to indicate hemodynamically significant  stenosis.  FADIA LANDIS  Exam Date:     06/22/2020 17:34  Room #:     Inpatient  Priority:     Stat  Ht (in):             Wt (lb):  Ordering Physician:        ANTHONY PIPER  Referring Physician:       VERONIQUE  ANTHONY MACARIO  Sonographer:               Ewa Curtis RVT  Study Type:                Complete Bilateral  Technical Quality:         Adequate  Age:    65    Gender:     M  MRN:    [id number]  :    1955      BSA:  Indications:     Pain in leg, unspecified  CPT Codes:       20985  ICD Codes:       M79.606  History:         Pain in legs s/p TAVR. No prior duplex.  Limitations:                RIGHT  Waveform        Peak Systolic Velocity (cm/s)                  Prox    Prox-Mid  Mid    Mid-Dist  Distal  Monophasic                        307              143     CFA  Monophasic      55                                         PFA  Monophasic      53                51               34      SFA  Monophasic                        30                       POP  Monophasic      22                                 32      AT  Monophasic      19                                 11      PT  Monophasic      37                                 20      EVGENY                LEFT  Waveform        Peak Systolic Velocity (cm/s)                  Prox    Prox-Mid  Mid    Mid-Dist  Distal  Triphasic                         187                      CFA  Biphasic        78                                         PFA  Biphasic        103               142              106     SFA  Biphasic                          57                       POP  Biphasic        83                                 110     AT  Biphasic        76                                 24      PT  Biphasic        61                                 24      EVGENY  FINDINGS  Right.  Echolucent material consistent with thrombus partially fills the common  femoral artery causing a high grade stenosis. Stenosis of the common  femoral artery. Velocities are consistent with > 75% stenosis.  Mild plaque visualized in the femoral and popliteal arteries without  evidence of hemodynamic significance.  Monophasic waveforms demonstrated throughout the right  lower extremity.  Left.  Mild plaque is seen in the common femoral, femoral, and popliteal arteries  with no elevated velocities to indicate hemodynamic significance.  Waveforms are biphasic throughout the left lower extremity.  Demetria Narvaez  (Electronically Signed)  Final Date:      2020                   18:45  Amended:         2020 19:58    Us-extremity Artery Lower Unilat Right    Result Date: 2020  Lower Extremity  Arterial Duplex Report  Vascular Laboratory  CONCLUSIONS  Pinching of the common femoral artery from perclose device closure.  Velcoity consistent with >75% stenosis. Intraluminal clot seen in prior  study is not readily seen.  FADIA LANDIS  Exam Date:     2020 07:49  Room #:     Inpatient  Priority:     Routine  Ht (in):             Wt (lb):  Ordering Physician:        MELVIN SIMONS  Referring Physician:       MELVIN SIMONS  Sonographer:               Blank Calles RVT  Study Type:                Limited Unilateral  Technical Quality:         Adequate  Age:    65    Gender:     M  MRN:    [id number]  :    1955      BSA:  Indications:     Unspecified atherosclerosis of native arteries of                   extremities, right leg  CPT Codes:       43636  ICD Codes:       I70.201  History:         Right common femoral steosis.  Limitations:                RIGHT  Waveform        Peak Systolic Velocity (cm/s)                  Prox    Prox-Mid  Mid    Mid-Dist  Distal  Monophasic      42                384              230     CFA  Biphasic        97                                         PFA  Biphasic        55                                         SFA                                                             POP                                                             AT                                                             PT                                                              EVGENY                LEFT  Waveform        Peak Systolic Velocity (cm/s)                  Prox    Prox-Mid  Mid    Mid-Dist  Distal                                                             CFA                                                             PFA                                                             SFA                                                             POP                                                             AT                                                             PT                                                             EVGENY  FINDINGS  Limited evaluation of the right common femoral artery known stenosis.  Right.  Stenosis of the mid common femoral artery. Velocities are consistent with >  75%stenosis.  Distal flow is turbulent and damped.  No evidence of hematoma or psuedoaneuysm seen at the right groin.  Luiz Dennis MD  (Electronically Signed)  Final Date:      2020                   08:49    Ec-echocardiogram Complete W/o Cont    Result Date: 2020  Transthoracic Echo Report Echocardiography Laboratory CONCLUSIONS Prior echo done on 2020, there is now a TAVR valve present. Left ventricular ejection fraction is visually estimated to be 60%. Unable to estimate pulmonary artery pressure due to an inadequate tricuspid regurgitant jet. Known TAVR aortic valve. FADIA LANDIS Exam Date:         2020                    03:46 Exam Location:     Inpatient Priority:          Routine Ordering Physician:        ANTHONY PIPER Referring Physician:       [id number] Cintron Sonographer:               JOJO Ahs Age:    65     Gender:    Mal                           e MRN:    [id number] :    1955 BSA:    1.64   Ht (in):    66     Wt (lb):    126 Exam Type:     Complete Indications:     Prosthetic Valve ICD Codes:       V43.3 CPT Codes:       00193 BP:   142    /   83     HR:   70 Technical  Quality:       Fair MEASUREMENTS  (Male / Female) Normal Values 2D ECHO LV Diastolic Diameter PLAX        6 cm                  4.2 - 5.9 / 3.9 - 5.3 cm LV Systolic Diameter PLAX         3.7 cm                2.1 - 4.0 cm IVS Diastolic Thickness           0.71 cm               LVPW Diastolic Thickness          0.7 cm                LVOT Diameter                     1.5 cm                Estimated LV Ejection Fraction    60 %                  LV Ejection Fraction MOD BP       39.5 %                >= 55  % LV Ejection Fraction MOD 4C       40.2 %                LV Ejection Fraction MOD 2C       33.9 %                IVC Diameter                      1.8 cm                DOPPLER AV Peak Velocity                  0.99 m/s              AV Peak Gradient                  3.9 mmHg              AV Mean Gradient                  2.7 mmHg              LVOT Peak Velocity                1.1 m/s               AV Area Cont Eq vti               1.9 cm2               Mitral E Point Velocity           0.63 m/s              Mitral E to A Ratio               1.5                   MV Pressure Half Time             75.3 ms               MV Area PHT                       2.9 cm2               MV Deceleration Time              260 ms                * Indicates values subject to auto-interpretation LV EF:  60    % FINDINGS Left Ventricle Left ventricle is mildly dilated. Normal left ventricular wall thickness. Normal left ventricular systolic function. Left ventricular ejection fraction is visually estimated to be 60%. Normal regional wall motion. Normal diastolic function. Right Ventricle Normal right ventricular size. Normal right ventricular systolic function. Right Atrium Normal right atrial size. Normal inferior vena cava size and inspiratory collapse. Left Atrium Normal left atrial size. Left atrial volume index is 26 mL/sq m. Mitral Valve Calcification of the mitral valve leaflets. No mitral stenosis. Trace mitral regurgitation.  Aortic Valve Known TAVR aortic valve. Vmax is 1 m/s. Transvalvular gradients are - Peak: 3 mmHg, Mean: 2 mmHg. Tricuspid Valve Structurally normal tricuspid valve. No tricuspid stenosis. Trace tricuspid regurgitation. Right atrial pressure is estimated to be 8 mmHg. Unable to estimate pulmonary artery pressure due to an inadequate tricuspid regurgitant jet. Pulmonic Valve The pulmonic valve is not well visualized. Pericardium No pericardial effusion seen. Aorta Normal aortic root for body surface area. Ascending aorta diameter is 2.4 cm. Brenden Hansen MD (Electronically Signed) Final Date:     2020                 06:41    Ec-echocardiogram Ltd W/o Cont    Result Date: 2020  Transthoracic Echo Report Echocardiography Laboratory CONCLUSIONS Intraoperative TTE during TAVR.  Baseline images show mildly reduced LV function with EF =55%.  Calcified aortic valve leaflets. Severe aortic stenosis.  Post deployment images show preserved biventricular function.  A 26 mm Jewell Jose Manuel Ultra stented bioprosthetic valve is seen in the aortic position with normal leaflet motion, trace significant paravalvular leak, mean gradent of 5 mm Hg and calculated effective orifice area of 2.5 cm2.  Findings communicated at the time of exam. FADIA LANDIS Exam Date:         2020                    11:17 Exam Location:     Inpatient Priority:          Routine Ordering Physician:        CLEOPATRA JONES Referring Physician: Sonographer:               Antwan La RDCS Age:    65     Gender:    Mal                           e MRN:    [id number] :    1955 BSA:    1.64   Ht (in):    66     Wt (lb):    126 Exam Type:     Limited Indications:     Pre-Op Surgery ICD Codes:       V72.83 CPT Codes:       75316 BP:          /          HR: Technical Quality:       Fair MEASUREMENTS  (Male / Female) Normal Values 2D ECHO Estimated LV Ejection Fraction    55 %                  * Indicates values subject to  auto-interpretation LV EF:  55    % FINDINGS Left Ventricle The left ventricle was normal in size and function. Right Ventricle The right ventricle was normal in size and function. Right Atrium The right atrium is normal in size. Left Atrium The left atrium is normal in size. Mitral Valve Structurally normal mitral valve without significant stenosis or regurgitation. Aortic Valve Calcified aortic valve leaflets. Severe aortic stenosis. Tricuspid Valve Structurally normal tricuspid valve without significant stenosis or regurgitation. Pulmonic Valve Structurally normal pulmonic valve without significant stenosis or regurgitation. Pericardium Normal pericardium without effusion. Aorta The aortic root is normal. Simon X Issa (Electronically Signed) Final Date:     23 June 2020                 16:26 Amended:        23 June 2020 16:29      Micro:  Results     Procedure Component Value Units Date/Time    GRAM STAIN [[id number]] Collected:  06/29/20 1317    Order Status:  Completed Specimen:  Wound Updated:  06/29/20 1622     Significant Indicator .     Source WND     Site submandibular     Gram Stain Result Many WBCs.  Many Gram positive cocci.  Few Gram negative rods.      Narrative:       [id number] PAWAN LAURA F.  Large lump under chin  Please send for cytology/pathology  [id number] PAWAN NARVAEZ F.  2mls pink, frothy fluid aspirated from submandibular fluid  collection, MD Jenkins  [id number] PAWAN RODRIGUEZ.    Aerobic/Anaerobic Culture (Surgery) [[id number]] Collected:  06/29/20 1317    Order Status:  Completed Specimen:  Wound from Other Body Fluid Updated:  06/29/20 1622     Significant Indicator NEG     Source WND     Site submandibular     Culture Result Order placed in Surgery. Source appropriate culture orders  have been placed in Micro for testing.      Narrative:       [id number] PAWAN BRICE F.  Large lump under chin  Please send for cytology/pathology  [id number]  PAWAN RODRIGUEZ.  2mls pink, frothy fluid aspirated from submandibular fluid  collection, MD Jenkins  [id number] PAWAN RODRIGUEZ.    CULTURE WOUND W/ GRAM STAIN [[id number]] Collected:  06/29/20 1317    Order Status:  Completed Specimen:  Other Updated:  06/29/20 1350    Narrative:       [id number] PAWAN RODRIGUEZ.  Large lump under chin  Please send for cytology/pathology  [id number] PAWAN RODRIGUEZ.  2mls pink, frothy fluid aspirated from submandibular fluid  collection, MD Jenkins    Anaerobic Culture [[id number]] Collected:  06/29/20 1317    Order Status:  Completed Specimen:  Other Updated:  06/29/20 1350    Narrative:       [id number] PAWAN RODRIGUEZ.  Large lump under chin  Please send for cytology/pathology  [id number] PAWAN NARVAEZ F.  2mls pink, frothy fluid aspirated from submandibular fluid  collection, MD Jenkins    FLUID CULTURE W/GRAM STAIN [[id number]] Collected:  06/29/20 1317    Order Status:  Canceled Specimen:  Other Body Fluid     FLUID CULTURE W/GRAM STAIN [[id number]] Collected:  06/29/20 1317    Order Status:  Sent Specimen:  Other Body Fluid     URINALYSIS [[id number]]  (Abnormal) Collected:  06/27/20 1230    Order Status:  Completed Specimen:  Urine, Clean Catch Updated:  06/27/20 1257     Color DK Yellow     Character Clear     Specific Gravity 1.019     Ph 5.5     Glucose Negative mg/dL      Ketones Negative mg/dL      Protein 100 mg/dL      Bilirubin Negative     Urobilinogen, Urine 1.0     Nitrite Negative     Leukocyte Esterase Negative     Occult Blood Small     Micro Urine Req Microscopic    Narrative:       [id number] JANET CONNOLLY    BLOOD CULTURE [[id number]] Collected:  06/26/20 2146    Order Status:  Completed Specimen:  Blood from Peripheral Updated:  06/27/20 0815     Significant Indicator NEG     Source BLD     Site PERIPHERAL     Culture Result No Growth  Note: Blood cultures are incubated for 5 days and  are monitored  "continuously.Positive blood cultures  are called to the RN and reported as soon as  they are identified.      Narrative:       Protective  Per Hospital Policy: Only change Specimen Src: to \"Line\" if  specified by physician order.  Left Forearm/Arm    BLOOD CULTURE [[id number]] Collected:  06/26/20 2159    Order Status:  Completed Specimen:  Blood from Peripheral Updated:  06/27/20 0815     Significant Indicator NEG     Source BLD     Site PERIPHERAL     Culture Result No Growth  Note: Blood cultures are incubated for 5 days and  are monitored continuously.Positive blood cultures  are called to the RN and reported as soon as  they are identified.      Narrative:       Protective  Per Hospital Policy: Only change Specimen Src: to \"Line\" if  specified by physician order.  Right Forearm/Arm          Assessment:  Active Hospital Problems    Diagnosis   • *Fever [R50.9]   • Pancytopenia (HCC) [D61.818]   • S/P TAVR (transcatheter aortic valve replacement) [Z95.2]   • Essential hypertension [I10]   • Mixed hyperlipidemia [E78.2]   • Panlobular emphysema (HCC) [J43.1]   • Tobacco dependence [F17.200]           ASSESSMENT/PLAN:      65 y.o.  admitted 6/23/2020. Pt has a past medical history of TAVR on 6/22/2020 and pancytopenia which he has had some work-up but no bone marrow biopsy as yet.  He has had a decreased WBC since 2016 with neutropenia.  He also has some mild anemia and thrombocytopenia that is also chronic but worsened recently.  He presented the ER complaining of tachycardia the day after he been sent home after his TAVR surgery.      Hospital Course:   Patient was admitted and was initially febrile and 6/20 4-1.3 and then again spiked a fever to 103.6 on 6/26.  Initial work-up for pancytopenia was started and plan was to complete this with heme-onc as an outpatient.  Blood cultures were obtained 6/26.  Ultrasound abdomen on 6/25 with trabeculated bladder wall suggesting possible chronic bladder outlet obstruction " but otherwise unremarkable.  Ultrasound lower extremity with noted stenosis of the right common femoral artery report is known - no evidence of hematoma or pseudoaneurysm in right groin.  Chest x-rays have been unremarkable.  He is not been on any antibiotics and plan was for discharge and follow-up as an outpatient but he again spiked fevers and also reported soaking sweats last night.     Problem List   Neutropenic fever  Concern for malignancy and/or infection  Remains febrile   Remains neutropenic  -Work-up initiated by hospitalist with SPEP, smooth muscle antibodies, SEEMA, rheumatoid factor, B12 and folate levels as well as a fibrinogen.   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  Pathology report-pending  CMV PCR- pending  Parvovirus PCR- pending    Lump below chin, tender, prior infection   Fracture of mid mandibular body  Submandibular abscess  Possible infected hematoma-2.9 cm collection subjacent to fracture site noted on CT scan  -Reports oral surgery at the end of April and he was on antibiotics (augmentin) due to infection for approximately 1 month.  There had resolved but now with new swelling  Status post IR aspiration on 6/29/2020.  Gram stain+ GPC, GNR.  Culture- pending  On antibiotics above    I have performed a physical exam and reviewed and updated ROS and plan today 6/30/2020.  In review of yesterday's note 6/29/2020, there are no changes  except as documented above.       Plan of care discussed with Dr. Urena. Will continue to follow

## 2020-07-01 PROBLEM — M27.2 MANDIBULAR ABSCESS: Status: ACTIVE | Noted: 2020-07-01

## 2020-07-01 LAB
ANION GAP SERPL CALC-SCNC: 10 MMOL/L (ref 7–16)
ANISOCYTOSIS BLD QL SMEAR: ABNORMAL
BACTERIA WND AEROBE CULT: ABNORMAL
BACTERIA WND AEROBE CULT: ABNORMAL
BASOPHILS # BLD AUTO: 0.9 % (ref 0–1.8)
BASOPHILS # BLD: 0.04 K/UL (ref 0–0.12)
BUN SERPL-MCNC: 12 MG/DL (ref 8–22)
CALCIUM SERPL-MCNC: 7.9 MG/DL (ref 8.5–10.5)
CHLORIDE SERPL-SCNC: 100 MMOL/L (ref 96–112)
CO2 SERPL-SCNC: 22 MMOL/L (ref 20–33)
CREAT SERPL-MCNC: 0.63 MG/DL (ref 0.5–1.4)
EOSINOPHIL # BLD AUTO: 0.08 K/UL (ref 0–0.51)
EOSINOPHIL NFR BLD: 1.8 % (ref 0–6.9)
ERYTHROCYTE [DISTWIDTH] IN BLOOD BY AUTOMATED COUNT: 46.5 FL (ref 35.9–50)
GLUCOSE SERPL-MCNC: 107 MG/DL (ref 65–99)
GRAM STN SPEC: ABNORMAL
HCT VFR BLD AUTO: 25.7 % (ref 42–52)
HGB BLD-MCNC: 8.4 G/DL (ref 14–18)
LYMPHOCYTES # BLD AUTO: 2.72 K/UL (ref 1–4.8)
LYMPHOCYTES NFR BLD: 60.5 % (ref 22–41)
MACROCYTES BLD QL SMEAR: ABNORMAL
MANUAL DIFF BLD: NORMAL
MCH RBC QN AUTO: 28.6 PG (ref 27–33)
MCHC RBC AUTO-ENTMCNC: 32.7 G/DL (ref 33.7–35.3)
MCV RBC AUTO: 87.4 FL (ref 81.4–97.8)
MONOCYTES # BLD AUTO: 0.47 K/UL (ref 0–0.85)
MONOCYTES NFR BLD AUTO: 10.5 % (ref 0–13.4)
MORPHOLOGY BLD-IMP: NORMAL
NEUTROPHILS # BLD AUTO: 1.18 K/UL (ref 1.82–7.42)
NEUTROPHILS NFR BLD: 26.3 % (ref 44–72)
NRBC # BLD AUTO: 0 K/UL
NRBC BLD-RTO: 0 /100 WBC
OVALOCYTES BLD QL SMEAR: NORMAL
PLATELET # BLD AUTO: 52 K/UL (ref 164–446)
PLATELET BLD QL SMEAR: NORMAL
POIKILOCYTOSIS BLD QL SMEAR: NORMAL
POTASSIUM SERPL-SCNC: 4 MMOL/L (ref 3.6–5.5)
RBC # BLD AUTO: 2.94 M/UL (ref 4.7–6.1)
RBC BLD AUTO: PRESENT
SIGNIFICANT IND 70042: ABNORMAL
SITE SITE: ABNORMAL
SMUDGE CELLS BLD QL SMEAR: NORMAL
SODIUM SERPL-SCNC: 132 MMOL/L (ref 135–145)
SOURCE SOURCE: ABNORMAL
WBC # BLD AUTO: 4.5 K/UL (ref 4.8–10.8)

## 2020-07-01 PROCEDURE — 700105 HCHG RX REV CODE 258: Performed by: HOSPITALIST

## 2020-07-01 PROCEDURE — 700102 HCHG RX REV CODE 250 W/ 637 OVERRIDE(OP): Performed by: HOSPITALIST

## 2020-07-01 PROCEDURE — 700111 HCHG RX REV CODE 636 W/ 250 OVERRIDE (IP): Performed by: INTERNAL MEDICINE

## 2020-07-01 PROCEDURE — 80048 BASIC METABOLIC PNL TOTAL CA: CPT

## 2020-07-01 PROCEDURE — A9270 NON-COVERED ITEM OR SERVICE: HCPCS | Performed by: INTERNAL MEDICINE

## 2020-07-01 PROCEDURE — 99232 SBSQ HOSP IP/OBS MODERATE 35: CPT | Performed by: INTERNAL MEDICINE

## 2020-07-01 PROCEDURE — 99233 SBSQ HOSP IP/OBS HIGH 50: CPT | Performed by: HOSPITALIST

## 2020-07-01 PROCEDURE — 700105 HCHG RX REV CODE 258: Performed by: INTERNAL MEDICINE

## 2020-07-01 PROCEDURE — 85007 BL SMEAR W/DIFF WBC COUNT: CPT

## 2020-07-01 PROCEDURE — 770020 HCHG ROOM/CARE - TELE (206)

## 2020-07-01 PROCEDURE — 85027 COMPLETE CBC AUTOMATED: CPT

## 2020-07-01 PROCEDURE — 700102 HCHG RX REV CODE 250 W/ 637 OVERRIDE(OP): Performed by: INTERNAL MEDICINE

## 2020-07-01 PROCEDURE — A9270 NON-COVERED ITEM OR SERVICE: HCPCS | Performed by: HOSPITALIST

## 2020-07-01 PROCEDURE — 700111 HCHG RX REV CODE 636 W/ 250 OVERRIDE (IP): Performed by: HOSPITALIST

## 2020-07-01 PROCEDURE — 36415 COLL VENOUS BLD VENIPUNCTURE: CPT

## 2020-07-01 RX ORDER — MIDAZOLAM HYDROCHLORIDE 1 MG/ML
.5-2 INJECTION INTRAMUSCULAR; INTRAVENOUS PRN
Status: ACTIVE | OUTPATIENT
Start: 2020-07-01 | End: 2020-07-01

## 2020-07-01 RX ORDER — MIDAZOLAM HYDROCHLORIDE 1 MG/ML
.5-2 INJECTION INTRAMUSCULAR; INTRAVENOUS PRN
Status: DISCONTINUED | OUTPATIENT
Start: 2020-07-01 | End: 2020-07-01

## 2020-07-01 RX ORDER — SODIUM CHLORIDE 9 MG/ML
500 INJECTION, SOLUTION INTRAVENOUS
Status: DISCONTINUED | OUTPATIENT
Start: 2020-07-01 | End: 2020-07-01

## 2020-07-01 RX ORDER — ONDANSETRON 2 MG/ML
4 INJECTION INTRAMUSCULAR; INTRAVENOUS PRN
Status: ACTIVE | OUTPATIENT
Start: 2020-07-01 | End: 2020-07-01

## 2020-07-01 RX ORDER — HYDROCODONE BITARTRATE AND ACETAMINOPHEN 5; 325 MG/1; MG/1
1 TABLET ORAL EVERY 4 HOURS PRN
Status: DISCONTINUED | OUTPATIENT
Start: 2020-07-01 | End: 2020-07-04

## 2020-07-01 RX ORDER — SODIUM CHLORIDE 9 MG/ML
500 INJECTION, SOLUTION INTRAVENOUS
Status: ACTIVE | OUTPATIENT
Start: 2020-07-01 | End: 2020-07-01

## 2020-07-01 RX ADMIN — CEFEPIME 2 G: 2 INJECTION, POWDER, FOR SOLUTION INTRAVENOUS at 21:22

## 2020-07-01 RX ADMIN — CEFEPIME 2 G: 2 INJECTION, POWDER, FOR SOLUTION INTRAVENOUS at 05:24

## 2020-07-01 RX ADMIN — CEFEPIME 2 G: 2 INJECTION, POWDER, FOR SOLUTION INTRAVENOUS at 16:25

## 2020-07-01 RX ADMIN — VANCOMYCIN HYDROCHLORIDE 1250 MG: 500 INJECTION, POWDER, LYOPHILIZED, FOR SOLUTION INTRAVENOUS at 05:24

## 2020-07-01 RX ADMIN — LOVASTATIN 20 MG: 20 TABLET ORAL at 21:22

## 2020-07-01 RX ADMIN — HYDROCODONE BITARTRATE AND ACETAMINOPHEN 1 TABLET: 5; 325 TABLET ORAL at 16:24

## 2020-07-01 RX ADMIN — AMLODIPINE BESYLATE 2.5 MG: 5 TABLET ORAL at 21:22

## 2020-07-01 ASSESSMENT — PATIENT HEALTH QUESTIONNAIRE - PHQ9
SUM OF ALL RESPONSES TO PHQ9 QUESTIONS 1 AND 2: 0
1. LITTLE INTEREST OR PLEASURE IN DOING THINGS: NOT AT ALL
2. FEELING DOWN, DEPRESSED, IRRITABLE, OR HOPELESS: NOT AT ALL

## 2020-07-01 ASSESSMENT — ENCOUNTER SYMPTOMS
PHOTOPHOBIA: 0
MYALGIAS: 1
ORTHOPNEA: 0
MYALGIAS: 0
DOUBLE VISION: 0
COUGH: 0
BLURRED VISION: 0
CHILLS: 0
BACK PAIN: 0
FOCAL WEAKNESS: 0
NECK PAIN: 0
FEVER: 1
TREMORS: 0
BRUISES/BLEEDS EASILY: 0
VOMITING: 0
CLAUDICATION: 0
WEIGHT LOSS: 0
CONSTIPATION: 0
HEARTBURN: 0
NAUSEA: 0
HEADACHES: 0
CHILLS: 1
DIZZINESS: 0
SPUTUM PRODUCTION: 0
ABDOMINAL PAIN: 0
DIARRHEA: 0
WHEEZING: 0
PALPITATIONS: 0
DEPRESSION: 0
FEVER: 0
HEMOPTYSIS: 0

## 2020-07-01 ASSESSMENT — LIFESTYLE VARIABLES: SUBSTANCE_ABUSE: 0

## 2020-07-01 ASSESSMENT — FIBROSIS 4 INDEX: FIB4 SCORE: 12.39

## 2020-07-01 NOTE — CARE PLAN
Pt demonstrates adequate use of call light function. Addressed fever with tylenol, pt on abx therapy, pt verbalizes adequate pain control. Pt will remain free from falls and injury on my shift.

## 2020-07-01 NOTE — PROGRESS NOTES
Infectious Disease Progress Note    Author: Vera Berry M.D. Date & Time of service: 2020  8:12 AM    Chief Complaint:  Fevers, pancytopenia      Interval History:   T-max 102.9 WBC 3.7 patient remains neutropenic .  CT of the neck shows a 2.9 cm collection adjacent to the fracture site of the mid mandibular body.  An infected hematoma cannot be ruled out.  CT scan shows possible splenic infarcts and gallbladder wall thickening with pericholecystic fluid.  Patient continues to have fevers and chills.  He states his chin lesion is more painful and has increased in size.  Plan for bone marrow biopsy today   T-max 101.9, remains febrile.  Had bone biopsy yesterday and aspiration of submandibular abscess.  He did not sleep well overnight as he was very warm.  No new symptoms today   T-max 100.9 WBC 4.5 culture growing viridans Streptococcus.  Patient did not feel any fevers overnight.  Overall fever curve improving.  He states that his abscess drained throughout the night.  Bone marrow biopsy results pending.  Eager to go home    review of Systems:  Review of Systems   Constitutional: Positive for malaise/fatigue. Negative for chills and fever.   Respiratory: Negative for cough, sputum production and wheezing.    Cardiovascular: Negative for chest pain.   Gastrointestinal: Negative for abdominal pain, constipation, diarrhea, nausea and vomiting.   Genitourinary: Negative for dysuria.   Musculoskeletal: Positive for myalgias.   Neurological: Negative for dizziness and headaches.   All other systems reviewed and are negative.      Hemodynamics:  Temp (24hrs), Av.4 °C (99.3 °F), Min:36.6 °C (97.9 °F), Max:38.3 °C (100.9 °F)  Temperature: 37.5 °C (99.5 °F)  Pulse  Av.1  Min: 55  Max: 110   Blood Pressure : 139/94       Physical Exam:  Physical Exam  Vitals signs and nursing note reviewed.   Constitutional:       Appearance: Normal appearance.   HENT:      Head:      Comments: Submental  lump.  No active drainage.  No erythema.  Tender to palpation     Mouth/Throat:      Mouth: Mucous membranes are moist.      Pharynx: No oropharyngeal exudate.   Eyes:      Extraocular Movements: Extraocular movements intact.      Conjunctiva/sclera: Conjunctivae normal.      Pupils: Pupils are equal, round, and reactive to light.   Cardiovascular:      Rate and Rhythm: Normal rate and regular rhythm.      Heart sounds: Normal heart sounds.   Pulmonary:      Effort: Pulmonary effort is normal.      Breath sounds: Normal breath sounds.   Abdominal:      General: Abdomen is flat. Bowel sounds are normal.      Palpations: Abdomen is soft.   Musculoskeletal: Normal range of motion.      Right lower leg: No edema.      Left lower leg: No edema.   Skin:     General: Skin is warm and dry.   Neurological:      General: No focal deficit present.      Mental Status: He is alert and oriented to person, place, and time.   Psychiatric:         Mood and Affect: Mood normal.         Behavior: Behavior normal.      Comments: Pleasant         Meds:    Current Facility-Administered Medications:   •  NS  •  ondansetron  •  fentaNYL  •  midazolam  •  vancomycin  •  MD Alert...Vancomycin per Pharmacy  •  cefepime  •  zolpidem  •  amLODIPine  •  lovastatin  •  senna-docusate **AND** polyethylene glycol/lytes **AND** magnesium hydroxide **AND** bisacodyl  •  acetaminophen  •  enalaprilat  •  ondansetron  •  ondansetron    Labs:  Recent Labs     06/29/20 0411 06/30/20  0550 07/01/20  0408   WBC 3.7* 4.5* 4.5*   RBC 3.12* 3.09* 2.94*   HEMOGLOBIN 8.8* 8.8* 8.4*   HEMATOCRIT 26.7* 26.4* 25.7*   MCV 85.6 85.4 87.4   MCH 28.2 28.5 28.6   RDW 43.3 44.9 46.5   PLATELETCT 56* 52* 52*   NEUTSPOLYS 15.60* 18.80* 26.30*   LYMPHOCYTES 66.10* 67.00* 60.50*   MONOCYTES 15.60* 12.50 10.50   EOSINOPHILS 0.00 0.00 1.80   BASOPHILS 0.00 0.00 0.90   RBCMORPHOLO See Note Present Present     Recent Labs     06/29/20 0411 06/30/20  0550 07/01/20  0408      SODIUM 130* 130* 132*   POTASSIUM 3.6 3.3* 4.0   CHLORIDE 97 99 100   CO2 21 22 22   GLUCOSE 122* 114* 107*   BUN 16 15 12     Recent Labs     06/29/20  0411 06/30/20  0550 07/01/20  0408   CREATININE 0.63 0.53 0.63       Imaging:  Dx-chest-2 Views    Result Date: 6/27/2020 6/27/2020 11:36 AM HISTORY/REASON FOR EXAM:  Fever TECHNIQUE/EXAM DESCRIPTION AND NUMBER OF VIEWS: Two views of the chest. COMPARISON:  6/23/2020. FINDINGS: LUNGS: Slight hyperinflation. Chronic slightly hyperdense nodule in the right midlung, likely benign granuloma. No focal consolidation. No effusions. PNEUMOTHORAX: None. LINES AND TUBES: None. MEDIASTINUM: No cardiomegaly. TAVR. LAD stent. Atherosclerosis. MUSCULOSKELETAL STRUCTURES: No acute displaced fracture.     No acute cardiopulmonary abnormality.    Dx-chest-portable (1 View)    Result Date: 6/23/2020 6/23/2020 5:44 PM HISTORY/REASON FOR EXAM:  tachycardia. TECHNIQUE/EXAM DESCRIPTION AND NUMBER OF VIEWS: Single portable view of the chest. COMPARISON: Exam from 6:42 AM FINDINGS: Heart size is within normal limits. No pulmonary infiltrates or consolidations are noted. No pleural abnormalities are noted. There is an old left seventh rib fracture.     No acute cardiac or pulmonary abnormalities are identified.    Dx-chest-portable (1 View)    Result Date: 6/23/2020 6/23/2020 6:05 AM HISTORY/REASON FOR EXAM: Postop TAVR. TECHNIQUE/EXAM DESCRIPTION AND NUMBER OF VIEWS: Single portable view of the chest. COMPARISON: 6/22/2020 FINDINGS: LUNGS: Clear. No effusions. PNEUMOTHORAX: None. LINES AND TUBES: None. MEDIASTINUM: Stable cardiac silhouette. MUSCULOSKELETAL STRUCTURES: Unchanged.     Clear lungs. No pleural effusions.    Dx-chest-portable (1 View)    Result Date: 6/22/2020 6/22/2020 12:50 PM HISTORY/REASON FOR EXAM:  Status post aortic valve replacement. TECHNIQUE/EXAM DESCRIPTION AND NUMBER OF VIEWS: Single portable view of the chest. COMPARISON: 7/15/2019 FINDINGS: Single portable  view of the chest demonstrates a normal cardiac silhouette and mediastinal contours. Calcification is in the aorta. There is emphysematous change. The lungs are hyperexpanded. No confluent opacity, pleural fluid, or pneumothorax. No suspicious bony lesions.     No acute findings status post TAVR    Us-abdomen Complete Survey    Result Date: 6/26/2020 6/26/2020 6:27 AM HISTORY/REASON FOR EXAM:  Abnormal Labs Pain TECHNIQUE/EXAM DESCRIPTION AND NUMBER OF VIEWS:  Complete abdomen survey. COMPARISON: None FINDINGS: The liver is normal in contour. There is no evidence of solid mass lesion. The liver measures 14.57 cm. The gallbladder is normal. There is no evidence of cholelithiasis. The gallbladder wall thickness measures 2.50 mm. There is no pericholecystic fluid. The common duct measures 3.40 mm. The visualized pancreas is unremarkable. The visualized aorta is normal in caliber. Intrahepatic IVC is patent. The portal vein is patent with hepatopetal flow. The MPV measures 1.22 cm. The right kidney measures 11.19 cm. The left kidney measures 11.17 cm. There is no hydronephrosis. The spleen measures 10.41 cm maximally. The bladder is trabeculated, which could indicate chronic bladder outlet obstruction. There is no ascites.     Prominent trabeculated bladder wall, suggesting possible chronic bladder outlet obstruction. Unremarkable findings otherwise.     Us-extremity Artery Lower Bilat    Result Date: 6/22/2020  Lower Extremity  Arterial Duplex Report  Vascular Laboratory  CONCLUSIONS  Right.  Echolucent thrombus partially fills the common femoral artery causing a  high grade stenosis. Velocities are consistent with > 75% stenosis.  Mild plaque visualized in the superficial femoral and popliteal arteries  without evidence of hemodynamically significant stenosis.  Monophasic waveforms demonstrated throughout the right lower extremity.  Left.  Mild plaque is seen in the common femoral, femoral, and popliteal arteries   with no elevated velocities to indicate hemodynamically significant  stenosis.  FADIA LANDIS  Exam Date:     2020 17:34  Room #:     Inpatient  Priority:     Stat  Ht (in):             Wt (lb):  Ordering Physician:        ANTHONY PIPER  Referring Physician:       ANTHONY PIPER  Sonographer:               Ewa Curtis RVT  Study Type:                Complete Bilateral  Technical Quality:         Adequate  Age:    65    Gender:     M  MRN:    [id number]  :    1955      BSA:  Indications:     Pain in leg, unspecified  CPT Codes:       00810  ICD Codes:       M79.606  History:         Pain in legs s/p TAVR. No prior duplex.  Limitations:                RIGHT  Waveform        Peak Systolic Velocity (cm/s)                  Prox    Prox-Mid  Mid    Mid-Dist  Distal  Monophasic                        307              143     CFA  Monophasic      55                                         PFA  Monophasic      53                51               34      SFA  Monophasic                        30                       POP  Monophasic      22                                 32      AT  Monophasic      19                                 11      PT  Monophasic      37                                 20      EVGENY                LEFT  Waveform        Peak Systolic Velocity (cm/s)                  Prox    Prox-Mid  Mid    Mid-Dist  Distal  Triphasic                         187                      CFA  Biphasic        78                                         PFA  Biphasic        103               142              106     SFA  Biphasic                          57                       POP  Biphasic        83                                 110     AT  Biphasic        76                                 24      PT  Biphasic        61                                 24      EVGENY  FINDINGS  Right.  Echolucent material consistent with thrombus partially  fills the common  femoral artery causing a high grade stenosis. Stenosis of the common  femoral artery. Velocities are consistent with > 75% stenosis.  Mild plaque visualized in the femoral and popliteal arteries without  evidence of hemodynamic significance.  Monophasic waveforms demonstrated throughout the right lower extremity.  Left.  Mild plaque is seen in the common femoral, femoral, and popliteal arteries  with no elevated velocities to indicate hemodynamic significance.  Waveforms are biphasic throughout the left lower extremity.  Demetria Narvaez  (Electronically Signed)  Final Date:      2020                   18:45  Amended:         2020 19:58    Us-extremity Artery Lower Unilat Right    Result Date: 2020  Lower Extremity  Arterial Duplex Report  Vascular Laboratory  CONCLUSIONS  Pinching of the common femoral artery from perclose device closure.  Velcoity consistent with >75% stenosis. Intraluminal clot seen in prior  study is not readily seen.  FADIA LANDIS  Exam Date:     2020 07:49  Room #:     Inpatient  Priority:     Routine  Ht (in):             Wt (lb):  Ordering Physician:        MELVIN SIMOSN  Referring Physician:       MELVIN SIMONS  Sonographer:               Blank Calles RVT  Study Type:                Limited Unilateral  Technical Quality:         Adequate  Age:    65    Gender:     M  MRN:    [id number]  :    1955      BSA:  Indications:     Unspecified atherosclerosis of native arteries of                   extremities, right leg  CPT Codes:       55030  ICD Codes:       I70.201  History:         Right common femoral steosis.  Limitations:                RIGHT  Waveform        Peak Systolic Velocity (cm/s)                  Prox    Prox-Mid  Mid    Mid-Dist  Distal  Monophasic      42                384              230     CFA  Biphasic        97                                          PFA  Biphasic        55                                         SFA                                                             POP                                                             AT                                                             PT                                                             EVGENY                LEFT  Waveform        Peak Systolic Velocity (cm/s)                  Prox    Prox-Mid  Mid    Mid-Dist  Distal                                                             CFA                                                             PFA                                                             SFA                                                             POP                                                             AT                                                             PT                                                             EVGENY  FINDINGS  Limited evaluation of the right common femoral artery known stenosis.  Right.  Stenosis of the mid common femoral artery. Velocities are consistent with >  75%stenosis.  Distal flow is turbulent and damped.  No evidence of hematoma or psuedoaneuysm seen at the right groin.  Luiz Dennis MD  (Electronically Signed)  Final Date:      26 June 2020                   08:49    Ec-echocardiogram Complete W/o Cont    Result Date: 6/23/2020  Transthoracic Echo Report Echocardiography Laboratory CONCLUSIONS Prior echo done on 06/22/2020, there is now a TAVR valve present. Left ventricular ejection fraction is visually estimated to be 60%. Unable to estimate pulmonary artery pressure due to an inadequate tricuspid regurgitant jet. Known TAVR aortic valve. FADIA LANDIS Exam Date:         06/23/2020                    03:46 Exam Location:     Inpatient Priority:          Routine Ordering Physician:        ANTHONY PIPER Referring Physician:       [id number] Cintron Sonographer:                JOJO Ash Age:    65     Gender:    Jordy chavez MRN:    [id number] :    1955 BSA:    1.64   Ht (in):    66     Wt (lb):    126 Exam Type:     Complete Indications:     Prosthetic Valve ICD Codes:       V43.3 CPT Codes:       12507 BP:   142    /   83     HR:   70 Technical Quality:       Fair MEASUREMENTS  (Male / Female) Normal Values 2D ECHO LV Diastolic Diameter PLAX        6 cm                  4.2 - 5.9 / 3.9 - 5.3 cm LV Systolic Diameter PLAX         3.7 cm                2.1 - 4.0 cm IVS Diastolic Thickness           0.71 cm               LVPW Diastolic Thickness          0.7 cm                LVOT Diameter                     1.5 cm                Estimated LV Ejection Fraction    60 %                  LV Ejection Fraction MOD BP       39.5 %                >= 55  % LV Ejection Fraction MOD 4C       40.2 %                LV Ejection Fraction MOD 2C       33.9 %                IVC Diameter                      1.8 cm                DOPPLER AV Peak Velocity                  0.99 m/s              AV Peak Gradient                  3.9 mmHg              AV Mean Gradient                  2.7 mmHg              LVOT Peak Velocity                1.1 m/s               AV Area Cont Eq vti               1.9 cm2               Mitral E Point Velocity           0.63 m/s              Mitral E to A Ratio               1.5                   MV Pressure Half Time             75.3 ms               MV Area PHT                       2.9 cm2               MV Deceleration Time              260 ms                * Indicates values subject to auto-interpretation LV EF:  60    % FINDINGS Left Ventricle Left ventricle is mildly dilated. Normal left ventricular wall thickness. Normal left ventricular systolic function. Left ventricular ejection fraction is visually estimated to be 60%. Normal regional wall motion. Normal diastolic function. Right Ventricle Normal right ventricular size. Normal right  ventricular systolic function. Right Atrium Normal right atrial size. Normal inferior vena cava size and inspiratory collapse. Left Atrium Normal left atrial size. Left atrial volume index is 26 mL/sq m. Mitral Valve Calcification of the mitral valve leaflets. No mitral stenosis. Trace mitral regurgitation. Aortic Valve Known TAVR aortic valve. Vmax is 1 m/s. Transvalvular gradients are - Peak: 3 mmHg, Mean: 2 mmHg. Tricuspid Valve Structurally normal tricuspid valve. No tricuspid stenosis. Trace tricuspid regurgitation. Right atrial pressure is estimated to be 8 mmHg. Unable to estimate pulmonary artery pressure due to an inadequate tricuspid regurgitant jet. Pulmonic Valve The pulmonic valve is not well visualized. Pericardium No pericardial effusion seen. Aorta Normal aortic root for body surface area. Ascending aorta diameter is 2.4 cm. Brenden Hansen MD (Electronically Signed) Final Date:     2020                 06:41    Ec-echocardiogram Ltd W/o Cont    Result Date: 2020  Transthoracic Echo Report Echocardiography Laboratory CONCLUSIONS Intraoperative TTE during TAVR.  Baseline images show mildly reduced LV function with EF =55%.  Calcified aortic valve leaflets. Severe aortic stenosis.  Post deployment images show preserved biventricular function.  A 26 mm Jewell Jose Manuel Ultra stented bioprosthetic valve is seen in the aortic position with normal leaflet motion, trace significant paravalvular leak, mean gradent of 5 mm Hg and calculated effective orifice area of 2.5 cm2.  Findings communicated at the time of exam. FADIA LANDIS Exam Date:         2020                    11:17 Exam Location:     Inpatient Priority:          Routine Ordering Physician:        CLEOPATRA JONES Referring Physician: Sonographer:               Antwan La RDCS Age:    65     Gender:    Mal                           e MRN:    [id number] :    1955 BSA:    1.64   Ht (in):    66     Wt (lb):    126  Exam Type:     Limited Indications:     Pre-Op Surgery ICD Codes:       V72.83 CPT Codes:       72843 BP:          /          HR: Technical Quality:       Fair MEASUREMENTS  (Male / Female) Normal Values 2D ECHO Estimated LV Ejection Fraction    55 %                  * Indicates values subject to auto-interpretation LV EF:  55    % FINDINGS Left Ventricle The left ventricle was normal in size and function. Right Ventricle The right ventricle was normal in size and function. Right Atrium The right atrium is normal in size. Left Atrium The left atrium is normal in size. Mitral Valve Structurally normal mitral valve without significant stenosis or regurgitation. Aortic Valve Calcified aortic valve leaflets. Severe aortic stenosis. Tricuspid Valve Structurally normal tricuspid valve without significant stenosis or regurgitation. Pulmonic Valve Structurally normal pulmonic valve without significant stenosis or regurgitation. Pericardium Normal pericardium without effusion. Aorta The aortic root is normal. Simon Parsons (Electronically Signed) Final Date:     23 June 2020                 16:26 Amended:        23 June 2020 16:29      Micro:  Results     Procedure Component Value Units Date/Time    Aerobic/Anaerobic Culture (Surgery) [[id number]] Collected:  06/29/20 1317    Order Status:  Completed Specimen:  Wound from Other Body Fluid Updated:  06/30/20 1538     Significant Indicator NEG     Source WND     Site submandibular     Culture Result Order placed in Surgery. Source appropriate culture orders  have been placed in Micro for testing.      Narrative:       [id number] PAWAN KOHLI  Large lump under chin  Please send for cytology/pathology  [id number] PAWAN KOHLI  2mls pink, frothy fluid aspirated from submandibular fluid  collection, MD Jenkins  [id number] PAWAN KOHLI    Anaerobic Culture [[id number]] Collected:  06/29/20 1317    Order Status:  Completed Specimen:  Wound  Updated:  06/30/20 1538     Significant Indicator NEG     Source WND     Site submandibular     Culture Result Culture in progress.    Narrative:       [id number] PAWNA BRICE F.  Large lump under chin  Please send for cytology/pathology  [id number] PAWAN BRICE F.  2mls pink, frothy fluid aspirated from submandibular fluid  collection, MD Jenkins  [id number] PAWAN BRICE F.    CULTURE WOUND W/ GRAM STAIN [[id number]]  (Abnormal) Collected:  06/29/20 1317    Order Status:  Completed Specimen:  Wound Updated:  06/30/20 1538     Significant Indicator POS     Source WND     Site submandibular     Culture Result -     Gram Stain Result Many WBCs.  Many Gram positive cocci.  Few Gram negative rods.       Culture Result Viridans Streptococcus  Heavy growth      Narrative:       [id number] PAWAN BRICE F.  Large lump under chin  Please send for cytology/pathology  [id number] PAWAN BRICE F.  2mls pink, frothy fluid aspirated from submandibular fluid  collection, MD Jenkins  [id number] PAWAN BRICE F.    GRAM STAIN [[id number]] Collected:  06/29/20 1317    Order Status:  Completed Specimen:  Wound Updated:  06/29/20 1622     Significant Indicator .     Source WND     Site submandibular     Gram Stain Result Many WBCs.  Many Gram positive cocci.  Few Gram negative rods.      Narrative:       [id number] PAWAN BRICE F.  Large lump under chin  Please send for cytology/pathology  [id number] PAWAN BRICE F.  2mls pink, frothy fluid aspirated from submandibular fluid  collection, MD Jenkins  [id number] PAWAN BRICE F.    FLUID CULTURE W/GRAM STAIN [[id number]] Collected:  06/29/20 1317    Order Status:  Canceled Specimen:  Other Body Fluid     FLUID CULTURE W/GRAM STAIN [[id number]] Collected:  06/29/20 1317    Order Status:  Sent Specimen:  Other Body Fluid     URINALYSIS [[id number]]  (Abnormal) Collected:  06/27/20 1230    Order Status:  Completed  "Specimen:  Urine, Clean Catch Updated:  06/27/20 1257     Color DK Yellow     Character Clear     Specific Gravity 1.019     Ph 5.5     Glucose Negative mg/dL      Ketones Negative mg/dL      Protein 100 mg/dL      Bilirubin Negative     Urobilinogen, Urine 1.0     Nitrite Negative     Leukocyte Esterase Negative     Occult Blood Small     Micro Urine Req Microscopic    Narrative:       [id number] JANET CONNOLLY    BLOOD CULTURE [[id number]] Collected:  06/26/20 2146    Order Status:  Completed Specimen:  Blood from Peripheral Updated:  06/27/20 0815     Significant Indicator NEG     Source BLD     Site PERIPHERAL     Culture Result No Growth  Note: Blood cultures are incubated for 5 days and  are monitored continuously.Positive blood cultures  are called to the RN and reported as soon as  they are identified.      Narrative:       Protective  Per Hospital Policy: Only change Specimen Src: to \"Line\" if  specified by physician order.  Left Forearm/Arm    BLOOD CULTURE [[id number]] Collected:  06/26/20 2159    Order Status:  Completed Specimen:  Blood from Peripheral Updated:  06/27/20 0815     Significant Indicator NEG     Source BLD     Site PERIPHERAL     Culture Result No Growth  Note: Blood cultures are incubated for 5 days and  are monitored continuously.Positive blood cultures  are called to the RN and reported as soon as  they are identified.      Narrative:       Protective  Per Hospital Policy: Only change Specimen Src: to \"Line\" if  specified by physician order.  Right Forearm/Arm          Assessment:  Active Hospital Problems    Diagnosis   • *Fever [R50.9]   • Pancytopenia (HCC) [D61.818]   • S/P TAVR (transcatheter aortic valve replacement) [Z95.2]   • Essential hypertension [I10]   • Mixed hyperlipidemia [E78.2]   • Panlobular emphysema (HCC) [J43.1]   • Tobacco dependence [F17.200]         ASSESSMENT/PLAN:      65 y.o.  admitted 6/23/2020. Pt has a past medical history of TAVR on 6/22/2020 and " pancytopenia which he has had some work-up but no bone marrow biopsy as yet.  He has had a decreased WBC since 2016 with neutropenia.  He also has some mild anemia and thrombocytopenia that is also chronic but worsened recently.  He presented the ER complaining of tachycardia the day after he been sent home after his TAVR surgery.      Hospital Course:   Patient was admitted and was initially febrile and 6/20 4-1.3 and then again spiked a fever to 103.6 on 6/26.  Initial work-up for pancytopenia was started and plan was to complete this with heme-onc as an outpatient.  Blood cultures were obtained 6/26.  Ultrasound abdomen on 6/25 with trabeculated bladder wall suggesting possible chronic bladder outlet obstruction but otherwise unremarkable.  Ultrasound lower extremity with noted stenosis of the right common femoral artery report is known - no evidence of hematoma or pseudoaneurysm in right groin.  Chest x-rays have been unremarkable.  He is not been on any antibiotics and plan was for discharge and follow-up as an outpatient but he again spiked fevers and also reported soaking sweats last night.     Problem List   Neutropenic fever, overall improving  Concern for malignancy and/or infection  Fever curve improving  Berry improving  -Work-up initiated by hospitalist with SPEP, smooth muscle antibodies, SEEMA, rheumatoid factor, B12 and folate levels as well as a fibrinogen.   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  Pathology report-  pending  CMV PCR- pending  Parvovirus PCR- pending    Lump below chin, tender, prior infection   Fracture of mid mandibular body  Submandibular abscess  Possible infected hematoma-2.9 cm collection subjacent to fracture site noted on CT scan  -Reports oral surgery at the end of April and he was on antibiotics (augmentin) due to infection for approximately 1 month.  There had resolved but now with new swelling  Status post IR aspiration on 6/29/2020.  Gram stain+ GPC, GNR.  Culture- viridans Streptococcus  On antibiotics above  Oral surgery evaluation for fracture and need for possible surgical intervention    I have performed a physical exam and reviewed and updated ROS and plan today 7/1/2020.  In review of yesterday's note 6/30/2020, there are no changes except as documented above.       Plan of care discussed with Dr. Urena. Will continue to follow

## 2020-07-01 NOTE — PROGRESS NOTES
Oncology/Hematology Progress Note               Author: Simon Stahl M.D. Date & Time created: 7/1/2020  11:36 AM     CC: Pancytopenia  Interval History:  He mostly has complaints about this area under his chin.  He said he does have pain.  He also has had a fever.    Review of Systems:  Review of Systems   Constitutional: Positive for chills, fever and malaise/fatigue. Negative for weight loss.   HENT: Negative for hearing loss and tinnitus.    Eyes: Negative for blurred vision, double vision and photophobia.   Respiratory: Negative for cough and hemoptysis.    Cardiovascular: Negative for chest pain and palpitations.   Gastrointestinal: Negative for heartburn and nausea.   Genitourinary: Negative for dysuria and urgency.   Skin: Negative for itching and rash.   Neurological: Negative for dizziness and headaches.   Endo/Heme/Allergies: Negative for environmental allergies. Does not bruise/bleed easily.   Psychiatric/Behavioral: Negative for depression.       Physical Exam:  Physical Exam  Constitutional:       Appearance: Normal appearance.   Eyes:      Extraocular Movements: Extraocular movements intact.      Conjunctiva/sclera: Conjunctivae normal.   Cardiovascular:      Rate and Rhythm: Normal rate and regular rhythm.   Pulmonary:      Effort: Pulmonary effort is normal.      Breath sounds: Normal breath sounds.   Abdominal:      General: Abdomen is flat.      Palpations: Abdomen is soft.   Skin:     Comments: Painful area under chin   Neurological:      General: No focal deficit present.      Mental Status: He is alert and oriented to person, place, and time.         Labs:          Recent Labs     06/28/20  1342 06/29/20  0411 06/30/20  0550 07/01/20  0408   SODIUM  --  130* 130* 132*   POTASSIUM  --  3.6 3.3* 4.0   CHLORIDE  --  97 99 100   CO2  --  21 22 22   BUN  --  16 15 12   CREATININE  --  0.63 0.53 0.63   MAGNESIUM 1.4*  --   --   --    CALCIUM  --  7.7* 7.7* 7.9*     Recent Labs     06/29/20  9977  20  0550 20  0408   GLUCOSE 122* 114* 107*     Recent Labs     20  0411 20  0550 20  0408   RBC 3.12* 3.09* 2.94*   HEMOGLOBIN 8.8* 8.8* 8.4*   HEMATOCRIT 26.7* 26.4* 25.7*   PLATELETCT 56* 52* 52*     Recent Labs     20  0411 20  0550 20  0408   WBC 3.7* 4.5* 4.5*   NEUTSPOLYS 15.60* 18.80* 26.30*   LYMPHOCYTES 66.10* 67.00* 60.50*   MONOCYTES 15.60* 12.50 10.50   EOSINOPHILS 0.00 0.00 1.80   BASOPHILS 0.00 0.00 0.90     Recent Labs     20  04120  0550 20  0408   SODIUM 130* 130* 132*   POTASSIUM 3.6 3.3* 4.0   CHLORIDE 97 99 100   CO2 21 22 22   GLUCOSE 122* 114* 107*   BUN 16 15 12   CREATININE 0.63 0.53 0.63   CALCIUM 7.7* 7.7* 7.9*     Hemodynamics:  Temp (24hrs), Av.4 °C (99.3 °F), Min:36.6 °C (97.9 °F), Max:38.3 °C (100.9 °F)  Temperature: 37.5 °C (99.5 °F)  Pulse  Av.1  Min: 55  Max: 110   Blood Pressure : 139/94     Respiratory:    Respiration: 18, Pulse Oximetry: 90 %(RN notified)           Fluids:    Intake/Output Summary (Last 24 hours) at 2020 1136  Last data filed at 2020 0900  Gross per 24 hour   Intake 240 ml   Output --   Net 240 ml     Weight: 61.4 kg (135 lb 5.8 oz)  GI/Nutrition:  Orders Placed This Encounter   Procedures   • Diet Order Regular     Standing Status:   Standing     Number of Occurrences:   1     Order Specific Question:   Diet:     Answer:   Regular [1]     Medical Decision Making, by Problem:  Active Hospital Problems    Diagnosis   • *Fever [R50.9]   • Pancytopenia (HCC) [D61.818]   • S/P TAVR (transcatheter aortic valve replacement) [Z95.2]   • Essential hypertension [I10]   • Mixed hyperlipidemia [E78.2]   • Panlobular emphysema (HCC) [J43.1]   • Tobacco dependence [F17.200]       Plan:    1.  Hematology: I came by to discuss the preliminary results of his bone marrow.  He understands that there is no acute leukemia at this current time.  They do not see any changes of MDS.  However the  pathologist is going to send it to stand for some additional staining because they did see increase in T cells.  They felt this could also just be all reactive as well.  He understands that there still could be a chance of something underlying but will have to wait on these other tests but at this current time nothing acute.  This all could just be bone marrow suppression from his recent infection as well.    His white count has improved over the last several days.  His platelets are stable.  His hemoglobin is stable.  I do agree with infectious disease with their concern of the splenic infarcts and emboli.  We will continue to follow along peripherally and come back only have more information from the bone marrow.  His counts seem to be stable.  They are getting the surgeon back to help I&D  this area under his chin.    It also seems possible to have infarcts from the TAVR.  It seems closure post procedure can put patient at risk for thrombosis.  He did have neutropenia prior to procedure but anemia/ thrombocytopenia since.  So, seems like need to make sure valve ok like ID is rec. His bilirubin was normal on 0.7.    This patient was seen under COVID 19 pandemic disaster response conditions.  During a disaster, the provisions of care is subject to the Crisis Standard of Care    High complexity  Quality-Core Measures

## 2020-07-01 NOTE — PROGRESS NOTES
Hospital Medicine Daily Progress Note    Date of Service  7/1/2020    Chief Complaint  65 y.o. male admitted 6/23/2020 with tachycardia.  He had a TAVR on 6/22/2020, and was discharged home on 6/23/2020.  He returned to the ER due to persistent tachycardia.     He has a history of pancytopenia which is worsening, and has been hesitant to do a bone marrow biopsy.  He has tobacco dependence, essential hypertension, hyperlipidemia.       Hospital Course    Home Eliquis and aspirin were discontinued.  Heart rate stabilized. He continued to have decline in his hemoglobin and platelets. WBC stabilized. Briefly discussed with hem/onc.  Abdominal ultrasound from 6/25/2020 showed no acute abnormalities.  SPEP, smooth muscle antibodies, SEEMA, rheumatoid factor, B12 and folate levels, fibrinogen were ordered.  Patient wished to go home and agreed follow-up with hematology for bone marrow biopsy and further work-up.  He was discharged to go home on 6/26/2020, but developed a fever of 103.6.     Infectious disease were consulted on 6/27/2020 due to fever and neutropenia, Cefepime was empirically started.     B12, folate, and fibrinogen levels were normal.  Rheumatoid factor was 40, ESR was 45, LFTs were unremarkable, SEEMA was undetectable, HIV was nonreactive.      Interval Problem Update    Case d/w with Dr. Berry of ID    Case d/w dr rouse of oncology. He suspects the bone marrow suppression was reactive to the infection. Final results still pending    Low 8.4  Low plt 52      Sodium 132    Patient states states he feels good    Fever last night again    Leukopenia but no longer neutropenic    Low plts 52, no signs of bleeding    Mandibular fluid collection is infected.  Growing out Streptococcus viridans.    Have consulted oral surgery Dr. Morrison  today and case discussed with him    echoCardiogram shows no vegetations on valves    Consultants/Specialty  ID    Code Status  full    Disposition  home    Review of Systems  Review  of Systems   Constitutional: Positive for fever. Negative for chills, malaise/fatigue and weight loss.   HENT: Negative for ear discharge, hearing loss and tinnitus.    Eyes: Negative for blurred vision, double vision and photophobia.   Respiratory: Negative for cough and sputum production.    Cardiovascular: Negative for palpitations, orthopnea and claudication.   Gastrointestinal: Negative for abdominal pain, heartburn, nausea and vomiting.   Genitourinary: Negative for frequency and urgency.   Musculoskeletal: Negative for back pain, myalgias and neck pain.   Neurological: Negative for dizziness, tremors, focal weakness and headaches.   Endo/Heme/Allergies: Does not bruise/bleed easily.   Psychiatric/Behavioral: Negative for depression and substance abuse.        Physical Exam  Temp:  [36.9 °C (98.4 °F)-38.3 °C (100.9 °F)] 37.5 °C (99.5 °F)  Pulse:  [] 84  Resp:  [16-18] 18  BP: (123-147)/(64-94) 140/78  SpO2:  [90 %-95 %] 95 %    Physical Exam  Constitutional:       Appearance: Normal appearance. He is not diaphoretic.   HENT:      Head: Atraumatic.      Mouth/Throat:      Mouth: Mucous membranes are moist.   Eyes:      General: No scleral icterus.     Extraocular Movements: Extraocular movements intact.      Pupils: Pupils are equal, round, and reactive to light.   Neck:      Musculoskeletal: Normal range of motion and neck supple.   Cardiovascular:      Rate and Rhythm: Normal rate.      Heart sounds: No murmur. No gallop.    Pulmonary:      Effort: Pulmonary effort is normal. No respiratory distress.      Breath sounds: No stridor. No wheezing.   Chest:      Chest wall: No tenderness.   Abdominal:      General: Abdomen is flat. There is no distension.      Tenderness: There is no guarding.      Hernia: No hernia is present.   Musculoskeletal: Normal range of motion.         General: No swelling, tenderness, deformity or signs of injury.      Right lower leg: No edema.   Skin:     General: Skin is warm.       Capillary Refill: Capillary refill takes 2 to 3 seconds.      Coloration: Skin is not jaundiced or pale.      Findings: No bruising or rash.   Neurological:      General: No focal deficit present.      Mental Status: He is alert and oriented to person, place, and time.      Cranial Nerves: No cranial nerve deficit.      Deep Tendon Reflexes: Reflexes normal.   Psychiatric:         Mood and Affect: Mood normal.         Fluids    Intake/Output Summary (Last 24 hours) at 7/1/2020 1333  Last data filed at 7/1/2020 0900  Gross per 24 hour   Intake 240 ml   Output --   Net 240 ml       Laboratory  Recent Labs     06/29/20  0411 06/30/20  0550 07/01/20  0408   WBC 3.7* 4.5* 4.5*   RBC 3.12* 3.09* 2.94*   HEMOGLOBIN 8.8* 8.8* 8.4*   HEMATOCRIT 26.7* 26.4* 25.7*   MCV 85.6 85.4 87.4   MCH 28.2 28.5 28.6   MCHC 33.0* 33.3* 32.7*   RDW 43.3 44.9 46.5   PLATELETCT 56* 52* 52*     Recent Labs     06/29/20  0411 06/30/20  0550 07/01/20  0408   SODIUM 130* 130* 132*   POTASSIUM 3.6 3.3* 4.0   CHLORIDE 97 99 100   CO2 21 22 22   GLUCOSE 122* 114* 107*   BUN 16 15 12   CREATININE 0.63 0.53 0.63   CALCIUM 7.7* 7.7* 7.9*                   Imaging  EC-ECHOCARDIOGRAM COMPLETE W/O CONT   Final Result      IR-CYST ASPIRATION-MISC   Final Result         ULTRASOUND GUIDED ASPIRATION OF SUBMANDIBULAR ABSCESS..      CT-SOFT TISSUE NECK WITH   Final Result         1. Acute versus subacute fracture of the mid mandibular body.   2. A 2.9 cm collection subjacent to the fracture site, likely hematoma. Infected hematoma can be considered in the appropriate clinical settings.   3. No cervical lymphadenopathy.   4. Moderate mucosal thickening of the left maxillary sinus, likely odontogenic in nature, related to impacted remaining maxillary tooth.      CT-CHEST,ABDOMEN,PELVIS WITH   Final Result      Hypodensities in the spleen worrisome for splenic infarcts.      Trace nonspecific free fluid in the pelvis.      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      Bowel containing right inguinal hernia without evidence of obstruction.      Atherosclerotic plaque within an ectatic aorta.      Gallbladder wall thickening/pericholecystic fluid. Further evaluation can be performed with right upper quadrant ultrasound.      Small hypodense left renal lesions are too small to characterize but likely represent small cysts.      Emphysematous changes.      Irregular nodular opacity along the minor fissure measuring 8 mm.      4.7 mm right middle lobe pulmonary nodule.      Mild bibasilar and lingular atelectasis.      Emphysematous changes.      Sequelae of prior granulomatous exposure.      Right hilar lymph node is borderline enlarged and nonspecific, possibly reactive..      Enlarged periportal lymph nodes are nonspecific.      Fleischner Society pulmonary nodule recommendations:   Low Risk: CT at 3-6 months, then consider CT at 18-24 months      High Risk: CT at 3-6 months, then at 18-24 months      Comments: Use most suspicious nodule as guide to management. Follow-up intervals may vary according to size and risk.      Low Risk - Minimal or absent history of smoking and of other known risk factors.      High Risk - History of smoking or of other known risk factors.      Note: These recommendations do not apply to lung cancer screening, patients with immunosuppression, or patients with known primary cancer.      Fleischner Society 2017 Guidelines for Management of Incidentally Detected Pulmonary Nodules in Adults      DX-CHEST-2 VIEWS   Final Result      No acute cardiopulmonary abnormality.      US-EXTREMITY ARTERY LOWER UNILAT RIGHT   Final Result      US-ABDOMEN COMPLETE SURVEY   Final Result      Prominent trabeculated bladder wall, suggesting possible chronic bladder outlet obstruction. Unremarkable findings otherwise.         DX-CHEST-PORTABLE (1 VIEW)   Final Result      No acute cardiac or pulmonary abnormalities are identified.            Assessment/Plan  * Fever- (present on admission)  Assessment & Plan  Fever with neutropenia ALthough neutropenia has resolved    Still febrile    Follow all cultures    Discussed with ID MD Dr. Berry    Mandibular abscess- (present on admission)  Assessment & Plan  Growing strep viridans    Oral surgery to eval dr vasquez    Pancytopenia (HCC)- (present on admission)  Assessment & Plan  Present for a few months, unclear etiology  Briefly discussed with Dr. Chacon (hematology/oncology) a few days ago.  LFT's, fibrinogen, B12, folic acid, SEEMA were normal.  RF was 40, ESR was 45  SPEP was unremarkable, smooth muscle antibodies are pending  Infectious disease were consulted, cefepime started 6/27/2020  Hematology/oncology were consulted 6/28/2020  Bone marrow biopsy done  Will await further recommendations    S/P TAVR (transcatheter aortic valve replacement)- (present on admission)  Assessment & Plan  S/P TAVR on 6/22/2020   . Continue statin  Per cardiology start B12 and folate supplements  Echocardiogram shows no vegetations  Cardiology is following    Mixed hyperlipidemia- (present on admission)  Assessment & Plan  Continue home Lovastatin    Essential hypertension- (present on admission)  Assessment & Plan    Continue Norvasc    Panlobular emphysema (HCC)- (present on admission)  Assessment & Plan  History of tobacco use  Chest x-ray from admission shows no acute cardiopulmonary process    Tobacco dependence- (present on admission)  Assessment & Plan  Patient has been counseled on tobacco cessation  Nicotine replacement options were provided       VTE prophylaxis: scd      Check cm cbc, bmp

## 2020-07-02 PROBLEM — E87.6 HYPOKALEMIA: Status: ACTIVE | Noted: 2020-07-02

## 2020-07-02 LAB
ANION GAP SERPL CALC-SCNC: 7 MMOL/L (ref 7–16)
ANISOCYTOSIS BLD QL SMEAR: ABNORMAL
ANNOTATION COMMENT IMP: NOT DETECTED
B19V DNA SERPL NAA+PROBE-ACNC: <100 IU/ML
B19V DNA SERPL NAA+PROBE-LOG IU: <2 LOG IU/ML
BACTERIA BLD CULT: NORMAL
BACTERIA BLD CULT: NORMAL
BACTERIA SPEC ANAEROBE CULT: NORMAL
BASO STIPL BLD QL SMEAR: NORMAL
BASOPHILS # BLD AUTO: 1.7 % (ref 0–1.8)
BASOPHILS # BLD: 0.07 K/UL (ref 0–0.12)
BUN SERPL-MCNC: 11 MG/DL (ref 8–22)
CALCIUM SERPL-MCNC: 7.8 MG/DL (ref 8.5–10.5)
CHLORIDE SERPL-SCNC: 96 MMOL/L (ref 96–112)
CMV DNA # SERPL NAA+PROBE: <390 CPY/ML
CMV DNA SERPL NAA+PROBE-ACNC: <227 IU/ML
CMV DNA SERPL NAA+PROBE-LOG IU: <2.4 LOG IU/ML
CMV DNA SERPL NAA+PROBE-LOG#: <2.6 LOG CPY/ML
CMV GENE MUT DET ISLT: NOT DETECTED
CMV PCR SOURCE Q4398: NORMAL
CO2 SERPL-SCNC: 22 MMOL/L (ref 20–33)
COVID ORDER STATUS COVID19: NORMAL
COVID ORDER STATUS COVID19: NORMAL
CREAT SERPL-MCNC: 0.59 MG/DL (ref 0.5–1.4)
EKG IMPRESSION: NORMAL
EKG IMPRESSION: NORMAL
EOSINOPHIL # BLD AUTO: 0.07 K/UL (ref 0–0.51)
EOSINOPHIL NFR BLD: 1.7 % (ref 0–6.9)
ERYTHROCYTE [DISTWIDTH] IN BLOOD BY AUTOMATED COUNT: 45.4 FL (ref 35.9–50)
GLUCOSE SERPL-MCNC: 114 MG/DL (ref 65–99)
HCT VFR BLD AUTO: 25 % (ref 42–52)
HGB BLD-MCNC: 8.2 G/DL (ref 14–18)
HYPOCHROMIA BLD QL SMEAR: ABNORMAL
LYMPHOCYTES # BLD AUTO: 2.89 K/UL (ref 1–4.8)
LYMPHOCYTES NFR BLD: 70.4 % (ref 22–41)
MANUAL DIFF BLD: NORMAL
MCH RBC QN AUTO: 28.5 PG (ref 27–33)
MCHC RBC AUTO-ENTMCNC: 32.8 G/DL (ref 33.7–35.3)
MCV RBC AUTO: 86.8 FL (ref 81.4–97.8)
MICROCYTES BLD QL SMEAR: ABNORMAL
MONOCYTES # BLD AUTO: 0.21 K/UL (ref 0–0.85)
MONOCYTES NFR BLD AUTO: 5.2 % (ref 0–13.4)
MORPHOLOGY BLD-IMP: NORMAL
NEUTROPHILS # BLD AUTO: 0.86 K/UL (ref 1.82–7.42)
NEUTROPHILS NFR BLD: 20.9 % (ref 44–72)
NRBC # BLD AUTO: 0 K/UL
NRBC BLD-RTO: 0 /100 WBC
OVALOCYTES BLD QL SMEAR: NORMAL
PLATELET # BLD AUTO: 38 K/UL (ref 164–446)
PLATELET BLD QL SMEAR: NORMAL
PLATELETS.RETICULATED NFR BLD AUTO: 8.2 K/UL (ref 0.6–13.1)
POIKILOCYTOSIS BLD QL SMEAR: NORMAL
POLYCHROMASIA BLD QL SMEAR: NORMAL
POTASSIUM SERPL-SCNC: 3.2 MMOL/L (ref 3.6–5.5)
RBC # BLD AUTO: 2.88 M/UL (ref 4.7–6.1)
RBC BLD AUTO: PRESENT
SIGNIFICANT IND 70042: NORMAL
SITE SITE: NORMAL
SODIUM SERPL-SCNC: 125 MMOL/L (ref 135–145)
SOURCE SOURCE: NORMAL
SPECIMEN SOURCE: NORMAL
WBC # BLD AUTO: 4.1 K/UL (ref 4.8–10.8)

## 2020-07-02 PROCEDURE — 80048 BASIC METABOLIC PNL TOTAL CA: CPT

## 2020-07-02 PROCEDURE — 86022 PLATELET ANTIBODIES: CPT

## 2020-07-02 PROCEDURE — 85007 BL SMEAR W/DIFF WBC COUNT: CPT

## 2020-07-02 PROCEDURE — U0004 COV-19 TEST NON-CDC HGH THRU: HCPCS

## 2020-07-02 PROCEDURE — 93010 ELECTROCARDIOGRAM REPORT: CPT | Performed by: INTERNAL MEDICINE

## 2020-07-02 PROCEDURE — 85055 RETICULATED PLATELET ASSAY: CPT

## 2020-07-02 PROCEDURE — 93005 ELECTROCARDIOGRAM TRACING: CPT | Performed by: HOSPITALIST

## 2020-07-02 PROCEDURE — A9270 NON-COVERED ITEM OR SERVICE: HCPCS | Performed by: INTERNAL MEDICINE

## 2020-07-02 PROCEDURE — 99232 SBSQ HOSP IP/OBS MODERATE 35: CPT | Performed by: HOSPITALIST

## 2020-07-02 PROCEDURE — 770020 HCHG ROOM/CARE - TELE (206)

## 2020-07-02 PROCEDURE — 700102 HCHG RX REV CODE 250 W/ 637 OVERRIDE(OP): Performed by: HOSPITALIST

## 2020-07-02 PROCEDURE — 700105 HCHG RX REV CODE 258: Performed by: INTERNAL MEDICINE

## 2020-07-02 PROCEDURE — 700102 HCHG RX REV CODE 250 W/ 637 OVERRIDE(OP): Performed by: INTERNAL MEDICINE

## 2020-07-02 PROCEDURE — C9803 HOPD COVID-19 SPEC COLLECT: HCPCS | Performed by: HOSPITALIST

## 2020-07-02 PROCEDURE — 700111 HCHG RX REV CODE 636 W/ 250 OVERRIDE (IP): Performed by: INTERNAL MEDICINE

## 2020-07-02 PROCEDURE — 36415 COLL VENOUS BLD VENIPUNCTURE: CPT

## 2020-07-02 PROCEDURE — 99232 SBSQ HOSP IP/OBS MODERATE 35: CPT | Performed by: INTERNAL MEDICINE

## 2020-07-02 PROCEDURE — C9803 HOPD COVID-19 SPEC COLLECT: HCPCS | Performed by: ORAL & MAXILLOFACIAL SURGERY

## 2020-07-02 PROCEDURE — A9270 NON-COVERED ITEM OR SERVICE: HCPCS | Performed by: HOSPITALIST

## 2020-07-02 PROCEDURE — 85027 COMPLETE CBC AUTOMATED: CPT

## 2020-07-02 RX ORDER — POTASSIUM CHLORIDE 20 MEQ/1
40 TABLET, EXTENDED RELEASE ORAL ONCE
Status: COMPLETED | OUTPATIENT
Start: 2020-07-02 | End: 2020-07-02

## 2020-07-02 RX ADMIN — CEFEPIME 2 G: 2 INJECTION, POWDER, FOR SOLUTION INTRAVENOUS at 05:21

## 2020-07-02 RX ADMIN — HYDROCODONE BITARTRATE AND ACETAMINOPHEN 1 TABLET: 5; 325 TABLET ORAL at 08:48

## 2020-07-02 RX ADMIN — CEFEPIME 2 G: 2 INJECTION, POWDER, FOR SOLUTION INTRAVENOUS at 20:33

## 2020-07-02 RX ADMIN — ACETAMINOPHEN 650 MG: 325 TABLET, FILM COATED ORAL at 16:03

## 2020-07-02 RX ADMIN — CEFEPIME 2 G: 2 INJECTION, POWDER, FOR SOLUTION INTRAVENOUS at 14:58

## 2020-07-02 RX ADMIN — HYDROCODONE BITARTRATE AND ACETAMINOPHEN 1 TABLET: 5; 325 TABLET ORAL at 19:38

## 2020-07-02 RX ADMIN — LOVASTATIN 20 MG: 20 TABLET ORAL at 19:38

## 2020-07-02 RX ADMIN — AMLODIPINE BESYLATE 2.5 MG: 5 TABLET ORAL at 19:39

## 2020-07-02 RX ADMIN — POTASSIUM CHLORIDE 40 MEQ: 1500 TABLET, EXTENDED RELEASE ORAL at 14:59

## 2020-07-02 RX ADMIN — ACETAMINOPHEN 650 MG: 325 TABLET, FILM COATED ORAL at 00:10

## 2020-07-02 RX ADMIN — ENALAPRILAT 1.25 MG: 1.25 INJECTION INTRAVENOUS at 16:03

## 2020-07-02 ASSESSMENT — FIBROSIS 4 INDEX: FIB4 SCORE: 16.95

## 2020-07-02 ASSESSMENT — ENCOUNTER SYMPTOMS
ABDOMINAL PAIN: 0
FEVER: 0
HEMOPTYSIS: 0
HEADACHES: 0
BLURRED VISION: 0
DIARRHEA: 0
WEIGHT LOSS: 0
CHILLS: 0
NAUSEA: 0
VOMITING: 0
COUGH: 0
CLAUDICATION: 0
MYALGIAS: 1
HEARTBURN: 0
BRUISES/BLEEDS EASILY: 0
WHEEZING: 0
NECK PAIN: 0
MYALGIAS: 0
SPUTUM PRODUCTION: 0
PALPITATIONS: 0
FEVER: 1
DEPRESSION: 0
PHOTOPHOBIA: 0
DIZZINESS: 0
FOCAL WEAKNESS: 0
CONSTIPATION: 0
DOUBLE VISION: 0
BACK PAIN: 0
TREMORS: 0
ORTHOPNEA: 0

## 2020-07-02 ASSESSMENT — LIFESTYLE VARIABLES: SUBSTANCE_ABUSE: 0

## 2020-07-02 NOTE — PROGRESS NOTES
Bedside report received, pt care assumed, tele box on.  Pt is A&Ox4, sitting in edge of bed, and denies any additional needs at this time. Bed in lowest position, pt educated on fall risk and verbalized understanding, and call light within reach.

## 2020-07-02 NOTE — PROGRESS NOTES
"Monitor room notified RN that patient was having \"sustained junctional ryhthm.\" Pt is asymptomatic. RN ordered a STAT EKG. MD aware, no new orders    CNA applied new leads  "

## 2020-07-02 NOTE — PROGRESS NOTES
Notified NP Brad Beal of pts critical Platelet count. Will refer to day team on if they want to transfuse. No active bleeding with the patient.

## 2020-07-02 NOTE — PROGRESS NOTES
Oncology/Hematology Progress Note               Author: Simon Stahl M.D. Date & Time created: 7/2/2020  1:58 PM     CC: Pancytopenia  Interval History:    Patient feeling ok.  He is still having pain under chin.  Surgery to see.  Still having some fever. No bleeding.      Review of Systems:  Review of Systems   Constitutional: Positive for fever and malaise/fatigue. Negative for chills and weight loss.   HENT: Negative for hearing loss and tinnitus.    Eyes: Negative for blurred vision and double vision.   Respiratory: Negative for cough and hemoptysis.    Cardiovascular: Negative for chest pain and palpitations.   Gastrointestinal: Negative for heartburn and nausea.   Genitourinary: Negative for dysuria and urgency.   Skin: Negative for itching and rash.   Neurological: Negative for dizziness and headaches.   Endo/Heme/Allergies: Negative for environmental allergies. Does not bruise/bleed easily.   Psychiatric/Behavioral: Negative for depression.       Physical Exam:  Physical Exam  Constitutional:       Appearance: Normal appearance.   Eyes:      Extraocular Movements: Extraocular movements intact.      Conjunctiva/sclera: Conjunctivae normal.   Skin:     Coloration: Skin is not jaundiced.      Comments: No change in area under skin   Neurological:      General: No focal deficit present.      Mental Status: He is alert and oriented to person, place, and time.   Psychiatric:         Mood and Affect: Mood normal.         Behavior: Behavior normal.         Thought Content: Thought content normal.         Judgment: Judgment normal.         Labs:          Recent Labs     06/30/20  0550 07/01/20 0408 07/02/20  0406   SODIUM 130* 132* 125*   POTASSIUM 3.3* 4.0 3.2*   CHLORIDE 99 100 96   CO2 22 22 22   BUN 15 12 11   CREATININE 0.53 0.63 0.59   CALCIUM 7.7* 7.9* 7.8*     Recent Labs     06/30/20  0550 07/01/20  0408 07/02/20  0406   GLUCOSE 114* 107* 114*     Recent Labs     06/30/20  0550 07/01/20  0408  20  0406   RBC 3.09* 2.94* 2.88*   HEMOGLOBIN 8.8* 8.4* 8.2*   HEMATOCRIT 26.4* 25.7* 25.0*   PLATELETCT 52* 52* 38*     Recent Labs     20  0550 20  0408 20  0406   WBC 4.5* 4.5* 4.1*   NEUTSPOLYS 18.80* 26.30* 20.90*   LYMPHOCYTES 67.00* 60.50* 70.40*   MONOCYTES 12.50 10.50 5.20   EOSINOPHILS 0.00 1.80 1.70   BASOPHILS 0.00 0.90 1.70     Recent Labs     20  0550 20  0408 20  0406   SODIUM 130* 132* 125*   POTASSIUM 3.3* 4.0 3.2*   CHLORIDE 99 100 96   CO2 22 22 22   GLUCOSE 114* 107* 114*   BUN 15 12 11   CREATININE 0.53 0.63 0.59   CALCIUM 7.7* 7.9* 7.8*     Hemodynamics:  Temp (24hrs), Av.6 °C (99.6 °F), Min:37 °C (98.6 °F), Max:39.1 °C (102.4 °F)  Temperature: 37.5 °C (99.5 °F)  Pulse  Av.4  Min: 52  Max: 110   Blood Pressure : 141/71     Respiratory:    Respiration: 18, Pulse Oximetry: 92 %           Fluids:    Intake/Output Summary (Last 24 hours) at 2020 1136  Last data filed at 2020 0900  Gross per 24 hour   Intake 240 ml   Output --   Net 240 ml     Weight: 61.5 kg (135 lb 9.3 oz)  GI/Nutrition:  Orders Placed This Encounter   Procedures   • Diet Order Regular     Standing Status:   Standing     Number of Occurrences:   1     Order Specific Question:   Diet:     Answer:   Regular [1]     Medical Decision Making, by Problem:  Active Hospital Problems    Diagnosis   • *Fever [R50.9]   • Pancytopenia (HCC) [D61.818]   • S/P TAVR (transcatheter aortic valve replacement) [Z95.2]   • Essential hypertension [I10]   • Mixed hyperlipidemia [E78.2]   • Panlobular emphysema (HCC) [J43.1]   • Tobacco dependence [F17.200]       Plan:    1.  Hematology:   My partner Dr Chacon saw patient initially.  I discussed bone marrow again with the patient.  I discussed his labs with him.  He understands that his platelet count has trended down today.  He has no bleeding.  His bone marrow showed enough kendal's.  He does not have any significant MAP changes.  He is waiting  on surgery to come see him to consider drainage of his chin abscess.  He understands that his situation is multifactorial most likely.  He has no positive blood cultures.  He had a low wbc prior to TAVR.  His counts meaning platelet count / hemoglobin have changed since TAVR.  He also was having dental issues prior to TAVR placement and he tells me was on antibiotics.  He had a cardiac cath about 30 days prior to TAVR he tells me.  He did get heparin.  I spoke to his cardiologist who put in the TAVR who told me he does get heparin during the procedure as well.  He also felt the splenic infarcts and his small thrombosis in the leg that was worked on is common.  However, he has had a significant drop in his platelets but confounded with his fevers/infection/abscess with concern for endocarditis.  His echo negative but JOON planned.  He can have these events affecting his CBC.  The only thing that is not new is his ANC was low prior to TAVR.  Of note, he did have one lab value with a platelet count of 135k on 6/17.  It was then normal on 6/22 at 172k.  He can have some bone marrow suppression (no obvious hemolysis).  His 4T score is 4.  It can be immune.  It all does not fit smoothly into one scenario.  Await his final bone marrow report being reviewed at [place].  D/w primary team    Will follow closely.  Await Flow cytometry which hopefully will come back sooner      This patient was seen under COVID 19 pandemic disaster response conditions.  During a disaster, the provisions of care is subject to the Crisis Standard of Care    High complexity  Quality-Core Measures

## 2020-07-02 NOTE — PROGRESS NOTES
Infectious Disease Progress Note    Author: Vera Berry M.D. Date & Time of service: 2020  7:50 AM    Chief Complaint:  Fevers, pancytopenia      Interval History:   T-max 102.9 WBC 3.7 patient remains neutropenic .  CT of the neck shows a 2.9 cm collection adjacent to the fracture site of the mid mandibular body.  An infected hematoma cannot be ruled out.  CT scan shows possible splenic infarcts and gallbladder wall thickening with pericholecystic fluid.  Patient continues to have fevers and chills.  He states his chin lesion is more painful and has increased in size.  Plan for bone marrow biopsy today   T-max 101.9, remains febrile.  Had bone biopsy yesterday and aspiration of submandibular abscess.  He did not sleep well overnight as he was very warm.  No new symptoms today   T-max 100.9 WBC 4.5 culture growing viridans Streptococcus.  Patient did not feel any fevers overnight.  Overall fever curve improving.  He states that his abscess drained throughout the night.  Bone marrow biopsy results pending.  Eager to go home   T-max 102.4 WBC 4.1 bone marrow results do not show any evidence of MDS or leukemia.  Ongoing chin pain.  Awaiting evaluation from oral surgeon      review of Systems:  Review of Systems   Constitutional: Positive for malaise/fatigue. Negative for chills and fever.   Respiratory: Negative for cough, sputum production and wheezing.    Cardiovascular: Negative for chest pain.   Gastrointestinal: Negative for abdominal pain, constipation, diarrhea, nausea and vomiting.   Genitourinary: Negative for dysuria.   Musculoskeletal: Positive for myalgias.   Neurological: Negative for dizziness and headaches.   All other systems reviewed and are negative.      Hemodynamics:  Temp (24hrs), Av.6 °C (99.7 °F), Min:37 °C (98.6 °F), Max:39.1 °C (102.4 °F)  Temperature: 37 °C (98.6 °F)  Pulse  Av.5  Min: 52  Max: 110   Blood Pressure : 116/44       Physical  Exam:  Physical Exam  Vitals signs and nursing note reviewed.   Constitutional:       Appearance: Normal appearance.   HENT:      Head:      Comments: Submental lump.  No active drainage.  No erythema.  Tender to palpation     Mouth/Throat:      Mouth: Mucous membranes are moist.      Pharynx: No oropharyngeal exudate.   Eyes:      Extraocular Movements: Extraocular movements intact.      Conjunctiva/sclera: Conjunctivae normal.      Pupils: Pupils are equal, round, and reactive to light.   Cardiovascular:      Rate and Rhythm: Normal rate and regular rhythm.      Heart sounds: Normal heart sounds.   Pulmonary:      Effort: Pulmonary effort is normal.      Breath sounds: Normal breath sounds.   Abdominal:      General: Abdomen is flat. Bowel sounds are normal.      Palpations: Abdomen is soft.   Musculoskeletal: Normal range of motion.      Right lower leg: No edema.      Left lower leg: No edema.   Skin:     General: Skin is warm and dry.   Neurological:      General: No focal deficit present.      Mental Status: He is alert and oriented to person, place, and time.   Psychiatric:         Mood and Affect: Mood normal.         Behavior: Behavior normal.      Comments: Pleasant         Meds:    Current Facility-Administered Medications:   •  HYDROcodone-acetaminophen  •  cefepime  •  zolpidem  •  amLODIPine  •  lovastatin  •  senna-docusate **AND** polyethylene glycol/lytes **AND** magnesium hydroxide **AND** bisacodyl  •  acetaminophen  •  enalaprilat  •  ondansetron  •  ondansetron    Labs:  Recent Labs     06/30/20  0550 07/01/20  0408 07/02/20  0406   WBC 4.5* 4.5* 4.1*   RBC 3.09* 2.94* 2.88*   HEMOGLOBIN 8.8* 8.4* 8.2*   HEMATOCRIT 26.4* 25.7* 25.0*   MCV 85.4 87.4 86.8   MCH 28.5 28.6 28.5   RDW 44.9 46.5 45.4   PLATELETCT 52* 52* 38*   NEUTSPOLYS 18.80* 26.30* 20.90*   LYMPHOCYTES 67.00* 60.50* 70.40*   MONOCYTES 12.50 10.50 5.20   EOSINOPHILS 0.00 1.80 1.70   BASOPHILS 0.00 0.90 1.70   RBCMORPHOLO Present  Present Present     Recent Labs     06/30/20  0550 07/01/20  0408 07/02/20  0406   SODIUM 130* 132* 125*   POTASSIUM 3.3* 4.0 3.2*   CHLORIDE 99 100 96   CO2 22 22 22   GLUCOSE 114* 107* 114*   BUN 15 12 11     Recent Labs     06/30/20  0550 07/01/20  0408 07/02/20  0406   CREATININE 0.53 0.63 0.59       Imaging:  Dx-chest-2 Views    Result Date: 6/27/2020 6/27/2020 11:36 AM HISTORY/REASON FOR EXAM:  Fever TECHNIQUE/EXAM DESCRIPTION AND NUMBER OF VIEWS: Two views of the chest. COMPARISON:  6/23/2020. FINDINGS: LUNGS: Slight hyperinflation. Chronic slightly hyperdense nodule in the right midlung, likely benign granuloma. No focal consolidation. No effusions. PNEUMOTHORAX: None. LINES AND TUBES: None. MEDIASTINUM: No cardiomegaly. TAVR. LAD stent. Atherosclerosis. MUSCULOSKELETAL STRUCTURES: No acute displaced fracture.     No acute cardiopulmonary abnormality.    Dx-chest-portable (1 View)    Result Date: 6/23/2020 6/23/2020 5:44 PM HISTORY/REASON FOR EXAM:  tachycardia. TECHNIQUE/EXAM DESCRIPTION AND NUMBER OF VIEWS: Single portable view of the chest. COMPARISON: Exam from 6:42 AM FINDINGS: Heart size is within normal limits. No pulmonary infiltrates or consolidations are noted. No pleural abnormalities are noted. There is an old left seventh rib fracture.     No acute cardiac or pulmonary abnormalities are identified.    Dx-chest-portable (1 View)    Result Date: 6/23/2020 6/23/2020 6:05 AM HISTORY/REASON FOR EXAM: Postop TAVR. TECHNIQUE/EXAM DESCRIPTION AND NUMBER OF VIEWS: Single portable view of the chest. COMPARISON: 6/22/2020 FINDINGS: LUNGS: Clear. No effusions. PNEUMOTHORAX: None. LINES AND TUBES: None. MEDIASTINUM: Stable cardiac silhouette. MUSCULOSKELETAL STRUCTURES: Unchanged.     Clear lungs. No pleural effusions.    Dx-chest-portable (1 View)    Result Date: 6/22/2020 6/22/2020 12:50 PM HISTORY/REASON FOR EXAM:  Status post aortic valve replacement. TECHNIQUE/EXAM DESCRIPTION AND NUMBER OF VIEWS:  Single portable view of the chest. COMPARISON: 7/15/2019 FINDINGS: Single portable view of the chest demonstrates a normal cardiac silhouette and mediastinal contours. Calcification is in the aorta. There is emphysematous change. The lungs are hyperexpanded. No confluent opacity, pleural fluid, or pneumothorax. No suspicious bony lesions.     No acute findings status post TAVR    Us-abdomen Complete Survey    Result Date: 6/26/2020 6/26/2020 6:27 AM HISTORY/REASON FOR EXAM:  Abnormal Labs Pain TECHNIQUE/EXAM DESCRIPTION AND NUMBER OF VIEWS:  Complete abdomen survey. COMPARISON: None FINDINGS: The liver is normal in contour. There is no evidence of solid mass lesion. The liver measures 14.57 cm. The gallbladder is normal. There is no evidence of cholelithiasis. The gallbladder wall thickness measures 2.50 mm. There is no pericholecystic fluid. The common duct measures 3.40 mm. The visualized pancreas is unremarkable. The visualized aorta is normal in caliber. Intrahepatic IVC is patent. The portal vein is patent with hepatopetal flow. The MPV measures 1.22 cm. The right kidney measures 11.19 cm. The left kidney measures 11.17 cm. There is no hydronephrosis. The spleen measures 10.41 cm maximally. The bladder is trabeculated, which could indicate chronic bladder outlet obstruction. There is no ascites.     Prominent trabeculated bladder wall, suggesting possible chronic bladder outlet obstruction. Unremarkable findings otherwise.     Us-extremity Artery Lower Bilat    Result Date: 6/22/2020  Lower Extremity  Arterial Duplex Report  Vascular Laboratory  CONCLUSIONS  Right.  Echolucent thrombus partially fills the common femoral artery causing a  high grade stenosis. Velocities are consistent with > 75% stenosis.  Mild plaque visualized in the superficial femoral and popliteal arteries  without evidence of hemodynamically significant stenosis.  Monophasic waveforms demonstrated throughout the right lower extremity.   Left.  Mild plaque is seen in the common femoral, femoral, and popliteal arteries  with no elevated velocities to indicate hemodynamically significant  stenosis.  FADIA LANDIS  Exam Date:     2020 17:34  Room #:     Inpatient  Priority:     Stat  Ht (in):             Wt (lb):  Ordering Physician:        ANTHONY PIPER  Referring Physician:       ANTHONY PIPER  Sonographer:               Ewa Curtis RVT  Study Type:                Complete Bilateral  Technical Quality:         Adequate  Age:    65    Gender:     M  MRN:    [id number]  :    1955      BSA:  Indications:     Pain in leg, unspecified  CPT Codes:       09285  ICD Codes:       M79.606  History:         Pain in legs s/p TAVR. No prior duplex.  Limitations:                RIGHT  Waveform        Peak Systolic Velocity (cm/s)                  Prox    Prox-Mid  Mid    Mid-Dist  Distal  Monophasic                        307              143     CFA  Monophasic      55                                         PFA  Monophasic      53                51               34      SFA  Monophasic                        30                       POP  Monophasic      22                                 32      AT  Monophasic      19                                 11      PT  Monophasic      37                                 20      EVGENY                LEFT  Waveform        Peak Systolic Velocity (cm/s)                  Prox    Prox-Mid  Mid    Mid-Dist  Distal  Triphasic                         187                      CFA  Biphasic        78                                         PFA  Biphasic        103               142              106     SFA  Biphasic                          57                       POP  Biphasic        83                                 110     AT  Biphasic        76                                 24      PT  Biphasic        61                                 24       EVGENY  FINDINGS  Right.  Echolucent material consistent with thrombus partially fills the common  femoral artery causing a high grade stenosis. Stenosis of the common  femoral artery. Velocities are consistent with > 75% stenosis.  Mild plaque visualized in the femoral and popliteal arteries without  evidence of hemodynamic significance.  Monophasic waveforms demonstrated throughout the right lower extremity.  Left.  Mild plaque is seen in the common femoral, femoral, and popliteal arteries  with no elevated velocities to indicate hemodynamic significance.  Waveforms are biphasic throughout the left lower extremity.  Demetria Narvaez  (Electronically Signed)  Final Date:      2020                   18:45  Amended:         2020 19:58    Us-extremity Artery Lower Unilat Right    Result Date: 2020  Lower Extremity  Arterial Duplex Report  Vascular Laboratory  CONCLUSIONS  Pinching of the common femoral artery from perclose device closure.  Velcoity consistent with >75% stenosis. Intraluminal clot seen in prior  study is not readily seen.  FADIA LANDIS  Exam Date:     2020 07:49  Room #:     Inpatient  Priority:     Routine  Ht (in):             Wt (lb):  Ordering Physician:        MELVIN SIMONS  Referring Physician:       MELVIN SIMONS  Sonographer:               Blank Calles RVT  Study Type:                Limited Unilateral  Technical Quality:         Adequate  Age:    65    Gender:     M  MRN:    [id number]  :    1955      BSA:  Indications:     Unspecified atherosclerosis of native arteries of                   extremities, right leg  CPT Codes:       11461  ICD Codes:       I70.201  History:         Right common femoral steosis.  Limitations:                RIGHT  Waveform        Peak Systolic Velocity (cm/s)                  Prox    Prox-Mid  Mid    Mid-Dist  Distal   Monophasic      42                384              230     CFA  Biphasic        97                                         PFA  Biphasic        55                                         SFA                                                             POP                                                             AT                                                             PT                                                             EVGENY                LEFT  Waveform        Peak Systolic Velocity (cm/s)                  Prox    Prox-Mid  Mid    Mid-Dist  Distal                                                             CFA                                                             PFA                                                             SFA                                                             POP                                                             AT                                                             PT                                                             EVGENY  FINDINGS  Limited evaluation of the right common femoral artery known stenosis.  Right.  Stenosis of the mid common femoral artery. Velocities are consistent with >  75%stenosis.  Distal flow is turbulent and damped.  No evidence of hematoma or psuedoaneuysm seen at the right groin.  Luiz Dennis MD  (Electronically Signed)  Final Date:      26 June 2020                   08:49    Ec-echocardiogram Complete W/o Cont    Result Date: 6/23/2020  Transthoracic Echo Report Echocardiography Laboratory CONCLUSIONS Prior echo done on 06/22/2020, there is now a TAVR valve present. Left ventricular ejection fraction is visually estimated to be 60%. Unable to estimate pulmonary artery pressure due to an inadequate tricuspid regurgitant jet. Known TAVR aortic valve. FADIA LANDIS Exam Date:         06/23/2020                    03:46 Exam Location:     Inpatient Priority:          Routine Ordering Physician:         ANTHONY PIPER Referring Physician:       [id number] Sonographer:               JOJO Ash Age:    65     Gender:    Mal                           e MRN:    [id number] :    1955 BSA:    1.64   Ht (in):    66     Wt (lb):    126 Exam Type:     Complete Indications:     Prosthetic Valve ICD Codes:       V43.3 CPT Codes:       87117 BP:   142    /   83     HR:   70 Technical Quality:       Fair MEASUREMENTS  (Male / Female) Normal Values 2D ECHO LV Diastolic Diameter PLAX        6 cm                  4.2 - 5.9 / 3.9 - 5.3 cm LV Systolic Diameter PLAX         3.7 cm                2.1 - 4.0 cm IVS Diastolic Thickness           0.71 cm               LVPW Diastolic Thickness          0.7 cm                LVOT Diameter                     1.5 cm                Estimated LV Ejection Fraction    60 %                  LV Ejection Fraction MOD BP       39.5 %                >= 55  % LV Ejection Fraction MOD 4C       40.2 %                LV Ejection Fraction MOD 2C       33.9 %                IVC Diameter                      1.8 cm                DOPPLER AV Peak Velocity                  0.99 m/s              AV Peak Gradient                  3.9 mmHg              AV Mean Gradient                  2.7 mmHg              LVOT Peak Velocity                1.1 m/s               AV Area Cont Eq vti               1.9 cm2               Mitral E Point Velocity           0.63 m/s              Mitral E to A Ratio               1.5                   MV Pressure Half Time             75.3 ms               MV Area PHT                       2.9 cm2               MV Deceleration Time              260 ms                * Indicates values subject to auto-interpretation LV EF:  60    % FINDINGS Left Ventricle Left ventricle is mildly dilated. Normal left ventricular wall thickness. Normal left ventricular systolic function. Left ventricular ejection fraction is visually estimated to be 60%. Normal regional wall motion.  Normal diastolic function. Right Ventricle Normal right ventricular size. Normal right ventricular systolic function. Right Atrium Normal right atrial size. Normal inferior vena cava size and inspiratory collapse. Left Atrium Normal left atrial size. Left atrial volume index is 26 mL/sq m. Mitral Valve Calcification of the mitral valve leaflets. No mitral stenosis. Trace mitral regurgitation. Aortic Valve Known TAVR aortic valve. Vmax is 1 m/s. Transvalvular gradients are - Peak: 3 mmHg, Mean: 2 mmHg. Tricuspid Valve Structurally normal tricuspid valve. No tricuspid stenosis. Trace tricuspid regurgitation. Right atrial pressure is estimated to be 8 mmHg. Unable to estimate pulmonary artery pressure due to an inadequate tricuspid regurgitant jet. Pulmonic Valve The pulmonic valve is not well visualized. Pericardium No pericardial effusion seen. Aorta Normal aortic root for body surface area. Ascending aorta diameter is 2.4 cm. Brenden Hansen MD (Electronically Signed) Final Date:     23 June 2020                 06:41    Ec-echocardiogram Ltd W/o Cont    Result Date: 6/23/2020  Transthoracic Echo Report Echocardiography Laboratory CONCLUSIONS Intraoperative TTE during TAVR.  Baseline images show mildly reduced LV function with EF =55%.  Calcified aortic valve leaflets. Severe aortic stenosis.  Post deployment images show preserved biventricular function.  A 26 mm Jewell Jose Manuel Ultra stented bioprosthetic valve is seen in the aortic position with normal leaflet motion, trace significant paravalvular leak, mean gradent of 5 mm Hg and calculated effective orifice area of 2.5 cm2.  Findings communicated at the time of exam. FADIA LANDIS Exam Date:         06/22/2020                    11:17 Exam Location:     Inpatient Priority:          Routine Ordering Physician:        CLEOPATRA JONES Referring Physician: Sonographer:               Antwan La RDCS Age:    65     Gender:    Mal                            "e MRN:    [id number] :    1955 BSA:    1.64   Ht (in):    66     Wt (lb):    126 Exam Type:     Limited Indications:     Pre-Op Surgery ICD Codes:       V72.83 CPT Codes:       37817 BP:          /          HR: Technical Quality:       Fair MEASUREMENTS  (Male / Female) Normal Values 2D ECHO Estimated LV Ejection Fraction    55 %                  * Indicates values subject to auto-interpretation LV EF:  55    % FINDINGS Left Ventricle The left ventricle was normal in size and function. Right Ventricle The right ventricle was normal in size and function. Right Atrium The right atrium is normal in size. Left Atrium The left atrium is normal in size. Mitral Valve Structurally normal mitral valve without significant stenosis or regurgitation. Aortic Valve Calcified aortic valve leaflets. Severe aortic stenosis. Tricuspid Valve Structurally normal tricuspid valve without significant stenosis or regurgitation. Pulmonic Valve Structurally normal pulmonic valve without significant stenosis or regurgitation. Pericardium Normal pericardium without effusion. Aorta The aortic root is normal. Simon X Issa (Electronically Signed) Final Date:     2020                 16:26 Amended:        2020 16:29      Micro:  Results     Procedure Component Value Units Date/Time    BLOOD CULTURE [[id number]] Collected:  20    Order Status:  Completed Specimen:  Blood from Peripheral Updated:  20     Significant Indicator NEG     Source BLD     Site PERIPHERAL     Culture Result No growth after 5 days of incubation.    Narrative:       Protective  Per Hospital Policy: Only change Specimen Src: to \"Line\" if  specified by physician order.  Right Forearm/Arm    BLOOD CULTURE [[id number]] Collected:  20    Order Status:  Completed Specimen:  Blood from Peripheral Updated:  20     Significant Indicator NEG     Source BLD     Site PERIPHERAL     Culture Result No growth after 5 days of " "incubation.    Narrative:       Protective  Per Hospital Policy: Only change Specimen Src: to \"Line\" if  specified by physician order.  Left Forearm/Arm    Aerobic/Anaerobic Culture (Surgery) [[id number]] Collected:  06/29/20 1317    Order Status:  Completed Specimen:  Wound from Other Body Fluid Updated:  07/01/20 0929     Significant Indicator NEG     Source WND     Site submandibular     Culture Result Order placed in Surgery. Source appropriate culture orders  have been placed in Micro for testing.      Narrative:       [id number] PAWAN BRICE F.  Large lump under chin  Please send for cytology/pathology  [id number] PAWAN BRICE F.  2mls pink, frothy fluid aspirated from submandibular fluid  collection, MD Jenkins  [id number] PAWAN NARVAEZ F.    Anaerobic Culture [[id number]] Collected:  06/29/20 1317    Order Status:  Completed Specimen:  Wound Updated:  07/01/20 0929     Significant Indicator NEG     Source WND     Site submandibular     Culture Result Culture in progress.    Narrative:       [id number] PAWAN BRICE F.  Large lump under chin  Please send for cytology/pathology  [id number] PAWAN BRICE F.  2mls pink, frothy fluid aspirated from submandibular fluid  collection, MD Jenkins  [id number] PAWAN NARVAEZ F.    CULTURE WOUND W/ GRAM STAIN [[id number]]  (Abnormal) Collected:  06/29/20 1317    Order Status:  Completed Specimen:  Wound Updated:  07/01/20 0929     Significant Indicator POS     Source WND     Site submandibular     Culture Result -     Gram Stain Result Many WBCs.  Many Gram positive cocci.  Few Gram negative rods.       Culture Result Viridans Streptococcus  Heavy growth      Narrative:       [id number] PAWAN BRICE F.  Large lump under chin  Please send for cytology/pathology  [id number] PAWAN BRICE F.  2mls pink, frothy fluid aspirated from submandibular fluid  collection, MD Jenkins  [id number] DEROSIER LAURA " MICHAEL.    GRAM STAIN [[id number]] Collected:  06/29/20 1317    Order Status:  Completed Specimen:  Wound Updated:  06/29/20 1622     Significant Indicator .     Source WND     Site submandibular     Gram Stain Result Many WBCs.  Many Gram positive cocci.  Few Gram negative rods.      Narrative:       [id number] PAWAN RODRIGUEZ.  Large lump under chin  Please send for cytology/pathology  [id number] PAWAN RODRIGUEZ.  2mls pink, frothy fluid aspirated from submandibular fluid  collection, MD Jenkins  [id number] PAWAN RODRIGUEZ.    FLUID CULTURE W/GRAM STAIN [[id number]] Collected:  06/29/20 1317    Order Status:  Canceled Specimen:  Other Body Fluid     FLUID CULTURE W/GRAM STAIN [[id number]] Collected:  06/29/20 1317    Order Status:  Sent Specimen:  Other Body Fluid     URINALYSIS [[id number]]  (Abnormal) Collected:  06/27/20 1230    Order Status:  Completed Specimen:  Urine, Clean Catch Updated:  06/27/20 1257     Color DK Yellow     Character Clear     Specific Gravity 1.019     Ph 5.5     Glucose Negative mg/dL      Ketones Negative mg/dL      Protein 100 mg/dL      Bilirubin Negative     Urobilinogen, Urine 1.0     Nitrite Negative     Leukocyte Esterase Negative     Occult Blood Small     Micro Urine Req Microscopic    Narrative:       [id number] JANET CONNOLLY          Assessment:  Active Hospital Problems    Diagnosis   • *Fever [R50.9]   • Pancytopenia (HCC) [D61.818]   • S/P TAVR (transcatheter aortic valve replacement) [Z95.2]   • Essential hypertension [I10]   • Mixed hyperlipidemia [E78.2]   • Panlobular emphysema (HCC) [J43.1]   • Tobacco dependence [F17.200]        ASSESSMENT/PLAN:      65 y.o.  admitted 6/23/2020. Pt has a past medical history of TAVR on 6/22/2020 and pancytopenia which he has had some work-up but no bone marrow biopsy as yet.  He has had a decreased WBC since 2016 with neutropenia.  He also has some mild anemia and thrombocytopenia that is also chronic but  worsened recently.  He presented the ER complaining of tachycardia the day after he been sent home after his TAVR surgery.      Hospital Course:   Patient was admitted and was initially febrile and 6/20 4-1.3 and then again spiked a fever to 103.6 on 6/26.  Initial work-up for pancytopenia was started and plan was to complete this with heme-onc as an outpatient.  Blood cultures were obtained 6/26.  Ultrasound abdomen on 6/25 with trabeculated bladder wall suggesting possible chronic bladder outlet obstruction but otherwise unremarkable.  Ultrasound lower extremity with noted stenosis of the right common femoral artery report is known - no evidence of hematoma or pseudoaneurysm in right groin.  Chest x-rays have been unremarkable.  He is not been on any antibiotics and plan was for discharge and follow-up as an outpatient but he again spiked fevers and also reported soaking sweats last night.  He underwent aspiration of submental abscess on 6/29/2020.  Cultures are growing viridans Streptococcus.  Bone marrow biopsy does not show any evidence of leukemia however additional testing is being sent to [place].     Problem List  Neutropenic fever, overall improving  Likely secondary to infection for additional testing of patient's bone marrow biopsy is being sent to [place]  Fever this morning  Neutropenia improving  -Work-up initiated by hospitalist with SPEP, smooth muscle antibodies, SEEMA, rheumatoid factor, B12 and folate levels as well as a fibrinogen.   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 However too early for endocarditis per cards per report so JOON  deferred at this time    Pancytopenia  Secondary to infection  Evaluated by oncology  Status post bone marrow biopsy on 6/29/2020.  Pathology report- no evidence of leukemia.  Additional testing being sent to [facility]  CMV PCR- pending  Parvovirus PCR- pending    Lump below chin, tender, prior infection   Fracture of mid mandibular body  Submandibular abscess  Possible infected hematoma-2.9 cm collection subjacent to fracture site noted on CT scan  -Reports oral surgery at the end of April and he was on antibiotics (augmentin) due to infection for approximately 1 month.  There had resolved but now with new swelling  Status post IR aspiration on 6/29/2020.  Gram stain+ GPC, GNR.  Culture- viridans Streptococcus  On antibiotics above  Oral surgery evaluation for fracture and need for possible surgical intervention pending  Concerned about infection causing fracture of the mid mandibular body, possible osteomyelitis.  If no surgery planned, can discharge patient on 6-week course of p.o. Augmentin 875 mg twice daily    I have performed a physical exam and reviewed and updated ROS and plan today 7/2/2020.  In review of yesterday's note 7/1/2020, there are no changes except as documented above.       Plan of care discussed with Dr. Urena. Will continue to follow     ADDENDUM:  Notified by hospitalist that JOON now planned for Monday 7/6. Continue IV abx for now

## 2020-07-02 NOTE — CARE PLAN
Problem: Communication  Goal: The ability to communicate needs accurately and effectively will improve  7/2/2020 0517 by Mia Lombardo, R.N.  Outcome: PROGRESSING AS EXPECTED  7/2/2020 0144 by Mia Lombardo, R.N.  Outcome: PROGRESSING AS EXPECTED     Problem: Safety  Goal: Will remain free from injury  7/2/2020 0517 by Mia Lombardo, R.N.  Outcome: PROGRESSING AS EXPECTED  7/2/2020 0144 by Mia Lombardo, R.N.  Outcome: PROGRESSING AS EXPECTED  Goal: Will remain free from falls  7/2/2020 0517 by Mia Lombardo, R.N.  Outcome: PROGRESSING AS EXPECTED  7/2/2020 0144 by Mia Lombardo, R.N.  Outcome: PROGRESSING AS EXPECTED     Problem: Infection  Goal: Will remain free from infection  7/2/2020 0517 by Mia Lombardo, R.N.  Outcome: PROGRESSING AS EXPECTED  7/2/2020 0144 by Mia Lombardo, R.N.  Outcome: PROGRESSING SLOWER THAN EXPECTED     Problem: Venous Thromboembolism (VTW)/Deep Vein Thrombosis (DVT) Prevention:  Goal: Patient will participate in Venous Thrombosis (VTE)/Deep Vein Thrombosis (DVT)Prevention Measures  7/2/2020 0517 by Mia Lombardo, R.N.  Outcome: PROGRESSING AS EXPECTED  7/2/2020 0144 by Mia Lombardo, R.N.  Outcome: PROGRESSING AS EXPECTED     Problem: Bowel/Gastric:  Goal: Normal bowel function is maintained or improved  7/2/2020 0517 by Mia Lombardo, R.N.  Outcome: PROGRESSING AS EXPECTED  7/2/2020 0144 by Mia Lombardo, R.N.  Outcome: PROGRESSING AS EXPECTED  Goal: Will not experience complications related to bowel motility  7/2/2020 0517 by Mia Lombardo, R.N.  Outcome: PROGRESSING AS EXPECTED  7/2/2020 0144 by Mia Lombardo, R.N.  Outcome: PROGRESSING AS EXPECTED     Problem: Knowledge Deficit  Goal: Knowledge of disease process/condition, treatment plan, diagnostic tests, and medications will improve  7/2/2020 0517 by Mia Lombardo, R.N.  Outcome: PROGRESSING AS EXPECTED  7/2/2020 0144 by Mia Lombardo, R.N.  Outcome: PROGRESSING AS EXPECTED  Goal: Knowledge of the prescribed therapeutic regimen will  improve  7/2/2020 0517 by Mia Lombardo, R.N.  Outcome: PROGRESSING AS EXPECTED  7/2/2020 0144 by Mia Lombardo, R.N.  Outcome: PROGRESSING AS EXPECTED     Problem: Discharge Barriers/Planning  Goal: Patient's continuum of care needs will be met  7/2/2020 0517 by Mia Lombardo, R.N.  Outcome: PROGRESSING AS EXPECTED  7/2/2020 0144 by Mia Lombardo, R.N.  Outcome: PROGRESSING AS EXPECTED     Problem: Pain Management  Goal: Pain level will decrease to patient's comfort goal  7/2/2020 0517 by Mia Lombardo, R.N.  Outcome: PROGRESSING AS EXPECTED  7/2/2020 0144 by Mia Lombardo, R.N.  Outcome: PROGRESSING AS EXPECTED

## 2020-07-02 NOTE — PROGRESS NOTES
"Monitor room notified RN that patient was having \"3 different P waves.\" Pt is asymptomatic. RN ordered a STAT EKG. MD aware, no new orders  "

## 2020-07-02 NOTE — CARE PLAN
Problem: Communication  Goal: The ability to communicate needs accurately and effectively will improve  Outcome: PROGRESSING AS EXPECTED     Problem: Safety  Goal: Will remain free from injury  Outcome: PROGRESSING AS EXPECTED  Goal: Will remain free from falls  Outcome: PROGRESSING AS EXPECTED     Problem: Venous Thromboembolism (VTW)/Deep Vein Thrombosis (DVT) Prevention:  Goal: Patient will participate in Venous Thrombosis (VTE)/Deep Vein Thrombosis (DVT)Prevention Measures  Outcome: PROGRESSING AS EXPECTED     Problem: Bowel/Gastric:  Goal: Normal bowel function is maintained or improved  Outcome: PROGRESSING AS EXPECTED  Goal: Will not experience complications related to bowel motility  Outcome: PROGRESSING AS EXPECTED     Problem: Knowledge Deficit  Goal: Knowledge of disease process/condition, treatment plan, diagnostic tests, and medications will improve  Outcome: PROGRESSING AS EXPECTED  Goal: Knowledge of the prescribed therapeutic regimen will improve  Outcome: PROGRESSING AS EXPECTED     Problem: Discharge Barriers/Planning  Goal: Patient's continuum of care needs will be met  Outcome: PROGRESSING AS EXPECTED     Problem: Pain Management  Goal: Pain level will decrease to patient's comfort goal  Outcome: PROGRESSING AS EXPECTED

## 2020-07-03 ENCOUNTER — ANESTHESIA (OUTPATIENT)
Dept: SURGERY | Facility: MEDICAL CENTER | Age: 65
DRG: 982 | End: 2020-07-03
Payer: COMMERCIAL

## 2020-07-03 ENCOUNTER — ANESTHESIA EVENT (OUTPATIENT)
Dept: SURGERY | Facility: MEDICAL CENTER | Age: 65
DRG: 982 | End: 2020-07-03
Payer: COMMERCIAL

## 2020-07-03 LAB
ABO GROUP BLD: NORMAL
ANION GAP SERPL CALC-SCNC: 9 MMOL/L (ref 7–16)
BARCODED ABORH UBTYP: 5100
BARCODED PRD CODE UBPRD: NORMAL
BARCODED UNIT NUM UBUNT: NORMAL
BASOPHILS # BLD AUTO: 0 % (ref 0–1.8)
BASOPHILS # BLD: 0 K/UL (ref 0–0.12)
BLD GP AB SCN SERPL QL: NORMAL
BUN SERPL-MCNC: 17 MG/DL (ref 8–22)
CALCIUM SERPL-MCNC: 7.9 MG/DL (ref 8.5–10.5)
CHLORIDE SERPL-SCNC: 98 MMOL/L (ref 96–112)
CO2 SERPL-SCNC: 22 MMOL/L (ref 20–33)
COMPONENT P 8504P: NORMAL
CREAT SERPL-MCNC: 0.73 MG/DL (ref 0.5–1.4)
EOSINOPHIL # BLD AUTO: 0 K/UL (ref 0–0.51)
EOSINOPHIL NFR BLD: 0 % (ref 0–6.9)
ERYTHROCYTE [DISTWIDTH] IN BLOOD BY AUTOMATED COUNT: 46.1 FL (ref 35.9–50)
ERYTHROCYTE [DISTWIDTH] IN BLOOD BY AUTOMATED COUNT: 47.1 FL (ref 35.9–50)
GLUCOSE SERPL-MCNC: 117 MG/DL (ref 65–99)
HCT VFR BLD AUTO: 25.1 % (ref 42–52)
HCT VFR BLD AUTO: 25.7 % (ref 42–52)
HGB BLD-MCNC: 8.2 G/DL (ref 14–18)
HGB BLD-MCNC: 8.2 G/DL (ref 14–18)
LYMPHOCYTES # BLD AUTO: 1.28 K/UL (ref 1–4.8)
LYMPHOCYTES NFR BLD: 45.7 % (ref 22–41)
MANUAL DIFF BLD: NORMAL
MCH RBC QN AUTO: 28.1 PG (ref 27–33)
MCH RBC QN AUTO: 28.4 PG (ref 27–33)
MCHC RBC AUTO-ENTMCNC: 31.9 G/DL (ref 33.7–35.3)
MCHC RBC AUTO-ENTMCNC: 32.7 G/DL (ref 33.7–35.3)
MCV RBC AUTO: 86 FL (ref 81.4–97.8)
MCV RBC AUTO: 88.9 FL (ref 81.4–97.8)
METAMYELOCYTES NFR BLD MANUAL: 0.8 %
MONOCYTES # BLD AUTO: 0.43 K/UL (ref 0–0.85)
MONOCYTES NFR BLD AUTO: 15.5 % (ref 0–13.4)
MORPHOLOGY BLD-IMP: NORMAL
MYELOCYTES NFR BLD MANUAL: 1.6 %
NEUTROPHILS # BLD AUTO: 1.02 K/UL (ref 1.82–7.42)
NEUTROPHILS NFR BLD: 36.4 % (ref 44–72)
NRBC # BLD AUTO: 0.02 K/UL
NRBC BLD-RTO: 0.7 /100 WBC
PLATELET # BLD AUTO: 34 K/UL (ref 164–446)
PLATELET # BLD AUTO: 78 K/UL (ref 164–446)
PLATELET BLD QL SMEAR: NORMAL
PLATELETS.RETICULATED NFR BLD AUTO: 9.1 K/UL (ref 0.6–13.1)
PMV BLD AUTO: 11.2 FL (ref 9–12.9)
POTASSIUM SERPL-SCNC: 4 MMOL/L (ref 3.6–5.5)
PRODUCT TYPE UPROD: NORMAL
RBC # BLD AUTO: 2.89 M/UL (ref 4.7–6.1)
RBC # BLD AUTO: 2.92 M/UL (ref 4.7–6.1)
RH BLD: NORMAL
SARS-COV-2 RNA RESP QL NAA+PROBE: NOTDETECTED
SODIUM SERPL-SCNC: 129 MMOL/L (ref 135–145)
SPECIMEN SOURCE: NORMAL
UNIT STATUS USTAT: NORMAL
WBC # BLD AUTO: 2.8 K/UL (ref 4.8–10.8)
WBC # BLD AUTO: 2.8 K/UL (ref 4.8–10.8)

## 2020-07-03 PROCEDURE — 85027 COMPLETE CBC AUTOMATED: CPT

## 2020-07-03 PROCEDURE — 86901 BLOOD TYPING SEROLOGIC RH(D): CPT

## 2020-07-03 PROCEDURE — 700102 HCHG RX REV CODE 250 W/ 637 OVERRIDE(OP): Performed by: INTERNAL MEDICINE

## 2020-07-03 PROCEDURE — 86850 RBC ANTIBODY SCREEN: CPT

## 2020-07-03 PROCEDURE — 160041 HCHG SURGERY MINUTES - EA ADDL 1 MIN LEVEL 4: Performed by: ORAL & MAXILLOFACIAL SURGERY

## 2020-07-03 PROCEDURE — 99233 SBSQ HOSP IP/OBS HIGH 50: CPT | Performed by: HOSPITALIST

## 2020-07-03 PROCEDURE — 0NSV04Z REPOSITION LEFT MANDIBLE WITH INTERNAL FIXATION DEVICE, OPEN APPROACH: ICD-10-PCS | Performed by: ORAL & MAXILLOFACIAL SURGERY

## 2020-07-03 PROCEDURE — 87075 CULTR BACTERIA EXCEPT BLOOD: CPT

## 2020-07-03 PROCEDURE — 36415 COLL VENOUS BLD VENIPUNCTURE: CPT

## 2020-07-03 PROCEDURE — 160002 HCHG RECOVERY MINUTES (STAT): Performed by: ORAL & MAXILLOFACIAL SURGERY

## 2020-07-03 PROCEDURE — 99233 SBSQ HOSP IP/OBS HIGH 50: CPT | Performed by: INTERNAL MEDICINE

## 2020-07-03 PROCEDURE — 0NUV0KZ SUPPLEMENT LEFT MANDIBLE WITH NONAUTOLOGOUS TISSUE SUBSTITUTE, OPEN APPROACH: ICD-10-PCS | Performed by: ORAL & MAXILLOFACIAL SURGERY

## 2020-07-03 PROCEDURE — 501838 HCHG SUTURE GENERAL: Performed by: ORAL & MAXILLOFACIAL SURGERY

## 2020-07-03 PROCEDURE — 160035 HCHG PACU - 1ST 60 MINS PHASE I: Performed by: ORAL & MAXILLOFACIAL SURGERY

## 2020-07-03 PROCEDURE — A9270 NON-COVERED ITEM OR SERVICE: HCPCS | Performed by: INTERNAL MEDICINE

## 2020-07-03 PROCEDURE — 700105 HCHG RX REV CODE 258: Performed by: ANESTHESIOLOGY

## 2020-07-03 PROCEDURE — 160029 HCHG SURGERY MINUTES - 1ST 30 MINS LEVEL 4: Performed by: ORAL & MAXILLOFACIAL SURGERY

## 2020-07-03 PROCEDURE — 160048 HCHG OR STATISTICAL LEVEL 1-5: Performed by: ORAL & MAXILLOFACIAL SURGERY

## 2020-07-03 PROCEDURE — 700111 HCHG RX REV CODE 636 W/ 250 OVERRIDE (IP): Performed by: ANESTHESIOLOGY

## 2020-07-03 PROCEDURE — 86900 BLOOD TYPING SEROLOGIC ABO: CPT

## 2020-07-03 PROCEDURE — 80048 BASIC METABOLIC PNL TOTAL CA: CPT

## 2020-07-03 PROCEDURE — 160009 HCHG ANES TIME/MIN: Performed by: ORAL & MAXILLOFACIAL SURGERY

## 2020-07-03 PROCEDURE — 700105 HCHG RX REV CODE 258

## 2020-07-03 PROCEDURE — 700101 HCHG RX REV CODE 250: Performed by: ANESTHESIOLOGY

## 2020-07-03 PROCEDURE — 87205 SMEAR GRAM STAIN: CPT

## 2020-07-03 PROCEDURE — P9034 PLATELETS, PHERESIS: HCPCS

## 2020-07-03 PROCEDURE — 85007 BL SMEAR W/DIFF WBC COUNT: CPT

## 2020-07-03 PROCEDURE — 700101 HCHG RX REV CODE 250: Performed by: ORAL & MAXILLOFACIAL SURGERY

## 2020-07-03 PROCEDURE — 700105 HCHG RX REV CODE 258: Performed by: HOSPITALIST

## 2020-07-03 PROCEDURE — 502000 HCHG MISC OR IMPLANTS RC 0278: Performed by: ORAL & MAXILLOFACIAL SURGERY

## 2020-07-03 PROCEDURE — A9270 NON-COVERED ITEM OR SERVICE: HCPCS | Performed by: HOSPITALIST

## 2020-07-03 PROCEDURE — 0J910ZZ DRAINAGE OF FACE SUBCUTANEOUS TISSUE AND FASCIA, OPEN APPROACH: ICD-10-PCS | Performed by: ORAL & MAXILLOFACIAL SURGERY

## 2020-07-03 PROCEDURE — 700102 HCHG RX REV CODE 250 W/ 637 OVERRIDE(OP): Performed by: HOSPITALIST

## 2020-07-03 PROCEDURE — 36430 TRANSFUSION BLD/BLD COMPNT: CPT

## 2020-07-03 PROCEDURE — 700105 HCHG RX REV CODE 258: Performed by: INTERNAL MEDICINE

## 2020-07-03 PROCEDURE — 85055 RETICULATED PLATELET ASSAY: CPT

## 2020-07-03 PROCEDURE — 160036 HCHG PACU - EA ADDL 30 MINS PHASE I: Performed by: ORAL & MAXILLOFACIAL SURGERY

## 2020-07-03 PROCEDURE — 700111 HCHG RX REV CODE 636 W/ 250 OVERRIDE (IP): Performed by: INTERNAL MEDICINE

## 2020-07-03 PROCEDURE — 770020 HCHG ROOM/CARE - TELE (206)

## 2020-07-03 PROCEDURE — C1713 ANCHOR/SCREW BN/BN,TIS/BN: HCPCS | Performed by: ORAL & MAXILLOFACIAL SURGERY

## 2020-07-03 PROCEDURE — 87070 CULTURE OTHR SPECIMN AEROBIC: CPT

## 2020-07-03 DEVICE — SCREW STRYK UFS 2.0X14MM ST - (2UFS10+M20=40): Type: IMPLANTABLE DEVICE | Status: FUNCTIONAL

## 2020-07-03 DEVICE — IMPLANTABLE DEVICE: Type: IMPLANTABLE DEVICE | Status: FUNCTIONAL

## 2020-07-03 DEVICE — DBM PUTTY PROGENIX 5.0CC: Type: IMPLANTABLE DEVICE | Status: FUNCTIONAL

## 2020-07-03 DEVICE — SCREW STRYK UFS 2.0X12MM ST - (2UFS10+M20=40): Type: IMPLANTABLE DEVICE | Status: FUNCTIONAL

## 2020-07-03 RX ORDER — SODIUM CHLORIDE, SODIUM LACTATE, POTASSIUM CHLORIDE, CALCIUM CHLORIDE 600; 310; 30; 20 MG/100ML; MG/100ML; MG/100ML; MG/100ML
INJECTION, SOLUTION INTRAVENOUS
Status: DISCONTINUED | OUTPATIENT
Start: 2020-07-03 | End: 2020-07-03 | Stop reason: SURG

## 2020-07-03 RX ORDER — HALOPERIDOL 5 MG/ML
1 INJECTION INTRAMUSCULAR
Status: DISCONTINUED | OUTPATIENT
Start: 2020-07-03 | End: 2020-07-03 | Stop reason: HOSPADM

## 2020-07-03 RX ORDER — SODIUM CHLORIDE 9 MG/ML
INJECTION, SOLUTION INTRAVENOUS
Status: COMPLETED
Start: 2020-07-03 | End: 2020-07-03

## 2020-07-03 RX ORDER — ONDANSETRON 2 MG/ML
4 INJECTION INTRAMUSCULAR; INTRAVENOUS
Status: DISCONTINUED | OUTPATIENT
Start: 2020-07-03 | End: 2020-07-03 | Stop reason: HOSPADM

## 2020-07-03 RX ORDER — OXYCODONE HCL 5 MG/5 ML
5 SOLUTION, ORAL ORAL
Status: DISCONTINUED | OUTPATIENT
Start: 2020-07-03 | End: 2020-07-03 | Stop reason: HOSPADM

## 2020-07-03 RX ORDER — LIDOCAINE HYDROCHLORIDE AND EPINEPHRINE 10; 10 MG/ML; UG/ML
INJECTION, SOLUTION INFILTRATION; PERINEURAL
Status: DISCONTINUED | OUTPATIENT
Start: 2020-07-03 | End: 2020-07-03 | Stop reason: HOSPADM

## 2020-07-03 RX ORDER — SODIUM CHLORIDE, SODIUM LACTATE, POTASSIUM CHLORIDE, CALCIUM CHLORIDE 600; 310; 30; 20 MG/100ML; MG/100ML; MG/100ML; MG/100ML
INJECTION, SOLUTION INTRAVENOUS CONTINUOUS
Status: DISCONTINUED | OUTPATIENT
Start: 2020-07-03 | End: 2020-07-03 | Stop reason: HOSPADM

## 2020-07-03 RX ORDER — ONDANSETRON 2 MG/ML
INJECTION INTRAMUSCULAR; INTRAVENOUS PRN
Status: DISCONTINUED | OUTPATIENT
Start: 2020-07-03 | End: 2020-07-03 | Stop reason: SURG

## 2020-07-03 RX ORDER — MEPERIDINE HYDROCHLORIDE 25 MG/ML
25 INJECTION INTRAMUSCULAR; INTRAVENOUS; SUBCUTANEOUS
Status: COMPLETED | OUTPATIENT
Start: 2020-07-03 | End: 2020-07-03

## 2020-07-03 RX ORDER — SODIUM CHLORIDE 9 MG/ML
INJECTION, SOLUTION INTRAVENOUS CONTINUOUS
Status: DISCONTINUED | OUTPATIENT
Start: 2020-07-03 | End: 2020-07-04

## 2020-07-03 RX ORDER — DIPHENHYDRAMINE HYDROCHLORIDE 50 MG/ML
12.5 INJECTION INTRAMUSCULAR; INTRAVENOUS
Status: DISCONTINUED | OUTPATIENT
Start: 2020-07-03 | End: 2020-07-03 | Stop reason: HOSPADM

## 2020-07-03 RX ORDER — OXYCODONE HCL 5 MG/5 ML
10 SOLUTION, ORAL ORAL
Status: DISCONTINUED | OUTPATIENT
Start: 2020-07-03 | End: 2020-07-03 | Stop reason: HOSPADM

## 2020-07-03 RX ORDER — BACITRACIN ZINC AND POLYMYXIN B SULFATE 500; 1000 [USP'U]/G; [USP'U]/G
OINTMENT TOPICAL
Status: DISCONTINUED | OUTPATIENT
Start: 2020-07-03 | End: 2020-07-03 | Stop reason: HOSPADM

## 2020-07-03 RX ORDER — GENTAMICIN SULFATE 40 MG/ML
INJECTION, SOLUTION INTRAMUSCULAR; INTRAVENOUS PRN
Status: DISCONTINUED | OUTPATIENT
Start: 2020-07-03 | End: 2020-07-03 | Stop reason: SURG

## 2020-07-03 RX ORDER — LIDOCAINE HYDROCHLORIDE 20 MG/ML
INJECTION, SOLUTION EPIDURAL; INFILTRATION; INTRACAUDAL; PERINEURAL PRN
Status: DISCONTINUED | OUTPATIENT
Start: 2020-07-03 | End: 2020-07-03 | Stop reason: SURG

## 2020-07-03 RX ADMIN — AMLODIPINE BESYLATE 2.5 MG: 5 TABLET ORAL at 22:18

## 2020-07-03 RX ADMIN — PROPOFOL 20 MG: 10 INJECTION, EMULSION INTRAVENOUS at 17:15

## 2020-07-03 RX ADMIN — PROPOFOL 20 MG: 10 INJECTION, EMULSION INTRAVENOUS at 18:45

## 2020-07-03 RX ADMIN — SODIUM CHLORIDE: 9 INJECTION, SOLUTION INTRAVENOUS at 12:06

## 2020-07-03 RX ADMIN — PROPOFOL 30 MG: 10 INJECTION, EMULSION INTRAVENOUS at 17:17

## 2020-07-03 RX ADMIN — LOVASTATIN 20 MG: 20 TABLET ORAL at 22:19

## 2020-07-03 RX ADMIN — MEPERIDINE HYDROCHLORIDE 25 MG: 25 INJECTION INTRAMUSCULAR; INTRAVENOUS; SUBCUTANEOUS at 19:18

## 2020-07-03 RX ADMIN — CEFEPIME 2 G: 2 INJECTION, POWDER, FOR SOLUTION INTRAVENOUS at 22:25

## 2020-07-03 RX ADMIN — Medication 100 MG: at 17:17

## 2020-07-03 RX ADMIN — LIDOCAINE HYDROCHLORIDE 20 MG: 20 INJECTION, SOLUTION EPIDURAL; INFILTRATION; INTRACAUDAL at 17:15

## 2020-07-03 RX ADMIN — GENTAMICIN SULFATE 149.6 MG: 40 INJECTION, SOLUTION INTRAMUSCULAR; INTRAVENOUS at 17:13

## 2020-07-03 RX ADMIN — SODIUM CHLORIDE: 9 INJECTION, SOLUTION INTRAVENOUS at 10:15

## 2020-07-03 RX ADMIN — ROCURONIUM BROMIDE 10 MG: 10 INJECTION, SOLUTION INTRAVENOUS at 18:18

## 2020-07-03 RX ADMIN — ALFENTANIL HYDROCHLORIDE 500 MCG: 500 INJECTION INTRAVENOUS at 17:15

## 2020-07-03 RX ADMIN — SODIUM CHLORIDE, POTASSIUM CHLORIDE, SODIUM LACTATE AND CALCIUM CHLORIDE: 600; 310; 30; 20 INJECTION, SOLUTION INTRAVENOUS at 17:13

## 2020-07-03 RX ADMIN — CEFEPIME 2 G: 2 INJECTION, POWDER, FOR SOLUTION INTRAVENOUS at 14:05

## 2020-07-03 RX ADMIN — ROCURONIUM BROMIDE 20 MG: 10 INJECTION, SOLUTION INTRAVENOUS at 17:36

## 2020-07-03 RX ADMIN — ROCURONIUM BROMIDE 5 MG: 10 INJECTION, SOLUTION INTRAVENOUS at 17:15

## 2020-07-03 RX ADMIN — ONDANSETRON 4 MG: 2 INJECTION INTRAMUSCULAR; INTRAVENOUS at 18:43

## 2020-07-03 RX ADMIN — HYDROCODONE BITARTRATE AND ACETAMINOPHEN 1 TABLET: 5; 325 TABLET ORAL at 22:18

## 2020-07-03 RX ADMIN — CEFEPIME 2 G: 2 INJECTION, POWDER, FOR SOLUTION INTRAVENOUS at 05:05

## 2020-07-03 ASSESSMENT — ENCOUNTER SYMPTOMS
PALPITATIONS: 0
TREMORS: 0
VOMITING: 0
BLURRED VISION: 0
FEVER: 1
WEIGHT LOSS: 0
COUGH: 0
BRUISES/BLEEDS EASILY: 0
WHEEZING: 0
MYALGIAS: 1
DEPRESSION: 0
ORTHOPNEA: 0
HEARTBURN: 0
DIZZINESS: 0
DIARRHEA: 0
CONSTIPATION: 0
HEADACHES: 0
NECK PAIN: 0
CLAUDICATION: 0
FEVER: 0
HEMOPTYSIS: 0
FOCAL WEAKNESS: 0
DOUBLE VISION: 0
PHOTOPHOBIA: 0
NAUSEA: 0
SPUTUM PRODUCTION: 0
CHILLS: 0
ABDOMINAL PAIN: 0
BACK PAIN: 0

## 2020-07-03 ASSESSMENT — PAIN SCALES - GENERAL: PAIN_LEVEL: 0

## 2020-07-03 ASSESSMENT — LIFESTYLE VARIABLES: SUBSTANCE_ABUSE: 0

## 2020-07-03 NOTE — PROGRESS NOTES
Oral surgery MD called night shift RN during report. Per night RN, oral surgery will not do procedure unless pts platelet count is greater than 60k. This RN notified , new orders received for a platelet transfusion. Consent signed by pt and COD ordered.

## 2020-07-03 NOTE — CONSULTS
Oral and Maxillofacial Surgery     Consult for possible hematoma vs infection     CT neck reviewed - fracture of mandibular symphysis noted.     Patient will need ORIF mandible fracture with bone graft.     Plan to OR tomorrow.  NPO after midnight.   COVID test ordered if not current one on file.     SAIRA

## 2020-07-03 NOTE — CARE PLAN
Problem: Communication  Goal: The ability to communicate needs accurately and effectively will improve  Outcome: PROGRESSING AS EXPECTED  Intervention: [place] patient and significant other/support system to call light to alert staff of needs  Flowsheets (Taken 7/3/2020 8695)  Oriented to:: All of the Following : Location of Bathroom, Visiting Policy, Unit Routine, Call Light and Bedside Controls, Bedside Rail Policy, Smoking Policy, Rights and Responsibilities, Bedside Report, and Patient Education Notebook     Problem: Safety  Goal: Will remain free from injury  Outcome: PROGRESSING AS EXPECTED  Note: Fall risk assessed, fall precautions in place, pt verbalizes understanding of fall risk.

## 2020-07-03 NOTE — ANESTHESIA PREPROCEDURE EVALUATION
Relevant Problems   PULMONARY   (+) Panlobular emphysema (HCC)      CARDIAC   (+) Coronary artery disease due to lipid rich plaque   (+) Essential hypertension   (+) PAD (peripheral artery disease) (HCC)- left femoral bruit; diminished right dt/pt pulse       Physical Exam    Airway   Mallampati: II  TM distance: >3 FB  Neck ROM: full       Cardiovascular - normal exam  Rhythm: regular  Rate: normal  (-) murmur     Dental - normal exam           Pulmonary - normal exam  Breath sounds clear to auscultation     Abdominal    Neurological - normal exam                 Anesthesia Plan    ASA 4- EMERGENT   ASA physical status 4 criteria: severe valve dysfunction, CVA or TIA - recent (< 3 months), sepsis and other (comment)    Plan - general       Airway plan will be ETT        Induction: intravenous    Postoperative Plan: Postoperative administration of opioids is intended.    Pertinent diagnostic labs and testing reviewed    Informed Consent:    Anesthetic plan and risks discussed with patient.    Use of blood products discussed with: patient whom consented to blood products.

## 2020-07-03 NOTE — PROGRESS NOTES
Infectious Disease Progress Note    Author: Vera Berry M.D. Date & Time of service: 7/3/2020  1:17 PM    Chief Complaint:  Fevers, pancytopenia      Interval History:   T-max 102.9 WBC 3.7 patient remains neutropenic .  CT of the neck shows a 2.9 cm collection adjacent to the fracture site of the mid mandibular body.  An infected hematoma cannot be ruled out.  CT scan shows possible splenic infarcts and gallbladder wall thickening with pericholecystic fluid.  Patient continues to have fevers and chills.  He states his chin lesion is more painful and has increased in size.  Plan for bone marrow biopsy today   T-max 101.9, remains febrile.  Had bone biopsy yesterday and aspiration of submandibular abscess.  He did not sleep well overnight as he was very warm.  No new symptoms today   T-max 100.9 WBC 4.5 culture growing viridans Streptococcus.  Patient did not feel any fevers overnight.  Overall fever curve improving.  He states that his abscess drained throughout the night.  Bone marrow biopsy results pending.  Eager to go home   T-max 102.4 WBC 4.1 bone marrow results do not show any evidence of MDS or leukemia.  Ongoing chin pain.  Awaiting evaluation from oral surgeon  William/3 T-max 99.9 WBC 2.8.  Plan for ORIF with bone graft today by Dr. Morrison.  No new symptoms to report    review of Systems:  Review of Systems   Constitutional: Positive for malaise/fatigue. Negative for chills and fever.   Respiratory: Negative for cough, sputum production and wheezing.    Cardiovascular: Negative for chest pain.   Gastrointestinal: Negative for abdominal pain, constipation, diarrhea, nausea and vomiting.   Genitourinary: Negative for dysuria.   Musculoskeletal: Positive for myalgias.   Neurological: Negative for dizziness and headaches.   All other systems reviewed and are negative.      Hemodynamics:  Temp (24hrs), Av.3 °C (99.1 °F), Min:35.9 °C (96.6 °F), Max:38 °C (100.4 °F)  Temperature: 35.9  °C (96.6 °F)  Pulse  Av.9  Min: 52  Max: 110   Blood Pressure : 138/57       Physical Exam:  Physical Exam  Vitals signs and nursing note reviewed.   Constitutional:       Appearance: Normal appearance.   HENT:      Head:      Comments: Submental lump.  No active drainage.  No erythema.  Tender to palpation     Mouth/Throat:      Mouth: Mucous membranes are moist.      Pharynx: No oropharyngeal exudate.   Eyes:      Extraocular Movements: Extraocular movements intact.      Conjunctiva/sclera: Conjunctivae normal.      Pupils: Pupils are equal, round, and reactive to light.   Cardiovascular:      Rate and Rhythm: Normal rate and regular rhythm.      Heart sounds: Normal heart sounds.   Pulmonary:      Effort: Pulmonary effort is normal.      Breath sounds: Normal breath sounds.   Abdominal:      General: Abdomen is flat. Bowel sounds are normal.      Palpations: Abdomen is soft.   Musculoskeletal: Normal range of motion.      Right lower leg: No edema.      Left lower leg: No edema.   Skin:     General: Skin is warm and dry.   Neurological:      General: No focal deficit present.      Mental Status: He is alert and oriented to person, place, and time.   Psychiatric:         Mood and Affect: Mood normal.         Behavior: Behavior normal.      Comments: Pleasant         Meds:    Current Facility-Administered Medications:   •  NS  •  [CANCELED] HEPARIN INDUCED PLATELET AB(HIT) **AND** Pharmacy Consult:  •  HYDROcodone-acetaminophen  •  cefepime  •  zolpidem  •  amLODIPine  •  lovastatin  •  senna-docusate **AND** polyethylene glycol/lytes **AND** magnesium hydroxide **AND** bisacodyl  •  acetaminophen  •  enalaprilat  •  ondansetron  •  ondansetron    Labs:  Recent Labs     20  0408 20  0406 20  0331   WBC 4.5* 4.1* 2.8*   RBC 2.94* 2.88* 2.92*   HEMOGLOBIN 8.4* 8.2* 8.2*   HEMATOCRIT 25.7* 25.0* 25.1*   MCV 87.4 86.8 86.0   MCH 28.6 28.5 28.1   RDW 46.5 45.4 46.1   PLATELETCT 52* 38* 34*      NEUTSPOLYS 26.30* 20.90* 36.40*   LYMPHOCYTES 60.50* 70.40* 45.70*   MONOCYTES 10.50 5.20 15.50*   EOSINOPHILS 1.80 1.70 0.00   BASOPHILS 0.90 1.70 0.00   RBCMORPHOLO Present Present  --      Recent Labs     07/01/20  0408 07/02/20  0406 07/03/20  0331   SODIUM 132* 125* 129*   POTASSIUM 4.0 3.2* 4.0   CHLORIDE 100 96 98   CO2 22 22 22   GLUCOSE 107* 114* 117*   BUN 12 11 17     Recent Labs     07/01/20  0408 07/02/20  0406 07/03/20  0331   CREATININE 0.63 0.59 0.73       Imaging:  Dx-chest-2 Views    Result Date: 6/27/2020 6/27/2020 11:36 AM HISTORY/REASON FOR EXAM:  Fever TECHNIQUE/EXAM DESCRIPTION AND NUMBER OF VIEWS: Two views of the chest. COMPARISON:  6/23/2020. FINDINGS: LUNGS: Slight hyperinflation. Chronic slightly hyperdense nodule in the right midlung, likely benign granuloma. No focal consolidation. No effusions. PNEUMOTHORAX: None. LINES AND TUBES: None. MEDIASTINUM: No cardiomegaly. TAVR. LAD stent. Atherosclerosis. MUSCULOSKELETAL STRUCTURES: No acute displaced fracture.     No acute cardiopulmonary abnormality.    Dx-chest-portable (1 View)    Result Date: 6/23/2020 6/23/2020 5:44 PM HISTORY/REASON FOR EXAM:  tachycardia. TECHNIQUE/EXAM DESCRIPTION AND NUMBER OF VIEWS: Single portable view of the chest. COMPARISON: Exam from 6:42 AM FINDINGS: Heart size is within normal limits. No pulmonary infiltrates or consolidations are noted. No pleural abnormalities are noted. There is an old left seventh rib fracture.     No acute cardiac or pulmonary abnormalities are identified.    Dx-chest-portable (1 View)    Result Date: 6/23/2020 6/23/2020 6:05 AM HISTORY/REASON FOR EXAM: Postop TAVR. TECHNIQUE/EXAM DESCRIPTION AND NUMBER OF VIEWS: Single portable view of the chest. COMPARISON: 6/22/2020 FINDINGS: LUNGS: Clear. No effusions. PNEUMOTHORAX: None. LINES AND TUBES: None. MEDIASTINUM: Stable cardiac silhouette. MUSCULOSKELETAL STRUCTURES: Unchanged.     Clear lungs. No pleural  effusions.    Dx-chest-portable (1 View)    Result Date: 6/22/2020 6/22/2020 12:50 PM HISTORY/REASON FOR EXAM:  Status post aortic valve replacement. TECHNIQUE/EXAM DESCRIPTION AND NUMBER OF VIEWS: Single portable view of the chest. COMPARISON: 7/15/2019 FINDINGS: Single portable view of the chest demonstrates a normal cardiac silhouette and mediastinal contours. Calcification is in the aorta. There is emphysematous change. The lungs are hyperexpanded. No confluent opacity, pleural fluid, or pneumothorax. No suspicious bony lesions.     No acute findings status post TAVR    Us-abdomen Complete Survey    Result Date: 6/26/2020 6/26/2020 6:27 AM HISTORY/REASON FOR EXAM:  Abnormal Labs Pain TECHNIQUE/EXAM DESCRIPTION AND NUMBER OF VIEWS:  Complete abdomen survey. COMPARISON: None FINDINGS: The liver is normal in contour. There is no evidence of solid mass lesion. The liver measures 14.57 cm. The gallbladder is normal. There is no evidence of cholelithiasis. The gallbladder wall thickness measures 2.50 mm. There is no pericholecystic fluid. The common duct measures 3.40 mm. The visualized pancreas is unremarkable. The visualized aorta is normal in caliber. Intrahepatic IVC is patent. The portal vein is patent with hepatopetal flow. The MPV measures 1.22 cm. The right kidney measures 11.19 cm. The left kidney measures 11.17 cm. There is no hydronephrosis. The spleen measures 10.41 cm maximally. The bladder is trabeculated, which could indicate chronic bladder outlet obstruction. There is no ascites.     Prominent trabeculated bladder wall, suggesting possible chronic bladder outlet obstruction. Unremarkable findings otherwise.     Us-extremity Artery Lower Bilat    Result Date: 6/22/2020  Lower Extremity  Arterial Duplex Report  Vascular Laboratory  CONCLUSIONS  Right.  Echolucent thrombus partially fills the common femoral artery causing a  high grade stenosis. Velocities are consistent with > 75% stenosis.  Mild  plaque visualized in the superficial femoral and popliteal arteries  without evidence of hemodynamically significant stenosis.  Monophasic waveforms demonstrated throughout the right lower extremity.  Left.  Mild plaque is seen in the common femoral, femoral, and popliteal arteries  with no elevated velocities to indicate hemodynamically significant  stenosis.  FADIA LANDIS  Exam Date:     2020 17:34  Room #:     Inpatient  Priority:     Stat  Ht (in):             Wt (lb):  Ordering Physician:        ANTHONY PIPER  Referring Physician:       ANTHONY PIPER  Sonographer:               Ewa Curtis RVT  Study Type:                Complete Bilateral  Technical Quality:         Adequate  Age:    65    Gender:     M  MRN:    [id number]  :    1955      BSA:  Indications:     Pain in leg, unspecified  CPT Codes:       11336  ICD Codes:       M79.606  History:         Pain in legs s/p TAVR. No prior duplex.  Limitations:                RIGHT  Waveform        Peak Systolic Velocity (cm/s)                  Prox    Prox-Mid  Mid    Mid-Dist  Distal  Monophasic                        307              143     CFA  Monophasic      55                                         PFA  Monophasic      53                51               34      SFA  Monophasic                        30                       POP  Monophasic      22                                 32      AT  Monophasic      19                                 11      PT  Monophasic      37                                 20      EVGENY                LEFT  Waveform        Peak Systolic Velocity (cm/s)                  Prox    Prox-Mid  Mid    Mid-Dist  Distal  Triphasic                         187                      CFA  Biphasic        78                                         PFA  Biphasic        103               142              106     SFA  Biphasic                          57                        POP  Biphasic        83                                 110     AT  Biphasic        76                                 24      PT  Biphasic        61                                 24      EVGENY  FINDINGS  Right.  Echolucent material consistent with thrombus partially fills the common  femoral artery causing a high grade stenosis. Stenosis of the common  femoral artery. Velocities are consistent with > 75% stenosis.  Mild plaque visualized in the femoral and popliteal arteries without  evidence of hemodynamic significance.  Monophasic waveforms demonstrated throughout the right lower extremity.  Left.  Mild plaque is seen in the common femoral, femoral, and popliteal arteries  with no elevated velocities to indicate hemodynamic significance.  Waveforms are biphasic throughout the left lower extremity.  Demetria Narvaez  (Electronically Signed)  Final Date:      2020                   18:45  Amended:         2020 19:58    Us-extremity Artery Lower Unilat Right    Result Date: 2020  Lower Extremity  Arterial Duplex Report  Vascular Laboratory  CONCLUSIONS  Pinching of the common femoral artery from perclose device closure.  Velcoity consistent with >75% stenosis. Intraluminal clot seen in prior  study is not readily seen.  FADIA LANDIS  Exam Date:     2020 07:49  Room #:     Inpatient  Priority:     Routine  Ht (in):             Wt (lb):  Ordering Physician:        MELVIN SIMONS  Referring Physician:       MELVIN SIMONS  Sonographer:               Blank Calles RVT  Study Type:                Limited Unilateral  Technical Quality:         Adequate  Age:    65    Gender:     M  MRN:    [id number]  :    1955      BSA:  Indications:     Unspecified atherosclerosis of native arteries of                   extremities, right leg  CPT Codes:       88548  ICD Codes:        I70.201  History:         Right common femoral steosis.  Limitations:                RIGHT  Waveform        Peak Systolic Velocity (cm/s)                  Prox    Prox-Mid  Mid    Mid-Dist  Distal  Monophasic      42                384              230     CFA  Biphasic        97                                         PFA  Biphasic        55                                         SFA                                                             POP                                                             AT                                                             PT                                                             EVGENY                LEFT  Waveform        Peak Systolic Velocity (cm/s)                  Prox    Prox-Mid  Mid    Mid-Dist  Distal                                                             CFA                                                             PFA                                                             SFA                                                             POP                                                             AT                                                             PT                                                             EVGENY  FINDINGS  Limited evaluation of the right common femoral artery known stenosis.  Right.  Stenosis of the mid common femoral artery. Velocities are consistent with >  75%stenosis.  Distal flow is turbulent and damped.  No evidence of hematoma or psuedoaneuysm seen at the right groin.  Luiz Dennis MD  (Electronically Signed)  Final Date:      26 June 2020                   08:49    Ec-echocardiogram Complete W/o Cont    Result Date: 6/23/2020  Transthoracic Echo Report Echocardiography Laboratory CONCLUSIONS Prior echo done on 06/22/2020, there is now a TAVR valve present. Left ventricular ejection fraction is visually estimated to be 60%. Unable to estimate pulmonary artery pressure due to an inadequate tricuspid  regurgitant jet. Known TAVR aortic valve. FADIA LANDIS Exam Date:         2020                    03:46 Exam Location:     Inpatient Priority:          Routine Ordering Physician:        ANTHONY PIPER Referring Physician:       [id number] Cintron Sonographer:               JOJO Ash Age:    65     Gender:    Mal                           e MRN:    [id number] :    1955 BSA:    1.64   Ht (in):    66     Wt (lb):    126 Exam Type:     Complete Indications:     Prosthetic Valve ICD Codes:       V43.3 CPT Codes:       92126 BP:   142    /   83     HR:   70 Technical Quality:       Fair MEASUREMENTS  (Male / Female) Normal Values 2D ECHO LV Diastolic Diameter PLAX        6 cm                  4.2 - 5.9 / 3.9 - 5.3 cm LV Systolic Diameter PLAX         3.7 cm                2.1 - 4.0 cm IVS Diastolic Thickness           0.71 cm               LVPW Diastolic Thickness          0.7 cm                LVOT Diameter                     1.5 cm                Estimated LV Ejection Fraction    60 %                  LV Ejection Fraction MOD BP       39.5 %                >= 55  % LV Ejection Fraction MOD 4C       40.2 %                LV Ejection Fraction MOD 2C       33.9 %                IVC Diameter                      1.8 cm                DOPPLER AV Peak Velocity                  0.99 m/s              AV Peak Gradient                  3.9 mmHg              AV Mean Gradient                  2.7 mmHg              LVOT Peak Velocity                1.1 m/s               AV Area Cont Eq vti               1.9 cm2               Mitral E Point Velocity           0.63 m/s              Mitral E to A Ratio               1.5                   MV Pressure Half Time             75.3 ms               MV Area PHT                       2.9 cm2               MV Deceleration Time              260 ms                * Indicates values subject to auto-interpretation LV EF:  60    % FINDINGS Left Ventricle Left ventricle  is mildly dilated. Normal left ventricular wall thickness. Normal left ventricular systolic function. Left ventricular ejection fraction is visually estimated to be 60%. Normal regional wall motion. Normal diastolic function. Right Ventricle Normal right ventricular size. Normal right ventricular systolic function. Right Atrium Normal right atrial size. Normal inferior vena cava size and inspiratory collapse. Left Atrium Normal left atrial size. Left atrial volume index is 26 mL/sq m. Mitral Valve Calcification of the mitral valve leaflets. No mitral stenosis. Trace mitral regurgitation. Aortic Valve Known TAVR aortic valve. Vmax is 1 m/s. Transvalvular gradients are - Peak: 3 mmHg, Mean: 2 mmHg. Tricuspid Valve Structurally normal tricuspid valve. No tricuspid stenosis. Trace tricuspid regurgitation. Right atrial pressure is estimated to be 8 mmHg. Unable to estimate pulmonary artery pressure due to an inadequate tricuspid regurgitant jet. Pulmonic Valve The pulmonic valve is not well visualized. Pericardium No pericardial effusion seen. Aorta Normal aortic root for body surface area. Ascending aorta diameter is 2.4 cm. Brenden Hansen MD (Electronically Signed) Final Date:     23 June 2020                 06:41    Ec-echocardiogram Ltd W/o Cont    Result Date: 6/23/2020  Transthoracic Echo Report Echocardiography Laboratory CONCLUSIONS Intraoperative TTE during TAVR.  Baseline images show mildly reduced LV function with EF =55%.  Calcified aortic valve leaflets. Severe aortic stenosis.  Post deployment images show preserved biventricular function.  A 26 mm Jewell Jose Manuel Ultra stented bioprosthetic valve is seen in the aortic position with normal leaflet motion, trace significant paravalvular leak, mean gradent of 5 mm Hg and calculated effective orifice area of 2.5 cm2.  Findings communicated at the time of exam. FADIA LANDIS Exam Date:         06/22/2020                    11:17 Exam Location:      Inpatient Priority:          Routine Ordering Physician:        SIMON PARSONS Referring Physician: Sonographer:               Antwan La RDCS Age:    65     Gender:    Jordy                           e MRN:    [id number] :    1955 BSA:    1.64   Ht (in):    66     Wt (lb):    126 Exam Type:     Limited Indications:     Pre-Op Surgery ICD Codes:       V72.83 CPT Codes:       93884 BP:          /          HR: Technical Quality:       Fair MEASUREMENTS  (Male / Female) Normal Values 2D ECHO Estimated LV Ejection Fraction    55 %                  * Indicates values subject to auto-interpretation LV EF:  55    % FINDINGS Left Ventricle The left ventricle was normal in size and function. Right Ventricle The right ventricle was normal in size and function. Right Atrium The right atrium is normal in size. Left Atrium The left atrium is normal in size. Mitral Valve Structurally normal mitral valve without significant stenosis or regurgitation. Aortic Valve Calcified aortic valve leaflets. Severe aortic stenosis. Tricuspid Valve Structurally normal tricuspid valve without significant stenosis or regurgitation. Pulmonic Valve Structurally normal pulmonic valve without significant stenosis or regurgitation. Pericardium Normal pericardium without effusion. Aorta The aortic root is normal. Simon Parsons (Electronically Signed) Final Date:     2020                 16:26 Amended:        2020 16:29      Micro:  Results     Procedure Component Value Units Date/Time    SARS-CoV-2, PCR (In-House) [[id number]] Collected:  20 2301    Order Status:  Completed Updated:  20 0013     SARS-CoV-2 Source NP Swab     SARS-CoV-2 by PCR NotDetected     Comment: [facility] providers: PLEASE REFER TO DE-ESCALATION AND RETESTING PROTOCOL  on [email or web address]  **The [employer] GeneXpert Xpress SARS-CoV-2 Test has been made available for  use under the Emergency Use Authorization (EUA) only.         Narrative:        [id number] LOMBARDO FLASH  Expected Turn around time?->Rush (Cepheid 2-4 hours)  Abbott test failed twice; using previously rejected Cepheid swab.    COVID/SARS CoV-2 PCR [[id number]] Collected:  07/02/20 2301    Order Status:  Completed Specimen:  Respirate from Nasopharyngeal Updated:  07/02/20 2318     COVID Order Status Received    Narrative:       [id number] LOMBARDO FLASH  Expected Turn around time?->Rush (Cepheid 2-4 hours)  Abbott test failed twice; using previously rejected Cepheid swab.    SARS-CoV-2, PCR (In-House) [[id number]] Collected:  07/02/20 1934    Order Status:  Completed Updated:  07/02/20 2047     SARS-CoV-2 Source NP Swab    Narrative:       [id number] LOMBARDO FLASH  Expected Turn around time?->Routine (In-House PCR up to 24  hours)  [id number] LOMBARDO FLASH  Expected Turn around time?->Rush (Cepheid 2-4 hours)  Called RN to provide an Abbott swab.    COVID/SARS CoV-2 PCR [[id number]] Collected:  07/02/20 1934    Order Status:  Completed Specimen:  Respirate from Nasopharyngeal Updated:  07/02/20 2046     COVID Order Status Received    Narrative:       [id number] LOMBARDO FLASH  Expected Turn around time?->Routine (In-House PCR up to 24  hours)  [id number] LOMBARDO FLASH  Expected Turn around time?->Rush (Cepheid 2-4 hours)  Called RN to provide an Abbott swab.    COVID/SARS CoV-2 PCR [[id number]] Collected:  07/02/20 1934    Order Status:  Canceled Specimen:  Respirate from Nasopharyngeal     COVID/SARS CoV-2 PCR [[id number]] Collected:  07/02/20 1934    Order Status:  Canceled Specimen:  Respirate from Nasopharyngeal     Aerobic/Anaerobic Culture (Surgery) [[id number]] Collected:  06/29/20 1317    Order Status:  Completed Specimen:  Wound from Other Body Fluid Updated:  07/02/20 1138     Significant Indicator NEG     Source WND     Site submandibular     Culture Result Order placed in Surgery. Source appropriate culture orders  have been placed in Micro for  "testing.      Narrative:       [id number] PAWAN BRICE F.  Large lump under chin  Please send for cytology/pathology  [id number] PAWAN BRICE F.  2mls pink, frothy fluid aspirated from submandibular fluid  collection, MD Jenkins  [id number] PAWAN BRICE F.    Anaerobic Culture [[id number]] Collected:  06/29/20 1317    Order Status:  Completed Specimen:  Wound Updated:  07/02/20 1138     Significant Indicator NEG     Source WND     Site submandibular     Culture Result No Anaerobes isolated.    Narrative:       [id number] PAWAN BRICE F.  Large lump under chin  Please send for cytology/pathology  [id number] PAWAN BRICE F.  2mls pink, frothy fluid aspirated from submandibular fluid  collection, MD Jenkins  [id number] PAWAN BRICE F.    CULTURE WOUND W/ GRAM STAIN [[id number]]  (Abnormal) Collected:  06/29/20 1317    Order Status:  Completed Specimen:  Wound Updated:  07/02/20 1138     Significant Indicator POS     Source WND     Site submandibular     Culture Result -     Gram Stain Result Many WBCs.  Many Gram positive cocci.  Few Gram negative rods.       Culture Result Viridans Streptococcus  Heavy growth      Narrative:       [id number] PAWAN BRICE F.  Large lump under chin  Please send for cytology/pathology  [id number] PAWAN BRICE F.  2mls pink, frothy fluid aspirated from submandibular fluid  collection, MD Jenkins  [id number] PAWAN BRICE F.    BLOOD CULTURE [[id number]] Collected:  06/26/20 2159    Order Status:  Completed Specimen:  Blood from Peripheral Updated:  07/02/20 0100     Significant Indicator NEG     Source BLD     Site PERIPHERAL     Culture Result No growth after 5 days of incubation.    Narrative:       Protective  Per Hospital Policy: Only change Specimen Src: to \"Line\" if  specified by physician order.  Right Forearm/Arm    BLOOD CULTURE [[id number]] Collected:  06/26/20 2146    Order Status:  Completed " "Specimen:  Blood from Peripheral Updated:  07/02/20 0100     Significant Indicator NEG     Source BLD     Site PERIPHERAL     Culture Result No growth after 5 days of incubation.    Narrative:       Protective  Per Hospital Policy: Only change Specimen Src: to \"Line\" if  specified by physician order.  Left Forearm/Arm    GRAM STAIN [[id number]] Collected:  06/29/20 1317    Order Status:  Completed Specimen:  Wound Updated:  06/29/20 1622     Significant Indicator .     Source WND     Site submandibular     Gram Stain Result Many WBCs.  Many Gram positive cocci.  Few Gram negative rods.      Narrative:       [id number] PAWAN BRICE F.  Large lump under chin  Please send for cytology/pathology  [id number] PAWAN NARVAEZ FKym  2mls pink, frothy fluid aspirated from submandibular fluid  collection, MD Jenkins  [id number] PAWAN BRICE F.    FLUID CULTURE W/GRAM STAIN [[id number]] Collected:  06/29/20 1317    Order Status:  Canceled Specimen:  Other Body Fluid     FLUID CULTURE W/GRAM STAIN [[id number]] Collected:  06/29/20 1317    Order Status:  Sent Specimen:  Other Body Fluid     URINALYSIS [[id number]]  (Abnormal) Collected:  06/27/20 1230    Order Status:  Completed Specimen:  Urine, Clean Catch Updated:  06/27/20 1257     Color DK Yellow     Character Clear     Specific Gravity 1.019     Ph 5.5     Glucose Negative mg/dL      Ketones Negative mg/dL      Protein 100 mg/dL      Bilirubin Negative     Urobilinogen, Urine 1.0     Nitrite Negative     Leukocyte Esterase Negative     Occult Blood Small     Micro Urine Req Microscopic    Narrative:       [id number] JANET CONNOLLY          Assessment:  Active Hospital Problems    Diagnosis   • *Fever [R50.9]   • Pancytopenia (HCC) [D61.818]   • S/P TAVR (transcatheter aortic valve replacement) [Z95.2]   • Essential hypertension [I10]   • Mixed hyperlipidemia [E78.2]   • Panlobular emphysema (HCC) [J43.1]   • Tobacco dependence [F17.200] "        ASSESSMENT/PLAN:      65 y.o.  admitted 6/23/2020. Pt has a past medical history of TAVR on 6/22/2020 and pancytopenia which he has had some work-up but no bone marrow biopsy as yet.  He has had a decreased WBC since 2016 with neutropenia.  He also has some mild anemia and thrombocytopenia that is also chronic but worsened recently.  He presented the ER complaining of tachycardia the day after he been sent home after his TAVR surgery.      Hospital Course:   Patient was admitted and was initially febrile and 6/20 4-1.3 and then again spiked a fever to 103.6 on 6/26.  Initial work-up for pancytopenia was started and plan was to complete this with heme-onc as an outpatient.  Blood cultures were obtained 6/26.  Ultrasound abdomen on 6/25 with trabeculated bladder wall suggesting possible chronic bladder outlet obstruction but otherwise unremarkable.  Ultrasound lower extremity with noted stenosis of the right common femoral artery report is known - no evidence of hematoma or pseudoaneurysm in right groin.  Chest x-rays have been unremarkable.  He is not been on any antibiotics and plan was for discharge and follow-up as an outpatient but he again spiked fevers and also reported soaking sweats last night.  He underwent aspiration of submental abscess on 6/29/2020.  Cultures are growing viridans Streptococcus.  Bone marrow biopsy does not show any evidence of leukemia however additional testing is being sent to [facility].     Problem List  Neutropenic fever, overall improving  Likely secondary to infection for additional testing of patient's bone marrow biopsy is being sent to [facility]  Fever this morning  Neutropenia improving  -Work-up initiated by hospitalist with SPEP, smooth muscle antibodies, SEEMA, rheumatoid factor, B12 and folate levels as well as a fibrinogen.   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  Pathology report- no evidence of leukemia.  Additional testing being sent to [place]  CMV PCR- not detected  Parvovirus PCR- pending    Lump below chin, tender, prior infection   Fracture of mid mandibular body  Submandibular abscess  Possible infected hematoma-2.9 cm collection subjacent to fracture site noted on CT scan  -Reports oral surgery at the end of April and he was on antibiotics (augmentin) due to infection for approximately 1 month.  There had resolved but now with new swelling  Status post IR aspiration on 6/29/2020.  Gram stain+ GPC, GNR.  Culture- viridans Streptococcus  On antibiotics above  Oral surgery evaluation for fracture and need for possible surgical intervention pending  Concerned about infection causing fracture of the mid mandibular body, possible osteomyelitis.  Plan for ORIF of the mandible with bone graft today by Dr. Morrison  Duration of antibiotics will depend on operative findings however anticipate a 6-week course due to concerns for osteomyelitis given mandibular fracture    I have performed a physical exam and reviewed and updated ROS and plan today 7/3/2020.  In review of yesterday's note 7/2/2020, there are no changes except as documented above.       Plan of care discussed with Dr. Urena. Will continue to follow

## 2020-07-03 NOTE — DISCHARGE PLANNING
Plan is for patient to go to the OR today for an ORIF of mandible w/ possible bone graft.  Per ID notes, there is concern for osteomyelitis, so patient could potentially need abx for 6 weeks.  Patient is scheduled for a JOON on 7/6/2020.    DC Plan:  Home once medically cleared.  Is not medically cleared at this time.

## 2020-07-03 NOTE — ANESTHESIA PREPROCEDURE EVALUATION
Relevant Problems   No relevant active problems       Physical Exam    Airway   Mallampati: II  TM distance: >3 FB  Neck ROM: full       Cardiovascular - normal exam  Rhythm: regular  Rate: normal  (-) murmur     Dental - normal exam           Pulmonary - normal exam  Breath sounds clear to auscultation     Abdominal    Neurological - normal exam                 Anesthesia Plan    ASA 4- EMERGENT   ASA physical status 4 criteria: sepsis, severe valve dysfunction and respiratory failure    Plan - general       Airway plan will be ETT        Induction: intravenous    Postoperative Plan: Postoperative administration of opioids is intended.    Pertinent diagnostic labs and testing reviewed    Informed Consent:    Anesthetic plan and risks discussed with patient.    Use of blood products discussed with: patient whom consented to blood products.

## 2020-07-03 NOTE — PROGRESS NOTES
Report received, pt care assumed, tele box on. VSS, pt assessment complete. Pt aaox4, no signs of distress noted at this time. POC discussed with pt and verbalizes no questions. Pt denies any additional needs at this time. Bed in lowest position, pt educated on fall risk and verbalized understanding, call light within reach, will continue to monitor.

## 2020-07-03 NOTE — PROGRESS NOTES
[facility] Medicine Daily Progress Note    Date of Service  7/3/2020    Chief Complaint  65 y.o. male admitted 6/23/2020 with tachycardia.  He had a TAVR on 6/22/2020, and was discharged home on 6/23/2020.  He returned to the ER due to persistent tachycardia.     He has a history of pancytopenia which is worsening, and has been hesitant to do a bone marrow biopsy.  He has tobacco dependence, essential hypertension, hyperlipidemia.       Hospital Course    Home Eliquis and aspirin were discontinued.  Heart rate stabilized. He continued to have decline in his hemoglobin and platelets. WBC stabilized. Briefly discussed with hem/onc.  Abdominal ultrasound from 6/25/2020 showed no acute abnormalities.  SPEP, smooth muscle antibodies, SEEMA, rheumatoid factor, B12 and folate levels, fibrinogen were ordered.  Patient wished to go home and agreed follow-up with hematology for bone marrow biopsy and further work-up.  He was discharged to go home on 6/26/2020, but developed a fever of 103.6.     Infectious disease were consulted on 6/27/2020 due to fever and neutropenia, Cefepime was empirically started.     B12, folate, and fibrinogen levels were normal.  Rheumatoid factor was 40, ESR was 45, LFTs were unremarkable, SEEMA was undetectable, HIV was nonreactive.      Interval Problem Update    Case d/w with Dr. Berry of ID    Case d/w dr rouse of oncology.     Low 8.2  Low plt 34    Sodium 129    Patient states states he feels good. A little depressed about being in hopsital    Low grade temp last night    Leukopenia but no longer neutropenic      Mandibular fluid collection is infected.  Growing out Streptococcus viridans.  Dr vasquez oral surgery to take to OR soon    We need to get platelets > 50, 000    echoCardiogram shows no vegetations on valves    Subspecialty Mds requesting JOON- dr archibald aware- will do JOON on monday    Consultants/Specialty  ID    Code Status  full    Disposition  home    Review of Systems  Review of  Systems   Constitutional: Positive for fever. Negative for chills, malaise/fatigue and weight loss.   HENT: Negative for ear discharge, hearing loss and tinnitus.    Eyes: Negative for blurred vision, double vision and photophobia.   Respiratory: Negative for cough and sputum production.    Cardiovascular: Negative for palpitations, orthopnea and claudication.   Gastrointestinal: Negative for abdominal pain, heartburn, nausea and vomiting.   Genitourinary: Negative for frequency and urgency.   Musculoskeletal: Positive for myalgias. Negative for back pain and neck pain.   Neurological: Negative for dizziness, tremors, focal weakness and headaches.   Endo/Heme/Allergies: Does not bruise/bleed easily.   Psychiatric/Behavioral: Negative for depression and substance abuse.        Physical Exam  Temp:  [36.8 °C (98.2 °F)-38 °C (100.4 °F)] 37.1 °C (98.8 °F)  Pulse:  [69-93] 70  Resp:  [16-19] 19  BP: (117-183)/(53-80) 137/72  SpO2:  [90 %-95 %] 90 %    Physical Exam  Constitutional:       Appearance: Normal appearance. He is not diaphoretic.   HENT:      Head:      Comments: Submandibular swelling/induration and tenderness     Mouth/Throat:      Mouth: Mucous membranes are moist.   Eyes:      General: No scleral icterus.     Extraocular Movements: Extraocular movements intact.      Pupils: Pupils are equal, round, and reactive to light.   Neck:      Musculoskeletal: Normal range of motion and neck supple.   Cardiovascular:      Rate and Rhythm: Normal rate.      Heart sounds: No murmur. No gallop.    Pulmonary:      Effort: Pulmonary effort is normal. No respiratory distress.      Breath sounds: No stridor. No wheezing.   Chest:      Chest wall: No tenderness.   Abdominal:      General: Abdomen is flat. There is no distension.      Tenderness: There is no guarding.      Hernia: No hernia is present.   Musculoskeletal: Normal range of motion.         General: No swelling, tenderness, deformity or signs of injury.       Right lower leg: No edema.   Skin:     General: Skin is warm.      Capillary Refill: Capillary refill takes 2 to 3 seconds.      Coloration: Skin is not jaundiced or pale.      Findings: No bruising or rash.   Neurological:      General: No focal deficit present.      Mental Status: He is alert and oriented to person, place, and time.      Cranial Nerves: No cranial nerve deficit.      Deep Tendon Reflexes: Reflexes normal.   Psychiatric:         Mood and Affect: Mood normal.         Fluids    Intake/Output Summary (Last 24 hours) at 7/3/2020 1025  Last data filed at 7/2/2020 1846  Gross per 24 hour   Intake 1080 ml   Output --   Net 1080 ml       Laboratory  Recent Labs     07/01/20  0408 07/02/20  0406 07/03/20  0331   WBC 4.5* 4.1* 2.8*   RBC 2.94* 2.88* 2.92*   HEMOGLOBIN 8.4* 8.2* 8.2*   HEMATOCRIT 25.7* 25.0* 25.1*   MCV 87.4 86.8 86.0   MCH 28.6 28.5 28.1   MCHC 32.7* 32.8* 32.7*   RDW 46.5 45.4 46.1   PLATELETCT 52* 38* 34*     Recent Labs     07/01/20  0408 07/02/20  0406 07/03/20  0331   SODIUM 132* 125* 129*   POTASSIUM 4.0 3.2* 4.0   CHLORIDE 100 96 98   CO2 22 22 22   GLUCOSE 107* 114* 117*   BUN 12 11 17   CREATININE 0.63 0.59 0.73   CALCIUM 7.9* 7.8* 7.9*                   Imaging  EC-ECHOCARDIOGRAM COMPLETE W/O CONT   Final Result      IR-CYST ASPIRATION-MISC   Final Result         ULTRASOUND GUIDED ASPIRATION OF SUBMANDIBULAR ABSCESS..      CT-SOFT TISSUE NECK WITH   Final Result         1. Acute versus subacute fracture of the mid mandibular body.   2. A 2.9 cm collection subjacent to the fracture site, likely hematoma. Infected hematoma can be considered in the appropriate clinical settings.   3. No cervical lymphadenopathy.   4. Moderate mucosal thickening of the left maxillary sinus, likely odontogenic in nature, related to impacted remaining maxillary tooth.      CT-CHEST,ABDOMEN,PELVIS WITH   Final Result      Hypodensities in the spleen worrisome for splenic infarcts.      Trace nonspecific  free fluid in the pelvis.      2.2 x 1.7 cm left common femoral artery pseudoaneurysm.      Bowel containing right inguinal hernia without evidence of obstruction.      Atherosclerotic plaque within an ectatic aorta.      Gallbladder wall thickening/pericholecystic fluid. Further evaluation can be performed with right upper quadrant ultrasound.      Small hypodense left renal lesions are too small to characterize but likely represent small cysts.      Emphysematous changes.      Irregular nodular opacity along the minor fissure measuring 8 mm.      4.7 mm right middle lobe pulmonary nodule.      Mild bibasilar and lingular atelectasis.      Emphysematous changes.      Sequelae of prior granulomatous exposure.      Right hilar lymph node is borderline enlarged and nonspecific, possibly reactive..      Enlarged periportal lymph nodes are nonspecific.      Fleischner Society pulmonary nodule recommendations:   Low Risk: CT at 3-6 months, then consider CT at 18-24 months      High Risk: CT at 3-6 months, then at 18-24 months      Comments: Use most suspicious nodule as guide to management. Follow-up intervals may vary according to size and risk.      Low Risk - Minimal or absent history of smoking and of other known risk factors.      High Risk - History of smoking or of other known risk factors.      Note: These recommendations do not apply to lung cancer screening, patients with immunosuppression, or patients with known primary cancer.      Fleischner Society 2017 Guidelines for Management of Incidentally Detected Pulmonary Nodules in Adults      DX-CHEST-2 VIEWS   Final Result      No acute cardiopulmonary abnormality.      US-EXTREMITY ARTERY LOWER UNILAT RIGHT   Final Result      US-ABDOMEN COMPLETE SURVEY   Final Result      Prominent trabeculated bladder wall, suggesting possible chronic bladder outlet obstruction. Unremarkable findings otherwise.         DX-CHEST-PORTABLE (1 VIEW)   Final Result      No acute  cardiac or pulmonary abnormalities are identified.           Assessment/Plan  * Fever- (present on admission)  Assessment & Plan  Fever with neutropenia ALthough neutropenia has resolved    Still febrile    Follow all cultures    Discussed with ID MD Dr. Berry    Mandibular abscess- (present on admission)  Assessment & Plan  Growing strep viridans    Oral surgery dr vasquez    Pancytopenia (HCC)- (present on admission)  Assessment & Plan  Present for a few months, unclear etiology    abx as per id    Hematology/oncology were consulted 6/28/2020  Bone marrow biopsy done   Check HIT antibody    Transfuse plt on 7/3- to prepare for surgery on mandible    S/P TAVR (transcatheter aortic valve replacement)- (present on admission)  Assessment & Plan  S/P TAVR on 6/22/2020   . Continue statin  Per cardiology start B12 and folate supplements  Echocardiogram shows no vegetations  Cardiology is following    Mixed hyperlipidemia- (present on admission)  Assessment & Plan  Continue home Lovastatin    Essential hypertension- (present on admission)  Assessment & Plan    Continue Norvasc    Hypokalemia  Assessment & Plan  Resolved today    Panlobular emphysema (HCC)- (present on admission)  Assessment & Plan  History of tobacco use  Chest x-ray from admission shows no acute cardiopulmonary process    Tobacco dependence- (present on admission)  Assessment & Plan  Patient has been counseled on tobacco cessation  Nicotine replacement options were provided       VTE prophylaxis: scd      Check cm cbc, bmp

## 2020-07-03 NOTE — PROGRESS NOTES
Monitor Summary  SR/A Junctional   (f)(o)PVC  (f)(o)PAC  (o)trig (r)(o)big  I/O Junctional (MD johnson)  -/.08/.40

## 2020-07-03 NOTE — PROGRESS NOTES
[facility] Medicine Daily Progress Note    Date of Service  7/2/2020    Chief Complaint  65 y.o. male admitted 6/23/2020 with tachycardia.  He had a TAVR on 6/22/2020, and was discharged home on 6/23/2020.  He returned to the ER due to persistent tachycardia.     He has a history of pancytopenia which is worsening, and has been hesitant to do a bone marrow biopsy.  He has tobacco dependence, essential hypertension, hyperlipidemia.       Hospital Course    Home Eliquis and aspirin were discontinued.  Heart rate stabilized. He continued to have decline in his hemoglobin and platelets. WBC stabilized. Briefly discussed with hem/onc.  Abdominal ultrasound from 6/25/2020 showed no acute abnormalities.  SPEP, smooth muscle antibodies, SEEMA, rheumatoid factor, B12 and folate levels, fibrinogen were ordered.  Patient wished to go home and agreed follow-up with hematology for bone marrow biopsy and further work-up.  He was discharged to go home on 6/26/2020, but developed a fever of 103.6.     Infectious disease were consulted on 6/27/2020 due to fever and neutropenia, Cefepime was empirically started.     B12, folate, and fibrinogen levels were normal.  Rheumatoid factor was 40, ESR was 45, LFTs were unremarkable, SEEMA was undetectable, HIV was nonreactive.      Interval Problem Update    Case d/w with Dr. Berry of ID    Case d/w dr rouse of oncology.     Low 8.2  Low plt 38    Low potassium 3.2      Sodium 132    Patient states states he feels good    Fever last night again    Leukopenia but no longer neutropenic      Mandibular fluid collection is infected.  Growing out Streptococcus viridans.  Dr vasquez oral surgery to take to OR soon    echoCardiogram shows no vegetations on valves    Subspecialty Mds requesting JOON- dr archibald aware- will do JOON on monday    Consultants/Specialty  ID    Code Status  full    Disposition  home    Review of Systems  Review of Systems   Constitutional: Positive for fever. Negative for  chills, malaise/fatigue and weight loss.   HENT: Negative for ear discharge, hearing loss and tinnitus.    Eyes: Negative for blurred vision, double vision and photophobia.   Respiratory: Negative for cough and sputum production.    Cardiovascular: Negative for palpitations, orthopnea and claudication.   Gastrointestinal: Negative for abdominal pain, heartburn, nausea and vomiting.   Genitourinary: Negative for frequency and urgency.   Musculoskeletal: Negative for back pain, myalgias and neck pain.   Neurological: Negative for dizziness, tremors, focal weakness and headaches.   Endo/Heme/Allergies: Does not bruise/bleed easily.   Psychiatric/Behavioral: Negative for depression and substance abuse.        Physical Exam  Temp:  [37 °C (98.6 °F)-39.1 °C (102.4 °F)] 37 °C (98.6 °F)  Pulse:  [52-87] 69  Resp:  [16-18] 16  BP: (116-183)/(44-80) 117/53  SpO2:  [91 %-97 %] 92 %    Physical Exam  Constitutional:       Appearance: Normal appearance. He is not diaphoretic.   HENT:      Head:      Comments: Submandibular swelling/induration and tenderness     Mouth/Throat:      Mouth: Mucous membranes are moist.   Eyes:      General: No scleral icterus.     Extraocular Movements: Extraocular movements intact.      Pupils: Pupils are equal, round, and reactive to light.   Neck:      Musculoskeletal: Normal range of motion and neck supple.   Cardiovascular:      Rate and Rhythm: Normal rate.      Heart sounds: No murmur. No gallop.    Pulmonary:      Effort: Pulmonary effort is normal. No respiratory distress.      Breath sounds: No stridor. No wheezing.   Chest:      Chest wall: No tenderness.   Abdominal:      General: Abdomen is flat. There is no distension.      Tenderness: There is no guarding.      Hernia: No hernia is present.   Musculoskeletal: Normal range of motion.         General: No swelling, tenderness, deformity or signs of injury.      Right lower leg: No edema.   Skin:     General: Skin is warm.      Capillary  Refill: Capillary refill takes 2 to 3 seconds.      Coloration: Skin is not jaundiced or pale.      Findings: No bruising or rash.   Neurological:      General: No focal deficit present.      Mental Status: He is alert and oriented to person, place, and time.      Cranial Nerves: No cranial nerve deficit.      Deep Tendon Reflexes: Reflexes normal.   Psychiatric:         Mood and Affect: Mood normal.         Fluids    Intake/Output Summary (Last 24 hours) at 7/2/2020 2059  Last data filed at 7/2/2020 1846  Gross per 24 hour   Intake 1650 ml   Output --   Net 1650 ml       Laboratory  Recent Labs     06/30/20  0550 07/01/20  0408 07/02/20  0406   WBC 4.5* 4.5* 4.1*   RBC 3.09* 2.94* 2.88*   HEMOGLOBIN 8.8* 8.4* 8.2*   HEMATOCRIT 26.4* 25.7* 25.0*   MCV 85.4 87.4 86.8   MCH 28.5 28.6 28.5   MCHC 33.3* 32.7* 32.8*   RDW 44.9 46.5 45.4   PLATELETCT 52* 52* 38*     Recent Labs     06/30/20  0550 07/01/20  0408 07/02/20  0406   SODIUM 130* 132* 125*   POTASSIUM 3.3* 4.0 3.2*   CHLORIDE 99 100 96   CO2 22 22 22   GLUCOSE 114* 107* 114*   BUN 15 12 11   CREATININE 0.53 0.63 0.59   CALCIUM 7.7* 7.9* 7.8*                   Imaging  EC-ECHOCARDIOGRAM COMPLETE W/O CONT   Final Result      IR-CYST ASPIRATION-MISC   Final Result         ULTRASOUND GUIDED ASPIRATION OF SUBMANDIBULAR ABSCESS..      CT-SOFT TISSUE NECK WITH   Final Result         1. Acute versus subacute fracture of the mid mandibular body.   2. A 2.9 cm collection subjacent to the fracture site, likely hematoma. Infected hematoma can be considered in the appropriate clinical settings.   3. No cervical lymphadenopathy.   4. Moderate mucosal thickening of the left maxillary sinus, likely odontogenic in nature, related to impacted remaining maxillary tooth.      CT-CHEST,ABDOMEN,PELVIS WITH   Final Result      Hypodensities in the spleen worrisome for splenic infarcts.      Trace nonspecific free fluid in the pelvis.      2.2 x 1.7 cm left common femoral artery  pseudoaneurysm.      Bowel containing right inguinal hernia without evidence of obstruction.      Atherosclerotic plaque within an ectatic aorta.      Gallbladder wall thickening/pericholecystic fluid. Further evaluation can be performed with right upper quadrant ultrasound.      Small hypodense left renal lesions are too small to characterize but likely represent small cysts.      Emphysematous changes.      Irregular nodular opacity along the minor fissure measuring 8 mm.      4.7 mm right middle lobe pulmonary nodule.      Mild bibasilar and lingular atelectasis.      Emphysematous changes.      Sequelae of prior granulomatous exposure.      Right hilar lymph node is borderline enlarged and nonspecific, possibly reactive..      Enlarged periportal lymph nodes are nonspecific.      Fleischner Society pulmonary nodule recommendations:   Low Risk: CT at 3-6 months, then consider CT at 18-24 months      High Risk: CT at 3-6 months, then at 18-24 months      Comments: Use most suspicious nodule as guide to management. Follow-up intervals may vary according to size and risk.      Low Risk - Minimal or absent history of smoking and of other known risk factors.      High Risk - History of smoking or of other known risk factors.      Note: These recommendations do not apply to lung cancer screening, patients with immunosuppression, or patients with known primary cancer.      Fleischner Society 2017 Guidelines for Management of Incidentally Detected Pulmonary Nodules in Adults      DX-CHEST-2 VIEWS   Final Result      No acute cardiopulmonary abnormality.      US-EXTREMITY ARTERY LOWER UNILAT RIGHT   Final Result      US-ABDOMEN COMPLETE SURVEY   Final Result      Prominent trabeculated bladder wall, suggesting possible chronic bladder outlet obstruction. Unremarkable findings otherwise.         DX-CHEST-PORTABLE (1 VIEW)   Final Result      No acute cardiac or pulmonary abnormalities are identified.            Assessment/Plan  * Fever- (present on admission)  Assessment & Plan  Fever with neutropenia ALthough neutropenia has resolved    Still febrile    Follow all cultures    Discussed with ID MD Dr. Berry    Mandibular abscess- (present on admission)  Assessment & Plan  Growing strep viridans    Oral surgery to eval dr vasquez    Pancytopenia (HCC)- (present on admission)  Assessment & Plan  Present for a few months, unclear etiology  Briefly discussed with Dr. Chacon (hematology/oncology) a few days ago.  LFT's, fibrinogen, B12, folic acid, SEEMA were normal.  RF was 40, ESR was 45  SPEP was unremarkable, smooth muscle antibodies are pending  Infectious disease were consulted, cefepime started 6/27/2020  Hematology/oncology were consulted 6/28/2020  Bone marrow biopsy done  Will await further recommendations    S/P TAVR (transcatheter aortic valve replacement)- (present on admission)  Assessment & Plan  S/P TAVR on 6/22/2020   . Continue statin  Per cardiology start B12 and folate supplements  Echocardiogram shows no vegetations  Cardiology is following    Mixed hyperlipidemia- (present on admission)  Assessment & Plan  Continue home Lovastatin    Essential hypertension- (present on admission)  Assessment & Plan    Continue Norvasc    Panlobular emphysema (HCC)- (present on admission)  Assessment & Plan  History of tobacco use  Chest x-ray from admission shows no acute cardiopulmonary process    Tobacco dependence- (present on admission)  Assessment & Plan  Patient has been counseled on tobacco cessation  Nicotine replacement options were provided       VTE prophylaxis: scd      Check cm cbc, bmp

## 2020-07-04 ENCOUNTER — APPOINTMENT (OUTPATIENT)
Dept: RADIOLOGY | Facility: MEDICAL CENTER | Age: 65
DRG: 982 | End: 2020-07-04
Attending: HOSPITALIST
Payer: COMMERCIAL

## 2020-07-04 PROBLEM — D75.829 HIT (HEPARIN-INDUCED THROMBOCYTOPENIA) (HCC): Status: ACTIVE | Noted: 2020-07-04

## 2020-07-04 LAB
ANION GAP SERPL CALC-SCNC: 8 MMOL/L (ref 7–16)
ANISOCYTOSIS BLD QL SMEAR: ABNORMAL
ANNOTATION COMMENT IMP: NOT DETECTED
APTT PPP: 100 SEC (ref 24.7–36)
APTT PPP: 34.9 SEC (ref 24.7–36)
APTT PPP: 96.3 SEC (ref 24.7–36)
B19V DNA SERPL NAA+PROBE-ACNC: <100 IU/ML
B19V DNA SERPL NAA+PROBE-LOG IU: <2 LOG IU/ML
BASOPHILS # BLD AUTO: 0.9 % (ref 0–1.8)
BASOPHILS # BLD: 0.02 K/UL (ref 0–0.12)
BUN SERPL-MCNC: 16 MG/DL (ref 8–22)
CALCIUM SERPL-MCNC: 8.1 MG/DL (ref 8.5–10.5)
CHLORIDE SERPL-SCNC: 97 MMOL/L (ref 96–112)
CO2 SERPL-SCNC: 23 MMOL/L (ref 20–33)
CREAT SERPL-MCNC: 0.73 MG/DL (ref 0.5–1.4)
EOSINOPHIL # BLD AUTO: 0.08 K/UL (ref 0–0.51)
EOSINOPHIL NFR BLD: 3.5 % (ref 0–6.9)
ERYTHROCYTE [DISTWIDTH] IN BLOOD BY AUTOMATED COUNT: 48.1 FL (ref 35.9–50)
GLUCOSE SERPL-MCNC: 112 MG/DL (ref 65–99)
GRAM STN SPEC: NORMAL
HCT VFR BLD AUTO: 26 % (ref 42–52)
HGB BLD-MCNC: 8.4 G/DL (ref 14–18)
LYMPHOCYTES # BLD AUTO: 0.92 K/UL (ref 1–4.8)
LYMPHOCYTES NFR BLD: 41.7 % (ref 22–41)
MANUAL DIFF BLD: NORMAL
MCH RBC QN AUTO: 28.7 PG (ref 27–33)
MCHC RBC AUTO-ENTMCNC: 32.3 G/DL (ref 33.7–35.3)
MCV RBC AUTO: 88.7 FL (ref 81.4–97.8)
MICROCYTES BLD QL SMEAR: ABNORMAL
MONOCYTES # BLD AUTO: 0.61 K/UL (ref 0–0.85)
MONOCYTES NFR BLD AUTO: 27.8 % (ref 0–13.4)
MORPHOLOGY BLD-IMP: NORMAL
NEUTROPHILS # BLD AUTO: 0.55 K/UL (ref 1.82–7.42)
NEUTROPHILS NFR BLD: 25.2 % (ref 44–72)
NRBC # BLD AUTO: 0 K/UL
NRBC BLD-RTO: 0 /100 WBC
OVALOCYTES BLD QL SMEAR: NORMAL
PF4 HEPARIN CMPLX IGG SERPL IA: 2.9 OD
PLATELET # BLD AUTO: 58 K/UL (ref 164–446)
PLATELET BLD QL SMEAR: NORMAL
PMV BLD AUTO: 11.4 FL (ref 9–12.9)
POIKILOCYTOSIS BLD QL SMEAR: NORMAL
POLYCHROMASIA BLD QL SMEAR: NORMAL
POTASSIUM SERPL-SCNC: 4.4 MMOL/L (ref 3.6–5.5)
RBC # BLD AUTO: 2.93 M/UL (ref 4.7–6.1)
RBC BLD AUTO: PRESENT
SIGNIFICANT IND 70042: NORMAL
SITE SITE: NORMAL
SMUDGE CELLS BLD QL SMEAR: NORMAL
SODIUM SERPL-SCNC: 128 MMOL/L (ref 135–145)
SOURCE SOURCE: NORMAL
SPECIMEN SOURCE: NORMAL
TOXIC GRANULES BLD QL SMEAR: SLIGHT
WBC # BLD AUTO: 2.2 K/UL (ref 4.8–10.8)
WBC OTHER NFR BLD MANUAL: 0.9 %

## 2020-07-04 PROCEDURE — 700102 HCHG RX REV CODE 250 W/ 637 OVERRIDE(OP): Performed by: HOSPITALIST

## 2020-07-04 PROCEDURE — 700105 HCHG RX REV CODE 258: Performed by: INTERNAL MEDICINE

## 2020-07-04 PROCEDURE — 700105 HCHG RX REV CODE 258: Performed by: HOSPITALIST

## 2020-07-04 PROCEDURE — 85007 BL SMEAR W/DIFF WBC COUNT: CPT

## 2020-07-04 PROCEDURE — 700111 HCHG RX REV CODE 636 W/ 250 OVERRIDE (IP): Mod: JG | Performed by: HOSPITALIST

## 2020-07-04 PROCEDURE — 99233 SBSQ HOSP IP/OBS HIGH 50: CPT | Performed by: HOSPITALIST

## 2020-07-04 PROCEDURE — 36415 COLL VENOUS BLD VENIPUNCTURE: CPT

## 2020-07-04 PROCEDURE — 80048 BASIC METABOLIC PNL TOTAL CA: CPT

## 2020-07-04 PROCEDURE — 85730 THROMBOPLASTIN TIME PARTIAL: CPT | Mod: 91

## 2020-07-04 PROCEDURE — 700102 HCHG RX REV CODE 250 W/ 637 OVERRIDE(OP): Performed by: INTERNAL MEDICINE

## 2020-07-04 PROCEDURE — 700111 HCHG RX REV CODE 636 W/ 250 OVERRIDE (IP): Performed by: INTERNAL MEDICINE

## 2020-07-04 PROCEDURE — 99233 SBSQ HOSP IP/OBS HIGH 50: CPT | Performed by: INTERNAL MEDICINE

## 2020-07-04 PROCEDURE — A9270 NON-COVERED ITEM OR SERVICE: HCPCS | Performed by: INTERNAL MEDICINE

## 2020-07-04 PROCEDURE — A9270 NON-COVERED ITEM OR SERVICE: HCPCS | Performed by: HOSPITALIST

## 2020-07-04 PROCEDURE — 93970 EXTREMITY STUDY: CPT

## 2020-07-04 PROCEDURE — 86022 PLATELET ANTIBODIES: CPT

## 2020-07-04 PROCEDURE — 770020 HCHG ROOM/CARE - TELE (206)

## 2020-07-04 PROCEDURE — 85027 COMPLETE CBC AUTOMATED: CPT

## 2020-07-04 RX ORDER — ARGATROBAN 1 MG/ML
2 INJECTION, SOLUTION INTRAVENOUS CONTINUOUS
Status: DISCONTINUED | OUTPATIENT
Start: 2020-07-04 | End: 2020-07-13

## 2020-07-04 RX ORDER — HYDROCODONE BITARTRATE AND ACETAMINOPHEN 5; 325 MG/1; MG/1
1 TABLET ORAL EVERY 4 HOURS PRN
Status: DISCONTINUED | OUTPATIENT
Start: 2020-07-04 | End: 2020-07-15 | Stop reason: HOSPADM

## 2020-07-04 RX ORDER — HYDROCODONE BITARTRATE AND ACETAMINOPHEN 5; 325 MG/1; MG/1
2 TABLET ORAL EVERY 4 HOURS PRN
Status: DISCONTINUED | OUTPATIENT
Start: 2020-07-04 | End: 2020-07-04

## 2020-07-04 RX ADMIN — ZOLPIDEM TARTRATE 5 MG: 5 TABLET ORAL at 02:11

## 2020-07-04 RX ADMIN — HYDROCODONE BITARTRATE AND ACETAMINOPHEN 1 TABLET: 5; 325 TABLET ORAL at 08:03

## 2020-07-04 RX ADMIN — AMLODIPINE BESYLATE 2.5 MG: 5 TABLET ORAL at 21:56

## 2020-07-04 RX ADMIN — AMPICILLIN SODIUM AND SULBACTAM SODIUM 3 G: 2; 1 INJECTION, POWDER, FOR SOLUTION INTRAMUSCULAR; INTRAVENOUS at 17:36

## 2020-07-04 RX ADMIN — ARGATROBAN 2 MCG/KG/MIN: 50 INJECTION INTRAVENOUS at 13:44

## 2020-07-04 RX ADMIN — HYDROCODONE BITARTRATE AND ACETAMINOPHEN 2 TABLET: 5; 325 TABLET ORAL at 17:36

## 2020-07-04 RX ADMIN — CEFEPIME 2 G: 2 INJECTION, POWDER, FOR SOLUTION INTRAVENOUS at 13:20

## 2020-07-04 RX ADMIN — HYDROCODONE BITARTRATE AND ACETAMINOPHEN 1 TABLET: 5; 325 TABLET ORAL at 02:34

## 2020-07-04 RX ADMIN — LOVASTATIN 20 MG: 20 TABLET ORAL at 21:56

## 2020-07-04 RX ADMIN — CEFEPIME 2 G: 2 INJECTION, POWDER, FOR SOLUTION INTRAVENOUS at 06:38

## 2020-07-04 RX ADMIN — AMPICILLIN SODIUM AND SULBACTAM SODIUM 3 G: 2; 1 INJECTION, POWDER, FOR SOLUTION INTRAMUSCULAR; INTRAVENOUS at 14:45

## 2020-07-04 RX ADMIN — HYDROCODONE BITARTRATE AND ACETAMINOPHEN 2 TABLET: 5; 325 TABLET ORAL at 13:20

## 2020-07-04 RX ADMIN — ARGATROBAN 1.45 MCG/KG/MIN: 50 INJECTION INTRAVENOUS at 17:41

## 2020-07-04 RX ADMIN — SODIUM CHLORIDE: 9 INJECTION, SOLUTION INTRAVENOUS at 06:40

## 2020-07-04 ASSESSMENT — ENCOUNTER SYMPTOMS
COUGH: 0
HEARTBURN: 0
NECK PAIN: 0
DEPRESSION: 0
FEVER: 0
BRUISES/BLEEDS EASILY: 0
BACK PAIN: 0
ABDOMINAL PAIN: 0
CLAUDICATION: 0
WEIGHT LOSS: 0
WHEEZING: 0
BLURRED VISION: 0
SPUTUM PRODUCTION: 0
MYALGIAS: 1
CONSTIPATION: 0
DIARRHEA: 0
CHILLS: 0
TREMORS: 0
DIZZINESS: 0
FEVER: 1
DOUBLE VISION: 0
ORTHOPNEA: 0
PALPITATIONS: 0
VOMITING: 0
NAUSEA: 0
PHOTOPHOBIA: 0
FOCAL WEAKNESS: 0
HEADACHES: 0

## 2020-07-04 ASSESSMENT — LIFESTYLE VARIABLES: SUBSTANCE_ABUSE: 0

## 2020-07-04 ASSESSMENT — FIBROSIS 4 INDEX: FIB4 SCORE: 11.11

## 2020-07-04 NOTE — PROGRESS NOTES
Infectious Disease Progress Note    Author: Vera Berry M.D. Date & Time of service: 7/4/2020  12:08 PM    Chief Complaint:  Fevers, pancytopenia      Interval History:  6/29 T-max 102.9 WBC 3.7 patient remains neutropenic .  CT of the neck shows a 2.9 cm collection adjacent to the fracture site of the mid mandibular body.  An infected hematoma cannot be ruled out.  CT scan shows possible splenic infarcts and gallbladder wall thickening with pericholecystic fluid.  Patient continues to have fevers and chills.  He states his chin lesion is more painful and has increased in size.  Plan for bone marrow biopsy today  6/30 T-max 101.9, remains febrile.  Had bone biopsy yesterday and aspiration of submandibular abscess.  He did not sleep well overnight as he was very warm.  No new symptoms today  7/1 T-max 100.9 WBC 4.5 culture growing viridans Streptococcus.  Patient did not feel any fevers overnight.  Overall fever curve improving.  He states that his abscess drained throughout the night.  Bone marrow biopsy results pending.  Eager to go home  7/2 T-max 102.4 WBC 4.1 bone marrow results do not show any evidence of MDS or leukemia.  Ongoing chin pain.  Awaiting evaluation from oral surgeon  7/3 T-max 99.9 WBC 2.8.  Plan for ORIF with bone graft today by Dr. Morrison.  No new symptoms to report  7/4 afebrile WBC 2.2 underwent surgery yesterday.  Operative report not available.  Patient complaining of jaw pain from surgery.  Submental abscess cultures-pending    review of Systems:  Review of Systems   Constitutional: Positive for malaise/fatigue. Negative for chills and fever.   HENT:        Jaw pain   Respiratory: Negative for cough, sputum production and wheezing.    Cardiovascular: Negative for chest pain.   Gastrointestinal: Negative for abdominal pain, constipation, diarrhea, nausea and vomiting.   Genitourinary: Negative for dysuria.   Musculoskeletal: Positive for myalgias.   Neurological: Negative for  dizziness and headaches.   All other systems reviewed and are negative.      Hemodynamics:  Temp (24hrs), Av °C (98.6 °F), Min:35.9 °C (96.6 °F), Max:38 °C (100.4 °F)  Temperature: 38 °C (100.4 °F)(RN notified)  Pulse  Av.2  Min: 52  Max: 110   Blood Pressure : 153/73       Physical Exam:  Physical Exam  Vitals signs and nursing note reviewed.   Constitutional:       Appearance: Normal appearance.   HENT:      Mouth/Throat:      Mouth: Mucous membranes are moist.      Pharynx: No oropharyngeal exudate.      Comments: Mandibular surgical site dressed.  Ice pack in place.  Patient unable to open mouth given dressing  Eyes:      Extraocular Movements: Extraocular movements intact.      Conjunctiva/sclera: Conjunctivae normal.      Pupils: Pupils are equal, round, and reactive to light.   Cardiovascular:      Rate and Rhythm: Normal rate and regular rhythm.      Heart sounds: Normal heart sounds.   Pulmonary:      Effort: Pulmonary effort is normal.      Breath sounds: Normal breath sounds.   Abdominal:      General: Abdomen is flat. Bowel sounds are normal.      Palpations: Abdomen is soft.   Musculoskeletal: Normal range of motion.      Right lower leg: No edema.      Left lower leg: No edema.   Skin:     General: Skin is warm and dry.   Neurological:      General: No focal deficit present.      Mental Status: He is alert and oriented to person, place, and time.   Psychiatric:         Mood and Affect: Mood normal.         Behavior: Behavior normal.      Comments: Pleasant         Meds:    Current Facility-Administered Medications:   •  HYDROcodone-acetaminophen  •  [CANCELED] HEPARIN INDUCED PLATELET AB(HIT) **AND** Pharmacy Consult:  •  zolpidem  •  amLODIPine  •  lovastatin  •  senna-docusate **AND** polyethylene glycol/lytes **AND** magnesium hydroxide **AND** bisacodyl  •  acetaminophen  •  enalaprilat  •  ondansetron  •  ondansetron    Labs:  Recent Labs     20  0406 20  0331 20  1302  07/04/20  0330   WBC 4.1* 2.8* 2.8* 2.2*   RBC 2.88* 2.92* 2.89* 2.93*   HEMOGLOBIN 8.2* 8.2* 8.2* 8.4*   HEMATOCRIT 25.0* 25.1* 25.7* 26.0*   MCV 86.8 86.0 88.9 88.7   MCH 28.5 28.1 28.4 28.7   RDW 45.4 46.1 47.1 48.1   PLATELETCT 38* 34* 78* 58*   MPV  --   --  11.2 11.4   NEUTSPOLYS 20.90* 36.40*  --  25.20*   LYMPHOCYTES 70.40* 45.70*  --  41.70*   MONOCYTES 5.20 15.50*  --  27.80*   EOSINOPHILS 1.70 0.00  --  3.50   BASOPHILS 1.70 0.00  --  0.90   RBCMORPHOLO Present  --   --  Present     Recent Labs     07/02/20  0406 07/03/20  0331 07/04/20  0330   SODIUM 125* 129* 128*   POTASSIUM 3.2* 4.0 4.4   CHLORIDE 96 98 97   CO2 22 22 23   GLUCOSE 114* 117* 112*   BUN 11 17 16     Recent Labs     07/02/20  0406 07/03/20  0331 07/04/20  0330   CREATININE 0.59 0.73 0.73       Imaging:  Dx-chest-2 Views    Result Date: 6/27/2020 6/27/2020 11:36 AM HISTORY/REASON FOR EXAM:  Fever TECHNIQUE/EXAM DESCRIPTION AND NUMBER OF VIEWS: Two views of the chest. COMPARISON:  6/23/2020. FINDINGS: LUNGS: Slight hyperinflation. Chronic slightly hyperdense nodule in the right midlung, likely benign granuloma. No focal consolidation. No effusions. PNEUMOTHORAX: None. LINES AND TUBES: None. MEDIASTINUM: No cardiomegaly. TAVR. LAD stent. Atherosclerosis. MUSCULOSKELETAL STRUCTURES: No acute displaced fracture.     No acute cardiopulmonary abnormality.    Dx-chest-portable (1 View)    Result Date: 6/23/2020 6/23/2020 5:44 PM HISTORY/REASON FOR EXAM:  tachycardia. TECHNIQUE/EXAM DESCRIPTION AND NUMBER OF VIEWS: Single portable view of the chest. COMPARISON: Exam from 6:42 AM FINDINGS: Heart size is within normal limits. No pulmonary infiltrates or consolidations are noted. No pleural abnormalities are noted. There is an old left seventh rib fracture.     No acute cardiac or pulmonary abnormalities are identified.    Dx-chest-portable (1 View)    Result Date: 6/23/2020 6/23/2020 6:05 AM HISTORY/REASON FOR EXAM: Postop TAVR. TECHNIQUE/EXAM  DESCRIPTION AND NUMBER OF VIEWS: Single portable view of the chest. COMPARISON: 6/22/2020 FINDINGS: LUNGS: Clear. No effusions. PNEUMOTHORAX: None. LINES AND TUBES: None. MEDIASTINUM: Stable cardiac silhouette. MUSCULOSKELETAL STRUCTURES: Unchanged.     Clear lungs. No pleural effusions.    Dx-chest-portable (1 View)    Result Date: 6/22/2020 6/22/2020 12:50 PM HISTORY/REASON FOR EXAM:  Status post aortic valve replacement. TECHNIQUE/EXAM DESCRIPTION AND NUMBER OF VIEWS: Single portable view of the chest. COMPARISON: 7/15/2019 FINDINGS: Single portable view of the chest demonstrates a normal cardiac silhouette and mediastinal contours. Calcification is in the aorta. There is emphysematous change. The lungs are hyperexpanded. No confluent opacity, pleural fluid, or pneumothorax. No suspicious bony lesions.     No acute findings status post TAVR    Us-abdomen Complete Survey    Result Date: 6/26/2020 6/26/2020 6:27 AM HISTORY/REASON FOR EXAM:  Abnormal Labs Pain TECHNIQUE/EXAM DESCRIPTION AND NUMBER OF VIEWS:  Complete abdomen survey. COMPARISON: None FINDINGS: The liver is normal in contour. There is no evidence of solid mass lesion. The liver measures 14.57 cm. The gallbladder is normal. There is no evidence of cholelithiasis. The gallbladder wall thickness measures 2.50 mm. There is no pericholecystic fluid. The common duct measures 3.40 mm. The visualized pancreas is unremarkable. The visualized aorta is normal in caliber. Intrahepatic IVC is patent. The portal vein is patent with hepatopetal flow. The MPV measures 1.22 cm. The right kidney measures 11.19 cm. The left kidney measures 11.17 cm. There is no hydronephrosis. The spleen measures 10.41 cm maximally. The bladder is trabeculated, which could indicate chronic bladder outlet obstruction. There is no ascites.     Prominent trabeculated bladder wall, suggesting possible chronic bladder outlet obstruction. Unremarkable findings otherwise.     Us-extremity  Artery Lower Bilat    Result Date: 2020  Lower Extremity  Arterial Duplex Report  Vascular Laboratory  CONCLUSIONS  Right.  Echolucent thrombus partially fills the common femoral artery causing a  high grade stenosis. Velocities are consistent with > 75% stenosis.  Mild plaque visualized in the superficial femoral and popliteal arteries  without evidence of hemodynamically significant stenosis.  Monophasic waveforms demonstrated throughout the right lower extremity.  Left.  Mild plaque is seen in the common femoral, femoral, and popliteal arteries  with no elevated velocities to indicate hemodynamically significant  stenosis.  FADIA LANDIS  Exam Date:     2020 17:34  Room #:     Inpatient  Priority:     Stat  Ht (in):             Wt (lb):  Ordering Physician:        ANTHONY PIPER  Referring Physician:       ANTHONY PIPER  Sonographer:               Ewa Curtis RVT  Study Type:                Complete Bilateral  Technical Quality:         Adequate  Age:    65    Gender:     M  MRN:    [id number]  :    1955      BSA:  Indications:     Pain in leg, unspecified  CPT Codes:       88233  ICD Codes:       M79.606  History:         Pain in legs s/p TAVR. No prior duplex.  Limitations:                RIGHT  Waveform        Peak Systolic Velocity (cm/s)                  Prox    Prox-Mid  Mid    Mid-Dist  Distal  Monophasic                        307              143     CFA  Monophasic      55                                         PFA  Monophasic      53                51               34      SFA  Monophasic                        30                       POP  Monophasic      22                                 32      AT  Monophasic      19                                 11      PT  Monophasic      37                                 20      EVGENY                LEFT  Waveform        Peak Systolic Velocity (cm/s)                  Prox     Prox-Mid  Mid    Mid-Dist  Distal  Triphasic                         187                      CFA  Biphasic        78                                         PFA  Biphasic        103               142              106     SFA  Biphasic                          57                       POP  Biphasic        83                                 110     AT  Biphasic        76                                 24      PT  Biphasic        61                                 24      EVGENY  FINDINGS  Right.  Echolucent material consistent with thrombus partially fills the common  femoral artery causing a high grade stenosis. Stenosis of the common  femoral artery. Velocities are consistent with > 75% stenosis.  Mild plaque visualized in the femoral and popliteal arteries without  evidence of hemodynamic significance.  Monophasic waveforms demonstrated throughout the right lower extremity.  Left.  Mild plaque is seen in the common femoral, femoral, and popliteal arteries  with no elevated velocities to indicate hemodynamic significance.  Waveforms are biphasic throughout the left lower extremity.  Demetria Narvaez  (Electronically Signed)  Final Date:      22 June 2020                   18:45  Amended:         22 June 2020 19:58    Us-extremity Artery Lower Unilat Right    Result Date: 6/26/2020  Lower Extremity  Arterial Duplex Report  Vascular Laboratory  CONCLUSIONS  Pinching of the common femoral artery from perclose device closure.  Velcoity consistent with >75% stenosis. Intraluminal clot seen in prior  study is not readily seen.  FADIA LANDIS  Exam Date:     06/26/2020 07:49  Room #:     Inpatient  Priority:     Routine  Ht (in):             Wt (lb):  Ordering Physician:        MELVIN SIMONS  Referring Physician:       MELVIN SIMONS  Sonographer:               Blank Calles RVT  Study Type:                Limited  Unilateral  Technical Quality:         Adequate  Age:    65    Gender:     M  MRN:    [id number]  :    1955      BSA:  Indications:     Unspecified atherosclerosis of native arteries of                   extremities, right leg  CPT Codes:       30079  ICD Codes:       I70.201  History:         Right common femoral steosis.  Limitations:                RIGHT  Waveform        Peak Systolic Velocity (cm/s)                  Prox    Prox-Mid  Mid    Mid-Dist  Distal  Monophasic      42                384              230     CFA  Biphasic        97                                         PFA  Biphasic        55                                         SFA                                                             POP                                                             AT                                                             PT                                                             EVGENY                LEFT  Waveform        Peak Systolic Velocity (cm/s)                  Prox    Prox-Mid  Mid    Mid-Dist  Distal                                                             CFA                                                             PFA                                                             SFA                                                             POP                                                             AT                                                             PT                                                             EVGENY  FINDINGS  Limited evaluation of the right common femoral artery known stenosis.  Right.  Stenosis of the mid common femoral artery. Velocities are consistent with >  75%stenosis.  Distal flow is turbulent and damped.  No evidence of hematoma or psuedoaneuysm seen at the right groin.  Luiz Dennis MD  (Electronically Signed)  Final Date:      2020                   08:49    Ec-echocardiogram Complete W/o Cont    Result Date:  2020  Transthoracic Echo Report Echocardiography Laboratory CONCLUSIONS Prior echo done on 2020, there is now a TAVR valve present. Left ventricular ejection fraction is visually estimated to be 60%. Unable to estimate pulmonary artery pressure due to an inadequate tricuspid regurgitant jet. Known TAVR aortic valve. FADIA LANDIS Exam Date:         2020                    03:46 Exam Location:     Inpatient Priority:          Routine Ordering Physician:        ANTHONY PIPER Referring Physician:       [id number] Cintron Sonographer:               JOJO Ash Age:    65     Gender:    Mal                           e MRN:    [id number] :    1955 BSA:    1.64   Ht (in):    66     Wt (lb):    126 Exam Type:     Complete Indications:     Prosthetic Valve ICD Codes:       V43.3 CPT Codes:       07941 BP:   142    /   83     HR:   70 Technical Quality:       Fair MEASUREMENTS  (Male / Female) Normal Values 2D ECHO LV Diastolic Diameter PLAX        6 cm                  4.2 - 5.9 / 3.9 - 5.3 cm LV Systolic Diameter PLAX         3.7 cm                2.1 - 4.0 cm IVS Diastolic Thickness           0.71 cm               LVPW Diastolic Thickness          0.7 cm                LVOT Diameter                     1.5 cm                Estimated LV Ejection Fraction    60 %                  LV Ejection Fraction MOD BP       39.5 %                >= 55  % LV Ejection Fraction MOD 4C       40.2 %                LV Ejection Fraction MOD 2C       33.9 %                IVC Diameter                      1.8 cm                DOPPLER AV Peak Velocity                  0.99 m/s              AV Peak Gradient                  3.9 mmHg              AV Mean Gradient                  2.7 mmHg              LVOT Peak Velocity                1.1 m/s               AV Area Cont Eq vti               1.9 cm2               Mitral E Point Velocity           0.63 m/s              Mitral E to A Ratio               1.5                    MV Pressure Half Time             75.3 ms               MV Area PHT                       2.9 cm2               MV Deceleration Time              260 ms                * Indicates values subject to auto-interpretation LV EF:  60    % FINDINGS Left Ventricle Left ventricle is mildly dilated. Normal left ventricular wall thickness. Normal left ventricular systolic function. Left ventricular ejection fraction is visually estimated to be 60%. Normal regional wall motion. Normal diastolic function. Right Ventricle Normal right ventricular size. Normal right ventricular systolic function. Right Atrium Normal right atrial size. Normal inferior vena cava size and inspiratory collapse. Left Atrium Normal left atrial size. Left atrial volume index is 26 mL/sq m. Mitral Valve Calcification of the mitral valve leaflets. No mitral stenosis. Trace mitral regurgitation. Aortic Valve Known TAVR aortic valve. Vmax is 1 m/s. Transvalvular gradients are - Peak: 3 mmHg, Mean: 2 mmHg. Tricuspid Valve Structurally normal tricuspid valve. No tricuspid stenosis. Trace tricuspid regurgitation. Right atrial pressure is estimated to be 8 mmHg. Unable to estimate pulmonary artery pressure due to an inadequate tricuspid regurgitant jet. Pulmonic Valve The pulmonic valve is not well visualized. Pericardium No pericardial effusion seen. Aorta Normal aortic root for body surface area. Ascending aorta diameter is 2.4 cm. Brenden Hansen MD (Electronically Signed) Final Date:     23 June 2020                 06:41    Ec-echocardiogram Ltd W/o Cont    Result Date: 6/23/2020  Transthoracic Echo Report Echocardiography Laboratory CONCLUSIONS Intraoperative TTE during TAVR.  Baseline images show mildly reduced LV function with EF =55%.  Calcified aortic valve leaflets. Severe aortic stenosis.  Post deployment images show preserved biventricular function.  A 26 mm Jewell Jose Manuel Ultra stented bioprosthetic valve is seen in the aortic  position with normal leaflet motion, trace significant paravalvular leak, mean gradent of 5 mm Hg and calculated effective orifice area of 2.5 cm2.  Findings communicated at the time of exam. FADIA LANDIS Exam Date:         2020                    11:17 Exam Location:     Inpatient Priority:          Routine Ordering Physician:        SIMON PARSONS Referring Physician: Sonographer:               Antwan La RDCS Age:    65     Gender:    Mal                           e MRN:    [id number] :    1955 BSA:    1.64   Ht (in):    66     Wt (lb):    126 Exam Type:     Limited Indications:     Pre-Op Surgery ICD Codes:       V72.83 CPT Codes:       33031 BP:          /          HR: Technical Quality:       Fair MEASUREMENTS  (Male / Female) Normal Values 2D ECHO Estimated LV Ejection Fraction    55 %                  * Indicates values subject to auto-interpretation LV EF:  55    % FINDINGS Left Ventricle The left ventricle was normal in size and function. Right Ventricle The right ventricle was normal in size and function. Right Atrium The right atrium is normal in size. Left Atrium The left atrium is normal in size. Mitral Valve Structurally normal mitral valve without significant stenosis or regurgitation. Aortic Valve Calcified aortic valve leaflets. Severe aortic stenosis. Tricuspid Valve Structurally normal tricuspid valve without significant stenosis or regurgitation. Pulmonic Valve Structurally normal pulmonic valve without significant stenosis or regurgitation. Pericardium Normal pericardium without effusion. Aorta The aortic root is normal. Simon Parsons (Electronically Signed) Final Date:     2020                 16:26 Amended:        2020 16:29      Micro:  Results     Procedure Component Value Units Date/Time    GRAM STAIN [[id number]] Collected:  20 1736    Order Status:  Completed Specimen:  Wound Updated:  20 1127     Significant Indicator .     Source WND      Site Submental Abscess     Gram Stain Result Moderate WBCs.  Few Gram positive cocci.      Narrative:       Surgery - swabs received    Anaerobic Culture [[id number]] Collected:  07/03/20 1736    Order Status:  Completed Specimen:  Other Updated:  07/03/20 1846    CULTURE WOUND W/ GRAM STAIN [[id number]] Collected:  07/03/20 1736    Order Status:  Completed Specimen:  Other Updated:  07/03/20 1846    SARS-CoV-2, PCR (In-House) [[id number]] Collected:  07/02/20 2301    Order Status:  Completed Updated:  07/03/20 0013     SARS-CoV-2 Source NP Swab     SARS-CoV-2 by PCR NotDetected     Comment: [facility] providers: PLEASE REFER TO DE-ESCALATION AND RETESTING PROTOCOL  on [email or web address]  **The [employer] GeneXpert Xpress SARS-CoV-2 Test has been made available for  use under the Emergency Use Authorization (EUA) only.         Narrative:       [id number] LOMBARDO FLASH  Expected Turn around time?->Rush (Cepheid 2-4 hours)  Abbott test failed twice; using previously rejected Cepheid swab.    COVID/SARS CoV-2 PCR [[id number]] Collected:  07/02/20 2301    Order Status:  Completed Specimen:  Respirate from Nasopharyngeal Updated:  07/02/20 2318     COVID Order Status Received    Narrative:       [id number] LOMBARDO FLASH  Expected Turn around time?->Rush (Cepheid 2-4 hours)  Abbott test failed twice; using previously rejected Cepheid swab.    SARS-CoV-2, PCR (In-House) [[id number]] Collected:  07/02/20 1934    Order Status:  Completed Updated:  07/02/20 2047     SARS-CoV-2 Source NP Swab    Narrative:       [id number] LOMBARDO FLASH  Expected Turn around time?->Routine (In-House PCR up to 24  hours)  [id number] LOMBARDO FLASH  Expected Turn around time?->Rush (Cepheid 2-4 hours)  Called RN to provide an Abbott swab.    COVID/SARS CoV-2 PCR [[id number]] Collected:  07/02/20 1934    Order Status:  Completed Specimen:  Respirate from Nasopharyngeal Updated:  07/02/20 2046     COVID Order Status Received     Narrative:       [id number] LOMBARDO MIA  Expected Turn around time?->Routine (In-House PCR up to 24  hours)  [id number] LOMBARDO MIA  Expected Turn around time?->Rush (Cepheid 2-4 hours)  Called RN to provide an Abbott swab.    COVID/SARS CoV-2 PCR [[id number]] Collected:  07/02/20 1934    Order Status:  Canceled Specimen:  Respirate from Nasopharyngeal     COVID/SARS CoV-2 PCR [[id number]] Collected:  07/02/20 1934    Order Status:  Canceled Specimen:  Respirate from Nasopharyngeal     Aerobic/Anaerobic Culture (Surgery) [[id number]] Collected:  06/29/20 1317    Order Status:  Completed Specimen:  Wound from Other Body Fluid Updated:  07/02/20 1138     Significant Indicator NEG     Source WND     Site submandibular     Culture Result Order placed in Surgery. Source appropriate culture orders  have been placed in Micro for testing.      Narrative:       [id number] PAWAN BRICE F.  Large lump under chin  Please send for cytology/pathology  [id number] PAWAN BRICE F.  2mls pink, frothy fluid aspirated from submandibular fluid  collection, MD Jenkins  [id number] PAWAN BRICE F.    Anaerobic Culture [[id number]] Collected:  06/29/20 1317    Order Status:  Completed Specimen:  Wound Updated:  07/02/20 1138     Significant Indicator NEG     Source WND     Site submandibular     Culture Result No Anaerobes isolated.    Narrative:       [id number] PAWAN BRICE F.  Large lump under chin  Please send for cytology/pathology  [id number] PAWAN BRICE F.  2mls pink, frothy fluid aspirated from submandibular fluid  collection, MD Jenkins  [id number] PAWAN BRICE F.    CULTURE WOUND W/ GRAM STAIN [[id number]]  (Abnormal) Collected:  06/29/20 1317    Order Status:  Completed Specimen:  Wound Updated:  07/02/20 1138     Significant Indicator POS     Source WND     Site submandibular     Culture Result -     Gram Stain Result Many WBCs.  Many Gram positive cocci.  Few  "Gram negative rods.       Culture Result Viridans Streptococcus  Heavy growth      Narrative:       [id number] PAWAN BRICE F.  Large lump under chin  Please send for cytology/pathology  [id number] PAWAN BRICE F.  2mls pink, frothy fluid aspirated from submandibular fluid  collection, MD Jenkins  [id number] PAWAN BRICE F.    BLOOD CULTURE [[id number]] Collected:  06/26/20 2159    Order Status:  Completed Specimen:  Blood from Peripheral Updated:  07/02/20 0100     Significant Indicator NEG     Source BLD     Site PERIPHERAL     Culture Result No growth after 5 days of incubation.    Narrative:       Protective  Per Hospital Policy: Only change Specimen Src: to \"Line\" if  specified by physician order.  Right Forearm/Arm    BLOOD CULTURE [[id number]] Collected:  06/26/20 2146    Order Status:  Completed Specimen:  Blood from Peripheral Updated:  07/02/20 0100     Significant Indicator NEG     Source BLD     Site PERIPHERAL     Culture Result No growth after 5 days of incubation.    Narrative:       Protective  Per Hospital Policy: Only change Specimen Src: to \"Line\" if  specified by physician order.  Left Forearm/Arm    GRAM STAIN [[id number]] Collected:  06/29/20 1317    Order Status:  Completed Specimen:  Wound Updated:  06/29/20 1622     Significant Indicator .     Source WND     Site submandibular     Gram Stain Result Many WBCs.  Many Gram positive cocci.  Few Gram negative rods.      Narrative:       [id number] PAWAN NARVAEZ F.  Large lump under chin  Please send for cytology/pathology  [id number] PAWAN BRICE F.  2mls pink, frothy fluid aspirated from submandibular fluid  collection, MD Jenkins  [id number] PAWAN BRICE F.    FLUID CULTURE W/GRAM STAIN [[id number]] Collected:  06/29/20 1317    Order Status:  Canceled Specimen:  Other Body Fluid     FLUID CULTURE W/GRAM STAIN [[id number]] Collected:  06/29/20 1317    Order Status:  Sent Specimen:  Other Body " Fluid     URINALYSIS [[id number]]  (Abnormal) Collected:  06/27/20 1230    Order Status:  Completed Specimen:  Urine, Clean Catch Updated:  06/27/20 1257     Color DK Yellow     Character Clear     Specific Gravity 1.019     Ph 5.5     Glucose Negative mg/dL      Ketones Negative mg/dL      Protein 100 mg/dL      Bilirubin Negative     Urobilinogen, Urine 1.0     Nitrite Negative     Leukocyte Esterase Negative     Occult Blood Small     Micro Urine Req Microscopic    Narrative:       [id number] JANET CONNOLLY          Assessment:  Active Hospital Problems    Diagnosis   • *Fever [R50.9]   • Pancytopenia (HCC) [D61.818]   • S/P TAVR (transcatheter aortic valve replacement) [Z95.2]   • Essential hypertension [I10]   • Mixed hyperlipidemia [E78.2]   • Panlobular emphysema (HCC) [J43.1]   • Tobacco dependence [F17.200]        ASSESSMENT/PLAN:      65 y.o. man admitted 6/23/2020. Pt has a past medical history of TAVR on 6/22/2020 and pancytopenia which he has had some work-up but no bone marrow biopsy as yet.  He has had a decreased WBC since 2016 with neutropenia.  He also has some mild anemia and thrombocytopenia that is also chronic but worsened recently.  He presented the ER complaining of tachycardia the day after he been sent home after his TAVR surgery.      Hospital Course:   Patient was admitted and was initially febrile and 6/20 4-1.3 and then again spiked a fever to 103.6 on 6/26.  Initial work-up for pancytopenia was started and plan was to complete this with heme-onc as an outpatient.  Blood cultures were obtained 6/26.  Ultrasound abdomen on 6/25 with trabeculated bladder wall suggesting possible chronic bladder outlet obstruction but otherwise unremarkable.  Ultrasound lower extremity with noted stenosis of the right common femoral artery report is known - no evidence of hematoma or pseudoaneurysm in right groin.  Chest x-rays have been unremarkable.  He is not been on any antibiotics and plan was  for discharge and follow-up as an outpatient but he again spiked fevers and also reported soaking sweats last night.  He underwent aspiration of submental abscess on 6/29/2020.  Cultures are growing viridans Streptococcus.  Bone marrow biopsy does not show any evidence of leukemia however additional testing is being sent to [place]. S/p OR with Dr. Morrison on 7/3     Problem List  Neutropenic fever, overall improving  Likely secondary to infection.   Bone marrow biopsy did not reveal any leukemia, however additional testing is being sent to [place]  Fever this morning, T-max 100.4  Neutropenia improving  -Work-up initiated by hospitalist with SPEP, smooth muscle antibodies, SEEMA, rheumatoid factor, B12 and folate levels as well as a fibrinogen.   -ESR 45  - RA elevated at 40  -Folate and B12 within normal limits  -UA unremarkable in 6/27  -Blood cultures on 6/26 no growth to date  -HIV negative   Discontinue cefepime as no evidence of Pseudomonas and anticipate neutropenia will improve once infection is treated.  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  However this may be postponed given mandibular surgery    Pancytopenia  Secondary to infection  Evaluated by oncology  Status post bone marrow biopsy on 6/29/2020.  Pathology report- no evidence of leukemia.  Additional testing being sent to [place]  CMV PCR- not detected  Parvovirus PCR- pending    Lump below chin, tender, prior infection   Fracture of mid mandibular body  Submandibular abscess  Possible infected hematoma-2.9 cm collection subjacent to fracture site noted on CT scan  -Reports oral surgery at the end of April and he was on antibiotics (augmentin) due to infection for approximately 1 month.  There had resolved but now with new swelling  Status post IR aspiration on 6/29/2020.  Gram  stain+ GPC, GNR.  Culture- viridans Streptococcus  On antibiotics above  Concerned about infection causing fracture of the mid mandibular body, possible osteomyelitis.  Patient underwent surgery with Dr. Morrison on 7/3/2020.  Operative report not available  Duration of antibiotics will depend on operative findings however anticipate a 6-week course due to concerns for osteomyelitis given mandibular fracture. May be able to do PO augmentin 875 mg BID at discharge.    I have performed a physical exam and reviewed and updated ROS and plan today 7/4/2020.  In review of yesterday's note 7/3/2020, there are no changes except as documented above.       Plan of care discussed with Dr. Urena. Will continue to follow

## 2020-07-04 NOTE — PROGRESS NOTES
[facility] Medicine Daily Progress Note    Date of Service  7/4/2020    Chief Complaint  65 y.o. male admitted 6/23/2020 with tachycardia.  He had a TAVR on 6/22/2020, and was discharged home on 6/23/2020.  He returned to the ER due to persistent tachycardia.     He has a history of pancytopenia which is worsening, and has been hesitant to do a bone marrow biopsy.  He has tobacco dependence, essential hypertension, hyperlipidemia.       Hospital Course    Home Eliquis and aspirin were discontinued.  Heart rate stabilized. He continued to have decline in his hemoglobin and platelets. WBC stabilized. Briefly discussed with hem/onc.  Abdominal ultrasound from 6/25/2020 showed no acute abnormalities.  SPEP, smooth muscle antibodies, SEEMA, rheumatoid factor, B12 and folate levels, fibrinogen were ordered.  Patient wished to go home and agreed follow-up with hematology for bone marrow biopsy and further work-up.  He was discharged to go home on 6/26/2020, but developed a fever of 103.6.     Infectious disease were consulted on 6/27/2020 due to fever and neutropenia, Cefepime was empirically started.     B12, folate, and fibrinogen levels were normal.  Rheumatoid factor was 40, ESR was 45, LFTs were unremarkable, SEEMA was undetectable, HIV was nonreactive.      Interval Problem Update        Case d/w dr rouse of oncology. -- he requests doppler us of both legs    Low 8.4  Low plt 58    Sodium 128    Chin pain, intensity 8/10, constant, no radiation    S/p orif mandible on 7/3    Hit positive-- BULL sent off      echoCardiogram shows no vegetations on valves    Subspecialty Mds requesting JOON- dr archibald aware- will do JOON on monday    Consultants/Specialty  ID    Code Status  full    Disposition  home    Review of Systems  Review of Systems   Constitutional: Positive for fever. Negative for chills, malaise/fatigue and weight loss.   HENT: Negative for ear discharge, hearing loss and tinnitus.    Eyes: Negative for blurred  vision, double vision and photophobia.   Respiratory: Negative for cough and sputum production.    Cardiovascular: Negative for palpitations, orthopnea and claudication.   Gastrointestinal: Negative for abdominal pain, heartburn, nausea and vomiting.   Genitourinary: Negative for frequency and urgency.   Musculoskeletal: Positive for joint pain and myalgias. Negative for back pain and neck pain.   Neurological: Negative for dizziness, tremors, focal weakness and headaches.   Endo/Heme/Allergies: Does not bruise/bleed easily.   Psychiatric/Behavioral: Negative for depression and substance abuse.        Physical Exam  Temp:  [35.9 °C (96.6 °F)-38 °C (100.4 °F)] 38 °C (100.4 °F)  Pulse:  [] 103  Resp:  [16-28] 18  BP: (103-172)/(50-79) 153/73  SpO2:  [90 %-99 %] 90 %    Physical Exam  Constitutional:       Appearance: Normal appearance. He is not diaphoretic.   HENT:      Head:      Comments: Submandibular swelling/induration and tenderness    Jaw bra in place     Mouth/Throat:      Mouth: Mucous membranes are moist.   Eyes:      General: No scleral icterus.     Extraocular Movements: Extraocular movements intact.      Pupils: Pupils are equal, round, and reactive to light.   Neck:      Musculoskeletal: Normal range of motion and neck supple.   Cardiovascular:      Rate and Rhythm: Normal rate.      Heart sounds: No murmur. No gallop.    Pulmonary:      Effort: Pulmonary effort is normal. No respiratory distress.      Breath sounds: No stridor. No wheezing.   Chest:      Chest wall: No tenderness.   Abdominal:      General: Abdomen is flat. There is no distension.      Tenderness: There is no guarding.      Hernia: No hernia is present.   Musculoskeletal: Normal range of motion.         General: No swelling, tenderness, deformity or signs of injury.      Right lower leg: No edema.   Skin:     General: Skin is warm.      Capillary Refill: Capillary refill takes 2 to 3 seconds.      Coloration: Skin is not  jaundiced or pale.      Findings: No bruising or rash.   Neurological:      General: No focal deficit present.      Mental Status: He is alert and oriented to person, place, and time.      Cranial Nerves: No cranial nerve deficit.      Deep Tendon Reflexes: Reflexes normal.   Psychiatric:         Mood and Affect: Mood normal.         Fluids    Intake/Output Summary (Last 24 hours) at 7/4/2020 1131  Last data filed at 7/4/2020 0400  Gross per 24 hour   Intake 1450 ml   Output 220 ml   Net 1230 ml       Laboratory  Recent Labs     07/03/20  0331 07/03/20  1302 07/04/20  0330   WBC 2.8* 2.8* 2.2*   RBC 2.92* 2.89* 2.93*   HEMOGLOBIN 8.2* 8.2* 8.4*   HEMATOCRIT 25.1* 25.7* 26.0*   MCV 86.0 88.9 88.7   MCH 28.1 28.4 28.7   MCHC 32.7* 31.9* 32.3*   RDW 46.1 47.1 48.1   PLATELETCT 34* 78* 58*   MPV  --  11.2 11.4     Recent Labs     07/02/20  0406 07/03/20  0331 07/04/20  0330   SODIUM 125* 129* 128*   POTASSIUM 3.2* 4.0 4.4   CHLORIDE 96 98 97   CO2 22 22 23   GLUCOSE 114* 117* 112*   BUN 11 17 16   CREATININE 0.59 0.73 0.73   CALCIUM 7.8* 7.9* 8.1*                   Imaging  EC-ECHOCARDIOGRAM COMPLETE W/O CONT   Final Result      IR-CYST ASPIRATION-MISC   Final Result         ULTRASOUND GUIDED ASPIRATION OF SUBMANDIBULAR ABSCESS..      CT-SOFT TISSUE NECK WITH   Final Result         1. Acute versus subacute fracture of the mid mandibular body.   2. A 2.9 cm collection subjacent to the fracture site, likely hematoma. Infected hematoma can be considered in the appropriate clinical settings.   3. No cervical lymphadenopathy.   4. Moderate mucosal thickening of the left maxillary sinus, likely odontogenic in nature, related to impacted remaining maxillary tooth.      CT-CHEST,ABDOMEN,PELVIS WITH   Final Result      Hypodensities in the spleen worrisome for splenic infarcts.      Trace nonspecific free fluid in the pelvis.      2.2 x 1.7 cm left common femoral artery pseudoaneurysm.      Bowel containing right inguinal hernia  without evidence of obstruction.      Atherosclerotic plaque within an ectatic aorta.      Gallbladder wall thickening/pericholecystic fluid. Further evaluation can be performed with right upper quadrant ultrasound.      Small hypodense left renal lesions are too small to characterize but likely represent small cysts.      Emphysematous changes.      Irregular nodular opacity along the minor fissure measuring 8 mm.      4.7 mm right middle lobe pulmonary nodule.      Mild bibasilar and lingular atelectasis.      Emphysematous changes.      Sequelae of prior granulomatous exposure.      Right hilar lymph node is borderline enlarged and nonspecific, possibly reactive..      Enlarged periportal lymph nodes are nonspecific.      Fleischner Society pulmonary nodule recommendations:   Low Risk: CT at 3-6 months, then consider CT at 18-24 months      High Risk: CT at 3-6 months, then at 18-24 months      Comments: Use most suspicious nodule as guide to management. Follow-up intervals may vary according to size and risk.      Low Risk - Minimal or absent history of smoking and of other known risk factors.      High Risk - History of smoking or of other known risk factors.      Note: These recommendations do not apply to lung cancer screening, patients with immunosuppression, or patients with known primary cancer.      Fleischner Society 2017 Guidelines for Management of Incidentally Detected Pulmonary Nodules in Adults      DX-CHEST-2 VIEWS   Final Result      No acute cardiopulmonary abnormality.      US-EXTREMITY ARTERY LOWER UNILAT RIGHT   Final Result      US-ABDOMEN COMPLETE SURVEY   Final Result      Prominent trabeculated bladder wall, suggesting possible chronic bladder outlet obstruction. Unremarkable findings otherwise.         DX-CHEST-PORTABLE (1 VIEW)   Final Result      No acute cardiac or pulmonary abnormalities are identified.      US-EXTREMITY VENOUS LOWER BILAT    (Results Pending)         Assessment/Plan  * Fever- (present on admission)  Assessment & Plan  Fever with neutropenia ALthough neutropenia has resolved    Still febrile    Follow all cultures    Discussed with ID MD Dr. Berry    Mandibular abscess- (present on admission)  Assessment & Plan  Growing strep viridans    Oral surgery dr vasquez s/p ORIF    F/u op report    Pancytopenia (HCC)- (present on admission)  Assessment & Plan  Present for a few months, unclear etiology    abx as per id    Hematology/oncology were consulted 6/28/2020  Bone marrow biopsy done   Check HIT antibody    Transfuse plt on 7/3- to prepare for surgery on mandible    S/P TAVR (transcatheter aortic valve replacement)- (present on admission)  Assessment & Plan  S/P TAVR on 6/22/2020   . Continue statin  Per cardiology start B12 and folate supplements  Echocardiogram shows no vegetations  Cardiology is following    Mixed hyperlipidemia- (present on admission)  Assessment & Plan  Continue home Lovastatin    Essential hypertension- (present on admission)  Assessment & Plan    Continue Norvasc    HIT (heparin-induced thrombocytopenia) (HCC)  Assessment & Plan  huseyin pending    Started on agratraban on  7/4    Hypokalemia  Assessment & Plan  Resolved today    Panlobular emphysema (HCC)- (present on admission)  Assessment & Plan  History of tobacco use  Chest x-ray from admission shows no acute cardiopulmonary process    Tobacco dependence- (present on admission)  Assessment & Plan  Patient has been counseled on tobacco cessation  Nicotine replacement options were provided       VTE prophylaxis: scd      Check am cbc, bmp

## 2020-07-04 NOTE — PROGRESS NOTES
Patient brought to floor by transport post procedure. Patient is A& O x4, on 2L NC. Patient states pain in jaw from post procedure. Patient has tele box on, monitor room notified. Patient updated on plan of care and states no questions at this time. Will continue to monitor.

## 2020-07-04 NOTE — PROGRESS NOTES
Oncology/Hematology Progress Note               Author: Simon Stahl M.D. Date & Time created: 7/3/2020  6:44 PM     CC: Pancytopenia  Interval History:    There are plans to take him to urgent surgery today.  He has received a platelet transfusion today for the surgery.    Review of Systems:  Review of Systems   Constitutional: Positive for fever and malaise/fatigue. Negative for chills and weight loss.   HENT: Negative for ear pain, hearing loss and tinnitus.    Eyes: Negative for blurred vision and double vision.   Respiratory: Negative for cough and hemoptysis.    Cardiovascular: Negative for chest pain and palpitations.   Gastrointestinal: Negative for heartburn and nausea.   Genitourinary: Negative for dysuria and urgency.   Skin: Negative for itching and rash.   Neurological: Negative for dizziness and headaches.       Physical Exam:  Physical Exam  Constitutional:       Appearance: Normal appearance.   Eyes:      Extraocular Movements: Extraocular movements intact.      Conjunctiva/sclera: Conjunctivae normal.   Skin:     Comments: No change in area under skin   Neurological:      General: No focal deficit present.      Mental Status: He is alert and oriented to person, place, and time.   Psychiatric:         Mood and Affect: Mood normal.         Behavior: Behavior normal.         Thought Content: Thought content normal.         Judgment: Judgment normal.         Labs:          Recent Labs     07/01/20 0408 07/02/20 0406 07/03/20  0331   SODIUM 132* 125* 129*   POTASSIUM 4.0 3.2* 4.0   CHLORIDE 100 96 98   CO2 22 22 22   BUN 12 11 17   CREATININE 0.63 0.59 0.73   CALCIUM 7.9* 7.8* 7.9*     Recent Labs     07/01/20 0408 07/02/20  0406 07/03/20  0331   GLUCOSE 107* 114* 117*     Recent Labs     07/02/20  0406 07/03/20  0331 07/03/20  1302   RBC 2.88* 2.92* 2.89*   HEMOGLOBIN 8.2* 8.2* 8.2*   HEMATOCRIT 25.0* 25.1* 25.7*   PLATELETCT 38* 34* 78*     Recent Labs     07/01/20 0408 07/02/20  0406  20  0331 20  1302   WBC 4.5* 4.1* 2.8* 2.8*   NEUTSPOLYS 26.30* 20.90* 36.40*  --    LYMPHOCYTES 60.50* 70.40* 45.70*  --    MONOCYTES 10.50 5.20 15.50*  --    EOSINOPHILS 1.80 1.70 0.00  --    BASOPHILS 0.90 1.70 0.00  --      Recent Labs     20  0408 20  0406 20  0331   SODIUM 132* 125* 129*   POTASSIUM 4.0 3.2* 4.0   CHLORIDE 100 96 98   CO2 22 22 22   GLUCOSE 107* 114* 117*   BUN 12 11 17   CREATININE 0.63 0.59 0.73   CALCIUM 7.9* 7.8* 7.9*     Hemodynamics:  Temp (24hrs), Av.1 °C (98.8 °F), Min:35.9 °C (96.6 °F), Max:37.7 °C (99.9 °F)  Temperature: 36.7 °C (98 °F)  Pulse  Av.3  Min: 52  Max: 110   Blood Pressure : (!) 162/72     Respiratory:    Respiration: 16, Pulse Oximetry: 93 %           Fluids:    Intake/Output Summary (Last 24 hours) at 2020 1136  Last data filed at 2020 0900  Gross per 24 hour   Intake 240 ml   Output --   Net 240 ml     Weight: 59.8 kg (131 lb 13.4 oz)  GI/Nutrition:  Orders Placed This Encounter   Procedures   • Diet NPO     Standing Status:   Standing     Number of Occurrences:   8     Order Specific Question:   Restrict to:     Answer:   Strict [1]     Medical Decision Making, by Problem:  Active Hospital Problems    Diagnosis   • *Fever [R50.9]   • Pancytopenia (HCC) [D61.818]   • S/P TAVR (transcatheter aortic valve replacement) [Z95.2]   • Essential hypertension [I10]   • Mixed hyperlipidemia [E78.2]   • Panlobular emphysema (HCC) [J43.1]   • Tobacco dependence [F17.200]       Plan:    1.  Hematology:     Patient cbc overall stable.  He did have heparin antibody test ordered by primary team but it was sent out.  His 4T score after discussing some of the facts with his cardiologist falls into the low to intermediate category since they feel the splenic infarcts / small thrombus at catheter site are common post TAVR.  So, this may not be associated with the thrombocytopenia.  He also continues to have persistent fever.  He is to go to  urgent surgery today for I/D of abscess.  His counts could just be a function of this infection as well.  His bone marrow did show adequate kendal's.    His platelet count did go up post transfusion to 74k whick does go against  immune nature.  He will be higher risk for bleeding since he will be post op / with thrombocytopenia.  I think since 4T not high then could observe, US legs, and wait for lab results to avoid bleeding risk since there could be an explanation for this infarcts/thrombus.  He needs this surgery.  JOON planned monday.    However, if he does have venous thrombosis or progression of arterial area where catheter was then full dose anticoagulation needed.  We can see what his US shows to make more plans.  If we see important post I/D then could all be secondary infection/consumption.  There is no obvious hemolysis.    His flow cytometry negative except for concern ? LGL but clonal studies needed.  Path at [place].    D/w with primary team    This patient was seen under COVID 19 pandemic disaster response conditions.  During a disaster, the provisions of care is subject to the Crisis Standard of Care    High complexity  Quality-Core Measures

## 2020-07-04 NOTE — ANESTHESIA POSTPROCEDURE EVALUATION
Patient: Magalis Kumari    Procedure Summary     Date:  07/03/20 Room / Location:  [facility] OR 08 / SURGERY [facility]    Anesthesia Start:  1713 Anesthesia Stop:  1901    Procedure:  ORIF, FRACTURE, MANDIBLE (Bilateral ) Diagnosis:  (Mandible Fracture, sub mental abcess)    Surgeon:  Brenden Morrison D.D.S. Responsible Provider:  Jose Naik M.D.    Anesthesia Type:  general ASA Status:  4 - Emergent          Final Anesthesia Type: general  Last vitals  BP   Blood Pressure : (!) 162/72    Temp   36.7 °C (98 °F)    Pulse   Pulse: 78   Resp   16    SpO2   93 %      Anesthesia Post Evaluation    Patient location during evaluation: PACU  Patient participation: complete - patient participated  Level of consciousness: awake and alert  Pain score: 0    Airway patency: patent  Anesthetic complications: no  Cardiovascular status: hemodynamically stable  Respiratory status: acceptable  Hydration status: euvolemic    PONV: none           Nurse Pain Score: 0 (NPRS)

## 2020-07-04 NOTE — CARE PLAN
Problem: Communication  Goal: The ability to communicate needs accurately and effectively will improve  Outcome: PROGRESSING AS EXPECTED   Note: Patient updated to call for assistance with needs. Patient has call light within reach, fall precautions in place. Will continue to monitor.     Problem: Knowledge Deficit  Goal: Knowledge of disease process/condition, treatment plan, diagnostic tests, and medications will improve  Outcome: PROGRESSING AS EXPECTED   Note: Patient updated on plan of care, vocalized understanding, no new questions at this time. Will continue to monitor.

## 2020-07-04 NOTE — OR NURSING
Patient cooperative when awakened from surgery  Requested fluids; call placed to Dr Morrison; orders received  Offered fluids to patient; refused.  States he is confused; states he feels that he is in a dream and needs to wake up.  States he knows that he has had surgery and who Dr Morrison is but doesn't know how it was done.  Patient reorientated frequently to time & place.    Patient exhibiting increasing anxiety during his stay in PACU.  Patient asking for his wife, unable to come into recovery room.  Patient states he wants to go home.  Reorientated to time and place.  Patient's anxiety increased; exhibiting tense body language  Complained of pain; medicated as ordered.  Complained of discomfort again; refused all medications  Jaw bra in place; ice to bilaterl mandible  Gauze at chin dry and intact  Transferred to room via bed  Patient on O2 @ 2 l/m via oxymask  O2 tank  50% full

## 2020-07-04 NOTE — ANESTHESIA PROCEDURE NOTES
Airway    Date/Time: 7/3/2020 5:18 PM  Performed by: Jose Naik M.D.  Authorized by: Jose Naik M.D.     Location:  OR  Urgency:  Elective  Indications for Airway Management:  Anesthesia      Spontaneous Ventilation: absent    Sedation Level:  Deep  Preoxygenated: Yes    Patient Position:  Sniffing  Final Airway Type:  Endotracheal airway  Final Endotracheal Airway:  ETT  Cuffed: Yes    Technique Used for Successful ETT Placement:  Direct laryngoscopy  Devices/Methods Used in Placement:  Intubating stylet    Insertion Site:  Oral  Blade Type:  Meredith  Laryngoscope Blade/Videolaryngoscope Blade Size:  2  ETT Size (mm):  7.5  Measured from:  Lips  ETT to Lips (cm):  20  Placement Verified by: auscultation and capnometry    Cormack-Lehane Classification:  Grade I - full view of glottis  Number of Attempts at Approach:  1

## 2020-07-04 NOTE — ANESTHESIA TIME REPORT
Anesthesia Start and Stop Event Times     Date Time Event    7/3/2020 1403 Ready for Procedure     1713 Anesthesia Start     1901 Anesthesia Stop        Responsible Staff  07/03/20    Name Role Begin End    Jose Naik M.D. Anesth 1713 1901        Preop Diagnosis (Free Text):  Pre-op Diagnosis     Mandible Fracture, Sub-mental abcess        Preop Diagnosis (Codes):    Post op Diagnosis  Fracture of jaw, open, initial encounter (HCC)      Premium Reason  A. 3PM - 7AM    Comments:

## 2020-07-04 NOTE — ANESTHESIA QCDR
2019 [facility] Clinical Data Registry (for Quality Improvement)     Postoperative nausea/vomiting risk protocol (Adult = 18 yrs and Pediatric 3-17 yrs)- (430 and 463)  General inhalation anesthetic (NOT TIVA) with PONV risk factors: No  Provision of anti-emetic therapy with at least 2 different classes of agents: N/A  Patient DID NOT receive anti-emetic therapy and reason is documented in Medical Record: N/A    Multimodal Pain Management- (477)  Non-emergent surgery AND patient age >= 18: No  Use of Multimodal Pain Management, two or more drugs and/or interventions, NOT including systemic opioids:   Exception: Documented allergy to multiple classes of analgesics:     Smoking Abstinence (404)  Patient is current smoker (cigarette, pipe, e-cig, marijuanna): Yes  Elective Surgery: No  Abstinence instructions provided prior to day of surgery:   Patient abstained from smoking on day of surgery:     Pre-Op Beta-Blocker in Isolated CABG (44)  Isolated CABG AND patient age >= 18: No  Beta-blocker admin within 24 hours of surgical incision:   Exception:of medical reason(s) for not administering beta blocker within 24 hours prior to surgical incision (e.g., not  indicated,other medical reason):     PACU assessment of acute postoperative pain prior to Anesthesia Care End- Applies to Patients Age = 18- (ABG7)  Initial PACU pain score is which of the following: < 7/10  Patient unable to report pain score: N/A    Post-anesthetic transfer of care checklist/protocol to PACU/ICU- (426 and 427)  Upon conclusion of case, patient transferred to which of the following locations: PACU/Non-ICU  Use of transfer checklist/protocol: Yes  Exclusion: Service Performed in Patient Hospital Room (and thus did not require transfer): N/A  Unplanned admission to ICU related to anesthesia service up through end of PACU care- (MD51)  Unplanned admission to ICU (not initially anticipated at anesthesia start time): No

## 2020-07-04 NOTE — PROGRESS NOTES
Assumed care of pt. Bedside report completed with night shift RN. Pt alert and oriented. Pain medication given for jaw pain. Ice replenished. Pt resting comfortably in bed. Call light within reach. Bed low and locked. Offered needs.

## 2020-07-05 LAB
ANION GAP SERPL CALC-SCNC: 11 MMOL/L (ref 7–16)
ANISOCYTOSIS BLD QL SMEAR: ABNORMAL
APTT PPP: 81.9 SEC (ref 24.7–36)
APTT PPP: 82.1 SEC (ref 24.7–36)
BASOPHILS # BLD AUTO: 0 % (ref 0–1.8)
BASOPHILS # BLD: 0 K/UL (ref 0–0.12)
BUN SERPL-MCNC: 16 MG/DL (ref 8–22)
BURR CELLS BLD QL SMEAR: NORMAL
CALCIUM SERPL-MCNC: 8.1 MG/DL (ref 8.5–10.5)
CHLORIDE SERPL-SCNC: 97 MMOL/L (ref 96–112)
CO2 SERPL-SCNC: 21 MMOL/L (ref 20–33)
CREAT SERPL-MCNC: 0.79 MG/DL (ref 0.5–1.4)
EOSINOPHIL # BLD AUTO: 0.04 K/UL (ref 0–0.51)
EOSINOPHIL NFR BLD: 1.8 % (ref 0–6.9)
ERYTHROCYTE [DISTWIDTH] IN BLOOD BY AUTOMATED COUNT: 50.1 FL (ref 35.9–50)
GLUCOSE SERPL-MCNC: 107 MG/DL (ref 65–99)
HCT VFR BLD AUTO: 26.9 % (ref 42–52)
HGB BLD-MCNC: 8.4 G/DL (ref 14–18)
LYMPHOCYTES # BLD AUTO: 1.03 K/UL (ref 1–4.8)
LYMPHOCYTES NFR BLD: 49.1 % (ref 22–41)
MACROCYTES BLD QL SMEAR: ABNORMAL
MANUAL DIFF BLD: NORMAL
MCH RBC QN AUTO: 28.5 PG (ref 27–33)
MCHC RBC AUTO-ENTMCNC: 31.2 G/DL (ref 33.7–35.3)
MCV RBC AUTO: 91.2 FL (ref 81.4–97.8)
METAMYELOCYTES NFR BLD MANUAL: 0.9 %
MICROCYTES BLD QL SMEAR: ABNORMAL
MONOCYTES # BLD AUTO: 0.41 K/UL (ref 0–0.85)
MONOCYTES NFR BLD AUTO: 19.3 % (ref 0–13.4)
MORPHOLOGY BLD-IMP: NORMAL
NEUTROPHILS # BLD AUTO: 0.61 K/UL (ref 1.82–7.42)
NEUTROPHILS NFR BLD: 26.3 % (ref 44–72)
NEUTS BAND NFR BLD MANUAL: 2.6 % (ref 0–10)
NRBC # BLD AUTO: 0 K/UL
NRBC BLD-RTO: 0 /100 WBC
OVALOCYTES BLD QL SMEAR: NORMAL
PLATELET # BLD AUTO: 67 K/UL (ref 164–446)
PLATELET BLD QL SMEAR: NORMAL
PMV BLD AUTO: 12.6 FL (ref 9–12.9)
POIKILOCYTOSIS BLD QL SMEAR: NORMAL
POLYCHROMASIA BLD QL SMEAR: NORMAL
POTASSIUM SERPL-SCNC: 3.8 MMOL/L (ref 3.6–5.5)
RBC # BLD AUTO: 2.95 M/UL (ref 4.7–6.1)
RBC BLD AUTO: PRESENT
SODIUM SERPL-SCNC: 129 MMOL/L (ref 135–145)
WBC # BLD AUTO: 2.1 K/UL (ref 4.8–10.8)

## 2020-07-05 PROCEDURE — 85027 COMPLETE CBC AUTOMATED: CPT

## 2020-07-05 PROCEDURE — 99232 SBSQ HOSP IP/OBS MODERATE 35: CPT | Performed by: INTERNAL MEDICINE

## 2020-07-05 PROCEDURE — 700105 HCHG RX REV CODE 258: Performed by: INTERNAL MEDICINE

## 2020-07-05 PROCEDURE — 700111 HCHG RX REV CODE 636 W/ 250 OVERRIDE (IP): Performed by: INTERNAL MEDICINE

## 2020-07-05 PROCEDURE — 36415 COLL VENOUS BLD VENIPUNCTURE: CPT

## 2020-07-05 PROCEDURE — 770020 HCHG ROOM/CARE - TELE (206)

## 2020-07-05 PROCEDURE — A9270 NON-COVERED ITEM OR SERVICE: HCPCS | Performed by: HOSPITALIST

## 2020-07-05 PROCEDURE — A9270 NON-COVERED ITEM OR SERVICE: HCPCS | Performed by: INTERNAL MEDICINE

## 2020-07-05 PROCEDURE — 700102 HCHG RX REV CODE 250 W/ 637 OVERRIDE(OP): Performed by: INTERNAL MEDICINE

## 2020-07-05 PROCEDURE — 85730 THROMBOPLASTIN TIME PARTIAL: CPT

## 2020-07-05 PROCEDURE — 85007 BL SMEAR W/DIFF WBC COUNT: CPT

## 2020-07-05 PROCEDURE — 700102 HCHG RX REV CODE 250 W/ 637 OVERRIDE(OP): Performed by: HOSPITALIST

## 2020-07-05 PROCEDURE — 99233 SBSQ HOSP IP/OBS HIGH 50: CPT | Performed by: HOSPITALIST

## 2020-07-05 PROCEDURE — 700111 HCHG RX REV CODE 636 W/ 250 OVERRIDE (IP): Mod: JG | Performed by: HOSPITALIST

## 2020-07-05 PROCEDURE — 80048 BASIC METABOLIC PNL TOTAL CA: CPT

## 2020-07-05 RX ORDER — ATENOLOL 25 MG/1
25 TABLET ORAL
Status: DISCONTINUED | OUTPATIENT
Start: 2020-07-05 | End: 2020-07-13

## 2020-07-05 RX ADMIN — ACETAMINOPHEN 650 MG: 325 TABLET, FILM COATED ORAL at 23:39

## 2020-07-05 RX ADMIN — ATENOLOL 25 MG: 25 TABLET ORAL at 10:22

## 2020-07-05 RX ADMIN — ACETAMINOPHEN 650 MG: 325 TABLET, FILM COATED ORAL at 00:30

## 2020-07-05 RX ADMIN — AMPICILLIN SODIUM AND SULBACTAM SODIUM 3 G: 2; 1 INJECTION, POWDER, FOR SOLUTION INTRAMUSCULAR; INTRAVENOUS at 07:02

## 2020-07-05 RX ADMIN — LOVASTATIN 20 MG: 20 TABLET ORAL at 21:07

## 2020-07-05 RX ADMIN — ARGATROBAN 0.89 MCG/KG/MIN: 50 INJECTION INTRAVENOUS at 10:25

## 2020-07-05 RX ADMIN — ACETAMINOPHEN 650 MG: 325 TABLET, FILM COATED ORAL at 17:34

## 2020-07-05 RX ADMIN — AMPICILLIN SODIUM AND SULBACTAM SODIUM 3 G: 2; 1 INJECTION, POWDER, FOR SOLUTION INTRAMUSCULAR; INTRAVENOUS at 12:37

## 2020-07-05 RX ADMIN — AMPICILLIN SODIUM AND SULBACTAM SODIUM 3 G: 2; 1 INJECTION, POWDER, FOR SOLUTION INTRAMUSCULAR; INTRAVENOUS at 17:34

## 2020-07-05 RX ADMIN — AMPICILLIN SODIUM AND SULBACTAM SODIUM 3 G: 2; 1 INJECTION, POWDER, FOR SOLUTION INTRAMUSCULAR; INTRAVENOUS at 23:42

## 2020-07-05 RX ADMIN — AMPICILLIN SODIUM AND SULBACTAM SODIUM 3 G: 2; 1 INJECTION, POWDER, FOR SOLUTION INTRAMUSCULAR; INTRAVENOUS at 00:31

## 2020-07-05 RX ADMIN — ACETAMINOPHEN 650 MG: 325 TABLET, FILM COATED ORAL at 10:22

## 2020-07-05 ASSESSMENT — ENCOUNTER SYMPTOMS
CHILLS: 0
SPUTUM PRODUCTION: 0
HEARTBURN: 0
WHEEZING: 0
ABDOMINAL PAIN: 0
BLURRED VISION: 0
DOUBLE VISION: 0
CLAUDICATION: 0
PHOTOPHOBIA: 0
BRUISES/BLEEDS EASILY: 0
ORTHOPNEA: 0
NERVOUS/ANXIOUS: 1
DIZZINESS: 0
COUGH: 0
FOCAL WEAKNESS: 0
CONSTIPATION: 0
PALPITATIONS: 0
BACK PAIN: 0
FEVER: 1
NECK PAIN: 0
HEADACHES: 0
MYALGIAS: 1
WEIGHT LOSS: 0
DIARRHEA: 0
TREMORS: 0
VOMITING: 0
DEPRESSION: 0
NAUSEA: 0

## 2020-07-05 ASSESSMENT — FIBROSIS 4 INDEX: FIB4 SCORE: 9.62

## 2020-07-05 ASSESSMENT — LIFESTYLE VARIABLES: SUBSTANCE_ABUSE: 0

## 2020-07-05 NOTE — PROGRESS NOTES
[facility] Medicine Daily Progress Note    Date of Service  7/5/2020    Chief Complaint  65 y.o. male admitted 6/23/2020 with tachycardia.  He had a TAVR on 6/22/2020, and was discharged home on 6/23/2020.  He returned to the ER due to persistent tachycardia.     He has a history of pancytopenia which is worsening, and has been hesitant to do a bone marrow biopsy.  He has tobacco dependence, essential hypertension, hyperlipidemia.       Hospital Course    Home Eliquis and aspirin were discontinued.  Heart rate stabilized. He continued to have decline in his hemoglobin and platelets. WBC stabilized. Briefly discussed with hem/onc.  Abdominal ultrasound from 6/25/2020 showed no acute abnormalities.  SPEP, smooth muscle antibodies, SEEMA, rheumatoid factor, B12 and folate levels, fibrinogen were ordered.  Patient wished to go home and agreed follow-up with hematology for bone marrow biopsy and further work-up.  He was discharged to go home on 6/26/2020, but developed a fever of 103.6.     Infectious disease were consulted on 6/27/2020 due to fever and neutropenia, Cefepime was empirically started.     B12, folate, and fibrinogen levels were normal.  Rheumatoid factor was 40, ESR was 45, LFTs were unremarkable, SEEMA was undetectable, HIV was nonreactive.      Interval Problem Update    Fever last night    Case d/w dr santillan ID    DVT negative of legs    Low 8.4  Low plt 67    Sodium 129    Chin pain, intensity 83/10,  no radiation, only when he open mouth    S/p orif mandible on 7/3    Hit positive-- BULL sent off.. on iv agrataban      echoCardiogram shows no vegetations on valves    Subspecialty Mds requesting JOON- dr archibald aware- will do JOON on Monday--> cancelled due to recent jaw surgery    Consultants/Specialty  ID  oncology    Code Status  full    Disposition  home    Review of Systems  Review of Systems   Constitutional: Positive for fever. Negative for chills, malaise/fatigue and weight loss.   HENT: Negative  for ear discharge, hearing loss and tinnitus.    Eyes: Negative for blurred vision, double vision and photophobia.   Respiratory: Negative for cough and sputum production.    Cardiovascular: Negative for palpitations, orthopnea and claudication.   Gastrointestinal: Negative for abdominal pain, heartburn, nausea and vomiting.   Genitourinary: Negative for frequency and urgency.   Musculoskeletal: Positive for joint pain and myalgias. Negative for back pain and neck pain.   Neurological: Negative for dizziness, tremors, focal weakness and headaches.   Endo/Heme/Allergies: Does not bruise/bleed easily.   Psychiatric/Behavioral: Negative for depression and substance abuse.        Physical Exam  Temp:  [36.3 °C (97.4 °F)-38.9 °C (102.1 °F)] 37.4 °C (99.4 °F)  Pulse:  [70-98] 84  Resp:  [17-18] 18  BP: (123-155)/(55-80) 148/67  SpO2:  [90 %-92 %] 91 %    Physical Exam  Constitutional:       Appearance: Normal appearance. He is not diaphoretic.   HENT:      Head:      Comments: Submandibular swelling/induration and tenderness    Jaw bra in place     Mouth/Throat:      Mouth: Mucous membranes are moist.   Eyes:      General: No scleral icterus.     Extraocular Movements: Extraocular movements intact.      Pupils: Pupils are equal, round, and reactive to light.   Neck:      Musculoskeletal: Normal range of motion and neck supple.   Cardiovascular:      Rate and Rhythm: Normal rate.      Heart sounds: No murmur. No gallop.    Pulmonary:      Effort: Pulmonary effort is normal. No respiratory distress.      Breath sounds: No stridor. No wheezing.   Chest:      Chest wall: No tenderness.   Abdominal:      General: Abdomen is flat. There is no distension.      Tenderness: There is no guarding.      Hernia: No hernia is present.   Musculoskeletal: Normal range of motion.         General: No swelling, tenderness, deformity or signs of injury.      Right lower leg: No edema.   Skin:     General: Skin is warm.      Capillary Refill:  Capillary refill takes 2 to 3 seconds.      Coloration: Skin is not jaundiced or pale.      Findings: No bruising or rash.   Neurological:      General: No focal deficit present.      Mental Status: He is alert and oriented to person, place, and time.      Cranial Nerves: No cranial nerve deficit.      Deep Tendon Reflexes: Reflexes normal.   Psychiatric:         Mood and Affect: Mood normal.         Fluids    Intake/Output Summary (Last 24 hours) at 7/5/2020 1054  Last data filed at 7/5/2020 0907  Gross per 24 hour   Intake 240 ml   Output 200 ml   Net 40 ml       Laboratory  Recent Labs     07/03/20  1302 07/04/20  0330 07/05/20  0419   WBC 2.8* 2.2* 2.1*   RBC 2.89* 2.93* 2.95*   HEMOGLOBIN 8.2* 8.4* 8.4*   HEMATOCRIT 25.7* 26.0* 26.9*   MCV 88.9 88.7 91.2   MCH 28.4 28.7 28.5   MCHC 31.9* 32.3* 31.2*   RDW 47.1 48.1 50.1*   PLATELETCT 78* 58* 67*   MPV 11.2 11.4 12.6     Recent Labs     07/03/20  0331 07/04/20  0330 07/05/20  0419   SODIUM 129* 128* 129*   POTASSIUM 4.0 4.4 3.8   CHLORIDE 98 97 97   CO2 22 23 21   GLUCOSE 117* 112* 107*   BUN 17 16 16   CREATININE 0.73 0.73 0.79   CALCIUM 7.9* 8.1* 8.1*     Recent Labs     07/04/20  1544 07/04/20  1826 07/05/20  0848   APTT 100.0* 96.3* 81.9*               Imaging  US-EXTREMITY VENOUS LOWER BILAT   Final Result      EC-ECHOCARDIOGRAM COMPLETE W/O CONT   Final Result      IR-CYST ASPIRATION-MISC   Final Result         ULTRASOUND GUIDED ASPIRATION OF SUBMANDIBULAR ABSCESS..      CT-SOFT TISSUE NECK WITH   Final Result         1. Acute versus subacute fracture of the mid mandibular body.   2. A 2.9 cm collection subjacent to the fracture site, likely hematoma. Infected hematoma can be considered in the appropriate clinical settings.   3. No cervical lymphadenopathy.   4. Moderate mucosal thickening of the left maxillary sinus, likely odontogenic in nature, related to impacted remaining maxillary tooth.      CT-CHEST,ABDOMEN,PELVIS WITH   Final Result       Hypodensities in the spleen worrisome for splenic infarcts.      Trace nonspecific free fluid in the pelvis.      2.2 x 1.7 cm left common femoral artery pseudoaneurysm.      Bowel containing right inguinal hernia without evidence of obstruction.      Atherosclerotic plaque within an ectatic aorta.      Gallbladder wall thickening/pericholecystic fluid. Further evaluation can be performed with right upper quadrant ultrasound.      Small hypodense left renal lesions are too small to characterize but likely represent small cysts.      Emphysematous changes.      Irregular nodular opacity along the minor fissure measuring 8 mm.      4.7 mm right middle lobe pulmonary nodule.      Mild bibasilar and lingular atelectasis.      Emphysematous changes.      Sequelae of prior granulomatous exposure.      Right hilar lymph node is borderline enlarged and nonspecific, possibly reactive..      Enlarged periportal lymph nodes are nonspecific.      Fleischner Society pulmonary nodule recommendations:   Low Risk: CT at 3-6 months, then consider CT at 18-24 months      High Risk: CT at 3-6 months, then at 18-24 months      Comments: Use most suspicious nodule as guide to management. Follow-up intervals may vary according to size and risk.      Low Risk - Minimal or absent history of smoking and of other known risk factors.      High Risk - History of smoking or of other known risk factors.      Note: These recommendations do not apply to lung cancer screening, patients with immunosuppression, or patients with known primary cancer.      Fleischner Society 2017 Guidelines for Management of Incidentally Detected Pulmonary Nodules in Adults      DX-CHEST-2 VIEWS   Final Result      No acute cardiopulmonary abnormality.      US-EXTREMITY ARTERY LOWER UNILAT RIGHT   Final Result      US-ABDOMEN COMPLETE SURVEY   Final Result      Prominent trabeculated bladder wall, suggesting possible chronic bladder outlet obstruction. Unremarkable  findings otherwise.         DX-CHEST-PORTABLE (1 VIEW)   Final Result      No acute cardiac or pulmonary abnormalities are identified.           Assessment/Plan  * Fever- (present on admission)  Assessment & Plan  Fever with neutropenia ALthough neutropenia has resolved    Still febrile    Follow all cultures    Discussed with ID MD Dr. Berry    Mandibular abscess- (present on admission)  Assessment & Plan  Growing strep viridans    Oral surgery dr vasquez s/p ORIF    F/u op report    Pancytopenia (HCC)- (present on admission)  Assessment & Plan  Present for a few months, unclear etiology    abx as per id    Hematology/oncology were consulted 6/28/2020  Bone marrow biopsy done   Check HIT antibody    Transfuse plt on 7/3- to prepare for surgery on mandible    S/P TAVR (transcatheter aortic valve replacement)- (present on admission)  Assessment & Plan  S/P TAVR on 6/22/2020   . Continue statin  Per cardiology start B12 and folate supplements  Echocardiogram shows no vegetations  Cardiology is following    Mixed hyperlipidemia- (present on admission)  Assessment & Plan  Continue home Lovastatin    Essential hypertension- (present on admission)  Assessment & Plan    Continue Norvasc    HIT (heparin-induced thrombocytopenia) (HCC)  Assessment & Plan  huseyin pending    Started on agratraban on  7/4    Hypokalemia  Assessment & Plan  Resolved today    Panlobular emphysema (HCC)- (present on admission)  Assessment & Plan  History of tobacco use  Chest x-ray from admission shows no acute cardiopulmonary process    Tobacco dependence- (present on admission)  Assessment & Plan  Patient has been counseled on tobacco cessation  Nicotine replacement options were provided       VTE prophylaxis: scd      Check am cbc, bmp

## 2020-07-05 NOTE — PROGRESS NOTES
Pt has two therapeutic lab draws of aPTT. Argatroban gtt has been running at 3.2 mL/hr. [facility] pharmacist updated and states that next aPTT can be drawn with morning labs at 0300 for Q24 hour lab draws.

## 2020-07-05 NOTE — PROGRESS NOTES
Clarified Argatroban aPTT order with Critical care pharmacist. Two consecutive draws are complete and gtt has been adjusted per protocol. Pharmacist states 24 hour aPTT draw should be drawn 24 hours after last rate change. Will order aPTT draw for 7/5 at 1930. Updated night shift RN as well.

## 2020-07-05 NOTE — PROGRESS NOTES
OLIVIA Lester paged per patients fever.  Patient afebrile throughout shift, patient remained febrile despite tylenol. Ice packs and cold wash cloths given. Patient fever decreased. Will continue to monitor.

## 2020-07-05 NOTE — PROGRESS NOTES
Report received from day shift RN, assumed patient care. Patient is A&O x 4, on room air. Tele box on and in place. Patient stating slight pain in mandible. Patient updated on plan of care and verbalizes no questions. Patient has call light within reach, fall precautions in place. Patient educated to call for assistance as needed. Will continue to monitor.

## 2020-07-05 NOTE — PROGRESS NOTES
Infectious Disease Progress Note    Author: Vera Berry M.D. Date & Time of service: 7/5/2020  7:38 AM    Chief Complaint:  Fevers, pancytopenia      Interval History:  6/29 T-max 102.9 WBC 3.7 patient remains neutropenic .  CT of the neck shows a 2.9 cm collection adjacent to the fracture site of the mid mandibular body.  An infected hematoma cannot be ruled out.  CT scan shows possible splenic infarcts and gallbladder wall thickening with pericholecystic fluid.  Patient continues to have fevers and chills.  He states his chin lesion is more painful and has increased in size.  Plan for bone marrow biopsy today  6/30 T-max 101.9, remains febrile.  Had bone biopsy yesterday and aspiration of submandibular abscess.  He did not sleep well overnight as he was very warm.  No new symptoms today  7/1 T-max 100.9 WBC 4.5 culture growing viridans Streptococcus.  Patient did not feel any fevers overnight.  Overall fever curve improving.  He states that his abscess drained throughout the night.  Bone marrow biopsy results pending.  Eager to go home  7/2 T-max 102.4 WBC 4.1 bone marrow results do not show any evidence of MDS or leukemia.  Ongoing chin pain.  Awaiting evaluation from oral surgeon  7/3 T-max 99.9 WBC 2.8.  Plan for ORIF with bone graft today by Dr. Morrison.  No new symptoms to report  7/4 afebrile WBC 2.2 underwent surgery yesterday.  Operative report not available.  Patient complaining of jaw pain from surgery.  Submental abscess cultures-pending  7/5 T-max 101.6 WBC 2.1 ongoing jaw pain.  Nurse states decreased drainage throughout the day yesterday and patient was noted to be somewhat confused overnight.  He is alert and oriented this morning answering questions appropriately        review of Systems:  Review of Systems   Constitutional: Positive for fever and malaise/fatigue. Negative for chills.   HENT:        Jaw pain   Respiratory: Negative for cough, sputum production and wheezing.     Cardiovascular: Negative for chest pain.   Gastrointestinal: Negative for abdominal pain, constipation, diarrhea, nausea and vomiting.   Genitourinary: Negative for dysuria.   Musculoskeletal: Positive for myalgias.   Neurological: Negative for dizziness and headaches.   All other systems reviewed and are negative.      Hemodynamics:  Temp (24hrs), Av.7 °C (99.9 °F), Min:36.3 °C (97.4 °F), Max:38.9 °C (102.1 °F)  Temperature: 36.3 °C (97.4 °F)  Pulse  Av.4  Min: 52  Max: 110   Blood Pressure : 129/55       Physical Exam:  Physical Exam  Vitals signs and nursing note reviewed.   Constitutional:       Appearance: Normal appearance.   HENT:      Mouth/Throat:      Mouth: Mucous membranes are moist.      Pharynx: No oropharyngeal exudate.      Comments: Mandibular surgical site dressed.  Penrose drain in place. ice pack in place.  Patient unable to fully open mouth given dressing  Eyes:      Extraocular Movements: Extraocular movements intact.      Conjunctiva/sclera: Conjunctivae normal.      Pupils: Pupils are equal, round, and reactive to light.   Cardiovascular:      Rate and Rhythm: Normal rate and regular rhythm.      Heart sounds: Normal heart sounds.   Pulmonary:      Effort: Pulmonary effort is normal.      Breath sounds: Normal breath sounds.   Abdominal:      General: Abdomen is flat. Bowel sounds are normal.      Palpations: Abdomen is soft.   Musculoskeletal: Normal range of motion.      Right lower leg: No edema.      Left lower leg: No edema.   Skin:     General: Skin is warm and dry.   Neurological:      General: No focal deficit present.      Mental Status: He is alert and oriented to person, place, and time.   Psychiatric:         Mood and Affect: Mood normal.         Behavior: Behavior normal.      Comments: Pleasant         Meds:    Current Facility-Administered Medications:   •  MD Alert...Argatroban per Pharmacy  •  argatroban **AND** Protocol 448 INDICATIONS **AND** Protocol 448  INCLUSION CRITERIA **AND** Protocol 448 EXCLUSION CRITERIA  **AND** Protocol 448 GOAL: To obtain aPTT value in the 50-90 range **AND** Protocol 448 Argatroban administration must be monitored and documented in EMR. Discontinue IV/subcutaneous heparin, heparin flushes, Enoxaparin (Lovenox), Dabigatran (Pradaxa), Rivaroxaban (Xarelto), Apixaban (Eliquis), Edoxaban (Savaysa, Lixiana), and Fondaparinux (Arixtra). **AND** Protocol 448 RN to order daily CBC w/o diff if newly-diagnosed heparin-induced thrombocytopenia **AND** Protocol 448 aPTT MONITORING - RN to place separate order for repeat aPTT as needed **AND** Start IV argatroban drip, initial rate per pharmacy dosing guidelines **AND** [COMPLETED] APTT **AND** Maximum dose 10 mcg/kg/min    •  ampicillin-sulbactam (UNASYN) IV  •  HYDROcodone-acetaminophen  •  [CANCELED] HEPARIN INDUCED PLATELET AB(HIT) **AND** Pharmacy Consult:  •  zolpidem  •  amLODIPine  •  lovastatin  •  senna-docusate **AND** polyethylene glycol/lytes **AND** magnesium hydroxide **AND** bisacodyl  •  acetaminophen  •  enalaprilat  •  ondansetron  •  ondansetron    Labs:  Recent Labs     07/03/20  0331 07/03/20  1302 07/04/20  0330 07/05/20  0419   WBC 2.8* 2.8* 2.2* 2.1*   RBC 2.92* 2.89* 2.93* 2.95*   HEMOGLOBIN 8.2* 8.2* 8.4* 8.4*   HEMATOCRIT 25.1* 25.7* 26.0* 26.9*   MCV 86.0 88.9 88.7 91.2   MCH 28.1 28.4 28.7 28.5   RDW 46.1 47.1 48.1 50.1*   PLATELETCT 34* 78* 58* 67*   MPV  --  11.2 11.4 12.6   NEUTSPOLYS 36.40*  --  25.20* 26.30*   LYMPHOCYTES 45.70*  --  41.70* 49.10*   MONOCYTES 15.50*  --  27.80* 19.30*   EOSINOPHILS 0.00  --  3.50 1.80   BASOPHILS 0.00  --  0.90 0.00   RBCMORPHOLO  --   --  Present Present     Recent Labs     07/03/20  0331 07/04/20  0330 07/05/20 0419   SODIUM 129* 128* 129*   POTASSIUM 4.0 4.4 3.8   CHLORIDE 98 97 97   CO2 22 23 21   GLUCOSE 117* 112* 107*   BUN 17 16 16     Recent Labs     07/03/20  0331 07/04/20  0330 07/05/20 0419   CREATININE 0.73 0.73 0.79        Imaging:  Dx-chest-2 Views    Result Date: 6/27/2020 6/27/2020 11:36 AM HISTORY/REASON FOR EXAM:  Fever TECHNIQUE/EXAM DESCRIPTION AND NUMBER OF VIEWS: Two views of the chest. COMPARISON:  6/23/2020. FINDINGS: LUNGS: Slight hyperinflation. Chronic slightly hyperdense nodule in the right midlung, likely benign granuloma. No focal consolidation. No effusions. PNEUMOTHORAX: None. LINES AND TUBES: None. MEDIASTINUM: No cardiomegaly. TAVR. LAD stent. Atherosclerosis. MUSCULOSKELETAL STRUCTURES: No acute displaced fracture.     No acute cardiopulmonary abnormality.    Dx-chest-portable (1 View)    Result Date: 6/23/2020 6/23/2020 5:44 PM HISTORY/REASON FOR EXAM:  tachycardia. TECHNIQUE/EXAM DESCRIPTION AND NUMBER OF VIEWS: Single portable view of the chest. COMPARISON: Exam from 6:42 AM FINDINGS: Heart size is within normal limits. No pulmonary infiltrates or consolidations are noted. No pleural abnormalities are noted. There is an old left seventh rib fracture.     No acute cardiac or pulmonary abnormalities are identified.    Dx-chest-portable (1 View)    Result Date: 6/23/2020 6/23/2020 6:05 AM HISTORY/REASON FOR EXAM: Postop TAVR. TECHNIQUE/EXAM DESCRIPTION AND NUMBER OF VIEWS: Single portable view of the chest. COMPARISON: 6/22/2020 FINDINGS: LUNGS: Clear. No effusions. PNEUMOTHORAX: None. LINES AND TUBES: None. MEDIASTINUM: Stable cardiac silhouette. MUSCULOSKELETAL STRUCTURES: Unchanged.     Clear lungs. No pleural effusions.    Dx-chest-portable (1 View)    Result Date: 6/22/2020 6/22/2020 12:50 PM HISTORY/REASON FOR EXAM:  Status post aortic valve replacement. TECHNIQUE/EXAM DESCRIPTION AND NUMBER OF VIEWS: Single portable view of the chest. COMPARISON: 7/15/2019 FINDINGS: Single portable view of the chest demonstrates a normal cardiac silhouette and mediastinal contours. Calcification is in the aorta. There is emphysematous change. The lungs are hyperexpanded. No confluent opacity, pleural fluid, or  pneumothorax. No suspicious bony lesions.     No acute findings status post TAVR    Us-abdomen Complete Survey    Result Date: 6/26/2020 6/26/2020 6:27 AM HISTORY/REASON FOR EXAM:  Abnormal Labs Pain TECHNIQUE/EXAM DESCRIPTION AND NUMBER OF VIEWS:  Complete abdomen survey. COMPARISON: None FINDINGS: The liver is normal in contour. There is no evidence of solid mass lesion. The liver measures 14.57 cm. The gallbladder is normal. There is no evidence of cholelithiasis. The gallbladder wall thickness measures 2.50 mm. There is no pericholecystic fluid. The common duct measures 3.40 mm. The visualized pancreas is unremarkable. The visualized aorta is normal in caliber. Intrahepatic IVC is patent. The portal vein is patent with hepatopetal flow. The MPV measures 1.22 cm. The right kidney measures 11.19 cm. The left kidney measures 11.17 cm. There is no hydronephrosis. The spleen measures 10.41 cm maximally. The bladder is trabeculated, which could indicate chronic bladder outlet obstruction. There is no ascites.     Prominent trabeculated bladder wall, suggesting possible chronic bladder outlet obstruction. Unremarkable findings otherwise.     Us-extremity Artery Lower Bilat    Result Date: 6/22/2020  Lower Extremity  Arterial Duplex Report  Vascular Laboratory  CONCLUSIONS  Right.  Echolucent thrombus partially fills the common femoral artery causing a  high grade stenosis. Velocities are consistent with > 75% stenosis.  Mild plaque visualized in the superficial femoral and popliteal arteries  without evidence of hemodynamically significant stenosis.  Monophasic waveforms demonstrated throughout the right lower extremity.  Left.  Mild plaque is seen in the common femoral, femoral, and popliteal arteries  with no elevated velocities to indicate hemodynamically significant  stenosis.  FADIA LANDIS  Exam Date:     06/22/2020 17:34  Room #:     Inpatient  Priority:     Stat  Ht (in):             Wt (lb):  Ordering  Physician:        ANTHONY PIPER  Referring Physician:       ANTHONY PIPER  Sonographer:               Ewa Curtis RVT  Study Type:                Complete Bilateral  Technical Quality:         Adequate  Age:    65    Gender:     M  MRN:    [id number]  :    1955      BSA:  Indications:     Pain in leg, unspecified  CPT Codes:       48193  ICD Codes:       M79.606  History:         Pain in legs s/p TAVR. No prior duplex.  Limitations:                RIGHT  Waveform        Peak Systolic Velocity (cm/s)                  Prox    Prox-Mid  Mid    Mid-Dist  Distal  Monophasic                        307              143     CFA  Monophasic      55                                         PFA  Monophasic      53                51               34      SFA  Monophasic                        30                       POP  Monophasic      22                                 32      AT  Monophasic      19                                 11      PT  Monophasic      37                                 20      EVGENY                LEFT  Waveform        Peak Systolic Velocity (cm/s)                  Prox    Prox-Mid  Mid    Mid-Dist  Distal  Triphasic                         187                      CFA  Biphasic        78                                         PFA  Biphasic        103               142              106     SFA  Biphasic                          57                       POP  Biphasic        83                                 110     AT  Biphasic        76                                 24      PT  Biphasic        61                                 24      EVGENY  FINDINGS  Right.  Echolucent material consistent with thrombus partially fills the common  femoral artery causing a high grade stenosis. Stenosis of the common  femoral artery. Velocities are consistent with > 75% stenosis.  Mild plaque visualized in the femoral and popliteal arteries  without  evidence of hemodynamic significance.  Monophasic waveforms demonstrated throughout the right lower extremity.  Left.  Mild plaque is seen in the common femoral, femoral, and popliteal arteries  with no elevated velocities to indicate hemodynamic significance.  Waveforms are biphasic throughout the left lower extremity.  Demetria Narvaez  (Electronically Signed)  Final Date:      2020                   18:45  Amended:         2020 19:58    Us-extremity Artery Lower Unilat Right    Result Date: 2020  Lower Extremity  Arterial Duplex Report  Vascular Laboratory  CONCLUSIONS  Pinching of the common femoral artery from perclose device closure.  Velcoity consistent with >75% stenosis. Intraluminal clot seen in prior  study is not readily seen.  FADIA LANDIS  Exam Date:     2020 07:49  Room #:     Inpatient  Priority:     Routine  Ht (in):             Wt (lb):  Ordering Physician:        MELVIN SIMONS  Referring Physician:       MELVIN SIMONS  Sonographer:               Blank Calles RVT  Study Type:                Limited Unilateral  Technical Quality:         Adequate  Age:    65    Gender:     M  MRN:    [id number]  :    1955      BSA:  Indications:     Unspecified atherosclerosis of native arteries of                   extremities, right leg  CPT Codes:       36134  ICD Codes:       I70.201  History:         Right common femoral steosis.  Limitations:                RIGHT  Waveform        Peak Systolic Velocity (cm/s)                  Prox    Prox-Mid  Mid    Mid-Dist  Distal  Monophasic      42                384              230     CFA  Biphasic        97                                         PFA  Biphasic        55                                         SFA                                                             POP                                                              AT                                                             PT                                                             EVGENY                LEFT  Waveform        Peak Systolic Velocity (cm/s)                  Prox    Prox-Mid  Mid    Mid-Dist  Distal                                                             CFA                                                             PFA                                                             SFA                                                             POP                                                             AT                                                             PT                                                             EVGENY  FINDINGS  Limited evaluation of the right common femoral artery known stenosis.  Right.  Stenosis of the mid common femoral artery. Velocities are consistent with >  75%stenosis.  Distal flow is turbulent and damped.  No evidence of hematoma or psuedoaneuysm seen at the right groin.  Luiz Dennis MD  (Electronically Signed)  Final Date:      2020                   08:49    Ec-echocardiogram Complete W/o Cont    Result Date: 2020  Transthoracic Echo Report Echocardiography Laboratory CONCLUSIONS Prior echo done on 2020, there is now a TAVR valve present. Left ventricular ejection fraction is visually estimated to be 60%. Unable to estimate pulmonary artery pressure due to an inadequate tricuspid regurgitant jet. Known TAVR aortic valve. LANDISFADIA Exam Date:         2020                    03:46 Exam Location:     Inpatient Priority:          Routine Ordering Physician:        ANTHONY PIPER Referring Physician:       [id number] Cintron Sonographer:               JOJO Ash Age:    65     Gender:    Mal                           e MRN:    [id number] :    1955 BSA:    1.64   Ht (in):    66     Wt (lb):    126 Exam Type:     Complete Indications:      Prosthetic Valve ICD Codes:       V43.3 CPT Codes:       63763 BP:   142    /   83     HR:   70 Technical Quality:       Fair MEASUREMENTS  (Male / Female) Normal Values 2D ECHO LV Diastolic Diameter PLAX        6 cm                  4.2 - 5.9 / 3.9 - 5.3 cm LV Systolic Diameter PLAX         3.7 cm                2.1 - 4.0 cm IVS Diastolic Thickness           0.71 cm               LVPW Diastolic Thickness          0.7 cm                LVOT Diameter                     1.5 cm                Estimated LV Ejection Fraction    60 %                  LV Ejection Fraction MOD BP       39.5 %                >= 55  % LV Ejection Fraction MOD 4C       40.2 %                LV Ejection Fraction MOD 2C       33.9 %                IVC Diameter                      1.8 cm                DOPPLER AV Peak Velocity                  0.99 m/s              AV Peak Gradient                  3.9 mmHg              AV Mean Gradient                  2.7 mmHg              LVOT Peak Velocity                1.1 m/s               AV Area Cont Eq vti               1.9 cm2               Mitral E Point Velocity           0.63 m/s              Mitral E to A Ratio               1.5                   MV Pressure Half Time             75.3 ms               MV Area PHT                       2.9 cm2               MV Deceleration Time              260 ms                * Indicates values subject to auto-interpretation LV EF:  60    % FINDINGS Left Ventricle Left ventricle is mildly dilated. Normal left ventricular wall thickness. Normal left ventricular systolic function. Left ventricular ejection fraction is visually estimated to be 60%. Normal regional wall motion. Normal diastolic function. Right Ventricle Normal right ventricular size. Normal right ventricular systolic function. Right Atrium Normal right atrial size. Normal inferior vena cava size and inspiratory collapse. Left Atrium Normal left atrial size. Left atrial volume index is 26 mL/sq m.  Mitral Valve Calcification of the mitral valve leaflets. No mitral stenosis. Trace mitral regurgitation. Aortic Valve Known TAVR aortic valve. Vmax is 1 m/s. Transvalvular gradients are - Peak: 3 mmHg, Mean: 2 mmHg. Tricuspid Valve Structurally normal tricuspid valve. No tricuspid stenosis. Trace tricuspid regurgitation. Right atrial pressure is estimated to be 8 mmHg. Unable to estimate pulmonary artery pressure due to an inadequate tricuspid regurgitant jet. Pulmonic Valve The pulmonic valve is not well visualized. Pericardium No pericardial effusion seen. Aorta Normal aortic root for body surface area. Ascending aorta diameter is 2.4 cm. Brenden Hansen MD (Electronically Signed) Final Date:     2020                 06:41    Ec-echocardiogram Ltd W/o Cont    Result Date: 2020  Transthoracic Echo Report Echocardiography Laboratory CONCLUSIONS Intraoperative TTE during TAVR.  Baseline images show mildly reduced LV function with EF =55%.  Calcified aortic valve leaflets. Severe aortic stenosis.  Post deployment images show preserved biventricular function.  A 26 mm Jewell Jose Manuel Ultra stented bioprosthetic valve is seen in the aortic position with normal leaflet motion, trace significant paravalvular leak, mean gradent of 5 mm Hg and calculated effective orifice area of 2.5 cm2.  Findings communicated at the time of exam. MARLEN LANDISENCE Exam Date:         2020                    11:17 Exam Location:     Inpatient Priority:          Routine Ordering Physician:        CLEOPATRA JONES Referring Physician: Sonographer:               Antwan La RDCS Age:    65     Gender:    Mal                           e MRN:    [id number] :    1955 BSA:    1.64   Ht (in):    66     Wt (lb):    126 Exam Type:     Limited Indications:     Pre-Op Surgery ICD Codes:       V72.83 CPT Codes:       89589 BP:          /          HR: Technical Quality:       Fair MEASUREMENTS  (Male / Female) Normal Values  2D ECHO Estimated LV Ejection Fraction    55 %                  * Indicates values subject to auto-interpretation LV EF:  55    % FINDINGS Left Ventricle The left ventricle was normal in size and function. Right Ventricle The right ventricle was normal in size and function. Right Atrium The right atrium is normal in size. Left Atrium The left atrium is normal in size. Mitral Valve Structurally normal mitral valve without significant stenosis or regurgitation. Aortic Valve Calcified aortic valve leaflets. Severe aortic stenosis. Tricuspid Valve Structurally normal tricuspid valve without significant stenosis or regurgitation. Pulmonic Valve Structurally normal pulmonic valve without significant stenosis or regurgitation. Pericardium Normal pericardium without effusion. Aorta The aortic root is normal. Simon X Issa (Electronically Signed) Final Date:     23 June 2020                 16:26 Amended:        23 June 2020 16:29      Micro:  Results     Procedure Component Value Units Date/Time    CULTURE WOUND W/ GRAM STAIN [[id number]]  (Abnormal) Collected:  07/03/20 1736    Order Status:  Completed Specimen:  Wound Updated:  07/04/20 1550     Significant Indicator NEG     Source WND     Site Submental Abscess     Culture Result No growth at 24 hours.     Gram Stain Result Moderate WBCs.  Few Gram positive cocci.      Narrative:       Surgery - swabs received    Anaerobic Culture [[id number]] Collected:  07/03/20 1736    Order Status:  Completed Specimen:  Wound Updated:  07/04/20 1550     Significant Indicator NEG     Source WND     Site Submental Abscess     Culture Result Culture in progress.    Narrative:       Surgery - swabs received    GRAM STAIN [[id number]] Collected:  07/03/20 1736    Order Status:  Completed Specimen:  Wound Updated:  07/04/20 1127     Significant Indicator .     Source WND     Site Submental Abscess     Gram Stain Result Moderate WBCs.  Few Gram positive cocci.      Narrative:       Surgery -  swabs received    SARS-CoV-2, PCR (In-House) [[id number]] Collected:  07/02/20 2301    Order Status:  Completed Updated:  07/03/20 0013     SARS-CoV-2 Source NP Swab     SARS-CoV-2 by PCR NotDetected     Comment: [facility] providers: PLEASE REFER TO DE-ESCALATION AND RETESTING PROTOCOL  on [email or web address]  **The [facility] GeneXpert Xpress SARS-CoV-2 Test has been made available for  use under the Emergency Use Authorization (EUA) only.         Narrative:       [id number] LOMBARDO FLASH  Expected Turn around time?->Rush (Cepheid 2-4 hours)  Abbott test failed twice; using previously rejected Cepheid swab.    COVID/SARS CoV-2 PCR [[id number]] Collected:  07/02/20 2301    Order Status:  Completed Specimen:  Respirate from Nasopharyngeal Updated:  07/02/20 2318     COVID Order Status Received    Narrative:       [id number] LOMBARDO FLAHS  Expected Turn around time?->Rush (Cepheid 2-4 hours)  Abbott test failed twice; using previously rejected Cepheid swab.    SARS-CoV-2, PCR (In-House) [[id number]] Collected:  07/02/20 1934    Order Status:  Completed Updated:  07/02/20 2047     SARS-CoV-2 Source NP Swab    Narrative:       [id number] LOMBARDO FLASH  Expected Turn around time?->Routine (In-House PCR up to 24  hours)  [id number] LOMBARDO FLASH  Expected Turn around time?->Rush (Cepheid 2-4 hours)  Called RN to provide an Abbott swab.    COVID/SARS CoV-2 PCR [[id number]] Collected:  07/02/20 1934    Order Status:  Completed Specimen:  Respirate from Nasopharyngeal Updated:  07/02/20 2046     COVID Order Status Received    Narrative:       [id number] LOMBARDO FLASH  Expected Turn around time?->Routine (In-House PCR up to 24  hours)  [id number] LOMBARDO LFASH  Expected Turn around time?->Rush (Cepheid 2-4 hours)  Called RN to provide an Abbott swab.    COVID/SARS CoV-2 PCR [[id number]] Collected:  07/02/20 1934    Order Status:  Canceled Specimen:  Respirate from Nasopharyngeal     COVID/SARS  CoV-2 PCR [[id number]] Collected:  07/02/20 1934    Order Status:  Canceled Specimen:  Respirate from Nasopharyngeal     Aerobic/Anaerobic Culture (Surgery) [[id number]] Collected:  06/29/20 1317    Order Status:  Completed Specimen:  Wound from Other Body Fluid Updated:  07/02/20 1138     Significant Indicator NEG     Source WND     Site submandibular     Culture Result Order placed in Surgery. Source appropriate culture orders  have been placed in Micro for testing.      Narrative:       [id number] PAWAN BRICE F.  Large lump under chin  Please send for cytology/pathology  [id number] PAWAN BRICE F.  2mls pink, frothy fluid aspirated from submandibular fluid  collection, MD Jenkins  [id number] PAWAN BRICE F.    Anaerobic Culture [[id number]] Collected:  06/29/20 1317    Order Status:  Completed Specimen:  Wound Updated:  07/02/20 1138     Significant Indicator NEG     Source WND     Site submandibular     Culture Result No Anaerobes isolated.    Narrative:       [id number] PAWAN BRICE F.  Large lump under chin  Please send for cytology/pathology  [id number] PAWAN BRICE F.  2mls pink, frothy fluid aspirated from submandibular fluid  collection, MD Jenkins  [id number] PAWAN BRICE F.    CULTURE WOUND W/ GRAM STAIN [[id number]]  (Abnormal) Collected:  06/29/20 1317    Order Status:  Completed Specimen:  Wound Updated:  07/02/20 1138     Significant Indicator POS     Source WND     Site submandibular     Culture Result -     Gram Stain Result Many WBCs.  Many Gram positive cocci.  Few Gram negative rods.       Culture Result Viridans Streptococcus  Heavy growth      Narrative:       [id number] PAWAN BRICE F.  Large lump under chin  Please send for cytology/pathology  [id number] PAWAN BRICE F.  2mls pink, frothy fluid aspirated from submandibular fluid  collection, MD Jenkins  [id number] PAWAN BRICE FKym    BLOOD CULTURE [[id number]]  "Collected:  06/26/20 2159    Order Status:  Completed Specimen:  Blood from Peripheral Updated:  07/02/20 0100     Significant Indicator NEG     Source BLD     Site PERIPHERAL     Culture Result No growth after 5 days of incubation.    Narrative:       Protective  Per Hospital Policy: Only change Specimen Src: to \"Line\" if  specified by physician order.  Right Forearm/Arm    BLOOD CULTURE [[id number]] Collected:  06/26/20 2146    Order Status:  Completed Specimen:  Blood from Peripheral Updated:  07/02/20 0100     Significant Indicator NEG     Source BLD     Site PERIPHERAL     Culture Result No growth after 5 days of incubation.    Narrative:       Protective  Per Hospital Policy: Only change Specimen Src: to \"Line\" if  specified by physician order.  Left Forearm/Arm    GRAM STAIN [[id number]] Collected:  06/29/20 1317    Order Status:  Completed Specimen:  Wound Updated:  06/29/20 1622     Significant Indicator .     Source WND     Site submandibular     Gram Stain Result Many WBCs.  Many Gram positive cocci.  Few Gram negative rods.      Narrative:       [id number] PAWAN BRICE F.  Large lump under chin  Please send for cytology/pathology  [id number] PAWAN BRICE F.  2mls pink, frothy fluid aspirated from submandibular fluid  collection, MD Jenkins  [id number] PAWAN BRICE F.    FLUID CULTURE W/GRAM STAIN [[id number]] Collected:  06/29/20 1317    Order Status:  Canceled Specimen:  Other Body Fluid     FLUID CULTURE W/GRAM STAIN [[id number]] Collected:  06/29/20 1317    Order Status:  Sent Specimen:  Other Body Fluid           Assessment:  Active Hospital Problems    Diagnosis   • *Fever [R50.9]   • Pancytopenia (HCC) [D61.818]   • S/P TAVR (transcatheter aortic valve replacement) [Z95.2]   • Essential hypertension [I10]   • Mixed hyperlipidemia [E78.2]   • Panlobular emphysema (HCC) [J43.1]   • Tobacco dependence [F17.200]        ASSESSMENT/PLAN:      65 y.o. man admitted 6/23/2020. Pt has " a past medical history of TAVR on 6/22/2020 and pancytopenia which he has had some work-up but no bone marrow biopsy as yet.  He has had a decreased WBC since 2016 with neutropenia.  He also has some mild anemia and thrombocytopenia that is also chronic but worsened recently.  He presented the ER complaining of tachycardia the day after he been sent home after his TAVR surgery.      Hospital Course:   Patient was admitted and was initially febrile and 6/20 4-1.3 and then again spiked a fever to 103.6 on 6/26.  Initial work-up for pancytopenia was started and plan was to complete this with heme-onc as an outpatient.  Blood cultures were obtained 6/26.  Ultrasound abdomen on 6/25 with trabeculated bladder wall suggesting possible chronic bladder outlet obstruction but otherwise unremarkable.  Ultrasound lower extremity with noted stenosis of the right common femoral artery report is known - no evidence of hematoma or pseudoaneurysm in right groin.  Chest x-rays have been unremarkable.  He is not been on any antibiotics and plan was for discharge and follow-up as an outpatient but he again spiked fevers and also reported soaking sweats last night.  He underwent aspiration of submental abscess on 6/29/2020.  Cultures are growing viridans Streptococcus.  Bone marrow biopsy does not show any evidence of leukemia however additional testing is being sent to [place]. S/p OR with Dr. Morrison on 7/3     Problem List  Neutropenic fever, overall improving  Likely secondary to infection.   Bone marrow biopsy did not reveal any leukemia, however additional testing is being sent to [place]  Continues to be febrile  Neutropenia improving, stable  -Work-up initiated by hospitalist with SPEP, smooth muscle antibodies, SEEMA, rheumatoid factor, B12 and folate levels as well as a fibrinogen.   -ESR 45  - RA elevated at 40  -Folate and B12 within normal limits  -UA unremarkable in 6/27  -Blood cultures on 6/26 no growth to date  -HIV  negative   Discontinued cefepime on 7/4 as no evidence of Pseudomonas and anticipate neutropenia will improve once infection is treated.  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  However this may be postponed given mandibular surgery    Pancytopenia  Secondary to infection  Evaluated by oncology  Status post bone marrow biopsy on 6/29/2020.  Pathology report- no evidence of leukemia.  Additional testing being sent to [place]  CMV PCR- not detected  Parvovirus PCR- not detected    Lump below chin, tender, prior infection   Fracture of mid mandibular body  Submandibular abscess  Possible infected hematoma-2.9 cm collection subjacent to fracture site noted on CT scan  -Reports oral surgery at the end of April and he was on antibiotics (augmentin) due to infection for approximately 1 month.  There had resolved but now with new swelling  Status post IR aspiration on 6/29/2020.  Gram stain+ GPC, GNR.  Culture- viridans Streptococcus  On antibiotics above  Concerned about infection causing fracture of the mid mandibular body, possible osteomyelitis.  Patient underwent surgery with Dr. Morrison on 7/3/2020.  Operative report not yet available  OR cultures from 7/3-negative to date.  Gram stain- few gram-positive cocci  Duration of antibiotics will depend on operative findings however anticipate a 6-week course due to concerns for osteomyelitis given mandibular fracture. May be able to do PO augmentin 875 mg BID at discharge.    I have performed a physical exam and reviewed and updated ROS and plan today 7/5/2020.  In review of yesterday's note 7/4/2020, there are no changes except as documented above.       Plan of care discussed with Dr. Urena. Will continue to follow

## 2020-07-05 NOTE — PROGRESS NOTES
Oncology/Hematology Progress Note               Author: Simon Stahl M.D. Date & Time created: 7/5/2020  9:36 AM     CC: Pancytopenia  Interval History:    Patient pain is better under the chin.  He had a surgical procedure yesterday.  He did well with it.  He still has had a fever.  He does have a strap underneath his chin.    No bleeding.  Review of Systems:  Review of Systems   Constitutional: Positive for fever and malaise/fatigue. Negative for chills.   HENT: Negative for hearing loss and tinnitus.    Eyes: Negative for blurred vision and double vision.   Respiratory: Negative for cough.    Cardiovascular: Negative for chest pain.   Gastrointestinal: Negative for heartburn and nausea.   Genitourinary: Negative for dysuria.   Skin: Negative for rash.   Neurological: Negative for dizziness and headaches.   Endo/Heme/Allergies: Does not bruise/bleed easily.   Psychiatric/Behavioral: The patient is nervous/anxious.        Physical Exam:  Physical Exam  Constitutional:       Appearance: Normal appearance.   Eyes:      Extraocular Movements: Extraocular movements intact.      Conjunctiva/sclera: Conjunctivae normal.   Neck:      Comments: He has a strap device underneath the chin  Musculoskeletal:         General: No swelling.   Skin:     Coloration: Skin is not jaundiced.      Findings: No bruising or erythema.      Comments: No change in area under skin   Neurological:      General: No focal deficit present.      Mental Status: He is alert and oriented to person, place, and time.   Psychiatric:         Mood and Affect: Mood normal.         Behavior: Behavior normal.         Thought Content: Thought content normal.         Judgment: Judgment normal.         Labs:          Recent Labs     07/03/20  0331 07/04/20  0330 07/05/20  0419   SODIUM 129* 128* 129*   POTASSIUM 4.0 4.4 3.8   CHLORIDE 98 97 97   CO2 22 23 21   BUN 17 16 16   CREATININE 0.73 0.73 0.79   CALCIUM 7.9* 8.1* 8.1*     Recent Labs      20  03320  03320  0419   GLUCOSE 117* 112* 107*     Recent Labs     20  1302 20  03320  1544 20  1826 209 20  0848   RBC 2.89* 2.93*  --   --   --  2.95*  --    HEMOGLOBIN 8.2* 8.4*  --   --   --  8.4*  --    HEMATOCRIT 25.7* 26.0*  --   --   --  26.9*  --    PLATELETCT 78* 58*  --   --   --  67*  --    APTT  --   --    < > 100.0* 96.3*  --  81.9*    < > = values in this interval not displayed.     Recent Labs     209   WBC 2.8* 2.8* 2.2* 2.1*   NEUTSPOLYS 36.40*  --  25.20* 26.30*   LYMPHOCYTES 45.70*  --  41.70* 49.10*   MONOCYTES 15.50*  --  27.80* 19.30*   EOSINOPHILS 0.00  --  3.50 1.80   BASOPHILS 0.00  --  0.90 0.00     Recent Labs     209   SODIUM 129* 128* 129*   POTASSIUM 4.0 4.4 3.8   CHLORIDE 98 97 97   CO2 22 23 21   GLUCOSE 117* 112* 107*   BUN 17 16 16   CREATININE 0.73 0.73 0.79   CALCIUM 7.9* 8.1* 8.1*     Hemodynamics:  Temp (24hrs), Av.7 °C (99.8 °F), Min:36.3 °C (97.4 °F), Max:38.9 °C (102.1 °F)  Temperature: 37.4 °C (99.4 °F)  Pulse  Av.5  Min: 52  Max: 110   Blood Pressure : 148/67     Respiratory:    Respiration: 18, Pulse Oximetry: 91 %           Fluids:    Intake/Output Summary (Last 24 hours) at 2020 1136  Last data filed at 2020 0900  Gross per 24 hour   Intake 240 ml   Output --   Net 240 ml     Weight: 63.3 kg (139 lb 8.8 oz)  GI/Nutrition:  Orders Placed This Encounter   Procedures   • Diet Order Full Liquid     Standing Status:   Standing     Number of Occurrences:   1     Order Specific Question:   Diet:     Answer:   Full Liquid [11]   • Diet NPO     Standing Status:   Standing     Number of Occurrences:   8     Order Specific Question:   Restrict to:     Answer:   Strict [1]     Medical Decision Making, by Problem:  Active Hospital Problems    Diagnosis   • *Fever [R50.9]   • Pancytopenia (HCC) [D61.818]    • S/P TAVR (transcatheter aortic valve replacement) [Z95.2]   • Essential hypertension [I10]   • Mixed hyperlipidemia [E78.2]   • Panlobular emphysema (HCC) [J43.1]   • Tobacco dependence [F17.200]       Plan:    1.  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  --- Ultrasound of the lower extremity did not show any venous thrombus.  He did have a small thrombus after his procedure with his valve surgery.  He also had a splenic infarct.  I think more likely not this was related to the procedure but we cannot really differentiate.  I would lean towards 3 months of therapy with anticoagulation for HIT since the thrombosis could have been from either.  Cardiology may end up leaving him on it for his valve long-term.  Leave it to them to make those decisions.  --- Follow-up on BULL result     2.  Neutropenia    --- Wait on the final bone marrow report from [place].  There is some concern for LGL.    3.  Anemia    --- No real signs of hemolysis.  This all could be secondary to bone marrow suppression from infection.  I would just continue to watch this for now.    4.  Infectious disease    --- Patient had I&D of this abscess under his chin on 7/4/2020.  --- He still has fever overnight.  Infectious disease following.  --- JOON planned.  I think all this depends on if the patient can really open his mouth from his recent procedure.  This could make it difficult.  ----COVID test negative on 7/2/2020  ----ID can decide if he needs  coverage for gram-negative as well since he is neutropenic.      High complexity/complex decision making  This patient was seen under COVID 19 pandemic disaster response conditions.  During a disaster, the provisions of care is subject to the Crisis Standard of Care  Please note that this dictation was created using voice recognition software. I have made every reasonable attempt to correct obvious errors, but I expect that there are errors of grammar and possibly context that I did not discover before finalizing the note    yQuality-Core Measures   Reviewed items::  Labs reviewed, Radiology images reviewed and Medications reviewed

## 2020-07-05 NOTE — PROGRESS NOTES
Assumed care of pt. Bedside report completed with night shift RN. Pt alert and oriented for this RN, but seems confused at times. Pt denies pain for this RN. Pt resting comfortably in bed. Call light within reach. Bed low and locked. Offered needs.

## 2020-07-06 LAB
ANION GAP SERPL CALC-SCNC: 11 MMOL/L (ref 7–16)
ANISOCYTOSIS BLD QL SMEAR: ABNORMAL
APTT PPP: 81.9 SEC (ref 24.7–36)
BACTERIA WND AEROBE CULT: ABNORMAL
BACTERIA WND AEROBE CULT: ABNORMAL
BASOPHILS # BLD AUTO: 1.7 % (ref 0–1.8)
BASOPHILS # BLD: 0.04 K/UL (ref 0–0.12)
BUN SERPL-MCNC: 14 MG/DL (ref 8–22)
CALCIUM SERPL-MCNC: 8.1 MG/DL (ref 8.5–10.5)
CHLORIDE SERPL-SCNC: 98 MMOL/L (ref 96–112)
CO2 SERPL-SCNC: 22 MMOL/L (ref 20–33)
CREAT SERPL-MCNC: 0.66 MG/DL (ref 0.5–1.4)
EOSINOPHIL # BLD AUTO: 0.04 K/UL (ref 0–0.51)
EOSINOPHIL NFR BLD: 1.7 % (ref 0–6.9)
ERYTHROCYTE [DISTWIDTH] IN BLOOD BY AUTOMATED COUNT: 47.5 FL (ref 35.9–50)
GIANT PLATELETS BLD QL SMEAR: NORMAL
GLUCOSE SERPL-MCNC: 112 MG/DL (ref 65–99)
GRAM STN SPEC: ABNORMAL
HCT VFR BLD AUTO: 24.3 % (ref 42–52)
HGB BLD-MCNC: 8 G/DL (ref 14–18)
LYMPHOCYTES # BLD AUTO: 1.22 K/UL (ref 1–4.8)
LYMPHOCYTES NFR BLD: 53 % (ref 22–41)
MANUAL DIFF BLD: NORMAL
MCH RBC QN AUTO: 28.7 PG (ref 27–33)
MCHC RBC AUTO-ENTMCNC: 32.9 G/DL (ref 33.7–35.3)
MCV RBC AUTO: 87.1 FL (ref 81.4–97.8)
MICROCYTES BLD QL SMEAR: ABNORMAL
MONOCYTES # BLD AUTO: 0.48 K/UL (ref 0–0.85)
MONOCYTES NFR BLD AUTO: 20.9 % (ref 0–13.4)
MORPHOLOGY BLD-IMP: NORMAL
NEUTROPHILS # BLD AUTO: 0.52 K/UL (ref 1.82–7.42)
NEUTROPHILS NFR BLD: 21.7 % (ref 44–72)
NEUTS BAND NFR BLD MANUAL: 0.9 % (ref 0–10)
NRBC # BLD AUTO: 0 K/UL
NRBC BLD-RTO: 0 /100 WBC
OVALOCYTES BLD QL SMEAR: NORMAL
PLATELET # BLD AUTO: 57 K/UL (ref 164–446)
PLATELET BLD QL SMEAR: NORMAL
PMV BLD AUTO: 10.8 FL (ref 9–12.9)
POIKILOCYTOSIS BLD QL SMEAR: NORMAL
POLYCHROMASIA BLD QL SMEAR: NORMAL
POTASSIUM SERPL-SCNC: 3.7 MMOL/L (ref 3.6–5.5)
RBC # BLD AUTO: 2.79 M/UL (ref 4.7–6.1)
RBC BLD AUTO: PRESENT
SIGNIFICANT IND 70042: ABNORMAL
SITE SITE: ABNORMAL
SMUDGE CELLS BLD QL SMEAR: NORMAL
SODIUM SERPL-SCNC: 131 MMOL/L (ref 135–145)
SOURCE SOURCE: ABNORMAL
WBC # BLD AUTO: 2.3 K/UL (ref 4.8–10.8)

## 2020-07-06 PROCEDURE — 85730 THROMBOPLASTIN TIME PARTIAL: CPT

## 2020-07-06 PROCEDURE — 770020 HCHG ROOM/CARE - TELE (206)

## 2020-07-06 PROCEDURE — A9270 NON-COVERED ITEM OR SERVICE: HCPCS | Performed by: HOSPITALIST

## 2020-07-06 PROCEDURE — 99232 SBSQ HOSP IP/OBS MODERATE 35: CPT | Performed by: HOSPITALIST

## 2020-07-06 PROCEDURE — 80048 BASIC METABOLIC PNL TOTAL CA: CPT

## 2020-07-06 PROCEDURE — 99232 SBSQ HOSP IP/OBS MODERATE 35: CPT | Performed by: INTERNAL MEDICINE

## 2020-07-06 PROCEDURE — 85027 COMPLETE CBC AUTOMATED: CPT

## 2020-07-06 PROCEDURE — 700105 HCHG RX REV CODE 258: Performed by: INTERNAL MEDICINE

## 2020-07-06 PROCEDURE — 36415 COLL VENOUS BLD VENIPUNCTURE: CPT

## 2020-07-06 PROCEDURE — 85007 BL SMEAR W/DIFF WBC COUNT: CPT

## 2020-07-06 PROCEDURE — A9270 NON-COVERED ITEM OR SERVICE: HCPCS | Performed by: INTERNAL MEDICINE

## 2020-07-06 PROCEDURE — 700102 HCHG RX REV CODE 250 W/ 637 OVERRIDE(OP): Performed by: INTERNAL MEDICINE

## 2020-07-06 PROCEDURE — 700111 HCHG RX REV CODE 636 W/ 250 OVERRIDE (IP): Mod: JG | Performed by: HOSPITALIST

## 2020-07-06 PROCEDURE — 700102 HCHG RX REV CODE 250 W/ 637 OVERRIDE(OP): Performed by: HOSPITALIST

## 2020-07-06 PROCEDURE — 700111 HCHG RX REV CODE 636 W/ 250 OVERRIDE (IP): Performed by: INTERNAL MEDICINE

## 2020-07-06 RX ADMIN — AMPICILLIN SODIUM AND SULBACTAM SODIUM 3 G: 2; 1 INJECTION, POWDER, FOR SOLUTION INTRAMUSCULAR; INTRAVENOUS at 05:56

## 2020-07-06 RX ADMIN — ARGATROBAN 0.89 MCG/KG/MIN: 50 INJECTION INTRAVENOUS at 05:57

## 2020-07-06 RX ADMIN — HYDROCODONE BITARTRATE AND ACETAMINOPHEN 1 TABLET: 5; 325 TABLET ORAL at 16:52

## 2020-07-06 RX ADMIN — ARGATROBAN 0.89 MCG/KG/MIN: 50 INJECTION INTRAVENOUS at 22:44

## 2020-07-06 RX ADMIN — AMPICILLIN SODIUM AND SULBACTAM SODIUM 3 G: 2; 1 INJECTION, POWDER, FOR SOLUTION INTRAMUSCULAR; INTRAVENOUS at 16:37

## 2020-07-06 RX ADMIN — AMPICILLIN SODIUM AND SULBACTAM SODIUM 3 G: 2; 1 INJECTION, POWDER, FOR SOLUTION INTRAMUSCULAR; INTRAVENOUS at 13:15

## 2020-07-06 RX ADMIN — LOVASTATIN 20 MG: 20 TABLET ORAL at 22:44

## 2020-07-06 RX ADMIN — AMPICILLIN SODIUM AND SULBACTAM SODIUM 3 G: 2; 1 INJECTION, POWDER, FOR SOLUTION INTRAMUSCULAR; INTRAVENOUS at 22:48

## 2020-07-06 RX ADMIN — ATENOLOL 25 MG: 25 TABLET ORAL at 05:47

## 2020-07-06 ASSESSMENT — ENCOUNTER SYMPTOMS
CHILLS: 0
HEARTBURN: 0
MYALGIAS: 1
HEADACHES: 0
ORTHOPNEA: 0
NERVOUS/ANXIOUS: 1
WEIGHT LOSS: 0
ABDOMINAL PAIN: 0
DOUBLE VISION: 0
DIARRHEA: 0
FEVER: 0
BRUISES/BLEEDS EASILY: 0
SPUTUM PRODUCTION: 0
PALPITATIONS: 0
VOMITING: 0
TREMORS: 0
BACK PAIN: 0
FEVER: 1
DEPRESSION: 0
CLAUDICATION: 0
BLURRED VISION: 0
COUGH: 0
DIZZINESS: 0
NECK PAIN: 0
NAUSEA: 0
PHOTOPHOBIA: 0
FOCAL WEAKNESS: 0
CONSTIPATION: 0

## 2020-07-06 ASSESSMENT — FIBROSIS 4 INDEX: FIB4 SCORE: 11.3

## 2020-07-06 ASSESSMENT — LIFESTYLE VARIABLES: SUBSTANCE_ABUSE: 0

## 2020-07-06 NOTE — PROGRESS NOTES
Bedside report received from NOC RN. Assumed care of pt. Pt awake, laying in bed. A/Ox4, VSS. No concerns, complaints or distress. Pt educated to call before getting out of bed. POC reviewed and white board updated. Tele box on. SR 61on the monitor. Call light in reach. Bed locked in lowest position with 2 upper bed rails up. Bed alarm on.

## 2020-07-06 NOTE — PROGRESS NOTES
0240 APTT of 81.9. Protocol on drip followed. Patient had critical labs of WBC 2.3 and preliminary ANC of 0.48. OLIVIA Way notified. Will continue to monitor.

## 2020-07-06 NOTE — PROGRESS NOTES
Report received from day shift RN, assumed patient care. Patient is A&O x 4, slightly confused but answers questions appropriately.  Pt on 2L NC. Tele box on and in place. Patient denies any pain at this time. Patient updated on plan of care and verbalizes no questions. Patient has call light within reach, fall precautions in place. Patient educated to call for assistance as needed. Will continue to monitor.

## 2020-07-06 NOTE — PROGRESS NOTES
Infectious Disease Progress Note    Author: Ruthie Avitia M.D. Date & Time of service: 7/6/2020  11:11 AM    Chief Complaint:  Fevers, pancytopenia      Interval History:  6/29 T-max 102.9 WBC 3.7 patient remains neutropenic .  CT of the neck shows a 2.9 cm collection adjacent to the fracture site of the mid mandibular body.  An infected hematoma cannot be ruled out.  CT scan shows possible splenic infarcts and gallbladder wall thickening with pericholecystic fluid.  Patient continues to have fevers and chills.  He states his chin lesion is more painful and has increased in size.  Plan for bone marrow biopsy today  6/30 T-max 101.9, remains febrile.  Had bone biopsy yesterday and aspiration of submandibular abscess.  He did not sleep well overnight as he was very warm.  No new symptoms today  7/1 T-max 100.9 WBC 4.5 culture growing viridans Streptococcus.  Patient did not feel any fevers overnight.  Overall fever curve improving.  He states that his abscess drained throughout the night.  Bone marrow biopsy results pending.  Eager to go home  7/2 T-max 102.4 WBC 4.1 bone marrow results do not show any evidence of MDS or leukemia.  Ongoing chin pain.  Awaiting evaluation from oral surgeon  7/3 T-max 99.9 WBC 2.8.  Plan for ORIF with bone graft today by Dr. Morrison.  No new symptoms to report  7/4 afebrile WBC 2.2 underwent surgery yesterday.  Operative report not available.  Patient complaining of jaw pain from surgery.  Submental abscess cultures-pending  7/5 T-max 101.6 WBC 2.1 ongoing jaw pain.  Nurse states decreased drainage throughout the day yesterday and patient was noted to be somewhat confused overnight.  He is alert and oriented this morning answering questions appropriately    Review of Systems:  Review of Systems   Constitutional: Positive for malaise/fatigue. Negative for chills and fever.   Respiratory: Negative for cough.    Cardiovascular: Negative for chest pain.   Gastrointestinal:  Negative for abdominal pain, constipation, diarrhea, nausea and vomiting.   Musculoskeletal: Positive for myalgias.       Hemodynamics:  Temp (24hrs), Av.1 °C (98.8 °F), Min:36.4 °C (97.6 °F), Max:37.7 °C (99.9 °F)  Temperature: 37.7 °C (99.8 °F)  Pulse  Av.4  Min: 52  Max: 110   Blood Pressure : 135/74       Physical Exam:  Physical Exam  Constitutional:       Appearance: Normal appearance.   HENT:      Head:      Comments: Submandibular drainage, edema and tenderness   Cardiovascular:      Rate and Rhythm: Normal rate and regular rhythm.      Heart sounds: Normal heart sounds.   Pulmonary:      Effort: Pulmonary effort is normal.      Breath sounds: Normal breath sounds.   Abdominal:      General: Abdomen is flat. Bowel sounds are normal. There is no distension.      Palpations: Abdomen is soft.      Tenderness: There is no abdominal tenderness. There is no guarding.   Musculoskeletal:      Right lower leg: No edema.      Left lower leg: No edema.   Skin:     General: Skin is warm and dry.   Neurological:      General: No focal deficit present.      Mental Status: He is alert and oriented to person, place, and time.   Psychiatric:         Mood and Affect: Mood normal.         Behavior: Behavior normal.         Meds:    Current Facility-Administered Medications:   •  atenolol  •  MD Alert...Argatroban per Pharmacy  •  argatroban **AND** Protocol 448 INDICATIONS **AND** Protocol 448 INCLUSION CRITERIA **AND** Protocol 448 EXCLUSION CRITERIA  **AND** Protocol 448 GOAL: To obtain aPTT value in the 50-90 range **AND** Protocol 448 Argatroban administration must be monitored and documented in EMR. Discontinue IV/subcutaneous heparin, heparin flushes, Enoxaparin (Lovenox), Dabigatran (Pradaxa), Rivaroxaban (Xarelto), Apixaban (Eliquis), Edoxaban (Savaysa, Lixiana), and Fondaparinux (Arixtra). **AND** Protocol 448 RN to order daily CBC w/o diff if newly-diagnosed heparin-induced thrombocytopenia **AND** Protocol  448 aPTT MONITORING - RN to place separate order for repeat aPTT as needed **AND** Start IV argatroban drip, initial rate per pharmacy dosing guidelines **AND** [COMPLETED] APTT **AND** Maximum dose 10 mcg/kg/min    •  ampicillin-sulbactam (UNASYN) IV  •  HYDROcodone-acetaminophen  •  [CANCELED] HEPARIN INDUCED PLATELET AB(HIT) **AND** Pharmacy Consult:  •  zolpidem  •  lovastatin  •  senna-docusate **AND** polyethylene glycol/lytes **AND** magnesium hydroxide **AND** bisacodyl  •  acetaminophen  •  enalaprilat  •  ondansetron  •  ondansetron    Labs:  Recent Labs     07/04/20 0330 07/05/20 0419 07/06/20  0240   WBC 2.2* 2.1* 2.3*   RBC 2.93* 2.95* 2.79*   HEMOGLOBIN 8.4* 8.4* 8.0*   HEMATOCRIT 26.0* 26.9* 24.3*   MCV 88.7 91.2 87.1   MCH 28.7 28.5 28.7   RDW 48.1 50.1* 47.5   PLATELETCT 58* 67* 57*   MPV 11.4 12.6 10.8   NEUTSPOLYS 25.20* 26.30* 21.70*   LYMPHOCYTES 41.70* 49.10* 53.00*   MONOCYTES 27.80* 19.30* 20.90*   EOSINOPHILS 3.50 1.80 1.70   BASOPHILS 0.90 0.00 1.70   RBCMORPHOLO Present Present Present     Recent Labs     07/04/20 0330 07/05/20 0419 07/06/20  0240   SODIUM 128* 129* 131*   POTASSIUM 4.4 3.8 3.7   CHLORIDE 97 97 98   CO2 23 21 22   GLUCOSE 112* 107* 112*   BUN 16 16 14     Recent Labs     07/04/20 0330 07/05/20 0419 07/06/20  0240   CREATININE 0.73 0.79 0.66       Imaging:  Ct-chest,abdomen,pelvis With    Result Date: 6/28/2020 6/28/2020 4:20 PM HISTORY/REASON FOR EXAM:  Neutropenic fever TECHNIQUE/EXAM DESCRIPTION: CT scan of the chest, abdomen and pelvis with contrast. Helical scanning was obtained with intravenous contrast from the lung apices through the pubic symphysis to include the chest, abdomen and pelvis.  80 mL of Omnipaque 350 nonionic contrast was administered intravenously without complication. Low dose optimization technique was utilized for this CT exam including automated exposure control and adjustment of the mA and/or kV according to patient size. COMPARISON:  4/2/2020 FINDINGS: Atherosclerotic plaque is seen in the aorta. Coronary artery calcification is seen. There is a prosthetic aortic valve. The heart is not enlarged. There is trace pericardial fluid. There are small mediastinal lymph nodes. Right hilar lymph node measures 1 cm in short axis dimension. There is no left hilar or axillary lymphadenopathy. There is no pleural effusion. There are emphysematous changes. There are calcified pulmonary granulomata. There is mild bibasilar and lingula atelectasis. Central airways are patent. 4.7 mm nodule is seen in the right middle lobe on image 227. There is an irregular density along the minor fissure measuring 8 mm which could be related to scarring. No free air is seen in the abdomen or pelvis. The liver appears unremarkable. Portal vein is patent. There is pericholecystic fluid/gallbladder wall thickening. Areas of hypoenhancement in the spleen are worrisome for splenic infarcts. No adrenal mass is  identified. There is no evidence of hydronephrosis. There is a small hypodense left renal lesions which are too small to characterize. Pancreas appears unremarkable. There is no biliary ductal dilatation. There is atherosclerotic plaque within an ectatic aorta. There is a left common femoral pseudoaneurysm measuring 2.2 x 1.7 cm. There are small retroperitoneal lymph nodes. Aortocaval retroperitoneal nodes measure up to 8 mm in short axis dimension. Periportal lymph nodes measure up to 1.4 cm in short axis dimension. There is no evidence of bowel obstruction. The visualized appendix appears unremarkable. The appendix is not identified in its entirety, limiting evaluation. Bladder is mildly distended. There is a bladder diverticulum on the right. Prostate is mildly enlarged. There is trace free fluid in the pelvis. There is a right inguinal hernia containing bowel without evidence of obstruction. Degenerative changes are seen in the spine.     Hypodensities in the spleen  worrisome for splenic infarcts. Trace nonspecific free fluid in the pelvis. 2.2 x 1.7 cm left common femoral artery pseudoaneurysm. Bowel containing right inguinal hernia without evidence of obstruction. Atherosclerotic plaque within an ectatic aorta. Gallbladder wall thickening/pericholecystic fluid. Further evaluation can be performed with right upper quadrant ultrasound. Small hypodense left renal lesions are too small to characterize but likely represent small cysts. Emphysematous changes. Irregular nodular opacity along the minor fissure measuring 8 mm. 4.7 mm right middle lobe pulmonary nodule. Mild bibasilar and lingular atelectasis. Emphysematous changes. Sequelae of prior granulomatous exposure. Right hilar lymph node is borderline enlarged and nonspecific, possibly reactive.. Enlarged periportal lymph nodes are nonspecific. Fleischner Society pulmonary nodule recommendations: Low Risk: CT at 3-6 months, then consider CT at 18-24 months High Risk: CT at 3-6 months, then at 18-24 months Comments: Use most suspicious nodule as guide to management. Follow-up intervals may vary according to size and risk. Low Risk - Minimal or absent history of smoking and of other known risk factors. High Risk - History of smoking or of other known risk factors. Note: These recommendations do not apply to lung cancer screening, patients with immunosuppression, or patients with known primary cancer. Fleischner Society 2017 Guidelines for Management of Incidentally Detected Pulmonary Nodules in Adults    Ct-soft Tissue Neck With    Result Date: 6/28/2020 6/28/2020 4:20 PM HISTORY/REASON FOR EXAM:  Palpable nodule/thyroid enlargement. TECHNIQUE/EXAM DESCRIPTION AND NUMBER OF VIEWS:  CT soft tissue neck with contrast. The study was performed on a helical multidetector CT scanner. Contiguous thin section helical images were obtained of the neck from the skull base through the thoracic inlet. 80 mL of Omnipaque 350 nonionic  contrast was injected intravenously. Low dose optimization technique was utilized for this CT exam including automated exposure control and adjustment of the mA and/or kV according to patient size. COMPARISON: None. FINDINGS: PARANASAL SINUSES: Moderate mucosal thickening of left maxillary sinus. Last remaining tooth with associated periapical lucency protrudes into the left maxillary sinus floor. NASOPHARYNX: Normal. SUPRAHYOID NECK: Normal oropharynx, oral cavity, parapharyngeal space, and retropharyngeal space. EPIGLOTTIS: Normal. INFRAHYOID NECK: Normal larynx, hypopharynx, and supraglottis. THYROID: Normal. No thyroid nodules. UPPER CHEST: Reported separately. LYMPH NODES: No enlarged nodes by size criteria. VASCULAR STRUCTURES: Patent. Approximately 50% stenosis of the left ICA origin secondary to predominantly calcific plaque. OTHER FINDINGS: Acute versus subacute fracture of the mandibular body. Fluid collection subjacent to the fracture site measures 2.9 x 1.2 cm, likely a hematoma. Infection can be considered in the appropriate clinical setting. Visualized brain is unremarkable.     1. Acute versus subacute fracture of the mid mandibular body. 2. A 2.9 cm collection subjacent to the fracture site, likely hematoma. Infected hematoma can be considered in the appropriate clinical settings. 3. No cervical lymphadenopathy. 4. Moderate mucosal thickening of the left maxillary sinus, likely odontogenic in nature, related to impacted remaining maxillary tooth.    Dx-chest-2 Views    Result Date: 6/27/2020 6/27/2020 11:36 AM HISTORY/REASON FOR EXAM:  Fever TECHNIQUE/EXAM DESCRIPTION AND NUMBER OF VIEWS: Two views of the chest. COMPARISON:  6/23/2020. FINDINGS: LUNGS: Slight hyperinflation. Chronic slightly hyperdense nodule in the right midlung, likely benign granuloma. No focal consolidation. No effusions. PNEUMOTHORAX: None. LINES AND TUBES: None. MEDIASTINUM: No cardiomegaly. TAVR. LAD stent. Atherosclerosis.  MUSCULOSKELETAL STRUCTURES: No acute displaced fracture.     No acute cardiopulmonary abnormality.    Dx-chest-portable (1 View)    Result Date: 6/23/2020 6/23/2020 5:44 PM HISTORY/REASON FOR EXAM:  tachycardia. TECHNIQUE/EXAM DESCRIPTION AND NUMBER OF VIEWS: Single portable view of the chest. COMPARISON: Exam from 6:42 AM FINDINGS: Heart size is within normal limits. No pulmonary infiltrates or consolidations are noted. No pleural abnormalities are noted. There is an old left seventh rib fracture.     No acute cardiac or pulmonary abnormalities are identified.    Dx-chest-portable (1 View)    Result Date: 6/23/2020 6/23/2020 6:05 AM HISTORY/REASON FOR EXAM: Postop TAVR. TECHNIQUE/EXAM DESCRIPTION AND NUMBER OF VIEWS: Single portable view of the chest. COMPARISON: 6/22/2020 FINDINGS: LUNGS: Clear. No effusions. PNEUMOTHORAX: None. LINES AND TUBES: None. MEDIASTINUM: Stable cardiac silhouette. MUSCULOSKELETAL STRUCTURES: Unchanged.     Clear lungs. No pleural effusions.    Dx-chest-portable (1 View)    Result Date: 6/22/2020 6/22/2020 12:50 PM HISTORY/REASON FOR EXAM:  Status post aortic valve replacement. TECHNIQUE/EXAM DESCRIPTION AND NUMBER OF VIEWS: Single portable view of the chest. COMPARISON: 7/15/2019 FINDINGS: Single portable view of the chest demonstrates a normal cardiac silhouette and mediastinal contours. Calcification is in the aorta. There is emphysematous change. The lungs are hyperexpanded. No confluent opacity, pleural fluid, or pneumothorax. No suspicious bony lesions.     No acute findings status post TAVR    Ir-cyst Aspiration-misc    Result Date: 6/29/2020 6/29/2020 1:13 PM HISTORY/REASON FOR EXAM: Redness and swelling in the anterior submandibular region TECHNIQUE/EXAM DESCRIPTION: Ultrasound guided aspiration of submandibular fluid collection. PROCEDURE: Informed consent was obtained. Localizing ultrasound images were obtained with the patient in supine position. A heterogeneous fluid  collection was noted in the anterior submandibular region. The skin was prepped with chlorhexidine gluconate and draped in a sterile fashion. Local anesthesia was administered with 1% Lidocaine. Under real-time ultrasound guidance, a 17-gauge introducer needle was advanced into the submandibular collection and 4 mL of purulent material was aspirated and sent to lab for analysis. The patient tolerated the procedure well with no evidence of complication. COMPARISON: Recent CT scan of the neck FINDINGS: The localizing ultrasound demonstrates a heterogeneous submandibular fluid collection.     ULTRASOUND GUIDED ASPIRATION OF SUBMANDIBULAR ABSCESS..    Us-abdomen Complete Survey    Result Date: 6/26/2020 6/26/2020 6:27 AM HISTORY/REASON FOR EXAM:  Abnormal Labs Pain TECHNIQUE/EXAM DESCRIPTION AND NUMBER OF VIEWS:  Complete abdomen survey. COMPARISON: None FINDINGS: The liver is normal in contour. There is no evidence of solid mass lesion. The liver measures 14.57 cm. The gallbladder is normal. There is no evidence of cholelithiasis. The gallbladder wall thickness measures 2.50 mm. There is no pericholecystic fluid. The common duct measures 3.40 mm. The visualized pancreas is unremarkable. The visualized aorta is normal in caliber. Intrahepatic IVC is patent. The portal vein is patent with hepatopetal flow. The MPV measures 1.22 cm. The right kidney measures 11.19 cm. The left kidney measures 11.17 cm. There is no hydronephrosis. The spleen measures 10.41 cm maximally. The bladder is trabeculated, which could indicate chronic bladder outlet obstruction. There is no ascites.     Prominent trabeculated bladder wall, suggesting possible chronic bladder outlet obstruction. Unremarkable findings otherwise.     Us-extremity Artery Lower Bilat    Result Date: 6/22/2020  Lower Extremity  Arterial Duplex Report  Vascular Laboratory  CONCLUSIONS  Right.  Echolucent thrombus partially fills the common femoral artery causing a  high  grade stenosis. Velocities are consistent with > 75% stenosis.  Mild plaque visualized in the superficial femoral and popliteal arteries  without evidence of hemodynamically significant stenosis.  Monophasic waveforms demonstrated throughout the right lower extremity.  Left.  Mild plaque is seen in the common femoral, femoral, and popliteal arteries  with no elevated velocities to indicate hemodynamically significant  stenosis.  FADIA LANDIS  Exam Date:     2020 17:34  Room #:     Inpatient  Priority:     Stat  Ht (in):             Wt (lb):  Ordering Physician:        ANTHONY PIPER  Referring Physician:       ANTHONY PIPER  Sonographer:               Ewa Curtis RVT  Study Type:                Complete Bilateral  Technical Quality:         Adequate  Age:    65    Gender:     M  MRN:    [id number]  :    1955      BSA:  Indications:     Pain in leg, unspecified  CPT Codes:       31987  ICD Codes:       M79.606  History:         Pain in legs s/p TAVR. No prior duplex.  Limitations:                RIGHT  Waveform        Peak Systolic Velocity (cm/s)                  Prox    Prox-Mid  Mid    Mid-Dist  Distal  Monophasic                        307              143     CFA  Monophasic      55                                         PFA  Monophasic      53                51               34      SFA  Monophasic                        30                       POP  Monophasic      22                                 32      AT  Monophasic      19                                 11      PT  Monophasic      37                                 20      EVGENY                LEFT  Waveform        Peak Systolic Velocity (cm/s)                  Prox    Prox-Mid  Mid    Mid-Dist  Distal  Triphasic                         187                      CFA  Biphasic        78                                         PFA  Biphasic        103               142               106     SFA  Biphasic                          57                       POP  Biphasic        83                                 110     AT  Biphasic        76                                 24      PT  Biphasic        61                                 24      EVGENY  FINDINGS  Right.  Echolucent material consistent with thrombus partially fills the common  femoral artery causing a high grade stenosis. Stenosis of the common  femoral artery. Velocities are consistent with > 75% stenosis.  Mild plaque visualized in the femoral and popliteal arteries without  evidence of hemodynamic significance.  Monophasic waveforms demonstrated throughout the right lower extremity.  Left.  Mild plaque is seen in the common femoral, femoral, and popliteal arteries  with no elevated velocities to indicate hemodynamic significance.  Waveforms are biphasic throughout the left lower extremity.  Demetria Narvaez  (Electronically Signed)  Final Date:      2020                   18:45  Amended:         2020 19:58    Us-extremity Artery Lower Unilat Right    Result Date: 2020  Lower Extremity  Arterial Duplex Report  Vascular Laboratory  CONCLUSIONS  Pinching of the common femoral artery from perclose device closure.  Velcoity consistent with >75% stenosis. Intraluminal clot seen in prior  study is not readily seen.  FADIA LANDIS  Exam Date:     2020 07:49  Room #:     Inpatient  Priority:     Routine  Ht (in):             Wt (lb):  Ordering Physician:        MELVIN SIMONS  Referring Physician:       MELVIN SIMONS  Sonographer:               Blank Calles RVT  Study Type:                Limited Unilateral  Technical Quality:         Adequate  Age:    65    Gender:     M  MRN:    [id number]  :    1955      BSA:  Indications:     Unspecified atherosclerosis of native arteries of                    extremities, right leg  CPT Codes:       27574  ICD Codes:       I70.201  History:         Right common femoral steosis.  Limitations:                RIGHT  Waveform        Peak Systolic Velocity (cm/s)                  Prox    Prox-Mid  Mid    Mid-Dist  Distal  Monophasic      42                384              230     CFA  Biphasic        97                                         PFA  Biphasic        55                                         SFA                                                             POP                                                             AT                                                             PT                                                             EVGENY                LEFT  Waveform        Peak Systolic Velocity (cm/s)                  Prox    Prox-Mid  Mid    Mid-Dist  Distal                                                             CFA                                                             PFA                                                             SFA                                                             POP                                                             AT                                                             PT                                                             EVGENY  FINDINGS  Limited evaluation of the right common femoral artery known stenosis.  Right.  Stenosis of the mid common femoral artery. Velocities are consistent with >  75%stenosis.  Distal flow is turbulent and damped.  No evidence of hematoma or psuedoaneuysm seen at the right groin.  Luiz Dennis MD  (Electronically Signed)  Final Date:      26 June 2020                   08:49    Us-extremity Venous Lower Bilat    Result Date: 7/4/2020   Vascular Laboratory  CONCLUSIONS  Normal bilateral superficial and deep venous examination of the lower  extremities.  FADIA LANDIS  Exam Date:     07/04/2020 14:05  Room #:     Inpatient  Priority:      Routine  Ht (in):             Wt (lb):  Ordering Physician:        FLAQUITO BLACKMAN  Referring Physician:       [id number] Case  Sonographer:               Humberto Gonzalez RVT  Study Type:                Complete Bilateral  Technical Quality:         Adequate  Age:    65    Gender:     M  MRN:    [id number]  :    1955      BSA:  Indications:     Pain in leg, unspecified  CPT Codes:       17896  ICD Codes:       M79.606  History:         BLE pain, pressure, and edema.  Limitations:  PROCEDURES:  Venous duplex imaging was performed in both lower extremities including  serial compressions and spectral Doppler flow evaluation.  FINDINGS:  The veins of the lower extremities are readily compressible with normal  venous flow dynamics including spontaneous flow and respiratory phasic  variation.  No evidence of deep venous thrombosis.  Alfonso Mondragon MD  (Electronically Signed)  Final Date:      2020                   16:16    Ec-echocardiogram Complete W/o Cont    Result Date: 2020  Transthoracic Echo Report Echocardiography Laboratory CONCLUSIONS Known TAVR aortic valve that has a mildly increased gradients. No paravaluvar leak or signficant aortic insufficiency seen. No vegetation seen FADIA LANDIS Exam Date:         2020                    14:21 Exam Location:     Inpatient Priority:          Routine Ordering Physician:        BRANDEN STOCKTON Referring Physician:       [id number] Beckwith Sonographer:               Susana Carson RDCS Age:    65     Gender:    M MRN:    [id number] :    1955 BSA:    1.68   Ht (in):    66     Wt (lb):    133 Exam Type:     Complete Indications:     Endocarditis ICD Codes:       421 CPT Codes:       37953 BP:   114    /   74     HR:   91 Technical Quality:       Fair MEASUREMENTS  (Male / Female) Normal Values 2D ECHO LV Diastolic Diameter PLAX        4.9 cm                4.2 - 5.9 / 3.9 - 5.3 cm LV Systolic Diameter PLAX         3.3 cm                2.1 - 4.0 cm  IVS Diastolic Thickness           1.2 cm                LVPW Diastolic Thickness          1.4 cm                RV Diameter 4C                    3.5 cm                2.5 - 2.9 cm LVOT Diameter                     2.1 cm                RA Diameter                       4 cm                  Estimated LV Ejection Fraction    65 %                  LV Ejection Fraction MOD BP       56.1 %                >= 55  % LV Ejection Fraction MOD 4C       56.9 %                LV Ejection Fraction MOD 2C       51.3 %                LA Volume Index                   26 cm3/m2             16 - 28 cm3/m2 IVC Diameter                      0.98 cm               DOPPLER AV Peak Velocity                  2.9 m/s               AV Peak Gradient                  32.6 mmHg             AV Mean Gradient                  18.1 mmHg             LVOT Peak Velocity                1.1 m/s               AV Area Cont Eq vti               1.1 cm2               Mitral E Point Velocity           0.8 m/s               Mitral E to A Ratio               0.7                   MV Pressure Half Time             30.9 ms               MV Area PHT                       7.1 cm2               MV Deceleration Time              107 ms                TR Peak Velocity                  269 cm/s              PV Peak Velocity                  1.2 m/s               PV Peak Gradient                  5.5 mmHg              * Indicates values subject to auto-interpretation LV EF:  65    % FINDINGS Left Ventricle Normal left ventricular size and systolic function. Normal regional wall motion. Mild concentric left ventricular hypertrophy. A reliable estimation of diastolic function cannot be made due to conflicting data. Right Ventricle Normal right ventricular size and systolic function. Right Atrium Enlarged right atrium. Normal inferior vena cava size and inspiratory collapse. Left Atrium Normal left atrial size. Mitral Valve Structurally normal mitral valve without  significant stenosis. Trace mitral regurgitation. Aortic Valve Known TAVR aortic valve that has a mildly increased gradients. Vmax is 3.0  m/s. Transvalvular gradients are - Peak: 36 mmHg,  Mean: 20 mmHg.  No paravaluvar leak or signficant aortic insufficiency. Tricuspid Valve Structurally normal tricuspid valve. Mild tricuspid regurgitation. Right atrial pressure is estimated to be 3 mmHg. Estimated right ventricular systolic pressure  is 35 mmHg. Pulmonic Valve Structurally normal pulmonic valve without significant stenosis or regurgitation. Pericardium Normal pericardium without effusion. Aorta The aortic root is normal.  Ascending aorta diameter is  2.7 cm. Ascending aorta not well visualized. Zoya Milton M.D. (Electronically Signed) Final Date:     2020                 17:26    Ec-echocardiogram Complete W/o Cont    Result Date: 2020  Transthoracic Echo Report Echocardiography Laboratory CONCLUSIONS Prior echo done on 2020, there is now a TAVR valve present. Left ventricular ejection fraction is visually estimated to be 60%. Unable to estimate pulmonary artery pressure due to an inadequate tricuspid regurgitant jet. Known TAVR aortic valve. FADIA LANDIS Exam Date:         2020                    03:46 Exam Location:     Inpatient Priority:          Routine Ordering Physician:        ANTHONY PIPER Referring Physician:       [id number] Cintron Sonographer:               JOJO Ash Age:    65     Gender:    Mal                           e MRN:    [id number] :    1955 BSA:    1.64   Ht (in):    66     Wt (lb):    126 Exam Type:     Complete Indications:     Prosthetic Valve ICD Codes:       V43.3 CPT Codes:       04239 BP:   142    /   83     HR:   70 Technical Quality:       Fair MEASUREMENTS  (Male / Female) Normal Values 2D ECHO LV Diastolic Diameter PLAX        6 cm                  4.2 - 5.9 / 3.9 - 5.3 cm LV Systolic Diameter PLAX         3.7 cm                 2.1 - 4.0 cm IVS Diastolic Thickness           0.71 cm               LVPW Diastolic Thickness          0.7 cm                LVOT Diameter                     1.5 cm                Estimated LV Ejection Fraction    60 %                  LV Ejection Fraction MOD BP       39.5 %                >= 55  % LV Ejection Fraction MOD 4C       40.2 %                LV Ejection Fraction MOD 2C       33.9 %                IVC Diameter                      1.8 cm                DOPPLER AV Peak Velocity                  0.99 m/s              AV Peak Gradient                  3.9 mmHg              AV Mean Gradient                  2.7 mmHg              LVOT Peak Velocity                1.1 m/s               AV Area Cont Eq vti               1.9 cm2               Mitral E Point Velocity           0.63 m/s              Mitral E to A Ratio               1.5                   MV Pressure Half Time             75.3 ms               MV Area PHT                       2.9 cm2               MV Deceleration Time              260 ms                * Indicates values subject to auto-interpretation LV EF:  60    % FINDINGS Left Ventricle Left ventricle is mildly dilated. Normal left ventricular wall thickness. Normal left ventricular systolic function. Left ventricular ejection fraction is visually estimated to be 60%. Normal regional wall motion. Normal diastolic function. Right Ventricle Normal right ventricular size. Normal right ventricular systolic function. Right Atrium Normal right atrial size. Normal inferior vena cava size and inspiratory collapse. Left Atrium Normal left atrial size. Left atrial volume index is 26 mL/sq m. Mitral Valve Calcification of the mitral valve leaflets. No mitral stenosis. Trace mitral regurgitation. Aortic Valve Known TAVR aortic valve. Vmax is 1 m/s. Transvalvular gradients are - Peak: 3 mmHg, Mean: 2 mmHg. Tricuspid Valve Structurally normal tricuspid valve. No tricuspid stenosis. Trace tricuspid  regurgitation. Right atrial pressure is estimated to be 8 mmHg. Unable to estimate pulmonary artery pressure due to an inadequate tricuspid regurgitant jet. Pulmonic Valve The pulmonic valve is not well visualized. Pericardium No pericardial effusion seen. Aorta Normal aortic root for body surface area. Ascending aorta diameter is 2.4 cm. Brenden Hansen MD (Electronically Signed) Final Date:     2020                 06:41    Ec-echocardiogram Ltd W/o Cont    Result Date: 2020  Transthoracic Echo Report Echocardiography Laboratory CONCLUSIONS Intraoperative TTE during TAVR.  Baseline images show mildly reduced LV function with EF =55%.  Calcified aortic valve leaflets. Severe aortic stenosis.  Post deployment images show preserved biventricular function.  A 26 mm Jewell Jose Manuel Ultra stented bioprosthetic valve is seen in the aortic position with normal leaflet motion, trace significant paravalvular leak, mean gradent of 5 mm Hg and calculated effective orifice area of 2.5 cm2.  Findings communicated at the time of exam. MARLEN LANDISENCE Exam Date:         2020                    11:17 Exam Location:     Inpatient Priority:          Routine Ordering Physician:        CLEOPATRA JONES Referring Physician: Sonographer:               Antwan La RDCS Age:    65     Gender:    Mal                           e MRN:    [id number] :    1955 BSA:    1.64   Ht (in):    66     Wt (lb):    126 Exam Type:     Limited Indications:     Pre-Op Surgery ICD Codes:       V72.83 CPT Codes:       46503 BP:          /          HR: Technical Quality:       Fair MEASUREMENTS  (Male / Female) Normal Values 2D ECHO Estimated LV Ejection Fraction    55 %                  * Indicates values subject to auto-interpretation LV EF:  55    % FINDINGS Left Ventricle The left ventricle was normal in size and function. Right Ventricle The right ventricle was normal in size and function. Right Atrium The right atrium is  normal in size. Left Atrium The left atrium is normal in size. Mitral Valve Structurally normal mitral valve without significant stenosis or regurgitation. Aortic Valve Calcified aortic valve leaflets. Severe aortic stenosis. Tricuspid Valve Structurally normal tricuspid valve without significant stenosis or regurgitation. Pulmonic Valve Structurally normal pulmonic valve without significant stenosis or regurgitation. Pericardium Normal pericardium without effusion. Aorta The aortic root is normal. Simon X Issa (Electronically Signed) Final Date:     23 June 2020                 16:26 Amended:        23 June 2020 16:29      Micro:  Results     Procedure Component Value Units Date/Time    CULTURE WOUND W/ GRAM STAIN [[id number]]  (Abnormal) Collected:  07/03/20 1736    Order Status:  Completed Specimen:  Wound Updated:  07/06/20 0823     Significant Indicator POS     Source WND     Site Submental Abscess     Culture Result Growth noted after further incubation, see below for  organism identification.       Gram Stain Result Moderate WBCs.  Few Gram positive cocci.       Culture Result Viridans Streptococcus  Rare growth      Narrative:       Surgery - swabs received    Anaerobic Culture [[id number]] Collected:  07/03/20 1736    Order Status:  Completed Specimen:  Wound Updated:  07/06/20 0823     Significant Indicator NEG     Source WND     Site Submental Abscess     Culture Result Culture in progress.    Narrative:       Surgery - swabs received    GRAM STAIN [[id number]] Collected:  07/03/20 1736    Order Status:  Completed Specimen:  Wound Updated:  07/04/20 1127     Significant Indicator .     Source WND     Site Submental Abscess     Gram Stain Result Moderate WBCs.  Few Gram positive cocci.      Narrative:       Surgery - swabs received    SARS-CoV-2, PCR (In-House) [[id number]] Collected:  07/02/20 2301    Order Status:  Completed Updated:  07/03/20 0013     SARS-CoV-2 Source NP Swab     SARS-CoV-2 by PCR  NotDetected     Comment: [facility] providers: PLEASE REFER TO DE-ESCALATION AND RETESTING PROTOCOL  on [email or web address]  **The Cepheid GeneXpert Xpress SARS-CoV-2 Test has been made available for  use under the Emergency Use Authorization (EUA) only.         Narrative:       [id number] LOMBARDO FLASH  Expected Turn around time?->Rush (Cepheid 2-4 hours)  Abbott test failed twice; using previously rejected Cepheid swab.    COVID/SARS CoV-2 PCR [[id number]] Collected:  07/02/20 2301    Order Status:  Completed Specimen:  Respirate from Nasopharyngeal Updated:  07/02/20 2318     COVID Order Status Received    Narrative:       [id number] LOMBARDO FLASH  Expected Turn around time?->Rush (Cepheid 2-4 hours)  Abbott test failed twice; using previously rejected Cepheid swab.    SARS-CoV-2, PCR (In-House) [[id number]] Collected:  07/02/20 1934    Order Status:  Completed Updated:  07/02/20 2047     SARS-CoV-2 Source NP Swab    Narrative:       [id number] LOMBARDO FALSH  Expected Turn around time?->Routine (In-House PCR up to 24  hours)  [id number] LOMBARDO FLASH  Expected Turn around time?->Rush (Cepheid 2-4 hours)  Called RN to provide an Abbott swab.    COVID/SARS CoV-2 PCR [[id number]] Collected:  07/02/20 1934    Order Status:  Completed Specimen:  Respirate from Nasopharyngeal Updated:  07/02/20 2046     COVID Order Status Received    Narrative:       [id number] LOMBARDO FLASH  Expected Turn around time?->Routine (In-House PCR up to 24  hours)  [id number] LOMBARDO FLASH  Expected Turn around time?->Rush (Cepheid 2-4 hours)  Called RN to provide an Abbott swab.    COVID/SARS CoV-2 PCR [[id number]] Collected:  07/02/20 1934    Order Status:  Canceled Specimen:  Respirate from Nasopharyngeal     COVID/SARS CoV-2 PCR [[id number]] Collected:  07/02/20 1934    Order Status:  Canceled Specimen:  Respirate from Nasopharyngeal     Aerobic/Anaerobic Culture (Surgery) [[id number]] Collected:  06/29/20  1317    Order Status:  Completed Specimen:  Wound from Other Body Fluid Updated:  07/02/20 1138     Significant Indicator NEG     Source WND     Site submandibular     Culture Result Order placed in Surgery. Source appropriate culture orders  have been placed in Micro for testing.      Narrative:       [id number] PAWAN BRICE F.  Large lump under chin  Please send for cytology/pathology  [id number] PAWAN BRICE F.  2mls pink, frothy fluid aspirated from submandibular fluid  collection, MD Jenkins  [id number] PAWAN BRICE F.    Anaerobic Culture [[id number]] Collected:  06/29/20 1317    Order Status:  Completed Specimen:  Wound Updated:  07/02/20 1138     Significant Indicator NEG     Source WND     Site submandibular     Culture Result No Anaerobes isolated.    Narrative:       [id number] PAWAN BRICE F.  Large lump under chin  Please send for cytology/pathology  [id number] PAWAN BRICE F.  2mls pink, frothy fluid aspirated from submandibular fluid  collection, MD Jenkins  [id number] PAWAN BRICE F.    CULTURE WOUND W/ GRAM STAIN [[id number]]  (Abnormal) Collected:  06/29/20 1317    Order Status:  Completed Specimen:  Wound Updated:  07/02/20 1138     Significant Indicator POS     Source WND     Site submandibular     Culture Result -     Gram Stain Result Many WBCs.  Many Gram positive cocci.  Few Gram negative rods.       Culture Result Viridans Streptococcus  Heavy growth      Narrative:       [id number] PAWAN BRICE F.  Large lump under chin  Please send for cytology/pathology  [id number] PAWAN BRICE F.  2mls pink, frothy fluid aspirated from submandibular fluid  collection, MD Jenkins  [id number] PAWAN BRICE F.    BLOOD CULTURE [[id number]] Collected:  06/26/20 2159    Order Status:  Completed Specimen:  Blood from Peripheral Updated:  07/02/20 0100     Significant Indicator NEG     Source BLD     Site PERIPHERAL     Culture  "Result No growth after 5 days of incubation.    Narrative:       Protective  Per Hospital Policy: Only change Specimen Src: to \"Line\" if  specified by physician order.  Right Forearm/Arm    BLOOD CULTURE [[id number]] Collected:  06/26/20 2146    Order Status:  Completed Specimen:  Blood from Peripheral Updated:  07/02/20 0100     Significant Indicator NEG     Source BLD     Site PERIPHERAL     Culture Result No growth after 5 days of incubation.    Narrative:       Protective  Per Hospital Policy: Only change Specimen Src: to \"Line\" if  specified by physician order.  Left Forearm/Arm    GRAM STAIN [[id number]] Collected:  06/29/20 1317    Order Status:  Completed Specimen:  Wound Updated:  06/29/20 1622     Significant Indicator .     Source WND     Site submandibular     Gram Stain Result Many WBCs.  Many Gram positive cocci.  Few Gram negative rods.      Narrative:       [id number] PAWAN BRICE F.  Large lump under chin  Please send for cytology/pathology  [id number] PAWAN LAURA F.  2mls pink, frothy fluid aspirated from submandibular fluid  collection, MD Jenkins  [id number] PAWAN BRICE F.    FLUID CULTURE W/GRAM STAIN [[id number]] Collected:  06/29/20 1317    Order Status:  Canceled Specimen:  Other Body Fluid     FLUID CULTURE W/GRAM STAIN [[id number]] Collected:  06/29/20 1317    Order Status:  Sent Specimen:  Other Body Fluid           Assessment:  Active Hospital Problems    Diagnosis   • *Fever [R50.9]   • Mandibular abscess [M27.2]   • Pancytopenia (HCC) [D61.818]   • S/P TAVR (transcatheter aortic valve replacement) [Z95.2]   • Essential hypertension [I10]   • Mixed hyperlipidemia [E78.2]   • Panlobular emphysema (HCC) [J43.1]   • Tobacco dependence [F17.200]   • HIT (heparin-induced thrombocytopenia) ([facility]) [D75.82]   • Hypokalemia [E87.6]     Interval 24 hours:      AF, O2 RA  Labs reviewed  Imaging personally reviewed both images and report.   Studies reviewed  Micro " reviewed    Pt with submandibular pain and ongoing drainage.  Awaiting pathology results from [facility].  Continued on unasyn.      ASSESSMENT/PLAN:      65 y.o. man admitted 6/23/2020. Pt has a past medical history of TAVR on 6/22/2020 and pancytopenia.  He has had a decreased WBC since 2016 with neutropenia.  He also has some mild anemia and thrombocytopenia that is also chronic but worsened recently.  He presented the ER complaining of tachycardia the day after he been sent home after his TAVR surgery.      Hospital Course:   Patient was admitted and was initially febrile and then again spiked a fever to 103.6 on 6/26.  Initial work-up for pancytopenia was started and plan was to complete this with hem-onc as an outpatient.  Blood cultures were obtained 6/26.  Ultrasound abdomen on 6/25 with trabeculated bladder wall suggesting possible chronic bladder outlet obstruction but otherwise unremarkable.  Ultrasound lower extremity with noted stenosis of the right common femoral artery report is known - no evidence of hematoma or pseudoaneurysm in right groin.  Chest x-rays have been unremarkable. He underwent aspiration of submental abscess on 6/29/2020.  Cultures are growing viridans Streptococcus.  Bone marrow biopsy does not show any evidence of leukemia however additional testing is being sent to [facility]. S/p OR with Dr. Morrison on 7/3.      Problem List  Neutropenic fever, overall improving, no fever in over 24 hours  Bone marrow biopsy did not reveal any leukemia, however additional testing is being sent to [facility]  Neutropenia - ongoing,  today   -Work-up initiated by hospitalist with SPEP, smooth muscle antibodies, SEEMA, rheumatoid factor, B12 and folate levels as well as a fibrinogen.   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  However this may be postponed given mandibular surgery     Pancytopenia, ongoing   HIT  Evaluated by oncology  Status post bone marrow biopsy on 6/29/2020.  Pathology report- no evidence of leukemia.  Additional testing being sent to [facility] and pending-Per note from oncology there is some concern for LGL   CMV PCR- not detected  Parvovirus PCR- not detected  Per oncology note from 7/5 labs are consistent with HIT anticoagulation with artagroban started on 7/4/2020, oncology first 3 months of therapy with anticoagulation      Lump below chin, tender, prior infection   Fracture of mid mandibular body  Submandibular abscess  Possible infected hematoma-2.9 cm collection subjacent to fracture site noted on CT scan  -Reports oral surgery at the end of April and he was on antibiotics (augmentin) due to infection for approximately 1 month.  There had resolved but now with new swelling  Status post IR aspiration on 6/29/2020.  Gram stain+ GPC, GNR.  Culture- viridans Streptococcus  On antibiotics above  Concerned about infection causing fracture of the mid mandibular body, possible osteomyelitis.  Patient underwent surgery with Dr. Morrison on 7/3/2020.  Operative report not yet available  OR cultures from 7/3-  +viridans Streptococcus     --- Duration of antibiotics will depend on operative findings however anticipate a 6-week course due to concerns for osteomyelitis given mandibular fracture. Will likely be able to utilize orals - augmentin 875/125      Plan of care discussed with Dr. Urena. Will continue to follow.

## 2020-07-06 NOTE — CARE PLAN
Problem: Communication  Goal: The ability to communicate needs accurately and effectively will improve  Outcome: PROGRESSING AS EXPECTED   Note: Patient updated to call for assistance with needs. Patient has fall precautions in place, call light within reach. Will continue to monitor.     Problem: Knowledge Deficit  Goal: Knowledge of disease process/condition, treatment plan, diagnostic tests, and medications will improve  Outcome: PROGRESSING AS EXPECTED   Note: Patient updated on plan of care. Patient verbalized no questions. Patient to be updated as needed and reinforced with education as needed.

## 2020-07-06 NOTE — PROGRESS NOTES
[facility] Medicine Daily Progress Note    Date of Service  7/6/2020    Chief Complaint  65 y.o. male admitted 6/23/2020 with tachycardia.  He had a TAVR on 6/22/2020, and was discharged home on 6/23/2020.  He returned to the ER due to persistent tachycardia.     He has a history of pancytopenia which is worsening, and has been hesitant to do a bone marrow biopsy.  He has tobacco dependence, essential hypertension, hyperlipidemia.       Hospital Course    Home Eliquis and aspirin were discontinued.  Heart rate stabilized. He continued to have decline in his hemoglobin and platelets. WBC stabilized. Briefly discussed with hem/onc.  Abdominal ultrasound from 6/25/2020 showed no acute abnormalities.  SPEP, smooth muscle antibodies, SEEMA, rheumatoid factor, B12 and folate levels, fibrinogen were ordered.  Patient wished to go home and agreed follow-up with hematology for bone marrow biopsy and further work-up.  He was discharged to go home on 6/26/2020, but developed a fever of 103.6.     Infectious disease were consulted on 6/27/2020 due to fever and neutropenia, Cefepime was empirically started.     B12, folate, and fibrinogen levels were normal.  Rheumatoid factor was 40, ESR was 45, LFTs were unremarkable, SEEMA was undetectable, HIV was nonreactive.      Interval Problem Update    tmax 99    Low 8.0  Low plt 57    Wbc 2.3    Sodium 131    Chin pain, intensity 3/10,  no radiation, only when he opens mouth    S/p orif mandible on 7/3    Hit positive-- BULL sent off.. on iv agrataban      echoCardiogram shows no vegetations on valves    Subspecialty Mds requesting JOON- dr archibald aware- will do JOON on Monday--> cancelled due to recent jaw surgery.     Consultants/Specialty  ID  oncology    Code Status  full    Disposition  home    Review of Systems  Review of Systems   Constitutional: Positive for fever. Negative for chills, malaise/fatigue and weight loss.   HENT: Negative for ear discharge, hearing loss and tinnitus.     Eyes: Negative for blurred vision, double vision and photophobia.   Respiratory: Negative for cough and sputum production.    Cardiovascular: Negative for palpitations, orthopnea and claudication.   Gastrointestinal: Negative for abdominal pain, heartburn, nausea and vomiting.   Genitourinary: Negative for frequency and urgency.   Musculoskeletal: Positive for joint pain and myalgias. Negative for back pain and neck pain.   Neurological: Negative for dizziness, tremors, focal weakness and headaches.   Endo/Heme/Allergies: Does not bruise/bleed easily.   Psychiatric/Behavioral: Negative for depression and substance abuse.        Physical Exam  Temp:  [36.4 °C (97.6 °F)-37.7 °C (99.9 °F)] 37.7 °C (99.8 °F)  Pulse:  [61-67] 67  Resp:  [17-20] 18  BP: (113-155)/(53-74) 135/74  SpO2:  [90 %-96 %] 90 %    Physical Exam  Constitutional:       Appearance: Normal appearance. He is not diaphoretic.   HENT:      Head:      Comments: Submandibular swelling/induration and tenderness    Jaw bra in place     Mouth/Throat:      Mouth: Mucous membranes are moist.   Eyes:      General: No scleral icterus.     Extraocular Movements: Extraocular movements intact.      Pupils: Pupils are equal, round, and reactive to light.   Neck:      Musculoskeletal: Normal range of motion and neck supple.   Cardiovascular:      Rate and Rhythm: Normal rate.      Heart sounds: No murmur. No gallop.    Pulmonary:      Effort: Pulmonary effort is normal. No respiratory distress.      Breath sounds: No stridor. No wheezing.   Chest:      Chest wall: No tenderness.   Abdominal:      General: Abdomen is flat. There is no distension.      Tenderness: There is no guarding.      Hernia: No hernia is present.   Musculoskeletal: Normal range of motion.         General: No swelling, tenderness, deformity or signs of injury.      Right lower leg: No edema.   Skin:     General: Skin is warm.      Capillary Refill: Capillary refill takes 2 to 3 seconds.       Coloration: Skin is not jaundiced or pale.      Findings: No bruising or rash.   Neurological:      General: No focal deficit present.      Mental Status: He is alert and oriented to person, place, and time.      Cranial Nerves: No cranial nerve deficit.      Deep Tendon Reflexes: Reflexes normal.   Psychiatric:         Mood and Affect: Mood normal.         Fluids    Intake/Output Summary (Last 24 hours) at 7/6/2020 1132  Last data filed at 7/5/2020 1730  Gross per 24 hour   Intake --   Output 300 ml   Net -300 ml       Laboratory  Recent Labs     07/04/20  0330 07/05/20  0419 07/06/20  0240   WBC 2.2* 2.1* 2.3*   RBC 2.93* 2.95* 2.79*   HEMOGLOBIN 8.4* 8.4* 8.0*   HEMATOCRIT 26.0* 26.9* 24.3*   MCV 88.7 91.2 87.1   MCH 28.7 28.5 28.7   MCHC 32.3* 31.2* 32.9*   RDW 48.1 50.1* 47.5   PLATELETCT 58* 67* 57*   MPV 11.4 12.6 10.8     Recent Labs     07/04/20  0330 07/05/20  0419 07/06/20  0240   SODIUM 128* 129* 131*   POTASSIUM 4.4 3.8 3.7   CHLORIDE 97 97 98   CO2 23 21 22   GLUCOSE 112* 107* 112*   BUN 16 16 14   CREATININE 0.73 0.79 0.66   CALCIUM 8.1* 8.1* 8.1*     Recent Labs     07/05/20  0848 07/05/20  1132 07/06/20  0240   APTT 81.9* 82.1* 81.9*               Imaging  US-EXTREMITY VENOUS LOWER BILAT   Final Result      EC-ECHOCARDIOGRAM COMPLETE W/O CONT   Final Result      IR-CYST ASPIRATION-MISC   Final Result         ULTRASOUND GUIDED ASPIRATION OF SUBMANDIBULAR ABSCESS..      CT-SOFT TISSUE NECK WITH   Final Result         1. Acute versus subacute fracture of the mid mandibular body.   2. A 2.9 cm collection subjacent to the fracture site, likely hematoma. Infected hematoma can be considered in the appropriate clinical settings.   3. No cervical lymphadenopathy.   4. Moderate mucosal thickening of the left maxillary sinus, likely odontogenic in nature, related to impacted remaining maxillary tooth.      CT-CHEST,ABDOMEN,PELVIS WITH   Final Result      Hypodensities in the spleen worrisome for splenic  infarcts.      Trace nonspecific free fluid in the pelvis.      2.2 x 1.7 cm left common femoral artery pseudoaneurysm.      Bowel containing right inguinal hernia without evidence of obstruction.      Atherosclerotic plaque within an ectatic aorta.      Gallbladder wall thickening/pericholecystic fluid. Further evaluation can be performed with right upper quadrant ultrasound.      Small hypodense left renal lesions are too small to characterize but likely represent small cysts.      Emphysematous changes.      Irregular nodular opacity along the minor fissure measuring 8 mm.      4.7 mm right middle lobe pulmonary nodule.      Mild bibasilar and lingular atelectasis.      Emphysematous changes.      Sequelae of prior granulomatous exposure.      Right hilar lymph node is borderline enlarged and nonspecific, possibly reactive..      Enlarged periportal lymph nodes are nonspecific.      Fleischner Society pulmonary nodule recommendations:   Low Risk: CT at 3-6 months, then consider CT at 18-24 months      High Risk: CT at 3-6 months, then at 18-24 months      Comments: Use most suspicious nodule as guide to management. Follow-up intervals may vary according to size and risk.      Low Risk - Minimal or absent history of smoking and of other known risk factors.      High Risk - History of smoking or of other known risk factors.      Note: These recommendations do not apply to lung cancer screening, patients with immunosuppression, or patients with known primary cancer.      Fleischner Society 2017 Guidelines for Management of Incidentally Detected Pulmonary Nodules in Adults      DX-CHEST-2 VIEWS   Final Result      No acute cardiopulmonary abnormality.      US-EXTREMITY ARTERY LOWER UNILAT RIGHT   Final Result      US-ABDOMEN COMPLETE SURVEY   Final Result      Prominent trabeculated bladder wall, suggesting possible chronic bladder outlet obstruction. Unremarkable findings otherwise.         DX-CHEST-PORTABLE (1  VIEW)   Final Result      No acute cardiac or pulmonary abnormalities are identified.           Assessment/Plan  * Fever- (present on admission)  Assessment & Plan  abx as per iD    Follow cbc    Follow all cultures        Mandibular abscess- (present on admission)  Assessment & Plan  Growing strep viridans    Oral surgery dr vasquez s/p ORIF    F/u op report    Pancytopenia (HCC)- (present on admission)  Assessment & Plan  Present for a few months, unclear etiology    abx as per id    Hematology/oncology were consulted 6/28/2020  Bone marrow biopsy done   Check HIT antibody    Transfuse plt on 7/3- to prepare for surgery on mandible    S/P TAVR (transcatheter aortic valve replacement)- (present on admission)  Assessment & Plan  S/P TAVR on 6/22/2020   . Continue statin  Per cardiology start B12 and folate supplements  Echocardiogram shows no vegetations  Cardiology is following    Mixed hyperlipidemia- (present on admission)  Assessment & Plan  Continue home Lovastatin    Essential hypertension- (present on admission)  Assessment & Plan    Continue Norvasc    HIT (heparin-induced thrombocytopenia) (HCC)  Assessment & Plan  huseyin pending    Started on agratraban on  7/4    Hypokalemia  Assessment & Plan  Resolved today    Panlobular emphysema (HCC)- (present on admission)  Assessment & Plan  History of tobacco use  Chest x-ray from admission shows no acute cardiopulmonary process    Tobacco dependence- (present on admission)  Assessment & Plan  Patient has been counseled on tobacco cessation  Nicotine replacement options were provided       VTE prophylaxis: scd      Check am cbc, bmp

## 2020-07-06 NOTE — PROGRESS NOTES
Oncology/Hematology Progress Note               Author: Denise Cabello M.D. Date & Time created: 7/6/2020  1:00 PM     CC: Pancytopenia    Interval History:  Pt reports he feels fine today. Pain is improved. He is tired of being in the hospital.     Review of Systems:  Review of Systems   Constitutional: Positive for fever and malaise/fatigue. Negative for chills.   HENT: Negative for hearing loss and tinnitus.    Eyes: Negative for blurred vision and double vision.   Respiratory: Negative for cough.    Cardiovascular: Negative for chest pain.   Gastrointestinal: Negative for heartburn and nausea.   Genitourinary: Negative for dysuria.   Skin: Negative for rash.   Neurological: Negative for dizziness and headaches.   Endo/Heme/Allergies: Does not bruise/bleed easily.   Psychiatric/Behavioral: The patient is nervous/anxious.        Physical Exam:  Physical Exam  Constitutional:       General: He is not in acute distress.     Appearance: Normal appearance.   HENT:      Head: Normocephalic and atraumatic.      Right Ear: External ear normal.      Left Ear: External ear normal.      Nose: Nose normal.      Mouth/Throat:      Mouth: Mucous membranes are moist.      Pharynx: Oropharynx is clear.   Eyes:      General: No scleral icterus.     Conjunctiva/sclera: Conjunctivae normal.   Neck:      Musculoskeletal: Neck supple. Muscular tenderness present.   Cardiovascular:      Rate and Rhythm: Normal rate and regular rhythm.   Pulmonary:      Effort: Pulmonary effort is normal. No respiratory distress.      Breath sounds: Normal breath sounds.   Abdominal:      General: Abdomen is flat. Bowel sounds are normal. There is no distension.      Palpations: Abdomen is soft.   Musculoskeletal:         General: No swelling or tenderness.   Skin:     Coloration: Skin is not jaundiced.      Findings: No bruising or erythema.   Neurological:      General: No focal deficit present.      Mental Status: He is alert and oriented to  person, place, and time.   Psychiatric:         Mood and Affect: Mood normal.         Behavior: Behavior normal.         Thought Content: Thought content normal.         Judgment: Judgment normal.         Labs:          Recent Labs     20  0240   SODIUM 128* 129* 131*   POTASSIUM 4.4 3.8 3.7   CHLORIDE 97 97 98   CO2 23 21 22   BUN 16 16 14   CREATININE 0.73 0.79 0.66   CALCIUM 8.1* 8.1* 8.1*     Recent Labs     20  024   GLUCOSE 112* 107* 112*     Recent Labs     20  0848 20  1132 20  0240   RBC 2.93*  --  2.95*  --   --  2.79*   HEMOGLOBIN 8.4*  --  8.4*  --   --  8.0*   HEMATOCRIT 26.0*  --  26.9*  --   --  24.3*   PLATELETCT 58*  --  67*  --   --  57*   APTT  --    < >  --  81.9* 82.1* 81.9*    < > = values in this interval not displayed.     Recent Labs     20  0240   WBC 2.2* 2.1* 2.3*   NEUTSPOLYS 25.20* 26.30* 21.70*   LYMPHOCYTES 41.70* 49.10* 53.00*   MONOCYTES 27.80* 19.30* 20.90*   EOSINOPHILS 3.50 1.80 1.70   BASOPHILS 0.90 0.00 1.70     Recent Labs     20  0240   SODIUM 128* 129* 131*   POTASSIUM 4.4 3.8 3.7   CHLORIDE 97 97 98   CO2 23 21 22   GLUCOSE 112* 107* 112*   BUN 16 16 14   CREATININE 0.73 0.79 0.66   CALCIUM 8.1* 8.1* 8.1*     Hemodynamics:  Temp (24hrs), Av.3 °C (99.1 °F), Min:36.7 °C (98.1 °F), Max:37.7 °C (99.9 °F)  Temperature: 37.3 °C (99.1 °F)  Pulse  Av.2  Min: 52  Max: 110   Blood Pressure : 143/79     Respiratory:    Respiration: 18, Pulse Oximetry: 90 %           Fluids:    Intake/Output Summary (Last 24 hours) at 2020 1136  Last data filed at 2020 0900  Gross per 24 hour   Intake 240 ml   Output --   Net 240 ml     Weight: 61.2 kg (134 lb 14.7 oz)  GI/Nutrition:  Orders Placed This Encounter   Procedures   • Diet Order Full Liquid     Standing Status:   Standing     Number  of Occurrences:   1     Order Specific Question:   Diet:     Answer:   Full Liquid [11]     Medical Decision Making, by Problem:  Active Hospital Problems    Diagnosis   • *Fever [R50.9]   • Pancytopenia (HCC) [D61.818]   • S/P TAVR (transcatheter aortic valve replacement) [Z95.2]   • Essential hypertension [I10]   • Mixed hyperlipidemia [E78.2]   • Panlobular emphysema (HCC) [J43.1]   • Tobacco dependence [F17.200]       Assessment and Plan:    1. Thrombocytopenia     --- Platelets stable today at 57,000  --- HIT IgG antibody: Optical density: 2.9 positive, BULL is pending  --- anticoagulation argatroban started 7/4/2020 and continues  --- Will await BULL before deciding on further anti-coagulation    2.  Neutropenia    --- Wait on the final bone marrow report from [place].  There is some concern for LGL.  -- Will touch base with pathology today to see when results might be available    3.  Anemia    --- His hemoglobin has been stable around 8, monitor, awaiting BM biopsy results    4.  Infectious disease    --- Patient had I&D of this abscess under his chin on 7/4/2020.  --- ID is following  ----COVID test negative on 7/2/2020  ----ID can decide if he needs coverage for gram-negative as well since he is neutropenic.    High complexity/complex decision making  This patient was seen under COVID 19 pandemic disaster response conditions.  During a disaster, the provisions of care is subject to the Crisis Standard of Care    yQuality-Core Measures   Reviewed items::  Labs reviewed, Radiology images reviewed and Medications reviewed

## 2020-07-07 ENCOUNTER — TELEPHONE (OUTPATIENT)
Dept: CARDIOLOGY | Facility: MEDICAL CENTER | Age: 65
End: 2020-07-07

## 2020-07-07 ENCOUNTER — APPOINTMENT (OUTPATIENT)
Dept: RADIOLOGY | Facility: MEDICAL CENTER | Age: 65
DRG: 982 | End: 2020-07-07
Attending: HOSPITALIST
Payer: COMMERCIAL

## 2020-07-07 PROBLEM — S92.911A TOE FRACTURE, RIGHT: Status: ACTIVE | Noted: 2020-07-07

## 2020-07-07 LAB
ANION GAP SERPL CALC-SCNC: 11 MMOL/L (ref 7–16)
ANISOCYTOSIS BLD QL SMEAR: ABNORMAL
APTT PPP: 76.2 SEC (ref 24.7–36)
BACTERIA SPEC ANAEROBE CULT: NORMAL
BASOPHILS # BLD AUTO: 1.7 % (ref 0–1.8)
BASOPHILS # BLD: 0.04 K/UL (ref 0–0.12)
BUN SERPL-MCNC: 10 MG/DL (ref 8–22)
CALCIUM SERPL-MCNC: 8 MG/DL (ref 8.5–10.5)
CHLORIDE SERPL-SCNC: 98 MMOL/L (ref 96–112)
CO2 SERPL-SCNC: 23 MMOL/L (ref 20–33)
CREAT SERPL-MCNC: 0.68 MG/DL (ref 0.5–1.4)
EOSINOPHIL # BLD AUTO: 0.04 K/UL (ref 0–0.51)
EOSINOPHIL NFR BLD: 1.7 % (ref 0–6.9)
ERYTHROCYTE [DISTWIDTH] IN BLOOD BY AUTOMATED COUNT: 46.2 FL (ref 35.9–50)
GLUCOSE SERPL-MCNC: 107 MG/DL (ref 65–99)
HCT VFR BLD AUTO: 25.3 % (ref 42–52)
HGB BLD-MCNC: 8.3 G/DL (ref 14–18)
LYMPHOCYTES # BLD AUTO: 1.24 K/UL (ref 1–4.8)
LYMPHOCYTES NFR BLD: 59.1 % (ref 22–41)
MANUAL DIFF BLD: NORMAL
MCH RBC QN AUTO: 28.6 PG (ref 27–33)
MCHC RBC AUTO-ENTMCNC: 32.8 G/DL (ref 33.7–35.3)
MCV RBC AUTO: 87.2 FL (ref 81.4–97.8)
MICROCYTES BLD QL SMEAR: ABNORMAL
MONOCYTES # BLD AUTO: 0.35 K/UL (ref 0–0.85)
MONOCYTES NFR BLD AUTO: 16.5 % (ref 0–13.4)
MORPHOLOGY BLD-IMP: NORMAL
NEUTROPHILS # BLD AUTO: 0.44 K/UL (ref 1.82–7.42)
NEUTROPHILS NFR BLD: 20.9 % (ref 44–72)
NRBC # BLD AUTO: 0 K/UL
NRBC BLD-RTO: 0 /100 WBC
OVALOCYTES BLD QL SMEAR: NORMAL
PLATELET # BLD AUTO: 65 K/UL (ref 164–446)
PLATELET BLD QL SMEAR: NORMAL
PMV BLD AUTO: 12.7 FL (ref 9–12.9)
POIKILOCYTOSIS BLD QL SMEAR: NORMAL
POLYCHROMASIA BLD QL SMEAR: NORMAL
POTASSIUM SERPL-SCNC: 3.7 MMOL/L (ref 3.6–5.5)
RBC # BLD AUTO: 2.9 M/UL (ref 4.7–6.1)
RBC BLD AUTO: PRESENT
SIGNIFICANT IND 70042: NORMAL
SITE SITE: NORMAL
SODIUM SERPL-SCNC: 132 MMOL/L (ref 135–145)
SOURCE SOURCE: NORMAL
WBC # BLD AUTO: 2.1 K/UL (ref 4.8–10.8)

## 2020-07-07 PROCEDURE — 700111 HCHG RX REV CODE 636 W/ 250 OVERRIDE (IP): Mod: JG | Performed by: HOSPITALIST

## 2020-07-07 PROCEDURE — 770020 HCHG ROOM/CARE - TELE (206)

## 2020-07-07 PROCEDURE — 700102 HCHG RX REV CODE 250 W/ 637 OVERRIDE(OP): Performed by: HOSPITALIST

## 2020-07-07 PROCEDURE — A9270 NON-COVERED ITEM OR SERVICE: HCPCS | Performed by: INTERNAL MEDICINE

## 2020-07-07 PROCEDURE — A9270 NON-COVERED ITEM OR SERVICE: HCPCS | Performed by: HOSPITALIST

## 2020-07-07 PROCEDURE — 700102 HCHG RX REV CODE 250 W/ 637 OVERRIDE(OP): Performed by: INTERNAL MEDICINE

## 2020-07-07 PROCEDURE — 99233 SBSQ HOSP IP/OBS HIGH 50: CPT | Performed by: INTERNAL MEDICINE

## 2020-07-07 PROCEDURE — 700111 HCHG RX REV CODE 636 W/ 250 OVERRIDE (IP): Performed by: INTERNAL MEDICINE

## 2020-07-07 PROCEDURE — 700105 HCHG RX REV CODE 258: Performed by: INTERNAL MEDICINE

## 2020-07-07 PROCEDURE — 36415 COLL VENOUS BLD VENIPUNCTURE: CPT

## 2020-07-07 PROCEDURE — 85027 COMPLETE CBC AUTOMATED: CPT

## 2020-07-07 PROCEDURE — 80048 BASIC METABOLIC PNL TOTAL CA: CPT

## 2020-07-07 PROCEDURE — 73620 X-RAY EXAM OF FOOT: CPT | Mod: RT

## 2020-07-07 PROCEDURE — 85730 THROMBOPLASTIN TIME PARTIAL: CPT

## 2020-07-07 PROCEDURE — 99232 SBSQ HOSP IP/OBS MODERATE 35: CPT | Performed by: HOSPITALIST

## 2020-07-07 PROCEDURE — 85007 BL SMEAR W/DIFF WBC COUNT: CPT

## 2020-07-07 RX ORDER — LISINOPRIL 10 MG/1
10 TABLET ORAL
Status: DISCONTINUED | OUTPATIENT
Start: 2020-07-07 | End: 2020-07-15 | Stop reason: HOSPADM

## 2020-07-07 RX ADMIN — ACETAMINOPHEN 650 MG: 325 TABLET, FILM COATED ORAL at 23:43

## 2020-07-07 RX ADMIN — LOVASTATIN 20 MG: 20 TABLET ORAL at 21:50

## 2020-07-07 RX ADMIN — AMPICILLIN SODIUM AND SULBACTAM SODIUM 3 G: 2; 1 INJECTION, POWDER, FOR SOLUTION INTRAMUSCULAR; INTRAVENOUS at 17:17

## 2020-07-07 RX ADMIN — AMPICILLIN SODIUM AND SULBACTAM SODIUM 3 G: 2; 1 INJECTION, POWDER, FOR SOLUTION INTRAMUSCULAR; INTRAVENOUS at 23:43

## 2020-07-07 RX ADMIN — AMPICILLIN SODIUM AND SULBACTAM SODIUM 3 G: 2; 1 INJECTION, POWDER, FOR SOLUTION INTRAMUSCULAR; INTRAVENOUS at 05:08

## 2020-07-07 RX ADMIN — ACETAMINOPHEN 650 MG: 325 TABLET, FILM COATED ORAL at 13:07

## 2020-07-07 RX ADMIN — ACETAMINOPHEN 650 MG: 325 TABLET, FILM COATED ORAL at 05:16

## 2020-07-07 RX ADMIN — AMPICILLIN SODIUM AND SULBACTAM SODIUM 3 G: 2; 1 INJECTION, POWDER, FOR SOLUTION INTRAMUSCULAR; INTRAVENOUS at 10:23

## 2020-07-07 RX ADMIN — LISINOPRIL 10 MG: 10 TABLET ORAL at 10:20

## 2020-07-07 RX ADMIN — ATENOLOL 25 MG: 25 TABLET ORAL at 05:08

## 2020-07-07 RX ADMIN — ARGATROBAN 0.89 MCG/KG/MIN: 50 INJECTION INTRAVENOUS at 21:50

## 2020-07-07 ASSESSMENT — ENCOUNTER SYMPTOMS
COUGH: 0
DIARRHEA: 0
NAUSEA: 0
ABDOMINAL PAIN: 0
DIZZINESS: 0
CHILLS: 0
HEARTBURN: 0
BLURRED VISION: 0
CONSTIPATION: 0
MYALGIAS: 1
CLAUDICATION: 0
PALPITATIONS: 0
BACK PAIN: 0
HEADACHES: 0
FEVER: 1
DEPRESSION: 0
NECK PAIN: 1
NERVOUS/ANXIOUS: 1
SPUTUM PRODUCTION: 0
ORTHOPNEA: 0
BRUISES/BLEEDS EASILY: 0
FEVER: 0
PHOTOPHOBIA: 0
WEIGHT LOSS: 0
DOUBLE VISION: 0
VOMITING: 0
NECK PAIN: 0
TREMORS: 0
FOCAL WEAKNESS: 0

## 2020-07-07 ASSESSMENT — FIBROSIS 4 INDEX: FIB4 SCORE: 9.91

## 2020-07-07 ASSESSMENT — LIFESTYLE VARIABLES: SUBSTANCE_ABUSE: 0

## 2020-07-07 NOTE — OP REPORT
DATE OF SERVICE:  07/03/2020    SERVICE:  Oral and maxillofacial surgery.    PRIMARY SURGEON:  Brenden Morrison DDS, MD    ASSISTANT:  None.    PREOPERATIVE DIAGNOSES:  1.  Mandibular symphysis fracture.  2.  Submental abscess.    POSTOPERATIVE DIAGNOSES:  1.  Mandibular symphysis fracture.  2.  Submental abscess.    PROCEDURES PERFORMED:  1.  Open reduction and internal fixation of mandibular symphysis fracture.  2.  Incision and drainage of submental abscess.  3.  Bone graft to mandible.    ANESTHESIA:  General, oral endotracheal.    FLUIDS:  See anesthesia.    BLOOD LOSS:  Approximately 50 mL.    COMPLICATIONS:  None.    SPECIMENS:  Purulent fluid from the submental region was sent for cultures and   sensitivity, anaerobic, aerobic and Gram stain.    IMPLANTS:  Mediast[employer] CMF was used for mandibular plating.    HISTORY OF PRESENT ILLNESS:  The patient has been admitted to [facility] with a   possible hematoma versus submental infection in the anterior mandible.  CT   scan revealed fracture of the mandible.  The patient had dental extractions   and a large cyst removal performed as an outpatient a couple of months prior.    The patient appears to have a pathologic fracture to that site causing a   secondary infection.  Risks, benefits and alternatives were discussed with the   family, planned for incision and drainage of the abscess achieving mandibular   fixation and stability with plating and bone grafting the mandibular defect.    Risks, benefits and alternatives were discussed.  Consent was obtained in   preparation for the operating room.    DESCRIPTION OF PROCEDURE:  The patient was taken to the OR and placed in   supine position, where he remained for the entire procedure.  The patient was   placed under general anesthesia via oral endotracheal intubation.    Endotracheal tube was secured.  The patient was prepped and draped in normal   sterile fashion.  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  A   #15 blade was used for skin incision.  Dissection was carried out to the   inferior border of the mandible.  Full thickness mucoperiosteal flaps were   reflected.  The fracture was identified in the mandibular symphysis region.    Fracture was cleaned out and debrided.  The fractured segments were mobilized   and reduced.  A 1.5 miniplate was placed along the inferior border for   temporary stabilization.  A 2.5 locking reconstruction plate was placed across   the mandibular symphysis.  Three screws on either side of the fracture; 3.3   mm screws were used.  Adequate fixation was obtained.  The inferior border   plate was removed.  Again, debridement and irrigation of the fractured   segment; 4 mL of allograft putty was used for bone grafting the mandibular   defect.  There is approximately a 2 to 3 cm defect of the anterior mandible   area of the previous cyst appears to have expanded from a chronic infection.    Adequate bone graft was placed.  Purulent fluid was sent for cultures.    Multilayer closure was performed from the extraoral approach, Vicryl for deep   sutures, superficial with chromic gut.  A quarter-inch Penrose was placed,   allow for drainage.  Intraoral closure with chromic gut suture.  There was a   small exposure previously existing fistula in the area of the mandibular cyst.    This was adequately closed.  Care was then turned over to the anesthesia   service.  The patient was extubated in the operating room without difficulty   with plan to return to the floor for IV antibiotics and close observation.       ____________________________________     PETER Parker / JAMAR    DD:  07/07/2020 08:16:54  DT:  07/07/2020 09:12:33    D#:  [id number]  Job#:  [id number]

## 2020-07-07 NOTE — PROGRESS NOTES
Infectious Disease Progress Note    Author: Ruthie Avitia M.D. Date & Time of service: 7/7/2020  8:37 AM    Chief Complaint:  Fevers, pancytopenia      Interval History:  6/29 T-max 102.9 WBC 3.7 patient remains neutropenic .  CT of the neck shows a 2.9 cm collection adjacent to the fracture site of the mid mandibular body.  An infected hematoma cannot be ruled out.  CT scan shows possible splenic infarcts and gallbladder wall thickening with pericholecystic fluid.  Patient continues to have fevers and chills.  He states his chin lesion is more painful and has increased in size.  Plan for bone marrow biopsy today  6/30 T-max 101.9, remains febrile.  Had bone biopsy yesterday and aspiration of submandibular abscess.  He did not sleep well overnight as he was very warm.  No new symptoms today  7/1 T-max 100.9 WBC 4.5 culture growing viridans Streptococcus.  Patient did not feel any fevers overnight.  Overall fever curve improving.  He states that his abscess drained throughout the night.  Bone marrow biopsy results pending.  Eager to go home  7/2 T-max 102.4 WBC 4.1 bone marrow results do not show any evidence of MDS or leukemia.  Ongoing chin pain.  Awaiting evaluation from oral surgeon  7/3 T-max 99.9 WBC 2.8.  Plan for ORIF with bone graft today by Dr. Morrison.  No new symptoms to report  7/4 afebrile WBC 2.2 underwent surgery yesterday.  Operative report not available.  Patient complaining of jaw pain from surgery.  Submental abscess cultures-pending  7/5 T-max 101.6 WBC 2.1 ongoing jaw pain.  Nurse states decreased drainage throughout the day yesterday and patient was noted to be somewhat confused overnight.  He is alert and oriented this morning answering questions appropriately  7/6  AF, O2 RA, submandibular pain and ongoing drainage.  Awaiting pathology results from [place].    Review of Systems:  Review of Systems   Constitutional: Positive for malaise/fatigue. Negative for chills and fever.    Respiratory: Negative for cough.    Gastrointestinal: Negative for abdominal pain, constipation, diarrhea, nausea and vomiting.   Musculoskeletal: Positive for joint pain and myalgias.       Hemodynamics:  Temp (24hrs), Av °C (98.6 °F), Min:36.7 °C (98.1 °F), Max:37.3 °C (99.2 °F)  Temperature: 37.3 °C (99.2 °F)  Pulse  Av.7  Min: 52  Max: 110   Blood Pressure : 157/83       Physical Exam:  Physical Exam  Constitutional:       Appearance: Normal appearance.   HENT:      Head:      Comments: Submandibular edema, ttp, packing place, no significant drainage  Cardiovascular:      Rate and Rhythm: Normal rate and regular rhythm.      Heart sounds: Murmur present.   Pulmonary:      Effort: Pulmonary effort is normal.      Breath sounds: Normal breath sounds.   Abdominal:      General: Abdomen is flat. Bowel sounds are normal.      Palpations: Abdomen is soft.   Musculoskeletal:         General: Swelling present.      Comments: Right tow ttp and bruising    Skin:     General: Skin is warm and dry.   Neurological:      General: No focal deficit present.      Mental Status: He is alert and oriented to person, place, and time.   Psychiatric:         Mood and Affect: Mood normal.         Behavior: Behavior normal.         Meds:    Current Facility-Administered Medications:   •  lisinopril  •  atenolol  •  MD Alert...Argatroban per Pharmacy  •  argatroban **AND** Protocol 448 INDICATIONS **AND** Protocol 448 INCLUSION CRITERIA **AND** Protocol 448 EXCLUSION CRITERIA  **AND** Protocol 448 GOAL: To obtain aPTT value in the 50-90 range **AND** Protocol 448 Argatroban administration must be monitored and documented in EMR. Discontinue IV/subcutaneous heparin, heparin flushes, Enoxaparin (Lovenox), Dabigatran (Pradaxa), Rivaroxaban (Xarelto), Apixaban (Eliquis), Edoxaban (Savaysa, Lixiana), and Fondaparinux (Arixtra). **AND** Protocol 448 RN to order daily CBC w/o diff if newly-diagnosed heparin-induced thrombocytopenia  **AND** Protocol 448 aPTT MONITORING - RN to place separate order for repeat aPTT as needed **AND** Start IV argatroban drip, initial rate per pharmacy dosing guidelines **AND** [COMPLETED] APTT **AND** Maximum dose 10 mcg/kg/min    •  ampicillin-sulbactam (UNASYN) IV  •  HYDROcodone-acetaminophen  •  [CANCELED] HEPARIN INDUCED PLATELET AB(HIT) **AND** Pharmacy Consult:  •  zolpidem  •  lovastatin  •  senna-docusate **AND** polyethylene glycol/lytes **AND** magnesium hydroxide **AND** bisacodyl  •  acetaminophen  •  enalaprilat  •  ondansetron  •  ondansetron    Labs:  Recent Labs     07/05/20 0419 07/06/20 0240 07/07/20  0255   WBC 2.1* 2.3* 2.1*   RBC 2.95* 2.79* 2.90*   HEMOGLOBIN 8.4* 8.0* 8.3*   HEMATOCRIT 26.9* 24.3* 25.3*   MCV 91.2 87.1 87.2   MCH 28.5 28.7 28.6   RDW 50.1* 47.5 46.2   PLATELETCT 67* 57* 65*   MPV 12.6 10.8 12.7   NEUTSPOLYS 26.30* 21.70* 20.90*   LYMPHOCYTES 49.10* 53.00* 59.10*   MONOCYTES 19.30* 20.90* 16.50*   EOSINOPHILS 1.80 1.70 1.70   BASOPHILS 0.00 1.70 1.70   RBCMORPHOLO Present Present Present     Recent Labs     07/05/20 0419 07/06/20 0240 07/07/20  0255   SODIUM 129* 131* 132*   POTASSIUM 3.8 3.7 3.7   CHLORIDE 97 98 98   CO2 21 22 23   GLUCOSE 107* 112* 107*   BUN 16 14 10     Recent Labs     07/05/20 0419 07/06/20 0240 07/07/20  0255   CREATININE 0.79 0.66 0.68       Imaging:  Ct-chest,abdomen,pelvis With    Result Date: 6/28/2020 6/28/2020 4:20 PM HISTORY/REASON FOR EXAM:  Neutropenic fever TECHNIQUE/EXAM DESCRIPTION: CT scan of the chest, abdomen and pelvis with contrast. Helical scanning was obtained with intravenous contrast from the lung apices through the pubic symphysis to include the chest, abdomen and pelvis.  80 mL of Omnipaque 350 nonionic contrast was administered intravenously without complication. Low dose optimization technique was utilized for this CT exam including automated exposure control and adjustment of the mA and/or kV according to patient size.  COMPARISON: 4/2/2020 FINDINGS: Atherosclerotic plaque is seen in the aorta. Coronary artery calcification is seen. There is a prosthetic aortic valve. The heart is not enlarged. There is trace pericardial fluid. There are small mediastinal lymph nodes. Right hilar lymph node measures 1 cm in short axis dimension. There is no left hilar or axillary lymphadenopathy. There is no pleural effusion. There are emphysematous changes. There are calcified pulmonary granulomata. There is mild bibasilar and lingula atelectasis. Central airways are patent. 4.7 mm nodule is seen in the right middle lobe on image 227. There is an irregular density along the minor fissure measuring 8 mm which could be related to scarring. No free air is seen in the abdomen or pelvis. The liver appears unremarkable. Portal vein is patent. There is pericholecystic fluid/gallbladder wall thickening. Areas of hypoenhancement in the spleen are worrisome for splenic infarcts. No adrenal mass is  identified. There is no evidence of hydronephrosis. There is a small hypodense left renal lesions which are too small to characterize. Pancreas appears unremarkable. There is no biliary ductal dilatation. There is atherosclerotic plaque within an ectatic aorta. There is a left common femoral pseudoaneurysm measuring 2.2 x 1.7 cm. There are small retroperitoneal lymph nodes. Aortocaval retroperitoneal nodes measure up to 8 mm in short axis dimension. Periportal lymph nodes measure up to 1.4 cm in short axis dimension. There is no evidence of bowel obstruction. The visualized appendix appears unremarkable. The appendix is not identified in its entirety, limiting evaluation. Bladder is mildly distended. There is a bladder diverticulum on the right. Prostate is mildly enlarged. There is trace free fluid in the pelvis. There is a right inguinal hernia containing bowel without evidence of obstruction. Degenerative changes are seen in the spine.     Hypodensities in the  spleen worrisome for splenic infarcts. Trace nonspecific free fluid in the pelvis. 2.2 x 1.7 cm left common femoral artery pseudoaneurysm. Bowel containing right inguinal hernia without evidence of obstruction. Atherosclerotic plaque within an ectatic aorta. Gallbladder wall thickening/pericholecystic fluid. Further evaluation can be performed with right upper quadrant ultrasound. Small hypodense left renal lesions are too small to characterize but likely represent small cysts. Emphysematous changes. Irregular nodular opacity along the minor fissure measuring 8 mm. 4.7 mm right middle lobe pulmonary nodule. Mild bibasilar and lingular atelectasis. Emphysematous changes. Sequelae of prior granulomatous exposure. Right hilar lymph node is borderline enlarged and nonspecific, possibly reactive.. Enlarged periportal lymph nodes are nonspecific. Fleischner Society pulmonary nodule recommendations: Low Risk: CT at 3-6 months, then consider CT at 18-24 months High Risk: CT at 3-6 months, then at 18-24 months Comments: Use most suspicious nodule as guide to management. Follow-up intervals may vary according to size and risk. Low Risk - Minimal or absent history of smoking and of other known risk factors. High Risk - History of smoking or of other known risk factors. Note: These recommendations do not apply to lung cancer screening, patients with immunosuppression, or patients with known primary cancer. Fleischner Society 2017 Guidelines for Management of Incidentally Detected Pulmonary Nodules in Adults    Ct-soft Tissue Neck With    Result Date: 6/28/2020 6/28/2020 4:20 PM HISTORY/REASON FOR EXAM:  Palpable nodule/thyroid enlargement. TECHNIQUE/EXAM DESCRIPTION AND NUMBER OF VIEWS:  CT soft tissue neck with contrast. The study was performed on a helical multidetector CT scanner. Contiguous thin section helical images were obtained of the neck from the skull base through the thoracic inlet. 80 mL of Omnipaque 350 nonionic  contrast was injected intravenously. Low dose optimization technique was utilized for this CT exam including automated exposure control and adjustment of the mA and/or kV according to patient size. COMPARISON: None. FINDINGS: PARANASAL SINUSES: Moderate mucosal thickening of left maxillary sinus. Last remaining tooth with associated periapical lucency protrudes into the left maxillary sinus floor. NASOPHARYNX: Normal. SUPRAHYOID NECK: Normal oropharynx, oral cavity, parapharyngeal space, and retropharyngeal space. EPIGLOTTIS: Normal. INFRAHYOID NECK: Normal larynx, hypopharynx, and supraglottis. THYROID: Normal. No thyroid nodules. UPPER CHEST: Reported separately. LYMPH NODES: No enlarged nodes by size criteria. VASCULAR STRUCTURES: Patent. Approximately 50% stenosis of the left ICA origin secondary to predominantly calcific plaque. OTHER FINDINGS: Acute versus subacute fracture of the mandibular body. Fluid collection subjacent to the fracture site measures 2.9 x 1.2 cm, likely a hematoma. Infection can be considered in the appropriate clinical setting. Visualized brain is unremarkable.     1. Acute versus subacute fracture of the mid mandibular body. 2. A 2.9 cm collection subjacent to the fracture site, likely hematoma. Infected hematoma can be considered in the appropriate clinical settings. 3. No cervical lymphadenopathy. 4. Moderate mucosal thickening of the left maxillary sinus, likely odontogenic in nature, related to impacted remaining maxillary tooth.    Dx-chest-2 Views    Result Date: 6/27/2020 6/27/2020 11:36 AM HISTORY/REASON FOR EXAM:  Fever TECHNIQUE/EXAM DESCRIPTION AND NUMBER OF VIEWS: Two views of the chest. COMPARISON:  6/23/2020. FINDINGS: LUNGS: Slight hyperinflation. Chronic slightly hyperdense nodule in the right midlung, likely benign granuloma. No focal consolidation. No effusions. PNEUMOTHORAX: None. LINES AND TUBES: None. MEDIASTINUM: No cardiomegaly. TAVR. LAD stent. Atherosclerosis.  MUSCULOSKELETAL STRUCTURES: No acute displaced fracture.     No acute cardiopulmonary abnormality.    Dx-chest-portable (1 View)    Result Date: 6/23/2020 6/23/2020 5:44 PM HISTORY/REASON FOR EXAM:  tachycardia. TECHNIQUE/EXAM DESCRIPTION AND NUMBER OF VIEWS: Single portable view of the chest. COMPARISON: Exam from 6:42 AM FINDINGS: Heart size is within normal limits. No pulmonary infiltrates or consolidations are noted. No pleural abnormalities are noted. There is an old left seventh rib fracture.     No acute cardiac or pulmonary abnormalities are identified.    Dx-chest-portable (1 View)    Result Date: 6/23/2020 6/23/2020 6:05 AM HISTORY/REASON FOR EXAM: Postop TAVR. TECHNIQUE/EXAM DESCRIPTION AND NUMBER OF VIEWS: Single portable view of the chest. COMPARISON: 6/22/2020 FINDINGS: LUNGS: Clear. No effusions. PNEUMOTHORAX: None. LINES AND TUBES: None. MEDIASTINUM: Stable cardiac silhouette. MUSCULOSKELETAL STRUCTURES: Unchanged.     Clear lungs. No pleural effusions.    Dx-chest-portable (1 View)    Result Date: 6/22/2020 6/22/2020 12:50 PM HISTORY/REASON FOR EXAM:  Status post aortic valve replacement. TECHNIQUE/EXAM DESCRIPTION AND NUMBER OF VIEWS: Single portable view of the chest. COMPARISON: 7/15/2019 FINDINGS: Single portable view of the chest demonstrates a normal cardiac silhouette and mediastinal contours. Calcification is in the aorta. There is emphysematous change. The lungs are hyperexpanded. No confluent opacity, pleural fluid, or pneumothorax. No suspicious bony lesions.     No acute findings status post TAVR    Ir-cyst Aspiration-misc    Result Date: 6/29/2020 6/29/2020 1:13 PM HISTORY/REASON FOR EXAM: Redness and swelling in the anterior submandibular region TECHNIQUE/EXAM DESCRIPTION: Ultrasound guided aspiration of submandibular fluid collection. PROCEDURE: Informed consent was obtained. Localizing ultrasound images were obtained with the patient in supine position. A heterogeneous fluid  collection was noted in the anterior submandibular region. The skin was prepped with chlorhexidine gluconate and draped in a sterile fashion. Local anesthesia was administered with 1% Lidocaine. Under real-time ultrasound guidance, a 17-gauge introducer needle was advanced into the submandibular collection and 4 mL of purulent material was aspirated and sent to lab for analysis. The patient tolerated the procedure well with no evidence of complication. COMPARISON: Recent CT scan of the neck FINDINGS: The localizing ultrasound demonstrates a heterogeneous submandibular fluid collection.     ULTRASOUND GUIDED ASPIRATION OF SUBMANDIBULAR ABSCESS..    Us-abdomen Complete Survey    Result Date: 6/26/2020 6/26/2020 6:27 AM HISTORY/REASON FOR EXAM:  Abnormal Labs Pain TECHNIQUE/EXAM DESCRIPTION AND NUMBER OF VIEWS:  Complete abdomen survey. COMPARISON: None FINDINGS: The liver is normal in contour. There is no evidence of solid mass lesion. The liver measures 14.57 cm. The gallbladder is normal. There is no evidence of cholelithiasis. The gallbladder wall thickness measures 2.50 mm. There is no pericholecystic fluid. The common duct measures 3.40 mm. The visualized pancreas is unremarkable. The visualized aorta is normal in caliber. Intrahepatic IVC is patent. The portal vein is patent with hepatopetal flow. The MPV measures 1.22 cm. The right kidney measures 11.19 cm. The left kidney measures 11.17 cm. There is no hydronephrosis. The spleen measures 10.41 cm maximally. The bladder is trabeculated, which could indicate chronic bladder outlet obstruction. There is no ascites.     Prominent trabeculated bladder wall, suggesting possible chronic bladder outlet obstruction. Unremarkable findings otherwise.     Us-extremity Artery Lower Bilat    Result Date: 6/22/2020  Lower Extremity  Arterial Duplex Report  Vascular Laboratory  CONCLUSIONS  Right.  Echolucent thrombus partially fills the common femoral artery causing a  high  grade stenosis. Velocities are consistent with > 75% stenosis.  Mild plaque visualized in the superficial femoral and popliteal arteries  without evidence of hemodynamically significant stenosis.  Monophasic waveforms demonstrated throughout the right lower extremity.  Left.  Mild plaque is seen in the common femoral, femoral, and popliteal arteries  with no elevated velocities to indicate hemodynamically significant  stenosis.  FADIA LANDIS  Exam Date:     2020 17:34  Room #:     Inpatient  Priority:     Stat  Ht (in):             Wt (lb):  Ordering Physician:        ANTHONY PIPER  Referring Physician:       ANTHONY PIPER  Sonographer:               Ewa Curtis RVT  Study Type:                Complete Bilateral  Technical Quality:         Adequate  Age:    65    Gender:     M  MRN:    [id number]  :    1955      BSA:  Indications:     Pain in leg, unspecified  CPT Codes:       76134  ICD Codes:       M79.606  History:         Pain in legs s/p TAVR. No prior duplex.  Limitations:                RIGHT  Waveform        Peak Systolic Velocity (cm/s)                  Prox    Prox-Mid  Mid    Mid-Dist  Distal  Monophasic                        307              143     CFA  Monophasic      55                                         PFA  Monophasic      53                51               34      SFA  Monophasic                        30                       POP  Monophasic      22                                 32      AT  Monophasic      19                                 11      PT  Monophasic      37                                 20      EVGENY                LEFT  Waveform        Peak Systolic Velocity (cm/s)                  Prox    Prox-Mid  Mid    Mid-Dist  Distal  Triphasic                         187                      CFA  Biphasic        78                                         PFA  Biphasic        103               142               106     SFA  Biphasic                          57                       POP  Biphasic        83                                 110     AT  Biphasic        76                                 24      PT  Biphasic        61                                 24      EVGENY  FINDINGS  Right.  Echolucent material consistent with thrombus partially fills the common  femoral artery causing a high grade stenosis. Stenosis of the common  femoral artery. Velocities are consistent with > 75% stenosis.  Mild plaque visualized in the femoral and popliteal arteries without  evidence of hemodynamic significance.  Monophasic waveforms demonstrated throughout the right lower extremity.  Left.  Mild plaque is seen in the common femoral, femoral, and popliteal arteries  with no elevated velocities to indicate hemodynamic significance.  Waveforms are biphasic throughout the left lower extremity.  Demetria Narvaez  (Electronically Signed)  Final Date:      2020                   18:45  Amended:         2020 19:58    Us-extremity Artery Lower Unilat Right    Result Date: 2020  Lower Extremity  Arterial Duplex Report  Vascular Laboratory  CONCLUSIONS  Pinching of the common femoral artery from perclose device closure.  Velcoity consistent with >75% stenosis. Intraluminal clot seen in prior  study is not readily seen.  FADIA LANDIS  Exam Date:     2020 07:49  Room #:     Inpatient  Priority:     Routine  Ht (in):             Wt (lb):  Ordering Physician:        MELVIN SIMONS  Referring Physician:       MELVIN SIMONS  Sonographer:               Blank Calles RVT  Study Type:                Limited Unilateral  Technical Quality:         Adequate  Age:    65    Gender:     M  MRN:    [id number]  :    1955      BSA:  Indications:     Unspecified atherosclerosis of native arteries of                    extremities, right leg  CPT Codes:       15015  ICD Codes:       I70.201  History:         Right common femoral steosis.  Limitations:                RIGHT  Waveform        Peak Systolic Velocity (cm/s)                  Prox    Prox-Mid  Mid    Mid-Dist  Distal  Monophasic      42                384              230     CFA  Biphasic        97                                         PFA  Biphasic        55                                         SFA                                                             POP                                                             AT                                                             PT                                                             EVGENY                LEFT  Waveform        Peak Systolic Velocity (cm/s)                  Prox    Prox-Mid  Mid    Mid-Dist  Distal                                                             CFA                                                             PFA                                                             SFA                                                             POP                                                             AT                                                             PT                                                             EVGENY  FINDINGS  Limited evaluation of the right common femoral artery known stenosis.  Right.  Stenosis of the mid common femoral artery. Velocities are consistent with >  75%stenosis.  Distal flow is turbulent and damped.  No evidence of hematoma or psuedoaneuysm seen at the right groin.  Luiz Dennis MD  (Electronically Signed)  Final Date:      26 June 2020                   08:49    Us-extremity Venous Lower Bilat    Result Date: 7/4/2020   Vascular Laboratory  CONCLUSIONS  Normal bilateral superficial and deep venous examination of the lower  extremities.  FADIA LANDIS  Exam Date:     07/04/2020 14:05  Room #:     Inpatient  Priority:      Routine  Ht (in):             Wt (lb):  Ordering Physician:        FLAQUITO BLACKMAN  Referring Physician:       [id number] Case  Sonographer:               Humberto Gonzalez RVT  Study Type:                Complete Bilateral  Technical Quality:         Adequate  Age:    65    Gender:     M  MRN:    [id number]  :    1955      BSA:  Indications:     Pain in leg, unspecified  CPT Codes:       21448  ICD Codes:       M79.606  History:         BLE pain, pressure, and edema.  Limitations:  PROCEDURES:  Venous duplex imaging was performed in both lower extremities including  serial compressions and spectral Doppler flow evaluation.  FINDINGS:  The veins of the lower extremities are readily compressible with normal  venous flow dynamics including spontaneous flow and respiratory phasic  variation.  No evidence of deep venous thrombosis.  Alfonso Mondragon MD  (Electronically Signed)  Final Date:      2020                   16:16    Ec-echocardiogram Complete W/o Cont    Result Date: 2020  Transthoracic Echo Report Echocardiography Laboratory CONCLUSIONS Known TAVR aortic valve that has a mildly increased gradients. No paravaluvar leak or signficant aortic insufficiency seen. No vegetation seen FADIA LANDIS Exam Date:         2020                    14:21 Exam Location:     Inpatient Priority:          Routine Ordering Physician:        BRANDEN STOCKTON Referring Physician:       [id number] Beckwith Sonographer:               Susana Carson RDCS Age:    65     Gender:    M MRN:    [id number] :    1955 BSA:    1.68   Ht (in):    66     Wt (lb):    133 Exam Type:     Complete Indications:     Endocarditis ICD Codes:       421 CPT Codes:       26829 BP:   114    /   74     HR:   91 Technical Quality:       Fair MEASUREMENTS  (Male / Female) Normal Values 2D ECHO LV Diastolic Diameter PLAX        4.9 cm                4.2 - 5.9 / 3.9 - 5.3 cm LV Systolic Diameter PLAX         3.3 cm                2.1 - 4.0 cm  IVS Diastolic Thickness           1.2 cm                LVPW Diastolic Thickness          1.4 cm                RV Diameter 4C                    3.5 cm                2.5 - 2.9 cm LVOT Diameter                     2.1 cm                RA Diameter                       4 cm                  Estimated LV Ejection Fraction    65 %                  LV Ejection Fraction MOD BP       56.1 %                >= 55  % LV Ejection Fraction MOD 4C       56.9 %                LV Ejection Fraction MOD 2C       51.3 %                LA Volume Index                   26 cm3/m2             16 - 28 cm3/m2 IVC Diameter                      0.98 cm               DOPPLER AV Peak Velocity                  2.9 m/s               AV Peak Gradient                  32.6 mmHg             AV Mean Gradient                  18.1 mmHg             LVOT Peak Velocity                1.1 m/s               AV Area Cont Eq vti               1.1 cm2               Mitral E Point Velocity           0.8 m/s               Mitral E to A Ratio               0.7                   MV Pressure Half Time             30.9 ms               MV Area PHT                       7.1 cm2               MV Deceleration Time              107 ms                TR Peak Velocity                  269 cm/s              PV Peak Velocity                  1.2 m/s               PV Peak Gradient                  5.5 mmHg              * Indicates values subject to auto-interpretation LV EF:  65    % FINDINGS Left Ventricle Normal left ventricular size and systolic function. Normal regional wall motion. Mild concentric left ventricular hypertrophy. A reliable estimation of diastolic function cannot be made due to conflicting data. Right Ventricle Normal right ventricular size and systolic function. Right Atrium Enlarged right atrium. Normal inferior vena cava size and inspiratory collapse. Left Atrium Normal left atrial size. Mitral Valve Structurally normal mitral valve without  significant stenosis. Trace mitral regurgitation. Aortic Valve Known TAVR aortic valve that has a mildly increased gradients. Vmax is 3.0  m/s. Transvalvular gradients are - Peak: 36 mmHg,  Mean: 20 mmHg.  No paravaluvar leak or signficant aortic insufficiency. Tricuspid Valve Structurally normal tricuspid valve. Mild tricuspid regurgitation. Right atrial pressure is estimated to be 3 mmHg. Estimated right ventricular systolic pressure  is 35 mmHg. Pulmonic Valve Structurally normal pulmonic valve without significant stenosis or regurgitation. Pericardium Normal pericardium without effusion. Aorta The aortic root is normal.  Ascending aorta diameter is  2.7 cm. Ascending aorta not well visualized. Zoya Milton M.D. (Electronically Signed) Final Date:     2020                 17:26    Ec-echocardiogram Complete W/o Cont    Result Date: 2020  Transthoracic Echo Report Echocardiography Laboratory CONCLUSIONS Prior echo done on 2020, there is now a TAVR valve present. Left ventricular ejection fraction is visually estimated to be 60%. Unable to estimate pulmonary artery pressure due to an inadequate tricuspid regurgitant jet. Known TAVR aortic valve. FADIA LANDIS Exam Date:         2020                    03:46 Exam Location:     Inpatient Priority:          Routine Ordering Physician:        ANTHONY PIPER Referring Physician:       [id number] Cintron Sonographer:               JOJO Ash Age:    65     Gender:    Mal                           e MRN:    [id number] :    1955 BSA:    1.64   Ht (in):    66     Wt (lb):    126 Exam Type:     Complete Indications:     Prosthetic Valve ICD Codes:       V43.3 CPT Codes:       76153 BP:   142    /   83     HR:   70 Technical Quality:       Fair MEASUREMENTS  (Male / Female) Normal Values 2D ECHO LV Diastolic Diameter PLAX        6 cm                  4.2 - 5.9 / 3.9 - 5.3 cm LV Systolic Diameter PLAX         3.7 cm                 2.1 - 4.0 cm IVS Diastolic Thickness           0.71 cm               LVPW Diastolic Thickness          0.7 cm                LVOT Diameter                     1.5 cm                Estimated LV Ejection Fraction    60 %                  LV Ejection Fraction MOD BP       39.5 %                >= 55  % LV Ejection Fraction MOD 4C       40.2 %                LV Ejection Fraction MOD 2C       33.9 %                IVC Diameter                      1.8 cm                DOPPLER AV Peak Velocity                  0.99 m/s              AV Peak Gradient                  3.9 mmHg              AV Mean Gradient                  2.7 mmHg              LVOT Peak Velocity                1.1 m/s               AV Area Cont Eq vti               1.9 cm2               Mitral E Point Velocity           0.63 m/s              Mitral E to A Ratio               1.5                   MV Pressure Half Time             75.3 ms               MV Area PHT                       2.9 cm2               MV Deceleration Time              260 ms                * Indicates values subject to auto-interpretation LV EF:  60    % FINDINGS Left Ventricle Left ventricle is mildly dilated. Normal left ventricular wall thickness. Normal left ventricular systolic function. Left ventricular ejection fraction is visually estimated to be 60%. Normal regional wall motion. Normal diastolic function. Right Ventricle Normal right ventricular size. Normal right ventricular systolic function. Right Atrium Normal right atrial size. Normal inferior vena cava size and inspiratory collapse. Left Atrium Normal left atrial size. Left atrial volume index is 26 mL/sq m. Mitral Valve Calcification of the mitral valve leaflets. No mitral stenosis. Trace mitral regurgitation. Aortic Valve Known TAVR aortic valve. Vmax is 1 m/s. Transvalvular gradients are - Peak: 3 mmHg, Mean: 2 mmHg. Tricuspid Valve Structurally normal tricuspid valve. No tricuspid stenosis. Trace tricuspid  regurgitation. Right atrial pressure is estimated to be 8 mmHg. Unable to estimate pulmonary artery pressure due to an inadequate tricuspid regurgitant jet. Pulmonic Valve The pulmonic valve is not well visualized. Pericardium No pericardial effusion seen. Aorta Normal aortic root for body surface area. Ascending aorta diameter is 2.4 cm. Brenden Hansen MD (Electronically Signed) Final Date:     2020                 06:41    Ec-echocardiogram Ltd W/o Cont    Result Date: 2020  Transthoracic Echo Report Echocardiography Laboratory CONCLUSIONS Intraoperative TTE during TAVR.  Baseline images show mildly reduced LV function with EF =55%.  Calcified aortic valve leaflets. Severe aortic stenosis.  Post deployment images show preserved biventricular function.  A 26 mm Jewell Jose Manuel Ultra stented bioprosthetic valve is seen in the aortic position with normal leaflet motion, trace significant paravalvular leak, mean gradent of 5 mm Hg and calculated effective orifice area of 2.5 cm2.  Findings communicated at the time of exam. MARLEN LANDISENCE Exam Date:         2020                    11:17 Exam Location:     Inpatient Priority:          Routine Ordering Physician:        CLEOPATRA JONES Referring Physician: Sonographer:               Antwan La RDCS Age:    65     Gender:    Mal                           e MRN:    [id number] :    1955 BSA:    1.64   Ht (in):    66     Wt (lb):    126 Exam Type:     Limited Indications:     Pre-Op Surgery ICD Codes:       V72.83 CPT Codes:       76591 BP:          /          HR: Technical Quality:       Fair MEASUREMENTS  (Male / Female) Normal Values 2D ECHO Estimated LV Ejection Fraction    55 %                  * Indicates values subject to auto-interpretation LV EF:  55    % FINDINGS Left Ventricle The left ventricle was normal in size and function. Right Ventricle The right ventricle was normal in size and function. Right Atrium The right atrium is  normal in size. Left Atrium The left atrium is normal in size. Mitral Valve Structurally normal mitral valve without significant stenosis or regurgitation. Aortic Valve Calcified aortic valve leaflets. Severe aortic stenosis. Tricuspid Valve Structurally normal tricuspid valve without significant stenosis or regurgitation. Pulmonic Valve Structurally normal pulmonic valve without significant stenosis or regurgitation. Pericardium Normal pericardium without effusion. Aorta The aortic root is normal. Simon X Issa (Electronically Signed) Final Date:     23 June 2020                 16:26 Amended:        23 June 2020 16:29      Micro:  Results     Procedure Component Value Units Date/Time    CULTURE WOUND W/ GRAM STAIN [[id number]]  (Abnormal) Collected:  07/03/20 1736    Order Status:  Completed Specimen:  Wound Updated:  07/06/20 0823     Significant Indicator POS     Source WND     Site Submental Abscess     Culture Result Growth noted after further incubation, see below for  organism identification.       Gram Stain Result Moderate WBCs.  Few Gram positive cocci.       Culture Result Viridans Streptococcus  Rare growth      Narrative:       Surgery - swabs received    Anaerobic Culture [[id number]] Collected:  07/03/20 1736    Order Status:  Completed Specimen:  Wound Updated:  07/06/20 0823     Significant Indicator NEG     Source WND     Site Submental Abscess     Culture Result Culture in progress.    Narrative:       Surgery - swabs received    GRAM STAIN [[id number]] Collected:  07/03/20 1736    Order Status:  Completed Specimen:  Wound Updated:  07/04/20 1127     Significant Indicator .     Source WND     Site Submental Abscess     Gram Stain Result Moderate WBCs.  Few Gram positive cocci.      Narrative:       Surgery - swabs received    SARS-CoV-2, PCR (In-House) [[id number]] Collected:  07/02/20 2301    Order Status:  Completed Updated:  07/03/20 0013     SARS-CoV-2 Source NP Swab     SARS-CoV-2 by PCR  NotDetected     Comment: [facility] providers: PLEASE REFER TO DE-ESCALATION AND RETESTING PROTOCOL  on [email or web address]  **The [employer] GeneXpert Xpress SARS-CoV-2 Test has been made available for  use under the Emergency Use Authorization (EUA) only.         Narrative:       [id number] LOMBARDO FLASH  Expected Turn around time?->Rush (Cepheid 2-4 hours)  Abbott test failed twice; using previously rejected Cepheid swab.    COVID/SARS CoV-2 PCR [[id number]] Collected:  07/02/20 2301    Order Status:  Completed Specimen:  Respirate from Nasopharyngeal Updated:  07/02/20 2318     COVID Order Status Received    Narrative:       [id number] LOMBARDO FLASH  Expected Turn around time?->Rush (Cepheid 2-4 hours)  Abbott test failed twice; using previously rejected Cepheid swab.    COVID/SARS CoV-2 PCR [[id number]] Collected:  07/02/20 1934    Order Status:  Completed Specimen:  Respirate from Nasopharyngeal Updated:  07/02/20 2046     COVID Order Status Received    Narrative:       [id number] LOMBARDO FLASH  Expected Turn around time?->Routine (In-House PCR up to 24  hours)  [id number] LOMBARDO FLASH  Expected Turn around time?->Rush (Cepheid 2-4 hours)  Called RN to provide an Abbott swab.    COVID/SARS CoV-2 PCR [[id number]] Collected:  07/02/20 1934    Order Status:  Canceled Specimen:  Respirate from Nasopharyngeal     COVID/SARS CoV-2 PCR [[id number]] Collected:  07/02/20 1934    Order Status:  Canceled Specimen:  Respirate from Nasopharyngeal     SARS-CoV-2, PCR (In-House) [[id number]] Collected:  07/02/20 1934    Order Status:  Canceled     Aerobic/Anaerobic Culture (Surgery) [[id number]] Collected:  06/29/20 1317    Order Status:  Completed Specimen:  Wound from Other Body Fluid Updated:  07/02/20 1138     Significant Indicator NEG     Source WND     Site submandibular     Culture Result Order placed in Surgery. Source appropriate culture orders  have been placed in Micro for testing.      Narrative:      "   [id number] PAWAN BRICE F.  Large lump under chin  Please send for cytology/pathology  [id number] PAWAN BRICE F.  2mls pink, frothy fluid aspirated from submandibular fluid  collection, MD Jenkins  [id number] PAWAN NARVAEZ F.    Anaerobic Culture [[id number]] Collected:  06/29/20 1317    Order Status:  Completed Specimen:  Wound Updated:  07/02/20 1138     Significant Indicator NEG     Source WND     Site submandibular     Culture Result No Anaerobes isolated.    Narrative:       [id number] PAWAN BRICE F.  Large lump under chin  Please send for cytology/pathology  [id number] PAWAN BRICE F.  2mls pink, frothy fluid aspirated from submandibular fluid  collection, MD Jenkins  [id number] PAWAN BRICE F.    CULTURE WOUND W/ GRAM STAIN [[id number]]  (Abnormal) Collected:  06/29/20 1317    Order Status:  Completed Specimen:  Wound Updated:  07/02/20 1138     Significant Indicator POS     Source WND     Site submandibular     Culture Result -     Gram Stain Result Many WBCs.  Many Gram positive cocci.  Few Gram negative rods.       Culture Result Viridans Streptococcus  Heavy growth      Narrative:       [id number] PAWAN BRICE F.  Large lump under chin  Please send for cytology/pathology  [id number] PAWAN BRICE F.  2mls pink, frothy fluid aspirated from submandibular fluid  collection, MD Jenkins  [id number] PAWAN BRICE F.    BLOOD CULTURE [[id number]] Collected:  06/26/20 2159    Order Status:  Completed Specimen:  Blood from Peripheral Updated:  07/02/20 0100     Significant Indicator NEG     Source BLD     Site PERIPHERAL     Culture Result No growth after 5 days of incubation.    Narrative:       Protective  Per Hospital Policy: Only change Specimen Src: to \"Line\" if  specified by physician order.  Right Forearm/Arm    BLOOD CULTURE [[id number]] Collected:  06/26/20 2146    Order Status:  Completed Specimen:  Blood from Peripheral " "Updated:  07/02/20 0100     Significant Indicator NEG     Source BLD     Site PERIPHERAL     Culture Result No growth after 5 days of incubation.    Narrative:       Protective  Per Hospital Policy: Only change Specimen Src: to \"Line\" if  specified by physician order.  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   Studies reviewed  Micro reviewed    Pt afebrile and still with pain and swelling in jaw but less drainage. Pt abx transitioned from unasyn to augmentin.        ASSESSMENT/PLAN:      65 y.o. man admitted 6/23/2020. Pt has a past medical history of TAVR on 6/22/2020 and pancytopenia.  He has had a decreased WBC since 2016 with neutropenia.  He also has some mild anemia and thrombocytopenia that is also chronic but worsened recently.  He presented the ER complaining of tachycardia the day after he been sent home after his TAVR surgery.      Hospital Course:   Patient was admitted and was initially febrile and then again spiked a fever to 103.6 on 6/26.  Initial work-up for pancytopenia was started and plan was to complete this with hem-onc as an outpatient.  Blood cultures were obtained 6/26.  Ultrasound abdomen on 6/25 with trabeculated bladder wall suggesting possible chronic bladder outlet obstruction but otherwise unremarkable.  Ultrasound lower extremity with noted stenosis of the right common femoral artery report is known - no evidence of hematoma or pseudoaneurysm in right groin.  Chest x-rays have been " unremarkable. He underwent aspiration of submental abscess on 6/29/2020.  Cultures are growing viridans Streptococcus.  Bone marrow biopsy does not show any evidence of leukemia however additional testing is being sent to [place]. S/p OR with Dr. Morrison on 7/3.      Problem List  Neutropenic fever, fever improved   Bone marrow biopsy did not reveal any leukemia, however additional testing is being sent to [facility]  Neutropenia - ongoing, ANC <500 today   -Work-up initiated by hospitalist with SPEP, smooth muscle antibodies, SEEMA, rheumatoid factor, B12 and folate levels as well as a fibrinogen.   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  Pathology report- no evidence of leukemia.  Additional testing being sent to [facility] and pending-Per note from oncology there is some concern for LGL - follow-up with oncology   CMV PCR- not detected  Parvovirus PCR- not detected  Per oncology note from 7/5 labs are consistent with HIT anticoagulation with artagroban started on 7/4/2020, oncology first 3 months of therapy with anticoagulation      Lump below chin, tender, prior infection   Fracture of mid mandibular body  Submandibular abscess  Possible infected hematoma-2.9 cm collection subjacent to fracture site noted on CT scan  -Reports oral surgery at the end of April and he was on antibiotics (augmentin) due to  infection for approximately 1 month.  There had resolved but now with new swelling  Status post IR aspiration on 6/29/2020.  Gram stain+ GPC, GNR.  Culture- +viridans Streptococcus  On antibiotics above  Concerned about infection causing fracture of the mid mandibular body, possible osteomyelitis.  Patient underwent surgery with Dr. Morrison on 7/3/2020.  Open reduction and internal fixation of mandibular symphysis fracture. Incision and drainage of submental abscess, chronic appearing per op note.  Bone graft to mandible.  OR cultures from 7/3-  +viridans Streptococcus      --- Pt improved and no fevers in ~ 48 hours, less tenderness and drainage- will stop unasyn and transition to augmentin 875/125 with plan to a complete a 6 week course from date of surgery and can extend if needed - end 8/14/20   --- If he continues to be neutropenic as the end the course may need long term antibiotics and antivirals but will defer to hem/onc   --- If continues to improve on oral regimen may be able to DC tomorrow from an ID perspective   --- F/up with oral surgeon   --- F/up in ID clinic at end of course      Right 5th toe, pain and bruising   - Xray w/ possible fracture      Plan of care discussed with Dr. Urena. Will continue to follow.

## 2020-07-07 NOTE — TELEPHONE ENCOUNTER
I contacted [employer] and they already ended his service today (14 days' enrollment is finished)  His end of service report is already being generated.  Apparently it's too late to extend this monitor. His wife is going to ship it back.  We can re-enroll him in the future with a new monitor if Dr. Dennis wants.

## 2020-07-07 NOTE — TELEPHONE ENCOUNTER
[facility] ECHEVERRIA   Received: Today   Message Contents   Isabel Chance, Med Ass't  BLAIR Recinos,   This patient had a 14 day [employer] JUWAN ordered by [employer]/  that I put on him on the 23rd. He's been in the hospital on telemetry.  May I call [employer] and end his service now?  He wore it for about a week. Thanks,   Isabel      ---------------------------------------------------------------------------------  S/w with Dr. Dennis in clinic today and he would like pt to continue wearing monitor, if reasonable, as he should be discharged from the hospital soon.     JUAN to Isabel

## 2020-07-07 NOTE — PROGRESS NOTES
[facility] Medicine Daily Progress Note    Date of Service  7/7/2020    Chief Complaint  65 y.o. male admitted 6/23/2020 with tachycardia.  He had a TAVR on 6/22/2020, and was discharged home on 6/23/2020.  He returned to the ER due to persistent tachycardia.     He has a history of pancytopenia which is worsening, and has been hesitant to do a bone marrow biopsy.  He has tobacco dependence, essential hypertension, hyperlipidemia.       Hospital Course    Home Eliquis and aspirin were discontinued.  Heart rate stabilized. He continued to have decline in his hemoglobin and platelets. WBC stabilized. Briefly discussed with hem/onc.  Abdominal ultrasound from 6/25/2020 showed no acute abnormalities.  SPEP, smooth muscle antibodies, SEEMA, rheumatoid factor, B12 and folate levels, fibrinogen were ordered.  Patient wished to go home and agreed follow-up with hematology for bone marrow biopsy and further work-up.  He was discharged to go home on 6/26/2020, but developed a fever of 103.6.     Infectious disease were consulted on 6/27/2020 due to fever and neutropenia, Cefepime was empirically started.     B12, folate, and fibrinogen levels were normal.  Rheumatoid factor was 40, ESR was 45, LFTs were unremarkable, SEEMA was undetectable, HIV was nonreactive.      Interval Problem Update  Right fifth toe injury- unclear how he got it    Xray shows likely toe fracture    tmax 99    Low  Hb 8.3  Low plt 57    bp is elevated    Wbc 2.1    Sodium 132    Chin pain, intensity 2/10,  no radiation, only when he opens mouth    S/p orif mandible on 7/3    Hit positive-- BULL sent off.. on iv agrataban      echoCardiogram shows no vegetations on valves    Subspecialty Mds requesting JOON- dr archibald aware- will do JOON on Monday--> cancelled due to recent jaw surgery.     Consultants/Specialty  ID  oncology    Code Status  full    Disposition  home    Review of Systems  Review of Systems   Constitutional: Positive for fever. Negative for  chills, malaise/fatigue and weight loss.   HENT: Negative for ear discharge, hearing loss and tinnitus.    Eyes: Negative for blurred vision, double vision and photophobia.   Respiratory: Negative for cough and sputum production.    Cardiovascular: Negative for palpitations, orthopnea and claudication.   Gastrointestinal: Negative for abdominal pain, heartburn, nausea and vomiting.   Genitourinary: Negative for frequency and urgency.   Musculoskeletal: Positive for joint pain and myalgias. Negative for back pain and neck pain.   Neurological: Negative for dizziness, tremors, focal weakness and headaches.   Endo/Heme/Allergies: Does not bruise/bleed easily.   Psychiatric/Behavioral: Negative for depression and substance abuse.        Physical Exam  Temp:  [36.7 °C (98.1 °F)-37.3 °C (99.2 °F)] 36.7 °C (98.1 °F)  Pulse:  [49-65] 49  Resp:  [18] 18  BP: (128-157)/(64-93) 133/64  SpO2:  [90 %-93 %] 93 %    Physical Exam  Constitutional:       Appearance: Normal appearance. He is not diaphoretic.   HENT:      Head:      Comments: Submandibular swelling/induration and tenderness- markedly decreased         Mouth/Throat:      Mouth: Mucous membranes are moist.   Eyes:      General: No scleral icterus.     Extraocular Movements: Extraocular movements intact.      Pupils: Pupils are equal, round, and reactive to light.   Neck:      Musculoskeletal: Normal range of motion and neck supple.   Cardiovascular:      Rate and Rhythm: Normal rate.      Heart sounds: No murmur. No gallop.    Pulmonary:      Effort: Pulmonary effort is normal. No respiratory distress.      Breath sounds: No stridor. No wheezing.   Chest:      Chest wall: No tenderness.   Abdominal:      General: Abdomen is flat. There is no distension.      Tenderness: There is no guarding.      Hernia: No hernia is present.   Musculoskeletal: Normal range of motion.         General: Deformity (right fifth toe) and signs of injury present. No swelling or tenderness.       Right lower leg: No edema.   Skin:     General: Skin is warm.      Capillary Refill: Capillary refill takes 2 to 3 seconds.      Coloration: Skin is not jaundiced or pale.      Findings: No bruising or rash.   Neurological:      General: No focal deficit present.      Mental Status: He is alert and oriented to person, place, and time.      Cranial Nerves: No cranial nerve deficit.      Deep Tendon Reflexes: Reflexes normal.   Psychiatric:         Mood and Affect: Mood normal.         Fluids    Intake/Output Summary (Last 24 hours) at 7/7/2020 1023  Last data filed at 7/7/2020 0508  Gross per 24 hour   Intake 480 ml   Output 1150 ml   Net -670 ml       Laboratory  Recent Labs     07/05/20  0419 07/06/20  0240 07/07/20  0255   WBC 2.1* 2.3* 2.1*   RBC 2.95* 2.79* 2.90*   HEMOGLOBIN 8.4* 8.0* 8.3*   HEMATOCRIT 26.9* 24.3* 25.3*   MCV 91.2 87.1 87.2   MCH 28.5 28.7 28.6   MCHC 31.2* 32.9* 32.8*   RDW 50.1* 47.5 46.2   PLATELETCT 67* 57* 65*   MPV 12.6 10.8 12.7     Recent Labs     07/05/20  0419 07/06/20  0240 07/07/20  0255   SODIUM 129* 131* 132*   POTASSIUM 3.8 3.7 3.7   CHLORIDE 97 98 98   CO2 21 22 23   GLUCOSE 107* 112* 107*   BUN 16 14 10   CREATININE 0.79 0.66 0.68   CALCIUM 8.1* 8.1* 8.0*     Recent Labs     07/05/20  1132 07/06/20  0240 07/07/20  0255   APTT 82.1* 81.9* 76.2*               Imaging  DX-FOOT-2- RIGHT   Final Result         Irregular appearance of the base of the fifth proximal phalanx and fracture is possible.      US-EXTREMITY VENOUS LOWER BILAT   Final Result      EC-ECHOCARDIOGRAM COMPLETE W/O CONT   Final Result      IR-CYST ASPIRATION-MISC   Final Result         ULTRASOUND GUIDED ASPIRATION OF SUBMANDIBULAR ABSCESS..      CT-SOFT TISSUE NECK WITH   Final Result         1. Acute versus subacute fracture of the mid mandibular body.   2. A 2.9 cm collection subjacent to the fracture site, likely hematoma. Infected hematoma can be considered in the appropriate clinical settings.   3. No  cervical lymphadenopathy.   4. Moderate mucosal thickening of the left maxillary sinus, likely odontogenic in nature, related to impacted remaining maxillary tooth.      CT-CHEST,ABDOMEN,PELVIS WITH   Final Result      Hypodensities in the spleen worrisome for splenic infarcts.      Trace nonspecific free fluid in the pelvis.      2.2 x 1.7 cm left common femoral artery pseudoaneurysm.      Bowel containing right inguinal hernia without evidence of obstruction.      Atherosclerotic plaque within an ectatic aorta.      Gallbladder wall thickening/pericholecystic fluid. Further evaluation can be performed with right upper quadrant ultrasound.      Small hypodense left renal lesions are too small to characterize but likely represent small cysts.      Emphysematous changes.      Irregular nodular opacity along the minor fissure measuring 8 mm.      4.7 mm right middle lobe pulmonary nodule.      Mild bibasilar and lingular atelectasis.      Emphysematous changes.      Sequelae of prior granulomatous exposure.      Right hilar lymph node is borderline enlarged and nonspecific, possibly reactive..      Enlarged periportal lymph nodes are nonspecific.      Fleischner Society pulmonary nodule recommendations:   Low Risk: CT at 3-6 months, then consider CT at 18-24 months      High Risk: CT at 3-6 months, then at 18-24 months      Comments: Use most suspicious nodule as guide to management. Follow-up intervals may vary according to size and risk.      Low Risk - Minimal or absent history of smoking and of other known risk factors.      High Risk - History of smoking or of other known risk factors.      Note: These recommendations do not apply to lung cancer screening, patients with immunosuppression, or patients with known primary cancer.      Fleischner Society 2017 Guidelines for Management of Incidentally Detected Pulmonary Nodules in Adults      DX-CHEST-2 VIEWS   Final Result      No acute cardiopulmonary abnormality.       US-EXTREMITY ARTERY LOWER UNILAT RIGHT   Final Result      US-ABDOMEN COMPLETE SURVEY   Final Result      Prominent trabeculated bladder wall, suggesting possible chronic bladder outlet obstruction. Unremarkable findings otherwise.         DX-CHEST-PORTABLE (1 VIEW)   Final Result      No acute cardiac or pulmonary abnormalities are identified.           Assessment/Plan  * Fever- (present on admission)  Assessment & Plan  abx as per iD    Follow cbc    Follow all cultures        Mandibular abscess- (present on admission)  Assessment & Plan  Growing strep viridans    Oral surgery dr vasquez s/p ORIF    F/u op report    Pancytopenia (HCC)- (present on admission)  Assessment & Plan  Present for a few months, unclear etiology    abx as per id    Hematology/oncology were consulted 6/28/2020  Bone marrow biopsy done   Check HIT antibody    Transfuse plt on 7/3- to prepare for surgery on mandible    S/P TAVR (transcatheter aortic valve replacement)- (present on admission)  Assessment & Plan  S/P TAVR on 6/22/2020   . Continue statin  Per cardiology start B12 and folate supplements  Echocardiogram shows no vegetations  Cardiology is following    Mixed hyperlipidemia- (present on admission)  Assessment & Plan  Continue home Lovastatin    Essential hypertension- (present on admission)  Assessment & Plan    Continue Norvasc    Added lisinopril 10mg pom daily 7/7    Toe fracture, right  Assessment & Plan  Splint    Pain control    HIT (heparin-induced thrombocytopenia) (HCC)  Assessment & Plan  huseyin pending    Started on agratraban on  7/4    Hypokalemia  Assessment & Plan  Resolved     Panlobular emphysema (HCC)- (present on admission)  Assessment & Plan  History of tobacco use  Chest x-ray from admission shows no acute cardiopulmonary process    Tobacco dependence- (present on admission)  Assessment & Plan  Patient has been counseled on tobacco cessation  Nicotine replacement options were provided       VTE prophylaxis:  scd      Check am cbc, bmp

## 2020-07-08 LAB
ANION GAP SERPL CALC-SCNC: 12 MMOL/L (ref 7–16)
APTT PPP: 70.4 SEC (ref 24.7–36)
BUN SERPL-MCNC: 11 MG/DL (ref 8–22)
CALCIUM SERPL-MCNC: 7.8 MG/DL (ref 8.5–10.5)
CHLORIDE SERPL-SCNC: 99 MMOL/L (ref 96–112)
CO2 SERPL-SCNC: 22 MMOL/L (ref 20–33)
CREAT SERPL-MCNC: 0.61 MG/DL (ref 0.5–1.4)
EKG IMPRESSION: NORMAL
ERYTHROCYTE [DISTWIDTH] IN BLOOD BY AUTOMATED COUNT: 46.9 FL (ref 35.9–50)
GLUCOSE SERPL-MCNC: 122 MG/DL (ref 65–99)
HCT VFR BLD AUTO: 25.1 % (ref 42–52)
HGB BLD-MCNC: 7.9 G/DL (ref 14–18)
MCH RBC QN AUTO: 27.5 PG (ref 27–33)
MCHC RBC AUTO-ENTMCNC: 31.5 G/DL (ref 33.7–35.3)
MCV RBC AUTO: 87.5 FL (ref 81.4–97.8)
PLATELET # BLD AUTO: 54 K/UL (ref 164–446)
PMV BLD AUTO: 11.2 FL (ref 9–12.9)
POTASSIUM SERPL-SCNC: 3.4 MMOL/L (ref 3.6–5.5)
RBC # BLD AUTO: 2.87 M/UL (ref 4.7–6.1)
SODIUM SERPL-SCNC: 133 MMOL/L (ref 135–145)
SRA 0.1IU/ML UFH SER-ACNC: 89 %
SRA 100IU/ML UFH SER-ACNC: 0 %
SRA PORCINE INTERP SER-IMP: POSITIVE
SRA UFH SER-IMP: ABNORMAL
WBC # BLD AUTO: 2.1 K/UL (ref 4.8–10.8)

## 2020-07-08 PROCEDURE — A9270 NON-COVERED ITEM OR SERVICE: HCPCS | Performed by: INTERNAL MEDICINE

## 2020-07-08 PROCEDURE — 770020 HCHG ROOM/CARE - TELE (206)

## 2020-07-08 PROCEDURE — 93005 ELECTROCARDIOGRAM TRACING: CPT | Performed by: HOSPITALIST

## 2020-07-08 PROCEDURE — 93010 ELECTROCARDIOGRAM REPORT: CPT | Performed by: INTERNAL MEDICINE

## 2020-07-08 PROCEDURE — 36415 COLL VENOUS BLD VENIPUNCTURE: CPT

## 2020-07-08 PROCEDURE — 700102 HCHG RX REV CODE 250 W/ 637 OVERRIDE(OP): Performed by: HOSPITALIST

## 2020-07-08 PROCEDURE — 700111 HCHG RX REV CODE 636 W/ 250 OVERRIDE (IP): Performed by: INTERNAL MEDICINE

## 2020-07-08 PROCEDURE — 700102 HCHG RX REV CODE 250 W/ 637 OVERRIDE(OP): Performed by: INTERNAL MEDICINE

## 2020-07-08 PROCEDURE — 700105 HCHG RX REV CODE 258: Performed by: INTERNAL MEDICINE

## 2020-07-08 PROCEDURE — 700111 HCHG RX REV CODE 636 W/ 250 OVERRIDE (IP): Mod: JG | Performed by: HOSPITALIST

## 2020-07-08 PROCEDURE — 85730 THROMBOPLASTIN TIME PARTIAL: CPT

## 2020-07-08 PROCEDURE — 99232 SBSQ HOSP IP/OBS MODERATE 35: CPT | Performed by: HOSPITALIST

## 2020-07-08 PROCEDURE — 85027 COMPLETE CBC AUTOMATED: CPT

## 2020-07-08 PROCEDURE — 80048 BASIC METABOLIC PNL TOTAL CA: CPT

## 2020-07-08 PROCEDURE — A9270 NON-COVERED ITEM OR SERVICE: HCPCS | Performed by: HOSPITALIST

## 2020-07-08 RX ORDER — POTASSIUM CHLORIDE 20 MEQ/1
40 TABLET, EXTENDED RELEASE ORAL ONCE
Status: COMPLETED | OUTPATIENT
Start: 2020-07-08 | End: 2020-07-08

## 2020-07-08 RX ADMIN — POTASSIUM CHLORIDE 40 MEQ: 1500 TABLET, EXTENDED RELEASE ORAL at 11:52

## 2020-07-08 RX ADMIN — HYDROCODONE BITARTRATE AND ACETAMINOPHEN 1 TABLET: 5; 325 TABLET ORAL at 21:31

## 2020-07-08 RX ADMIN — AMPICILLIN SODIUM AND SULBACTAM SODIUM 3 G: 2; 1 INJECTION, POWDER, FOR SOLUTION INTRAMUSCULAR; INTRAVENOUS at 19:58

## 2020-07-08 RX ADMIN — ATENOLOL 25 MG: 25 TABLET ORAL at 05:46

## 2020-07-08 RX ADMIN — AMPICILLIN SODIUM AND SULBACTAM SODIUM 3 G: 2; 1 INJECTION, POWDER, FOR SOLUTION INTRAMUSCULAR; INTRAVENOUS at 05:46

## 2020-07-08 RX ADMIN — LISINOPRIL 10 MG: 10 TABLET ORAL at 05:46

## 2020-07-08 RX ADMIN — ARGATROBAN 0.89 MCG/KG/MIN: 50 INJECTION INTRAVENOUS at 19:56

## 2020-07-08 RX ADMIN — DOCUSATE SODIUM 50 MG AND SENNOSIDES 8.6 MG 2 TABLET: 8.6; 5 TABLET, FILM COATED ORAL at 17:36

## 2020-07-08 RX ADMIN — HYDROCODONE BITARTRATE AND ACETAMINOPHEN 1 TABLET: 5; 325 TABLET ORAL at 17:36

## 2020-07-08 RX ADMIN — LOVASTATIN 20 MG: 20 TABLET ORAL at 20:40

## 2020-07-08 RX ADMIN — AMPICILLIN SODIUM AND SULBACTAM SODIUM 3 G: 2; 1 INJECTION, POWDER, FOR SOLUTION INTRAMUSCULAR; INTRAVENOUS at 23:49

## 2020-07-08 RX ADMIN — AMPICILLIN SODIUM AND SULBACTAM SODIUM 3 G: 2; 1 INJECTION, POWDER, FOR SOLUTION INTRAMUSCULAR; INTRAVENOUS at 11:52

## 2020-07-08 ASSESSMENT — ENCOUNTER SYMPTOMS
BRUISES/BLEEDS EASILY: 0
BLURRED VISION: 0
NECK PAIN: 0
PALPITATIONS: 0
FOCAL WEAKNESS: 0
BACK PAIN: 0
HEADACHES: 0
VOMITING: 0
TREMORS: 0
ABDOMINAL PAIN: 0
DOUBLE VISION: 0
DEPRESSION: 0
CLAUDICATION: 0
MYALGIAS: 1
HEARTBURN: 0
DIZZINESS: 0
ORTHOPNEA: 0
NAUSEA: 0
COUGH: 0
PHOTOPHOBIA: 0
FEVER: 1
CHILLS: 0
SPUTUM PRODUCTION: 0
WEIGHT LOSS: 0

## 2020-07-08 ASSESSMENT — COGNITIVE AND FUNCTIONAL STATUS - GENERAL
SUGGESTED CMS G CODE MODIFIER DAILY ACTIVITY: CH
CLIMB 3 TO 5 STEPS WITH RAILING: A LITTLE
MOBILITY SCORE: 23
DAILY ACTIVITIY SCORE: 24
SUGGESTED CMS G CODE MODIFIER MOBILITY: CI

## 2020-07-08 ASSESSMENT — LIFESTYLE VARIABLES: SUBSTANCE_ABUSE: 0

## 2020-07-08 NOTE — PROGRESS NOTES
Oncology/Hematology Progress Note               Author: Denise Cabello M.D. Date & Time created: 7/7/2020  5:04 PM     CC: Pancytopenia    Interval History:  Pt reports he feels fine today. No new concerns. Reports his neck is tender, but not much different than prior.     Review of Systems:  Review of Systems   Constitutional: Positive for malaise/fatigue. Negative for chills and fever.   HENT: Negative for hearing loss and tinnitus.    Eyes: Negative for blurred vision and double vision.   Respiratory: Negative for cough.    Cardiovascular: Negative for chest pain.   Gastrointestinal: Negative for heartburn and nausea.   Genitourinary: Negative for dysuria.   Musculoskeletal: Positive for neck pain (from recent abscess drainage). Negative for back pain and joint pain.   Skin: Negative for rash.   Neurological: Negative for dizziness and headaches.   Endo/Heme/Allergies: Does not bruise/bleed easily.   Psychiatric/Behavioral: The patient is nervous/anxious.        Physical Exam:  Physical Exam  Constitutional:       General: He is not in acute distress.     Appearance: Normal appearance.   HENT:      Head: Normocephalic and atraumatic.      Right Ear: External ear normal.      Left Ear: External ear normal.      Nose: Nose normal.      Mouth/Throat:      Mouth: Mucous membranes are moist.      Pharynx: Oropharynx is clear.   Eyes:      General: No scleral icterus.     Conjunctiva/sclera: Conjunctivae normal.   Neck:      Musculoskeletal: Neck supple. Muscular tenderness present.   Cardiovascular:      Rate and Rhythm: Normal rate and regular rhythm.   Pulmonary:      Effort: Pulmonary effort is normal. No respiratory distress.      Breath sounds: Normal breath sounds.   Abdominal:      General: Abdomen is flat. Bowel sounds are normal. There is no distension.      Palpations: Abdomen is soft.   Musculoskeletal:         General: No swelling or tenderness.   Skin:     Coloration: Skin is not jaundiced.       Findings: No bruising or erythema.   Neurological:      General: No focal deficit present.      Mental Status: He is alert and oriented to person, place, and time.   Psychiatric:         Mood and Affect: Mood normal.         Behavior: Behavior normal.         Thought Content: Thought content normal.         Judgment: Judgment normal.         Labs:          Recent Labs     20  0255   SODIUM 129* 131* 132*   POTASSIUM 3.8 3.7 3.7   CHLORIDE 97 98 98   CO2 21 22 23   BUN 16 14 10   CREATININE 0.79 0.66 0.68   CALCIUM 8.1* 8.1* 8.0*     Recent Labs     20  025   GLUCOSE 107* 112* 107*     Recent Labs     20  1132 20  025   RBC 2.95*  --   --  2.79* 2.90*   HEMOGLOBIN 8.4*  --   --  8.0* 8.3*   HEMATOCRIT 26.9*  --   --  24.3* 25.3*   PLATELETCT 67*  --   --  57* 65*   APTT  --    < > 82.1* 81.9* 76.2*    < > = values in this interval not displayed.     Recent Labs     20  025   WBC 2.1* 2.3* 2.1*   NEUTSPOLYS 26.30* 21.70* 20.90*   LYMPHOCYTES 49.10* 53.00* 59.10*   MONOCYTES 19.30* 20.90* 16.50*   EOSINOPHILS 1.80 1.70 1.70   BASOPHILS 0.00 1.70 1.70     Recent Labs     20  0255   SODIUM 129* 131* 132*   POTASSIUM 3.8 3.7 3.7   CHLORIDE 97 98 98   CO2 21 22 23   GLUCOSE 107* 112* 107*   BUN 16 14 10   CREATININE 0.79 0.66 0.68   CALCIUM 8.1* 8.1* 8.0*     Hemodynamics:  Temp (24hrs), Av.8 °C (98.2 °F), Min:36.3 °C (97.3 °F), Max:37.3 °C (99.2 °F)  Temperature: 36.3 °C (97.3 °F)  Pulse  Av.3  Min: 49  Max: 110   Blood Pressure : 118/67     Respiratory:    Respiration: 18, Pulse Oximetry: 93 %           Fluids:    Intake/Output Summary (Last 24 hours) at 2020 1136  Last data filed at 2020 0900  Gross per 24 hour   Intake 240 ml   Output --   Net 240 ml     Weight: 61 kg (134 lb 7.7 oz)  GI/Nutrition:  Orders Placed  This Encounter   Procedures   • Diet Order Full Liquid     Standing Status:   Standing     Number of Occurrences:   1     Order Specific Question:   Diet:     Answer:   Full Liquid [11]     Medical Decision Making, by Problem:  Active Hospital Problems    Diagnosis   • *Fever [R50.9]   • Pancytopenia (HCC) [D61.818]   • S/P TAVR (transcatheter aortic valve replacement) [Z95.2]   • Essential hypertension [I10]   • Mixed hyperlipidemia [E78.2]   • Panlobular emphysema (HCC) [J43.1]   • Tobacco dependence [F17.200]       Assessment and Plan:    1. Thrombocytopenia     --- Platelets stable today at 65,000  --- HIT IgG antibody: Optical density: 2.9 positive, BULL is pending  --- anticoagulation argatroban started 7/4/2020 and continues  --- Will await BULL before deciding on further anti-coagulation, if BULL negative then will discontinue    2.  Neutropenia    --- Wait on the final bone marrow report from [facility].  There is some concern for LGL.  -- Will touch base with pathology today to see when results might be available, still awaiting these results, no pre-[facility] report even issued, will ask for this    3.  Anemia    --- His hemoglobin has been stable around 8, monitor, awaiting BM biopsy results    4.  Infectious disease    --- Patient had I&D of this abscess under his chin on 7/4/2020.  --- ID is following  ----COVID test negative on 7/2/2020  ----ID can decide if he needs coverage for gram-negative as well since he is neutropenic.    High complexity/complex decision making  This patient was seen under COVID 19 pandemic disaster response conditions.  During a disaster, the provisions of care is subject to the Crisis Standard of Care    yQuality-Core Measures   Reviewed items::  Labs reviewed, Radiology images reviewed and Medications reviewed

## 2020-07-08 NOTE — PROGRESS NOTES
Dr Urena notified pt is in/out of Acc Junctional rhytm, VSS, not symptomatic. No new orders at this time.

## 2020-07-08 NOTE — CARE PLAN
Problem: Communication  Goal: The ability to communicate needs accurately and effectively will improve  Outcome: PROGRESSING AS EXPECTED  Note: Patient educated on medications, procedures and use of call light. Patient will continue to verbalize and improve on education and communication in the plan of care.        Problem: Infection  Goal: Will remain free from infection  Outcome: PROGRESSING AS EXPECTED  Note: Patient demonstrated proper hand hygiene after toileting. Hand sanitizing wipes provided to patient for bedside use. Patient educated on the importance of hand hygiene among staff and patients in preventing the spread of infection. Patient verbalized understanding.

## 2020-07-08 NOTE — CARE PLAN
Problem: Safety  Goal: Will remain free from injury  Outcome: PROGRESSING AS EXPECTED   Pt educated on safety precautions and precautions in place. Treaded socks on, bed locked and in lowest position, belongings and call light within reach.    Problem: Pain Management  Goal: Pain level will decrease to patient's comfort goal  Outcome: PROGRESSING AS EXPECTED  Patient educated on pain rating scale, pain control management, nonpharmacologic methods of pain management and reaching a tolerable level. Pain medication provided PRN in addition to nonpharmacologic methods. Patient verbalizes understanding of teaching and has no additional questions at this time.

## 2020-07-08 NOTE — PROGRESS NOTES
"Dr Urena notified, pt reported pain 6/10 in his right 5th toe, which was swallen, tender to touch, and blue. 4th right toe was also swollen. Pt reported, \" I don't remember when and where I bumped my toe, it must off happened when I was getting up to the bathroom\". Per Dr Urena, DX FOOT will be ordered to check for fracture. Will continue to monitor.     "

## 2020-07-08 NOTE — PROGRESS NOTES
Post op note:     S/p ORIF mandible fracture, bone graft to mandible, and I+D abscess on 7/3     Plan:    Procedure went well - pt can follow up outpatient in the next week for penrose drain removal.     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     Patient can follow up outpatient   Call [phone] with questions     Tamiko

## 2020-07-08 NOTE — PROGRESS NOTES
[facility] Medicine Daily Progress Note    Date of Service  7/8/2020    Chief Complaint  65 y.o. male admitted 6/23/2020 with tachycardia.  He had a TAVR on 6/22/2020, and was discharged home on 6/23/2020.  He returned to the ER due to persistent tachycardia.     He has a history of pancytopenia which is worsening, and has been hesitant to do a bone marrow biopsy.  He has tobacco dependence, essential hypertension, hyperlipidemia.       Hospital Course    Home Eliquis and aspirin were discontinued.  Heart rate stabilized. He continued to have decline in his hemoglobin and platelets. WBC stabilized. Briefly discussed with hem/onc.  Abdominal ultrasound from 6/25/2020 showed no acute abnormalities.  SPEP, smooth muscle antibodies, SEEMA, rheumatoid factor, B12 and folate levels, fibrinogen were ordered.  Patient wished to go home and agreed follow-up with hematology for bone marrow biopsy and further work-up.  He was discharged to go home on 6/26/2020, but developed a fever of 103.6.     Infectious disease were consulted on 6/27/2020 due to fever and neutropenia, Cefepime was empirically started.     B12, folate, and fibrinogen levels were normal.  Rheumatoid factor was 40, ESR was 45, LFTs were unremarkable, SEEMA was undetectable, HIV was nonreactive.      Interval Problem Update    Right fifth toe injury- unclear how he got it    Xray shows likely toe fracture    tmax 99    Low  Hb 7.9  Low plt 54     bp is elevated    Wbc 2.1    Sodium 133    Chin pain, intensity 2/10,  no radiation, only when he opens mouth    S/p orif mandible on 7/3    Hit positive-- BULL sent off.. on iv agrataban    bull still pending    echoCardiogram shows no vegetations on valves    Subspecialty Mds requesting JOON- dr archibald aware- will do JOON on Monday--> cancelled due to recent jaw surgery.     Consultants/Specialty  ID  oncology    Code Status  full    Disposition  home    Review of Systems  Review of Systems   Constitutional: Positive for  fever. Negative for chills, malaise/fatigue and weight loss.   HENT: Negative for ear discharge, hearing loss and tinnitus.    Eyes: Negative for blurred vision, double vision and photophobia.   Respiratory: Negative for cough and sputum production.    Cardiovascular: Negative for palpitations, orthopnea and claudication.   Gastrointestinal: Negative for abdominal pain, heartburn, nausea and vomiting.   Genitourinary: Negative for frequency and urgency.   Musculoskeletal: Positive for joint pain and myalgias. Negative for back pain and neck pain.   Neurological: Negative for dizziness, tremors, focal weakness and headaches.   Endo/Heme/Allergies: Does not bruise/bleed easily.   Psychiatric/Behavioral: Negative for depression and substance abuse.        Physical Exam  Temp:  [36.3 °C (97.3 °F)-38.8 °C (101.9 °F)] 37.2 °C (98.9 °F)  Pulse:  [58-76] 58  Resp:  [18] 18  BP: (118-147)/(57-72) 118/66  SpO2:  [90 %-93 %] 93 %    Physical Exam  Constitutional:       Appearance: Normal appearance. He is not diaphoretic.   HENT:      Head:      Comments: Submandibular swelling/induration and tenderness- markedly decreased         Mouth/Throat:      Mouth: Mucous membranes are moist.   Eyes:      General: No scleral icterus.     Extraocular Movements: Extraocular movements intact.      Pupils: Pupils are equal, round, and reactive to light.   Neck:      Musculoskeletal: Normal range of motion and neck supple.   Cardiovascular:      Rate and Rhythm: Normal rate.      Heart sounds: No murmur. No gallop.    Pulmonary:      Effort: Pulmonary effort is normal. No respiratory distress.      Breath sounds: No stridor. No wheezing.   Chest:      Chest wall: No tenderness.   Abdominal:      General: Abdomen is flat. There is no distension.      Tenderness: There is no guarding.      Hernia: No hernia is present.   Musculoskeletal: Normal range of motion.         General: Deformity (right fifth toe) and signs of injury present. No  swelling or tenderness.      Right lower leg: No edema.   Skin:     General: Skin is warm.      Capillary Refill: Capillary refill takes 2 to 3 seconds.      Coloration: Skin is not jaundiced or pale.      Findings: No bruising or rash.   Neurological:      General: No focal deficit present.      Mental Status: He is alert and oriented to person, place, and time.      Cranial Nerves: No cranial nerve deficit.      Deep Tendon Reflexes: Reflexes normal.   Psychiatric:         Mood and Affect: Mood normal.         Fluids    Intake/Output Summary (Last 24 hours) at 7/8/2020 1109  Last data filed at 7/8/2020 0743  Gross per 24 hour   Intake 480 ml   Output 300 ml   Net 180 ml       Laboratory  Recent Labs     07/06/20  0240 07/07/20 0255 07/08/20  0255   WBC 2.3* 2.1* 2.1*   RBC 2.79* 2.90* 2.87*   HEMOGLOBIN 8.0* 8.3* 7.9*   HEMATOCRIT 24.3* 25.3* 25.1*   MCV 87.1 87.2 87.5   MCH 28.7 28.6 27.5   MCHC 32.9* 32.8* 31.5*   RDW 47.5 46.2 46.9   PLATELETCT 57* 65* 54*   MPV 10.8 12.7 11.2     Recent Labs     07/06/20  0240 07/07/20 0255 07/08/20  0255   SODIUM 131* 132* 133*   POTASSIUM 3.7 3.7 3.4*   CHLORIDE 98 98 99   CO2 22 23 22   GLUCOSE 112* 107* 122*   BUN 14 10 11   CREATININE 0.66 0.68 0.61   CALCIUM 8.1* 8.0* 7.8*     Recent Labs     07/06/20  0240 07/07/20  0255 07/08/20  0255   APTT 81.9* 76.2* 70.4*               Imaging  DX-FOOT-2- RIGHT   Final Result         Irregular appearance of the base of the fifth proximal phalanx and fracture is possible.      US-EXTREMITY VENOUS LOWER BILAT   Final Result      EC-ECHOCARDIOGRAM COMPLETE W/O CONT   Final Result      IR-CYST ASPIRATION-MISC   Final Result         ULTRASOUND GUIDED ASPIRATION OF SUBMANDIBULAR ABSCESS..      CT-SOFT TISSUE NECK WITH   Final Result         1. Acute versus subacute fracture of the mid mandibular body.   2. A 2.9 cm collection subjacent to the fracture site, likely hematoma. Infected hematoma can be considered in the appropriate  clinical settings.   3. No cervical lymphadenopathy.   4. Moderate mucosal thickening of the left maxillary sinus, likely odontogenic in nature, related to impacted remaining maxillary tooth.      CT-CHEST,ABDOMEN,PELVIS WITH   Final Result      Hypodensities in the spleen worrisome for splenic infarcts.      Trace nonspecific free fluid in the pelvis.      2.2 x 1.7 cm left common femoral artery pseudoaneurysm.      Bowel containing right inguinal hernia without evidence of obstruction.      Atherosclerotic plaque within an ectatic aorta.      Gallbladder wall thickening/pericholecystic fluid. Further evaluation can be performed with right upper quadrant ultrasound.      Small hypodense left renal lesions are too small to characterize but likely represent small cysts.      Emphysematous changes.      Irregular nodular opacity along the minor fissure measuring 8 mm.      4.7 mm right middle lobe pulmonary nodule.      Mild bibasilar and lingular atelectasis.      Emphysematous changes.      Sequelae of prior granulomatous exposure.      Right hilar lymph node is borderline enlarged and nonspecific, possibly reactive..      Enlarged periportal lymph nodes are nonspecific.      Fleischner Society pulmonary nodule recommendations:   Low Risk: CT at 3-6 months, then consider CT at 18-24 months      High Risk: CT at 3-6 months, then at 18-24 months      Comments: Use most suspicious nodule as guide to management. Follow-up intervals may vary according to size and risk.      Low Risk - Minimal or absent history of smoking and of other known risk factors.      High Risk - History of smoking or of other known risk factors.      Note: These recommendations do not apply to lung cancer screening, patients with immunosuppression, or patients with known primary cancer.      Fleischner Society 2017 Guidelines for Management of Incidentally Detected Pulmonary Nodules in Adults      DX-CHEST-2 VIEWS   Final Result      No acute  cardiopulmonary abnormality.      US-EXTREMITY ARTERY LOWER UNILAT RIGHT   Final Result      US-ABDOMEN COMPLETE SURVEY   Final Result      Prominent trabeculated bladder wall, suggesting possible chronic bladder outlet obstruction. Unremarkable findings otherwise.         DX-CHEST-PORTABLE (1 VIEW)   Final Result      No acute cardiac or pulmonary abnormalities are identified.           Assessment/Plan  * Fever- (present on admission)  Assessment & Plan  abx as per iD    Follow cbc    Follow all cultures        Mandibular abscess- (present on admission)  Assessment & Plan  Growing strep viridans    Oral surgery dr vasquez s/p ORIF    F/u op report    Pancytopenia (HCC)- (present on admission)  Assessment & Plan  Present for a few months, unclear etiology    abx as per id    Hematology/oncology were consulted 6/28/2020  Bone marrow biopsy done   Check HIT antibody    Transfuse plt on 7/3- to prepare for surgery on mandible    S/P TAVR (transcatheter aortic valve replacement)- (present on admission)  Assessment & Plan  S/P TAVR on 6/22/2020   . Continue statin  Per cardiology start B12 and folate supplements  Echocardiogram shows no vegetations  Cardiology is following    Mixed hyperlipidemia- (present on admission)  Assessment & Plan  Continue home Lovastatin    Essential hypertension- (present on admission)  Assessment & Plan    Continue Norvasc    Added lisinopril 10mg po daily 7/7    Toe fracture, right  Assessment & Plan  Splint    Pain control    HIT (heparin-induced thrombocytopenia) (HCC)  Assessment & Plan  huseyin pending    Started on agratraban on  7/4    Hypokalemia  Assessment & Plan  Resolved     Panlobular emphysema (HCC)- (present on admission)  Assessment & Plan  History of tobacco use  Chest x-ray from admission shows no acute cardiopulmonary process    Tobacco dependence- (present on admission)  Assessment & Plan  Patient has been counseled on tobacco cessation  Nicotine replacement options were  provided       VTE prophylaxis: scd      Check am cbc

## 2020-07-09 LAB
ANISOCYTOSIS BLD QL SMEAR: ABNORMAL
APTT PPP: 72.4 SEC (ref 24.7–36)
BASOPHILS # BLD AUTO: 1.8 % (ref 0–1.8)
BASOPHILS # BLD: 0.03 K/UL (ref 0–0.12)
EOSINOPHIL # BLD AUTO: 0.02 K/UL (ref 0–0.51)
EOSINOPHIL NFR BLD: 0.9 % (ref 0–6.9)
ERYTHROCYTE [DISTWIDTH] IN BLOOD BY AUTOMATED COUNT: 48.8 FL (ref 35.9–50)
HCT VFR BLD AUTO: 28.4 % (ref 42–52)
HGB BLD-MCNC: 8.8 G/DL (ref 14–18)
LYMPHOCYTES # BLD AUTO: 1.08 K/UL (ref 1–4.8)
LYMPHOCYTES NFR BLD: 60.2 % (ref 22–41)
MACROCYTES BLD QL SMEAR: ABNORMAL
MANUAL DIFF BLD: NORMAL
MCH RBC QN AUTO: 27.8 PG (ref 27–33)
MCHC RBC AUTO-ENTMCNC: 31 G/DL (ref 33.7–35.3)
MCV RBC AUTO: 89.9 FL (ref 81.4–97.8)
MONOCYTES # BLD AUTO: 0.48 K/UL (ref 0–0.85)
MONOCYTES NFR BLD AUTO: 26.5 % (ref 0–13.4)
MORPHOLOGY BLD-IMP: NORMAL
MYELOCYTES NFR BLD MANUAL: 0.9 %
NEUTROPHILS # BLD AUTO: 0.17 K/UL (ref 1.82–7.42)
NEUTROPHILS NFR BLD: 9.7 % (ref 44–72)
NRBC # BLD AUTO: 0 K/UL
NRBC BLD-RTO: 0 /100 WBC
PLATELET # BLD AUTO: 58 K/UL (ref 164–446)
PLATELET BLD QL SMEAR: NORMAL
PMV BLD AUTO: 11.7 FL (ref 9–12.9)
POLYCHROMASIA BLD QL SMEAR: NORMAL
RBC # BLD AUTO: 3.16 M/UL (ref 4.7–6.1)
RBC BLD AUTO: PRESENT
WBC # BLD AUTO: 1.8 K/UL (ref 4.8–10.8)

## 2020-07-09 PROCEDURE — 700102 HCHG RX REV CODE 250 W/ 637 OVERRIDE(OP): Performed by: HOSPITALIST

## 2020-07-09 PROCEDURE — 99232 SBSQ HOSP IP/OBS MODERATE 35: CPT | Performed by: HOSPITALIST

## 2020-07-09 PROCEDURE — 700111 HCHG RX REV CODE 636 W/ 250 OVERRIDE (IP): Performed by: INTERNAL MEDICINE

## 2020-07-09 PROCEDURE — 85027 COMPLETE CBC AUTOMATED: CPT

## 2020-07-09 PROCEDURE — 85730 THROMBOPLASTIN TIME PARTIAL: CPT

## 2020-07-09 PROCEDURE — 700111 HCHG RX REV CODE 636 W/ 250 OVERRIDE (IP): Mod: JG | Performed by: HOSPITALIST

## 2020-07-09 PROCEDURE — A9270 NON-COVERED ITEM OR SERVICE: HCPCS | Performed by: ORAL & MAXILLOFACIAL SURGERY

## 2020-07-09 PROCEDURE — 770020 HCHG ROOM/CARE - TELE (206)

## 2020-07-09 PROCEDURE — 700102 HCHG RX REV CODE 250 W/ 637 OVERRIDE(OP): Performed by: ORAL & MAXILLOFACIAL SURGERY

## 2020-07-09 PROCEDURE — 85007 BL SMEAR W/DIFF WBC COUNT: CPT

## 2020-07-09 PROCEDURE — 700105 HCHG RX REV CODE 258: Performed by: INTERNAL MEDICINE

## 2020-07-09 PROCEDURE — A9270 NON-COVERED ITEM OR SERVICE: HCPCS | Performed by: INTERNAL MEDICINE

## 2020-07-09 PROCEDURE — 99233 SBSQ HOSP IP/OBS HIGH 50: CPT | Performed by: INTERNAL MEDICINE

## 2020-07-09 PROCEDURE — 700102 HCHG RX REV CODE 250 W/ 637 OVERRIDE(OP): Performed by: INTERNAL MEDICINE

## 2020-07-09 PROCEDURE — 36415 COLL VENOUS BLD VENIPUNCTURE: CPT

## 2020-07-09 PROCEDURE — A9270 NON-COVERED ITEM OR SERVICE: HCPCS | Performed by: HOSPITALIST

## 2020-07-09 RX ORDER — BACITRACIN ZINC 500 [USP'U]/G
OINTMENT TOPICAL 2 TIMES DAILY
Status: DISCONTINUED | OUTPATIENT
Start: 2020-07-10 | End: 2020-07-13

## 2020-07-09 RX ORDER — CHLORHEXIDINE GLUCONATE ORAL RINSE 1.2 MG/ML
15 SOLUTION DENTAL 4 TIMES DAILY
Status: DISCONTINUED | OUTPATIENT
Start: 2020-07-09 | End: 2020-07-15 | Stop reason: HOSPADM

## 2020-07-09 RX ADMIN — CHLORHEXIDINE GLUCONATE 0.12% ORAL RINSE 15 ML: 1.2 LIQUID ORAL at 20:52

## 2020-07-09 RX ADMIN — HYDROCODONE BITARTRATE AND ACETAMINOPHEN 1 TABLET: 5; 325 TABLET ORAL at 04:50

## 2020-07-09 RX ADMIN — AMPICILLIN SODIUM AND SULBACTAM SODIUM 3 G: 2; 1 INJECTION, POWDER, FOR SOLUTION INTRAMUSCULAR; INTRAVENOUS at 17:40

## 2020-07-09 RX ADMIN — ATENOLOL 25 MG: 25 TABLET ORAL at 04:50

## 2020-07-09 RX ADMIN — AMPICILLIN SODIUM AND SULBACTAM SODIUM 3 G: 2; 1 INJECTION, POWDER, FOR SOLUTION INTRAMUSCULAR; INTRAVENOUS at 12:14

## 2020-07-09 RX ADMIN — AMPICILLIN SODIUM AND SULBACTAM SODIUM 3 G: 2; 1 INJECTION, POWDER, FOR SOLUTION INTRAMUSCULAR; INTRAVENOUS at 23:33

## 2020-07-09 RX ADMIN — ARGATROBAN 0.89 MCG/KG/MIN: 50 INJECTION INTRAVENOUS at 16:45

## 2020-07-09 RX ADMIN — LOVASTATIN 20 MG: 20 TABLET ORAL at 20:52

## 2020-07-09 RX ADMIN — HYDROCODONE BITARTRATE AND ACETAMINOPHEN 1 TABLET: 5; 325 TABLET ORAL at 17:40

## 2020-07-09 RX ADMIN — AMPICILLIN SODIUM AND SULBACTAM SODIUM 3 G: 2; 1 INJECTION, POWDER, FOR SOLUTION INTRAMUSCULAR; INTRAVENOUS at 04:51

## 2020-07-09 RX ADMIN — ACETAMINOPHEN 650 MG: 325 TABLET, FILM COATED ORAL at 12:13

## 2020-07-09 RX ADMIN — LISINOPRIL 10 MG: 10 TABLET ORAL at 04:50

## 2020-07-09 ASSESSMENT — ENCOUNTER SYMPTOMS
CLAUDICATION: 0
FEVER: 1
FOCAL WEAKNESS: 0
PALPITATIONS: 0
HEADACHES: 0
NAUSEA: 0
ORTHOPNEA: 0
VOMITING: 0
PHOTOPHOBIA: 0
BACK PAIN: 0
DOUBLE VISION: 0
NECK PAIN: 1
TREMORS: 0
DEPRESSION: 0
BLURRED VISION: 0
ABDOMINAL PAIN: 0
DIARRHEA: 0
FEVER: 0
NERVOUS/ANXIOUS: 1
SHORTNESS OF BREATH: 0
BRUISES/BLEEDS EASILY: 0
COUGH: 0
CONSTIPATION: 0
DIZZINESS: 0
CHILLS: 0
SPUTUM PRODUCTION: 0
WEIGHT LOSS: 0
NECK PAIN: 0
HEARTBURN: 0
MYALGIAS: 1

## 2020-07-09 ASSESSMENT — LIFESTYLE VARIABLES: SUBSTANCE_ABUSE: 0

## 2020-07-09 ASSESSMENT — FIBROSIS 4 INDEX: FIB4 SCORE: 11.11

## 2020-07-09 NOTE — PROGRESS NOTES
Infectious Disease Progress Note    Author: Ruthie Avitia M.D. Date & Time of service: 7/9/2020  10:59 AM    Chief Complaint:  Fevers, pancytopenia      Interval History:  6/29 T-max 102.9 WBC 3.7 patient remains neutropenic .  CT of the neck shows a 2.9 cm collection adjacent to the fracture site of the mid mandibular body.  An infected hematoma cannot be ruled out.  CT scan shows possible splenic infarcts and gallbladder wall thickening with pericholecystic fluid.  Patient continues to have fevers and chills.  He states his chin lesion is more painful and has increased in size.  Plan for bone marrow biopsy today  6/30 T-max 101.9, remains febrile.  Had bone biopsy yesterday and aspiration of submandibular abscess.  He did not sleep well overnight as he was very warm.  No new symptoms today  7/1 T-max 100.9 WBC 4.5 culture growing viridans Streptococcus.  Patient did not feel any fevers overnight.  Overall fever curve improving.  He states that his abscess drained throughout the night.  Bone marrow biopsy results pending.  Eager to go home  7/2 T-max 102.4 WBC 4.1 bone marrow results do not show any evidence of MDS or leukemia.  Ongoing chin pain.  Awaiting evaluation from oral surgeon  7/3 T-max 99.9 WBC 2.8.  Plan for ORIF with bone graft today by Dr. Morrison.  No new symptoms to report  7/4 afebrile WBC 2.2 underwent surgery yesterday.  Operative report not available.  Patient complaining of jaw pain from surgery.  Submental abscess cultures-pending  7/5 T-max 101.6 WBC 2.1 ongoing jaw pain.  Nurse states decreased drainage throughout the day yesterday and patient was noted to be somewhat confused overnight.  He is alert and oriented this morning answering questions appropriately  7/6  AF, O2 RA, submandibular pain and ongoing drainage.  Awaiting pathology results from [facility].  7/7 AF, O2 RA, .pain and swelling in jaw but less drainage.      Review of Systems:  Review of Systems   Constitutional:  Positive for malaise/fatigue. Negative for chills and fever.   Respiratory: Negative for cough and shortness of breath.    Gastrointestinal: Negative for abdominal pain, constipation, diarrhea, nausea and vomiting.   Musculoskeletal: Positive for myalgias. Negative for joint pain.       Hemodynamics:  Temp (24hrs), Av.9 °C (98.4 °F), Min:36.6 °C (97.8 °F), Max:37.4 °C (99.4 °F)  Temperature: 36.6 °C (97.8 °F)  Pulse  Av.5  Min: 49  Max: 110   Blood Pressure : 151/76       Physical Exam:  Physical Exam  Constitutional:       Appearance: Normal appearance.      Comments: thin   HENT:      Head:      Comments: Submandibular edema, tenderness, drain in place  Cardiovascular:      Rate and Rhythm: Normal rate and regular rhythm.      Heart sounds: Normal heart sounds.   Pulmonary:      Effort: Pulmonary effort is normal.      Breath sounds: Normal breath sounds.   Abdominal:      General: Abdomen is flat. Bowel sounds are normal. There is no distension.      Palpations: Abdomen is soft.      Tenderness: There is no abdominal tenderness. There is no guarding.   Musculoskeletal:      Comments: Right foot bruising   Skin:     General: Skin is warm and dry.   Neurological:      General: No focal deficit present.      Mental Status: He is alert and oriented to person, place, and time.   Psychiatric:         Mood and Affect: Mood normal.         Behavior: Behavior normal.         Meds:    Current Facility-Administered Medications:   •  lisinopril  •  atenolol  •  MD Alert...Argatroban per Pharmacy  •  argatroban **AND** Protocol 448 INDICATIONS **AND** Protocol 448 INCLUSION CRITERIA **AND** Protocol 448 EXCLUSION CRITERIA  **AND** Protocol 448 GOAL: To obtain aPTT value in the 50-90 range **AND** Protocol 448 Argatroban administration must be monitored and documented in EMR. Discontinue IV/subcutaneous heparin, heparin flushes, Enoxaparin (Lovenox), Dabigatran (Pradaxa), Rivaroxaban (Xarelto), Apixaban (Eliquis),  Edoxaban (Savaysa, Lixiana), and Fondaparinux (Arixtra). **AND** Protocol 448 RN to order daily CBC w/o diff if newly-diagnosed heparin-induced thrombocytopenia **AND** Protocol 448 aPTT MONITORING - RN to place separate order for repeat aPTT as needed **AND** Start IV argatroban drip, initial rate per pharmacy dosing guidelines **AND** [COMPLETED] APTT **AND** Maximum dose 10 mcg/kg/min    •  ampicillin-sulbactam (UNASYN) IV  •  HYDROcodone-acetaminophen  •  [CANCELED] HEPARIN INDUCED PLATELET AB(HIT) **AND** Pharmacy Consult:  •  zolpidem  •  lovastatin  •  senna-docusate **AND** polyethylene glycol/lytes **AND** magnesium hydroxide **AND** bisacodyl  •  acetaminophen  •  enalaprilat  •  ondansetron  •  ondansetron    Labs:  Recent Labs     07/07/20 0255 07/08/20 0255 07/09/20  0332   WBC 2.1* 2.1* 1.8*   RBC 2.90* 2.87* 3.16*   HEMOGLOBIN 8.3* 7.9* 8.8*   HEMATOCRIT 25.3* 25.1* 28.4*   MCV 87.2 87.5 89.9   MCH 28.6 27.5 27.8   RDW 46.2 46.9 48.8   PLATELETCT 65* 54* 58*   MPV 12.7 11.2 11.7   NEUTSPOLYS 20.90*  --  9.70*   LYMPHOCYTES 59.10*  --  60.20*   MONOCYTES 16.50*  --  26.50*   EOSINOPHILS 1.70  --  0.90   BASOPHILS 1.70  --  1.80   RBCMORPHOLO Present  --  Present     Recent Labs     07/07/20 0255 07/08/20  0255   SODIUM 132* 133*   POTASSIUM 3.7 3.4*   CHLORIDE 98 99   CO2 23 22   GLUCOSE 107* 122*   BUN 10 11     Recent Labs     07/07/20  0255 07/08/20  0255   CREATININE 0.68 0.61       Imaging:  Ct-chest,abdomen,pelvis With    Result Date: 6/28/2020 6/28/2020 4:20 PM HISTORY/REASON FOR EXAM:  Neutropenic fever TECHNIQUE/EXAM DESCRIPTION: CT scan of the chest, abdomen and pelvis with contrast. Helical scanning was obtained with intravenous contrast from the lung apices through the pubic symphysis to include the chest, abdomen and pelvis.  80 mL of Omnipaque 350 nonionic contrast was administered intravenously without complication. Low dose optimization technique was utilized for this CT exam  including automated exposure control and adjustment of the mA and/or kV according to patient size. COMPARISON: 4/2/2020 FINDINGS: Atherosclerotic plaque is seen in the aorta. Coronary artery calcification is seen. There is a prosthetic aortic valve. The heart is not enlarged. There is trace pericardial fluid. There are small mediastinal lymph nodes. Right hilar lymph node measures 1 cm in short axis dimension. There is no left hilar or axillary lymphadenopathy. There is no pleural effusion. There are emphysematous changes. There are calcified pulmonary granulomata. There is mild bibasilar and lingula atelectasis. Central airways are patent. 4.7 mm nodule is seen in the right middle lobe on image 227. There is an irregular density along the minor fissure measuring 8 mm which could be related to scarring. No free air is seen in the abdomen or pelvis. The liver appears unremarkable. Portal vein is patent. There is pericholecystic fluid/gallbladder wall thickening. Areas of hypoenhancement in the spleen are worrisome for splenic infarcts. No adrenal mass is  identified. There is no evidence of hydronephrosis. There is a small hypodense left renal lesions which are too small to characterize. Pancreas appears unremarkable. There is no biliary ductal dilatation. There is atherosclerotic plaque within an ectatic aorta. There is a left common femoral pseudoaneurysm measuring 2.2 x 1.7 cm. There are small retroperitoneal lymph nodes. Aortocaval retroperitoneal nodes measure up to 8 mm in short axis dimension. Periportal lymph nodes measure up to 1.4 cm in short axis dimension. There is no evidence of bowel obstruction. The visualized appendix appears unremarkable. The appendix is not identified in its entirety, limiting evaluation. Bladder is mildly distended. There is a bladder diverticulum on the right. Prostate is mildly enlarged. There is trace free fluid in the pelvis. There is a right inguinal hernia containing bowel  without evidence of obstruction. Degenerative changes are seen in the spine.     Hypodensities in the spleen worrisome for splenic infarcts. Trace nonspecific free fluid in the pelvis. 2.2 x 1.7 cm left common femoral artery pseudoaneurysm. Bowel containing right inguinal hernia without evidence of obstruction. Atherosclerotic plaque within an ectatic aorta. Gallbladder wall thickening/pericholecystic fluid. Further evaluation can be performed with right upper quadrant ultrasound. Small hypodense left renal lesions are too small to characterize but likely represent small cysts. Emphysematous changes. Irregular nodular opacity along the minor fissure measuring 8 mm. 4.7 mm right middle lobe pulmonary nodule. Mild bibasilar and lingular atelectasis. Emphysematous changes. Sequelae of prior granulomatous exposure. Right hilar lymph node is borderline enlarged and nonspecific, possibly reactive.. Enlarged periportal lymph nodes are nonspecific. Fleischner Society pulmonary nodule recommendations: Low Risk: CT at 3-6 months, then consider CT at 18-24 months High Risk: CT at 3-6 months, then at 18-24 months Comments: Use most suspicious nodule as guide to management. Follow-up intervals may vary according to size and risk. Low Risk - Minimal or absent history of smoking and of other known risk factors. High Risk - History of smoking or of other known risk factors. Note: These recommendations do not apply to lung cancer screening, patients with immunosuppression, or patients with known primary cancer. Fleischner Society 2017 Guidelines for Management of Incidentally Detected Pulmonary Nodules in Adults    Ct-soft Tissue Neck With    Result Date: 6/28/2020 6/28/2020 4:20 PM HISTORY/REASON FOR EXAM:  Palpable nodule/thyroid enlargement. TECHNIQUE/EXAM DESCRIPTION AND NUMBER OF VIEWS:  CT soft tissue neck with contrast. The study was performed on a helical multidetector CT scanner. Contiguous thin section helical images were  obtained of the neck from the skull base through the thoracic inlet. 80 mL of Omnipaque 350 nonionic contrast was injected intravenously. Low dose optimization technique was utilized for this CT exam including automated exposure control and adjustment of the mA and/or kV according to patient size. COMPARISON: None. FINDINGS: PARANASAL SINUSES: Moderate mucosal thickening of left maxillary sinus. Last remaining tooth with associated periapical lucency protrudes into the left maxillary sinus floor. NASOPHARYNX: Normal. SUPRAHYOID NECK: Normal oropharynx, oral cavity, parapharyngeal space, and retropharyngeal space. EPIGLOTTIS: Normal. INFRAHYOID NECK: Normal larynx, hypopharynx, and supraglottis. THYROID: Normal. No thyroid nodules. UPPER CHEST: Reported separately. LYMPH NODES: No enlarged nodes by size criteria. VASCULAR STRUCTURES: Patent. Approximately 50% stenosis of the left ICA origin secondary to predominantly calcific plaque. OTHER FINDINGS: Acute versus subacute fracture of the mandibular body. Fluid collection subjacent to the fracture site measures 2.9 x 1.2 cm, likely a hematoma. Infection can be considered in the appropriate clinical setting. Visualized brain is unremarkable.     1. Acute versus subacute fracture of the mid mandibular body. 2. A 2.9 cm collection subjacent to the fracture site, likely hematoma. Infected hematoma can be considered in the appropriate clinical settings. 3. No cervical lymphadenopathy. 4. Moderate mucosal thickening of the left maxillary sinus, likely odontogenic in nature, related to impacted remaining maxillary tooth.    Dx-chest-2 Views    Result Date: 6/27/2020 6/27/2020 11:36 AM HISTORY/REASON FOR EXAM:  Fever TECHNIQUE/EXAM DESCRIPTION AND NUMBER OF VIEWS: Two views of the chest. COMPARISON:  6/23/2020. FINDINGS: LUNGS: Slight hyperinflation. Chronic slightly hyperdense nodule in the right midlung, likely benign granuloma. No focal consolidation. No effusions.  PNEUMOTHORAX: None. LINES AND TUBES: None. MEDIASTINUM: No cardiomegaly. TAVR. LAD stent. Atherosclerosis. MUSCULOSKELETAL STRUCTURES: No acute displaced fracture.     No acute cardiopulmonary abnormality.    Dx-chest-portable (1 View)    Result Date: 6/23/2020 6/23/2020 5:44 PM HISTORY/REASON FOR EXAM:  tachycardia. TECHNIQUE/EXAM DESCRIPTION AND NUMBER OF VIEWS: Single portable view of the chest. COMPARISON: Exam from 6:42 AM FINDINGS: Heart size is within normal limits. No pulmonary infiltrates or consolidations are noted. No pleural abnormalities are noted. There is an old left seventh rib fracture.     No acute cardiac or pulmonary abnormalities are identified.    Dx-chest-portable (1 View)    Result Date: 6/23/2020 6/23/2020 6:05 AM HISTORY/REASON FOR EXAM: Postop TAVR. TECHNIQUE/EXAM DESCRIPTION AND NUMBER OF VIEWS: Single portable view of the chest. COMPARISON: 6/22/2020 FINDINGS: LUNGS: Clear. No effusions. PNEUMOTHORAX: None. LINES AND TUBES: None. MEDIASTINUM: Stable cardiac silhouette. MUSCULOSKELETAL STRUCTURES: Unchanged.     Clear lungs. No pleural effusions.    Dx-chest-portable (1 View)    Result Date: 6/22/2020 6/22/2020 12:50 PM HISTORY/REASON FOR EXAM:  Status post aortic valve replacement. TECHNIQUE/EXAM DESCRIPTION AND NUMBER OF VIEWS: Single portable view of the chest. COMPARISON: 7/15/2019 FINDINGS: Single portable view of the chest demonstrates a normal cardiac silhouette and mediastinal contours. Calcification is in the aorta. There is emphysematous change. The lungs are hyperexpanded. No confluent opacity, pleural fluid, or pneumothorax. No suspicious bony lesions.     No acute findings status post TAVR    Dx-foot-2- Right    Result Date: 7/7/2020 7/7/2020 8:13 AM HISTORY/REASON FOR EXAM:  Pain/Deformity Following Trauma; ASSESS FOR FRACTURE Traumatic right foot pain, possibly stubbed toes, pain and swelling to 4th and 5th digits TECHNIQUE/EXAM DESCRIPTION AND NUMBER OF VIEWS: 2 views  of the RIGHT foot. COMPARISON:  None. FINDINGS: Irregular appearance of the base of the fifth proximal phalanx Moderate midfoot and hindfoot osteoarthritis. Moderate osteoarthritis of the first MTP joint and fifth MTP joint.     Irregular appearance of the base of the fifth proximal phalanx and fracture is possible.    Ir-cyst Aspiration-misc    Result Date: 6/29/2020 6/29/2020 1:13 PM HISTORY/REASON FOR EXAM: Redness and swelling in the anterior submandibular region TECHNIQUE/EXAM DESCRIPTION: Ultrasound guided aspiration of submandibular fluid collection. PROCEDURE: Informed consent was obtained. Localizing ultrasound images were obtained with the patient in supine position. A heterogeneous fluid collection was noted in the anterior submandibular region. The skin was prepped with chlorhexidine gluconate and draped in a sterile fashion. Local anesthesia was administered with 1% Lidocaine. Under real-time ultrasound guidance, a 17-gauge introducer needle was advanced into the submandibular collection and 4 mL of purulent material was aspirated and sent to lab for analysis. The patient tolerated the procedure well with no evidence of complication. COMPARISON: Recent CT scan of the neck FINDINGS: The localizing ultrasound demonstrates a heterogeneous submandibular fluid collection.     ULTRASOUND GUIDED ASPIRATION OF SUBMANDIBULAR ABSCESS..    Us-abdomen Complete Survey    Result Date: 6/26/2020 6/26/2020 6:27 AM HISTORY/REASON FOR EXAM:  Abnormal Labs Pain TECHNIQUE/EXAM DESCRIPTION AND NUMBER OF VIEWS:  Complete abdomen survey. COMPARISON: None FINDINGS: The liver is normal in contour. There is no evidence of solid mass lesion. The liver measures 14.57 cm. The gallbladder is normal. There is no evidence of cholelithiasis. The gallbladder wall thickness measures 2.50 mm. There is no pericholecystic fluid. The common duct measures 3.40 mm. The visualized pancreas is unremarkable. The visualized aorta is normal in  caliber. Intrahepatic IVC is patent. The portal vein is patent with hepatopetal flow. The MPV measures 1.22 cm. The right kidney measures 11.19 cm. The left kidney measures 11.17 cm. There is no hydronephrosis. The spleen measures 10.41 cm maximally. The bladder is trabeculated, which could indicate chronic bladder outlet obstruction. There is no ascites.     Prominent trabeculated bladder wall, suggesting possible chronic bladder outlet obstruction. Unremarkable findings otherwise.     Us-extremity Artery Lower Bilat    Result Date: 2020  Lower Extremity  Arterial Duplex Report  Vascular Laboratory  CONCLUSIONS  Right.  Echolucent thrombus partially fills the common femoral artery causing a  high grade stenosis. Velocities are consistent with > 75% stenosis.  Mild plaque visualized in the superficial femoral and popliteal arteries  without evidence of hemodynamically significant stenosis.  Monophasic waveforms demonstrated throughout the right lower extremity.  Left.  Mild plaque is seen in the common femoral, femoral, and popliteal arteries  with no elevated velocities to indicate hemodynamically significant  stenosis.  FADIA LANDIS  Exam Date:     2020 17:34  Room #:     Inpatient  Priority:     Stat  Ht (in):             Wt (lb):  Ordering Physician:        ANTHONY PIPER  Referring Physician:       ANTHONY PIPER  Sonographer:               Ewa Curtis RVT  Study Type:                Complete Bilateral  Technical Quality:         Adequate  Age:    65    Gender:     M  MRN:    [id number]  :    1955      BSA:  Indications:     Pain in leg, unspecified  CPT Codes:       08735  ICD Codes:       M79.606  History:         Pain in legs s/p TAVR. No prior duplex.  Limitations:                RIGHT  Waveform        Peak Systolic Velocity (cm/s)                  Prox    Prox-Mid  Mid    Mid-Dist  Distal  Monophasic                         307              143     CFA  Monophasic      55                                         PFA  Monophasic      53                51               34      SFA  Monophasic                        30                       POP  Monophasic      22                                 32      AT  Monophasic      19                                 11      PT  Monophasic      37                                 20      EVGENY                LEFT  Waveform        Peak Systolic Velocity (cm/s)                  Prox    Prox-Mid  Mid    Mid-Dist  Distal  Triphasic                         187                      CFA  Biphasic        78                                         PFA  Biphasic        103               142              106     SFA  Biphasic                          57                       POP  Biphasic        83                                 110     AT  Biphasic        76                                 24      PT  Biphasic        61                                 24      EVGENY  FINDINGS  Right.  Echolucent material consistent with thrombus partially fills the common  femoral artery causing a high grade stenosis. Stenosis of the common  femoral artery. Velocities are consistent with > 75% stenosis.  Mild plaque visualized in the femoral and popliteal arteries without  evidence of hemodynamic significance.  Monophasic waveforms demonstrated throughout the right lower extremity.  Left.  Mild plaque is seen in the common femoral, femoral, and popliteal arteries  with no elevated velocities to indicate hemodynamic significance.  Waveforms are biphasic throughout the left lower extremity.  Demetria Narvaez  (Electronically Signed)  Final Date:      22 June 2020                   18:45  Amended:         22 June 2020 19:58    Us-extremity Artery Lower Unilat Right    Result Date: 6/26/2020  Lower Extremity  Arterial Duplex Report  Vascular Laboratory  CONCLUSIONS  Pinching of the common femoral artery from perclose device  closure.  Velcoity consistent with >75% stenosis. Intraluminal clot seen in prior  study is not readily seen.  FADIA LANDIS  Exam Date:     2020 07:49  Room #:     Inpatient  Priority:     Routine  Ht (in):             Wt (lb):  Ordering Physician:        MELVIN SIMONS  Referring Physician:       MELVIN SIMONS  Sonographer:               Blank Calles RVT  Study Type:                Limited Unilateral  Technical Quality:         Adequate  Age:    65    Gender:     M  MRN:    [id number]  :    1955      BSA:  Indications:     Unspecified atherosclerosis of native arteries of                   extremities, right leg  CPT Codes:       65371  ICD Codes:       I70.201  History:         Right common femoral steosis.  Limitations:                RIGHT  Waveform        Peak Systolic Velocity (cm/s)                  Prox    Prox-Mid  Mid    Mid-Dist  Distal  Monophasic      42                384              230     CFA  Biphasic        97                                         PFA  Biphasic        55                                         SFA                                                             POP                                                             AT                                                             PT                                                             EVGENY                LEFT  Waveform        Peak Systolic Velocity (cm/s)                  Prox    Prox-Mid  Mid    Mid-Dist  Distal                                                             CFA                                                             PFA                                                             SFA                                                             POP                                                             AT                                                             PT                                                              EVGENY  FINDINGS  Limited evaluation of the right common femoral artery known stenosis.  Right.  Stenosis of the mid common femoral artery. Velocities are consistent with >  75%stenosis.  Distal flow is turbulent and damped.  No evidence of hematoma or psuedoaneuysm seen at the right groin.  Luiz Dennis MD  (Electronically Signed)  Final Date:      2020                   08:49    Us-extremity Venous Lower Bilat    Result Date: 2020   Vascular Laboratory  CONCLUSIONS  Normal bilateral superficial and deep venous examination of the lower  extremities.  FADIA LANDIS  Exam Date:     2020 14:05  Room #:     Inpatient  Priority:     Routine  Ht (in):             Wt (lb):  Ordering Physician:        FLAQUITO BLACKMAN  Referring Physician:       [id number] Gan  Sonographer:               Humberto Gonzalez RVT  Study Type:                Complete Bilateral  Technical Quality:         Adequate  Age:    65    Gender:     M  MRN:    [id number]  :    1955      BSA:  Indications:     Pain in leg, unspecified  CPT Codes:       58192  ICD Codes:       M79.606  History:         BLE pain, pressure, and edema.  Limitations:  PROCEDURES:  Venous duplex imaging was performed in both lower extremities including  serial compressions and spectral Doppler flow evaluation.  FINDINGS:  The veins of the lower extremities are readily compressible with normal  venous flow dynamics including spontaneous flow and respiratory phasic  variation.  No evidence of deep venous thrombosis.  Alfonso Mondragon MD  (Electronically Signed)  Final Date:      2020                   16:16    Ec-echocardiogram Complete W/o Cont    Result Date: 2020  Transthoracic Echo Report Echocardiography Laboratory CONCLUSIONS Known TAVR aortic valve that has a mildly increased gradients. No paravaluvar leak or signficant aortic insufficiency seen. No vegetation seen FADIA LANDIS Exam  Date:         2020                    14:21 Exam Location:     Inpatient Priority:          Routine Ordering Physician:        BRANDEN STOCKTON Referring Physician:       KATH DENSON Sonographer:               Susana Carson RDCS Age:    65     Gender:    M MRN:    [id number] :    1955 BSA:    1.68   Ht (in):    66     Wt (lb):    133 Exam Type:     Complete Indications:     Endocarditis ICD Codes:       421 CPT Codes:       28630 BP:   114    /   74     HR:   91 Technical Quality:       Fair MEASUREMENTS  (Male / Female) Normal Values 2D ECHO LV Diastolic Diameter PLAX        4.9 cm                4.2 - 5.9 / 3.9 - 5.3 cm LV Systolic Diameter PLAX         3.3 cm                2.1 - 4.0 cm IVS Diastolic Thickness           1.2 cm                LVPW Diastolic Thickness          1.4 cm                RV Diameter 4C                    3.5 cm                2.5 - 2.9 cm LVOT Diameter                     2.1 cm                RA Diameter                       4 cm                  Estimated LV Ejection Fraction    65 %                  LV Ejection Fraction MOD BP       56.1 %                >= 55  % LV Ejection Fraction MOD 4C       56.9 %                LV Ejection Fraction MOD 2C       51.3 %                LA Volume Index                   26 cm3/m2             16 - 28 cm3/m2 IVC Diameter                      0.98 cm               DOPPLER AV Peak Velocity                  2.9 m/s               AV Peak Gradient                  32.6 mmHg             AV Mean Gradient                  18.1 mmHg             LVOT Peak Velocity                1.1 m/s               AV Area Cont Eq vti               1.1 cm2               Mitral E Point Velocity           0.8 m/s               Mitral E to A Ratio               0.7                   MV Pressure Half Time             30.9 ms               MV Area PHT                       7.1 cm2               MV Deceleration Time              107 ms                TR Peak  Velocity                  269 cm/s              PV Peak Velocity                  1.2 m/s               PV Peak Gradient                  5.5 mmHg              * Indicates values subject to auto-interpretation LV EF:  65    % FINDINGS Left Ventricle Normal left ventricular size and systolic function. Normal regional wall motion. Mild concentric left ventricular hypertrophy. A reliable estimation of diastolic function cannot be made due to conflicting data. Right Ventricle Normal right ventricular size and systolic function. Right Atrium Enlarged right atrium. Normal inferior vena cava size and inspiratory collapse. Left Atrium Normal left atrial size. Mitral Valve Structurally normal mitral valve without significant stenosis. Trace mitral regurgitation. Aortic Valve Known TAVR aortic valve that has a mildly increased gradients. Vmax is 3.0  m/s. Transvalvular gradients are - Peak: 36 mmHg,  Mean: 20 mmHg.  No paravaluvar leak or signficant aortic insufficiency. Tricuspid Valve Structurally normal tricuspid valve. Mild tricuspid regurgitation. Right atrial pressure is estimated to be 3 mmHg. Estimated right ventricular systolic pressure  is 35 mmHg. Pulmonic Valve Structurally normal pulmonic valve without significant stenosis or regurgitation. Pericardium Normal pericardium without effusion. Aorta The aortic root is normal.  Ascending aorta diameter is  2.7 cm. Ascending aorta not well visualized. Zoya Milton M.D. (Electronically Signed) Final Date:     29 June 2020                 17:26    Ec-echocardiogram Complete W/o Cont    Result Date: 6/23/2020  Transthoracic Echo Report Echocardiography Laboratory CONCLUSIONS Prior echo done on 06/22/2020, there is now a TAVR valve present. Left ventricular ejection fraction is visually estimated to be 60%. Unable to estimate pulmonary artery pressure due to an inadequate tricuspid regurgitant jet. Known TAVR aortic valve. FADIA LANDIS Exam Date:          2020                    03:46 Exam Location:     Inpatient Priority:          Routine Ordering Physician:        ANTHONY PIPER Referring Physician:       [id number] Case Sonographer:               JOJO Ash Age:    65     Gender:    Mal                           e MRN:    [id number] :    1955 BSA:    1.64   Ht (in):    66     Wt (lb):    126 Exam Type:     Complete Indications:     Prosthetic Valve ICD Codes:       V43.3 CPT Codes:       32227 BP:   142    /   83     HR:   70 Technical Quality:       Fair MEASUREMENTS  (Male / Female) Normal Values 2D ECHO LV Diastolic Diameter PLAX        6 cm                  4.2 - 5.9 / 3.9 - 5.3 cm LV Systolic Diameter PLAX         3.7 cm                2.1 - 4.0 cm IVS Diastolic Thickness           0.71 cm               LVPW Diastolic Thickness          0.7 cm                LVOT Diameter                     1.5 cm                Estimated LV Ejection Fraction    60 %                  LV Ejection Fraction MOD BP       39.5 %                >= 55  % LV Ejection Fraction MOD 4C       40.2 %                LV Ejection Fraction MOD 2C       33.9 %                IVC Diameter                      1.8 cm                DOPPLER AV Peak Velocity                  0.99 m/s              AV Peak Gradient                  3.9 mmHg              AV Mean Gradient                  2.7 mmHg              LVOT Peak Velocity                1.1 m/s               AV Area Cont Eq vti               1.9 cm2               Mitral E Point Velocity           0.63 m/s              Mitral E to A Ratio               1.5                   MV Pressure Half Time             75.3 ms               MV Area PHT                       2.9 cm2               MV Deceleration Time              260 ms                * Indicates values subject to auto-interpretation LV EF:  60    % FINDINGS Left Ventricle Left ventricle is mildly dilated. Normal left ventricular wall thickness. Normal left  ventricular systolic function. Left ventricular ejection fraction is visually estimated to be 60%. Normal regional wall motion. Normal diastolic function. Right Ventricle Normal right ventricular size. Normal right ventricular systolic function. Right Atrium Normal right atrial size. Normal inferior vena cava size and inspiratory collapse. Left Atrium Normal left atrial size. Left atrial volume index is 26 mL/sq m. Mitral Valve Calcification of the mitral valve leaflets. No mitral stenosis. Trace mitral regurgitation. Aortic Valve Known TAVR aortic valve. Vmax is 1 m/s. Transvalvular gradients are - Peak: 3 mmHg, Mean: 2 mmHg. Tricuspid Valve Structurally normal tricuspid valve. No tricuspid stenosis. Trace tricuspid regurgitation. Right atrial pressure is estimated to be 8 mmHg. Unable to estimate pulmonary artery pressure due to an inadequate tricuspid regurgitant jet. Pulmonic Valve The pulmonic valve is not well visualized. Pericardium No pericardial effusion seen. Aorta Normal aortic root for body surface area. Ascending aorta diameter is 2.4 cm. Brenden Hansen MD (Electronically Signed) Final Date:     23 June 2020                 06:41    Ec-echocardiogram Ltd W/o Cont    Result Date: 6/23/2020  Transthoracic Echo Report Echocardiography Laboratory CONCLUSIONS Intraoperative TTE during TAVR.  Baseline images show mildly reduced LV function with EF =55%.  Calcified aortic valve leaflets. Severe aortic stenosis.  Post deployment images show preserved biventricular function.  A 26 mm Jewell Jose Manuel Ultra stented bioprosthetic valve is seen in the aortic position with normal leaflet motion, trace significant paravalvular leak, mean gradent of 5 mm Hg and calculated effective orifice area of 2.5 cm2.  Findings communicated at the time of exam. FADIA LANDIS Exam Date:         06/22/2020                    11:17 Exam Location:     Inpatient Priority:          Routine Ordering Physician:        CLEOPATRA JONES  H Referring Physician: Sonographer:               Antwan La RDCS Age:    65     Gender:    Jordy chavez MRN:    [id number] :    1955 BSA:    1.64   Ht (in):    66     Wt (lb):    126 Exam Type:     Limited Indications:     Pre-Op Surgery ICD Codes:       V72.83 CPT Codes:       44804 BP:          /          HR: Technical Quality:       Fair MEASUREMENTS  (Male / Female) Normal Values 2D ECHO Estimated LV Ejection Fraction    55 %                  * Indicates values subject to auto-interpretation LV EF:  55    % FINDINGS Left Ventricle The left ventricle was normal in size and function. Right Ventricle The right ventricle was normal in size and function. Right Atrium The right atrium is normal in size. Left Atrium The left atrium is normal in size. Mitral Valve Structurally normal mitral valve without significant stenosis or regurgitation. Aortic Valve Calcified aortic valve leaflets. Severe aortic stenosis. Tricuspid Valve Structurally normal tricuspid valve without significant stenosis or regurgitation. Pulmonic Valve Structurally normal pulmonic valve without significant stenosis or regurgitation. Pericardium Normal pericardium without effusion. Aorta The aortic root is normal. Simon Parsons (Electronically Signed) Final Date:     2020                 16:26 Amended:        2020 16:29      Micro:  Results     Procedure Component Value Units Date/Time    CULTURE WOUND W/ GRAM STAIN [[id number]]  (Abnormal) Collected:  20 1736    Order Status:  Completed Specimen:  Wound Updated:  20 1038     Significant Indicator POS     Source WND     Site Submental Abscess     Culture Result Growth noted after further incubation, see below for  organism identification.       Gram Stain Result Moderate WBCs.  Few Gram positive cocci.       Culture Result Viridans Streptococcus  Rare growth      Narrative:       Surgery - swabs received    Anaerobic Culture [[id number]]  Collected:  07/03/20 1736    Order Status:  Completed Specimen:  Wound Updated:  07/07/20 1038     Significant Indicator NEG     Source WND     Site Submental Abscess     Culture Result No Anaerobes isolated.    Narrative:       Surgery - swabs received    GRAM STAIN [[id number]] Collected:  07/03/20 1736    Order Status:  Completed Specimen:  Wound Updated:  07/04/20 1127     Significant Indicator .     Source WND     Site Submental Abscess     Gram Stain Result Moderate WBCs.  Few Gram positive cocci.      Narrative:       Surgery - swabs received    SARS-CoV-2, PCR (In-House) [[id number]] Collected:  07/02/20 2301    Order Status:  Completed Updated:  07/03/20 0013     SARS-CoV-2 Source NP Swab     SARS-CoV-2 by PCR NotDetected     Comment: [facility] providers: PLEASE REFER TO DE-ESCALATION AND RETESTING PROTOCOL  on [email or web address]  **The [employer] GeneXpert Xpress SARS-CoV-2 Test has been made available for  use under the Emergency Use Authorization (EUA) only.         Narrative:       [id number] LOMBARDO FLASH  Expected Turn around time?->Rush (Cepheid 2-4 hours)  Abbott test failed twice; using previously rejected Cepheid swab.    COVID/SARS CoV-2 PCR [[id number]] Collected:  07/02/20 2301    Order Status:  Completed Specimen:  Respirate from Nasopharyngeal Updated:  07/02/20 2318     COVID Order Status Received    Narrative:       [id number] LOMBARDO FLASH  Expected Turn around time?->Rush (Cepheid 2-4 hours)  Abbott test failed twice; using previously rejected Cepheid swab.    COVID/SARS CoV-2 PCR [[id number]] Collected:  07/02/20 1934    Order Status:  Completed Specimen:  Respirate from Nasopharyngeal Updated:  07/02/20 2046     COVID Order Status Received    Narrative:       [id number] LOMBARDO FLASH  Expected Turn around time?->Routine (In-House PCR up to 24  hours)  [id number] LOMBARDO FLASH  Expected Turn around time?->Rush (Cepheid 2-4 hours)  Called RN to provide an Abbott swab.     COVID/SARS CoV-2 PCR [[id number]] Collected:  07/02/20 1934    Order Status:  Canceled Specimen:  Respirate from Nasopharyngeal     COVID/SARS CoV-2 PCR [[id number]] Collected:  07/02/20 1934    Order Status:  Canceled Specimen:  Respirate from Nasopharyngeal     SARS-CoV-2, PCR (In-House) [[id number]] Collected:  07/02/20 1934    Order Status:  Canceled     Aerobic/Anaerobic Culture (Surgery) [[id number]] Collected:  06/29/20 1317    Order Status:  Completed Specimen:  Wound from Other Body Fluid Updated:  07/02/20 1138     Significant Indicator NEG     Source WND     Site submandibular     Culture Result Order placed in Surgery. Source appropriate culture orders  have been placed in Micro for testing.      Narrative:       [id number] PAWAN BRICE F.  Large lump under chin  Please send for cytology/pathology  [id number] PAWAN BRICE F.  2mls pink, frothy fluid aspirated from submandibular fluid  collection, MD Jenkins  [id number] PAWAN BRICE F.    Anaerobic Culture [[id number]] Collected:  06/29/20 1317    Order Status:  Completed Specimen:  Wound Updated:  07/02/20 1138     Significant Indicator NEG     Source WND     Site submandibular     Culture Result No Anaerobes isolated.    Narrative:       [id number] PAWAN BRICE F.  Large lump under chin  Please send for cytology/pathology  [id number] PAWAN BRICE F.  2mls pink, frothy fluid aspirated from submandibular fluid  collection, MD Jenkins  [id number] PAWAN BRICE F.    CULTURE WOUND W/ GRAM STAIN [[id number]]  (Abnormal) Collected:  06/29/20 1317    Order Status:  Completed Specimen:  Wound Updated:  07/02/20 1138     Significant Indicator POS     Source WND     Site submandibular     Culture Result -     Gram Stain Result Many WBCs.  Many Gram positive cocci.  Few Gram negative rods.       Culture Result Viridans Streptococcus  Heavy growth      Narrative:       [id number] PAWAN BRICE F.  Large lump  under chin  Please send for cytology/pathology  [id number] PAWAN KOHLI  2mls pink, frothy fluid aspirated from submandibular fluid  collection, MD Jenkins  [id number] PAWAN KOHLI          Assessment:  Active Hospital Problems    Diagnosis   • *Fever [R50.9]   • Mandibular abscess [M27.2]   • Pancytopenia (HCC) [D61.818]   • S/P TAVR (transcatheter aortic valve replacement) [Z95.2]   • Essential hypertension [I10]   • Mixed hyperlipidemia [E78.2]   • Panlobular emphysema (HCC) [J43.1]   • Tobacco dependence [F17.200]   • Toe fracture, right [S92.911A]   • HIT (heparin-induced 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  Some ongoing edema and tenderness.  He denies any other symptoms.  Still with critically low ANC and thrombocytopenia.  She continued on Unasyn.      ASSESSMENT/PLAN:      65 y.o. man admitted 6/23/2020. Pt has a past medical history of TAVR on 6/22/2020 and pancytopenia.  He has had a decreased WBC since 2016 with neutropenia.  He also has some mild anemia and thrombocytopenia that is also chronic but worsened recently.  He presented the ER complaining of tachycardia the day after he been sent home after his TAVR surgery.      Hospital Course:   Patient was admitted and was initially febrile and then again spiked a fever to 103.6 on 6/26.  Initial work-up for pancytopenia was started and plan was to complete this with hem-onc as an outpatient.  Blood cultures were obtained 6/26.  Ultrasound abdomen on 6/25 with trabeculated bladder wall suggesting possible chronic bladder outlet obstruction but otherwise unremarkable.  Ultrasound lower extremity with noted stenosis of the right common femoral artery report is known - no evidence of hematoma or pseudoaneurysm in right groin.  Chest x-rays have  been unremarkable. He underwent aspiration of submental abscess on 6/29/2020.  Cultures are growing viridans Streptococcus.  Bone marrow biopsy does not show any evidence of leukemia however additional testing is being sent to [facility]. S/p OR with Dr. Morrison on 7/3.      Problem List  Neutropenic fever, fever improved overall, last fever on 7/7  Bone marrow biopsy did not reveal any leukemia, however additional testing is being sent to [facility], fevers may be related to the splenic infarcts rather than infection or potential malignancy  Neutropenia - ongoing,  today   -Work-up initiated by hospitalist with SPEP, smooth muscle antibodies, SEEMA, rheumatoid factor, B12 and folate levels as well as a fibrinogen.   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  Pathology report- no evidence of leukemia.  Additional testing being sent to [facility] and pending-Per note from oncology there is some concern for LGL - follow-up with oncology.  Concern for undiagnosed malignancy given his significant and ongoing neutropenia.  CMV PCR- not detected  Parvovirus PCR- not detected  Per oncology note from 7/5 labs are consistent with HIT anticoagulation with artagroban started on 7/4/2020, oncology first 3 months of therapy with anticoagulation      Lump below chin, tender, prior infection   Fracture of mid mandibular  body  Submandibular abscess  Possible infected hematoma-2.9 cm collection subjacent to fracture site noted on CT scan  -Reports oral surgery at the end of April and he was on antibiotics (augmentin) due to infection for approximately 1 month.  There had resolved but now with new swelling  Status post IR aspiration on 6/29/2020.  Gram stain+ GPC, GNR.  Culture- +viridans Streptococcus  On antibiotics above  Concerned about infection causing fracture of the mid mandibular body, possible osteomyelitis.  Patient underwent surgery with Dr. Morrison on 7/3/2020.  Open reduction and internal fixation of mandibular symphysis fracture. Incision and drainage of submental abscess, chronic appearing per op note.  Bone graft to mandible.  OR cultures from 7/3-  +viridans Streptococcus      --- Continue Unasyn while inpatient -on discharge can transition to  augmentin 875/125 bid with plan to a complete a 6 week course from date of surgery and can extend if needed - end 8/14/20   --- If he continues to be profoundly neutropenic as the end the course may need long term antibiotics and antivirals but will defer to hem/onc   --- F/up with oral surgeon      Right 5th toe, pain and bruising   - Xray w/ possible fracture       Plan of care discussed with Dr. Urena and 30-minute discussion with patient's wife on the phone.  Will continue to follow.

## 2020-07-09 NOTE — PROGRESS NOTES
[facility] Medicine Daily Progress Note    Date of Service  7/9/2020    Chief Complaint  65 y.o. male admitted 6/23/2020 with tachycardia.  He had a TAVR on 6/22/2020, and was discharged home on 6/23/2020.  He returned to the ER due to persistent tachycardia.     He has a history of pancytopenia which is worsening, and has been hesitant to do a bone marrow biopsy.  He has tobacco dependence, essential hypertension, hyperlipidemia.       Hospital Course    Home Eliquis and aspirin were discontinued.  Heart rate stabilized. He continued to have decline in his hemoglobin and platelets. WBC stabilized. Briefly discussed with hem/onc.  Abdominal ultrasound from 6/25/2020 showed no acute abnormalities.  SPEP, smooth muscle antibodies, SEEMA, rheumatoid factor, B12 and folate levels, fibrinogen were ordered.  Patient wished to go home and agreed follow-up with hematology for bone marrow biopsy and further work-up.  He was discharged to go home on 6/26/2020, but developed a fever of 103.6.     Infectious disease were consulted on 6/27/2020 due to fever and neutropenia, Cefepime was empirically started.     B12, folate, and fibrinogen levels were normal.  Rheumatoid factor was 40, ESR was 45, LFTs were unremarkable, SEEMA was undetectable, HIV was nonreactive.      Interval Problem Update      tmax 99    Low  Hb 8.8  Low plt 58    bp is elevated    Wbc 1.8    Sodium 133    Chin pain, minimal    Discussed with ID MD as well as oncology MD      S/p orif mandible on 7/3    Hit positive-- BULL positive        echoCardiogram shows no vegetations on valves      Consultants/Specialty  ID dr beauchamp  Oncology dr mccray    Code Status  full    Disposition  home    Review of Systems  Review of Systems   Constitutional: Positive for fever. Negative for chills, malaise/fatigue and weight loss.   HENT: Negative for ear discharge, hearing loss and tinnitus.    Eyes: Negative for blurred vision, double vision and photophobia.   Respiratory:  Negative for cough and sputum production.    Cardiovascular: Negative for palpitations, orthopnea and claudication.   Gastrointestinal: Negative for abdominal pain, heartburn, nausea and vomiting.   Genitourinary: Negative for frequency and urgency.   Musculoskeletal: Positive for joint pain and myalgias. Negative for back pain and neck pain.   Neurological: Negative for dizziness, tremors, focal weakness and headaches.   Endo/Heme/Allergies: Does not bruise/bleed easily.   Psychiatric/Behavioral: Negative for depression and substance abuse.        Physical Exam  Temp:  [36.6 °C (97.8 °F)-37.4 °C (99.4 °F)] 36.8 °C (98.2 °F)  Pulse:  [61-73] 70  Resp:  [17-18] 18  BP: (142-158)/(58-76) 142/63  SpO2:  [90 %-94 %] 94 %    Physical Exam  Constitutional:       Appearance: Normal appearance. He is not diaphoretic.   HENT:      Head:      Comments: Submandibular swelling/induration and tenderness- markedly decreased         Mouth/Throat:      Mouth: Mucous membranes are moist.   Eyes:      General: No scleral icterus.     Extraocular Movements: Extraocular movements intact.      Pupils: Pupils are equal, round, and reactive to light.   Neck:      Musculoskeletal: Normal range of motion and neck supple.   Cardiovascular:      Rate and Rhythm: Normal rate.      Heart sounds: No murmur. No gallop.    Pulmonary:      Effort: Pulmonary effort is normal. No respiratory distress.      Breath sounds: No stridor. No wheezing.   Chest:      Chest wall: No tenderness.   Abdominal:      General: Abdomen is flat. There is no distension.      Tenderness: There is no guarding.      Hernia: No hernia is present.   Musculoskeletal: Normal range of motion.         General: Deformity (right fifth toe) and signs of injury present. No swelling or tenderness.      Right lower leg: No edema.   Skin:     General: Skin is warm.      Capillary Refill: Capillary refill takes 2 to 3 seconds.      Coloration: Skin is not jaundiced or pale.       Findings: No bruising or rash.   Neurological:      General: No focal deficit present.      Mental Status: He is alert and oriented to person, place, and time.      Cranial Nerves: No cranial nerve deficit.      Deep Tendon Reflexes: Reflexes normal.   Psychiatric:         Mood and Affect: Mood normal.         Fluids  No intake or output data in the 24 hours ending 07/09/20 1629    Laboratory  Recent Labs     07/07/20  0255 07/08/20  0255 07/09/20  0332   WBC 2.1* 2.1* 1.8*   RBC 2.90* 2.87* 3.16*   HEMOGLOBIN 8.3* 7.9* 8.8*   HEMATOCRIT 25.3* 25.1* 28.4*   MCV 87.2 87.5 89.9   MCH 28.6 27.5 27.8   MCHC 32.8* 31.5* 31.0*   RDW 46.2 46.9 48.8   PLATELETCT 65* 54* 58*   MPV 12.7 11.2 11.7     Recent Labs     07/07/20 0255 07/08/20  0255   SODIUM 132* 133*   POTASSIUM 3.7 3.4*   CHLORIDE 98 99   CO2 23 22   GLUCOSE 107* 122*   BUN 10 11   CREATININE 0.68 0.61   CALCIUM 8.0* 7.8*     Recent Labs     07/07/20  0255 07/08/20  0255 07/09/20  0332   APTT 76.2* 70.4* 72.4*               Imaging  DX-FOOT-2- RIGHT   Final Result         Irregular appearance of the base of the fifth proximal phalanx and fracture is possible.      US-EXTREMITY VENOUS LOWER BILAT   Final Result      EC-ECHOCARDIOGRAM COMPLETE W/O CONT   Final Result      IR-CYST ASPIRATION-MISC   Final Result         ULTRASOUND GUIDED ASPIRATION OF SUBMANDIBULAR ABSCESS..      CT-SOFT TISSUE NECK WITH   Final Result         1. Acute versus subacute fracture of the mid mandibular body.   2. A 2.9 cm collection subjacent to the fracture site, likely hematoma. Infected hematoma can be considered in the appropriate clinical settings.   3. No cervical lymphadenopathy.   4. Moderate mucosal thickening of the left maxillary sinus, likely odontogenic in nature, related to impacted remaining maxillary tooth.      CT-CHEST,ABDOMEN,PELVIS WITH   Final Result      Hypodensities in the spleen worrisome for splenic infarcts.      Trace nonspecific free fluid in the pelvis.       2.2 x 1.7 cm left common femoral artery pseudoaneurysm.      Bowel containing right inguinal hernia without evidence of obstruction.      Atherosclerotic plaque within an ectatic aorta.      Gallbladder wall thickening/pericholecystic fluid. Further evaluation can be performed with right upper quadrant ultrasound.      Small hypodense left renal lesions are too small to characterize but likely represent small cysts.      Emphysematous changes.      Irregular nodular opacity along the minor fissure measuring 8 mm.      4.7 mm right middle lobe pulmonary nodule.      Mild bibasilar and lingular atelectasis.      Emphysematous changes.      Sequelae of prior granulomatous exposure.      Right hilar lymph node is borderline enlarged and nonspecific, possibly reactive..      Enlarged periportal lymph nodes are nonspecific.      Fleischner Society pulmonary nodule recommendations:   Low Risk: CT at 3-6 months, then consider CT at 18-24 months      High Risk: CT at 3-6 months, then at 18-24 months      Comments: Use most suspicious nodule as guide to management. Follow-up intervals may vary according to size and risk.      Low Risk - Minimal or absent history of smoking and of other known risk factors.      High Risk - History of smoking or of other known risk factors.      Note: These recommendations do not apply to lung cancer screening, patients with immunosuppression, or patients with known primary cancer.      Fleischner Society 2017 Guidelines for Management of Incidentally Detected Pulmonary Nodules in Adults      DX-CHEST-2 VIEWS   Final Result      No acute cardiopulmonary abnormality.      US-EXTREMITY ARTERY LOWER UNILAT RIGHT   Final Result      US-ABDOMEN COMPLETE SURVEY   Final Result      Prominent trabeculated bladder wall, suggesting possible chronic bladder outlet obstruction. Unremarkable findings otherwise.         DX-CHEST-PORTABLE (1 VIEW)   Final Result      No acute cardiac or pulmonary  abnormalities are identified.           Assessment/Plan  * Fever- (present on admission)  Assessment & Plan  abx as per iD    Follow cbc    Follow all cultures        Mandibular abscess- (present on admission)  Assessment & Plan  Growing strep viridans    Oral surgery dr vasquez s/p ORIF    F/u op report    Pancytopenia (HCC)- (present on admission)  Assessment & Plan  Present for a few months, unclear etiology    abx as per id    Hematology/oncology were consulted 6/28/2020  Bone marrow biopsy done      Transfuse plt on 7/3- to prepare for surgery on mandible    S/P TAVR (transcatheter aortic valve replacement)- (present on admission)  Assessment & Plan  S/P TAVR on 6/22/2020   . Continue statin  Per cardiology start B12 and folate supplements  Echocardiogram shows no vegetations  Cardiology is following    Mixed hyperlipidemia- (present on admission)  Assessment & Plan  Continue home Lovastatin    Essential hypertension- (present on admission)  Assessment & Plan    Continue Norvasc    Added lisinopril 10mg po daily 7/7    Toe fracture, right  Assessment & Plan  Splint    Pain control    HIT (heparin-induced thrombocytopenia) (HCC)  Assessment & Plan  huseyin positive    on agratraban on  7/4    Will transition to po eliquis prior to discharge    Hypokalemia  Assessment & Plan  Resolved     Panlobular emphysema (HCC)- (present on admission)  Assessment & Plan  History of tobacco use  Chest x-ray from admission shows no acute cardiopulmonary process    Tobacco dependence- (present on admission)  Assessment & Plan  Patient has been counseled on tobacco cessation  Nicotine replacement options were provided       VTE prophylaxis: scd      Check am cbc

## 2020-07-09 NOTE — CARE PLAN
Problem: Safety  Goal: Will remain free from injury  Outcome: PROGRESSING AS EXPECTED  Note: Pt mobility assessed at beginning of shift. Pt is standby assist. Fall precautions in place. Non-slip socks on. Bed in lowest locked position. Call light within reach. Pt educated to call for assistance and verbalizes understanding.      Problem: Knowledge Deficit  Goal: Knowledge of disease process/condition, treatment plan, diagnostic tests, and medications will improve  Outcome: PROGRESSING AS EXPECTED  Note: Pt educated about disease process. Reason why medications are taken. And informed about treatment plan.

## 2020-07-09 NOTE — PROGRESS NOTES
Assumed care of PT. A&O 4. Pt resting in bed with no signs of labored breathing. On RA. Tele monitor in place, cardiac rhythm being monitored. Call light within reach, bed in lowest position, upper bed rails up. Pt was updated on plan of care for the night. Will continue to monitor.

## 2020-07-09 NOTE — PROGRESS NOTES
Oncology/Hematology Progress Note               Author: Denise Cabello M.D. Date & Time created: 7/9/2020  2:44 PM     CC: Pancytopenia    Interval History:  Pt reports he feels fine today. He still has fatigue. He wants to go home but worried that if he is not all better he will return. No new complaints today.      Review of Systems:  Review of Systems   Constitutional: Positive for malaise/fatigue. Negative for chills and fever.   HENT: Negative for hearing loss and tinnitus.    Eyes: Negative for blurred vision and double vision.   Respiratory: Negative for cough.    Cardiovascular: Negative for chest pain.   Gastrointestinal: Negative for heartburn and nausea.   Genitourinary: Negative for dysuria.   Musculoskeletal: Positive for neck pain (from recent abscess drainage). Negative for back pain and joint pain.   Skin: Negative for rash.   Neurological: Negative for dizziness and headaches.   Endo/Heme/Allergies: Does not bruise/bleed easily.   Psychiatric/Behavioral: The patient is nervous/anxious.        Physical Exam:  Physical Exam  Constitutional:       General: He is not in acute distress.     Appearance: Normal appearance.   HENT:      Head: Normocephalic and atraumatic.      Right Ear: External ear normal.      Left Ear: External ear normal.      Nose: Nose normal.      Mouth/Throat:      Mouth: Mucous membranes are moist.      Pharynx: Oropharynx is clear.   Eyes:      General: No scleral icterus.     Conjunctiva/sclera: Conjunctivae normal.   Neck:      Musculoskeletal: Neck supple.   Cardiovascular:      Rate and Rhythm: Normal rate and regular rhythm.   Pulmonary:      Effort: Pulmonary effort is normal. No respiratory distress.      Breath sounds: Normal breath sounds.   Abdominal:      General: Abdomen is flat. Bowel sounds are normal. There is no distension.      Palpations: Abdomen is soft.   Musculoskeletal:         General: No swelling or tenderness.   Skin:     Coloration: Skin is not  jaundiced.      Findings: No bruising or erythema.   Neurological:      General: No focal deficit present.      Mental Status: He is alert and oriented to person, place, and time.   Psychiatric:         Mood and Affect: Mood normal.         Behavior: Behavior normal.         Thought Content: Thought content normal.         Judgment: Judgment normal.         Labs:          Recent Labs     20   SODIUM 132* 133*   POTASSIUM 3.7 3.4*   CHLORIDE 98 99   CO2 23 22   BUN 10 11   CREATININE 0.68 0.61   CALCIUM 8.0* 7.8*     Recent Labs     20  025   GLUCOSE 107* 122*     Recent Labs     20  0332   RBC 2.90* 2.87* 3.16*   HEMOGLOBIN 8.3* 7.9* 8.8*   HEMATOCRIT 25.3* 25.1* 28.4*   PLATELETCT 65* 54* 58*   APTT 76.2* 70.4* 72.4*     Recent Labs     20  0332   WBC 2.1* 2.1* 1.8*   NEUTSPOLYS 20.90*  --  9.70*   LYMPHOCYTES 59.10*  --  60.20*   MONOCYTES 16.50*  --  26.50*   EOSINOPHILS 1.70  --  0.90   BASOPHILS 1.70  --  1.80     Recent Labs     20  025   SODIUM 132* 133*   POTASSIUM 3.7 3.4*   CHLORIDE 98 99   CO2 23 22   GLUCOSE 107* 122*   BUN 10 11   CREATININE 0.68 0.61   CALCIUM 8.0* 7.8*     Hemodynamics:  Temp (24hrs), Av.9 °C (98.4 °F), Min:36.6 °C (97.8 °F), Max:37.4 °C (99.4 °F)  Temperature: 36.8 °C (98.2 °F)  Pulse  Av.4  Min: 49  Max: 110   Blood Pressure : 142/63     Respiratory:    Respiration: 18, Pulse Oximetry: 94 %           Fluids:    Intake/Output Summary (Last 24 hours) at 2020 1136  Last data filed at 2020 0900  Gross per 24 hour   Intake 240 ml   Output --   Net 240 ml        GI/Nutrition:  Orders Placed This Encounter   Procedures   • Diet Order Low Fiber(GI Soft), Regular     Standing Status:   Standing     Number of Occurrences:   1     Order Specific Question:   Diet:     Answer:   Low Fiber(GI Soft) [2]     Order Specific Question:   Diet:      Answer:   Regular [1]     Medical Decision Making, by Problem:  Active Hospital Problems    Diagnosis   • *Fever [R50.9]   • Pancytopenia (HCC) [D61.818]   • S/P TAVR (transcatheter aortic valve replacement) [Z95.2]   • Essential hypertension [I10]   • Mixed hyperlipidemia [E78.2]   • Panlobular emphysema (HCC) [J43.1]   • Tobacco dependence [F17.200]       Assessment and Plan:    1. Heparin induced thrombocytopenia -   --- HIT IgG antibody: Optical density: 2.9 positive, BULL also positive which confirms the diagnosis of HIT  --- anticoagulation argatroban started 7/4/2020 and continues  --- Continue argatroban while hospitalized.   --- It is unclear if is platelets will fully normalize as he likely also has an underlying marrow disorder. His platelets have improved since admission and while on argatroban.   -- When ready for discharge, the plan would be to switch him to Eliquis 10 mg po BID. His platelet count has stayed consistently above 50.   -- NO WARFARIN as his platelet count is not normal    2.  Neutropenia/Pancytopenia  -- Wait on the final bone marrow report from [place], still pending  --  Preliminary report shows trilineage maturation without an increase in blasts    3.  Anemia  --- His hemoglobin has been stable around 8, monitor, awaiting BM biopsy results  --- Pt has not required a transfusion since 7/3/20    4.  Infectious disease  --- Patient had I&D of this abscess under his chin on 7/4/2020.  --- ID is following  ----COVID test negative on 7/2/2020  ----ID can decide if he needs coverage for gram-negative as well since he is neutropenic.    High complexity/complex decision making  This patient was seen under COVID 19 pandemic disaster response conditions.  During a disaster, the provisions of care is subject to the Crisis Standard of Care    yQuality-Core Measures   Reviewed items::  Labs reviewed, Radiology images reviewed and Medications reviewed

## 2020-07-10 LAB
ANION GAP SERPL CALC-SCNC: 12 MMOL/L (ref 7–16)
ANISOCYTOSIS BLD QL SMEAR: ABNORMAL
APTT PPP: 71.3 SEC (ref 24.7–36)
BASO STIPL BLD QL SMEAR: NORMAL
BASOPHILS # BLD AUTO: 5.2 % (ref 0–1.8)
BASOPHILS # BLD: 0.1 K/UL (ref 0–0.12)
BUN SERPL-MCNC: 12 MG/DL (ref 8–22)
CALCIUM SERPL-MCNC: 7.9 MG/DL (ref 8.5–10.5)
CHLORIDE SERPL-SCNC: 97 MMOL/L (ref 96–112)
CO2 SERPL-SCNC: 23 MMOL/L (ref 20–33)
CREAT SERPL-MCNC: 0.73 MG/DL (ref 0.5–1.4)
EOSINOPHIL # BLD AUTO: 0.03 K/UL (ref 0–0.51)
EOSINOPHIL NFR BLD: 1.7 % (ref 0–6.9)
ERYTHROCYTE [DISTWIDTH] IN BLOOD BY AUTOMATED COUNT: 48.2 FL (ref 35.9–50)
GLUCOSE SERPL-MCNC: 109 MG/DL (ref 65–99)
HCT VFR BLD AUTO: 26.3 % (ref 42–52)
HGB BLD-MCNC: 8.2 G/DL (ref 14–18)
LYMPHOCYTES # BLD AUTO: 1.15 K/UL (ref 1–4.8)
LYMPHOCYTES NFR BLD: 57.4 % (ref 22–41)
MACROCYTES BLD QL SMEAR: ABNORMAL
MANUAL DIFF BLD: NORMAL
MCH RBC QN AUTO: 28.1 PG (ref 27–33)
MCHC RBC AUTO-ENTMCNC: 31.2 G/DL (ref 33.7–35.3)
MCV RBC AUTO: 90.1 FL (ref 81.4–97.8)
MICROCYTES BLD QL SMEAR: ABNORMAL
MONOCYTES # BLD AUTO: 0.43 K/UL (ref 0–0.85)
MONOCYTES NFR BLD AUTO: 21.7 % (ref 0–13.4)
MORPHOLOGY BLD-IMP: NORMAL
MYELOCYTES NFR BLD MANUAL: 1.7 %
NEUTROPHILS # BLD AUTO: 0.24 K/UL (ref 1.82–7.42)
NEUTROPHILS NFR BLD: 12.2 % (ref 44–72)
NRBC # BLD AUTO: 0 K/UL
NRBC BLD-RTO: 0 /100 WBC
OVALOCYTES BLD QL SMEAR: NORMAL
PLATELET # BLD AUTO: 60 K/UL (ref 164–446)
PLATELET BLD QL SMEAR: NORMAL
PMV BLD AUTO: 12.3 FL (ref 9–12.9)
POIKILOCYTOSIS BLD QL SMEAR: NORMAL
POLYCHROMASIA BLD QL SMEAR: NORMAL
POTASSIUM SERPL-SCNC: 3.8 MMOL/L (ref 3.6–5.5)
RBC # BLD AUTO: 2.92 M/UL (ref 4.7–6.1)
RBC BLD AUTO: PRESENT
SODIUM SERPL-SCNC: 132 MMOL/L (ref 135–145)
WBC # BLD AUTO: 2 K/UL (ref 4.8–10.8)

## 2020-07-10 PROCEDURE — A9270 NON-COVERED ITEM OR SERVICE: HCPCS | Performed by: HOSPITALIST

## 2020-07-10 PROCEDURE — 770020 HCHG ROOM/CARE - TELE (206)

## 2020-07-10 PROCEDURE — 700105 HCHG RX REV CODE 258: Performed by: INTERNAL MEDICINE

## 2020-07-10 PROCEDURE — 85730 THROMBOPLASTIN TIME PARTIAL: CPT

## 2020-07-10 PROCEDURE — 80048 BASIC METABOLIC PNL TOTAL CA: CPT

## 2020-07-10 PROCEDURE — 700102 HCHG RX REV CODE 250 W/ 637 OVERRIDE(OP): Performed by: INTERNAL MEDICINE

## 2020-07-10 PROCEDURE — 700111 HCHG RX REV CODE 636 W/ 250 OVERRIDE (IP): Performed by: INTERNAL MEDICINE

## 2020-07-10 PROCEDURE — 700102 HCHG RX REV CODE 250 W/ 637 OVERRIDE(OP): Performed by: HOSPITALIST

## 2020-07-10 PROCEDURE — 700111 HCHG RX REV CODE 636 W/ 250 OVERRIDE (IP): Mod: JG | Performed by: HOSPITALIST

## 2020-07-10 PROCEDURE — A9270 NON-COVERED ITEM OR SERVICE: HCPCS | Performed by: ORAL & MAXILLOFACIAL SURGERY

## 2020-07-10 PROCEDURE — 700102 HCHG RX REV CODE 250 W/ 637 OVERRIDE(OP): Performed by: ORAL & MAXILLOFACIAL SURGERY

## 2020-07-10 PROCEDURE — 85027 COMPLETE CBC AUTOMATED: CPT

## 2020-07-10 PROCEDURE — 99232 SBSQ HOSP IP/OBS MODERATE 35: CPT | Performed by: HOSPITALIST

## 2020-07-10 PROCEDURE — 36415 COLL VENOUS BLD VENIPUNCTURE: CPT

## 2020-07-10 PROCEDURE — A9270 NON-COVERED ITEM OR SERVICE: HCPCS | Performed by: INTERNAL MEDICINE

## 2020-07-10 PROCEDURE — 85007 BL SMEAR W/DIFF WBC COUNT: CPT

## 2020-07-10 RX ADMIN — ACETAMINOPHEN 650 MG: 325 TABLET, FILM COATED ORAL at 17:15

## 2020-07-10 RX ADMIN — LOVASTATIN 20 MG: 20 TABLET ORAL at 20:54

## 2020-07-10 RX ADMIN — BACITRACIN ZINC: 500 OINTMENT TOPICAL at 05:56

## 2020-07-10 RX ADMIN — ATENOLOL 25 MG: 25 TABLET ORAL at 05:56

## 2020-07-10 RX ADMIN — ARGATROBAN 0.89 MCG/KG/MIN: 50 INJECTION INTRAVENOUS at 12:49

## 2020-07-10 RX ADMIN — HYDROCODONE BITARTRATE AND ACETAMINOPHEN 1 TABLET: 5; 325 TABLET ORAL at 12:48

## 2020-07-10 RX ADMIN — CHLORHEXIDINE GLUCONATE 0.12% ORAL RINSE 15 ML: 1.2 LIQUID ORAL at 12:56

## 2020-07-10 RX ADMIN — BACITRACIN ZINC: 500 OINTMENT TOPICAL at 17:15

## 2020-07-10 RX ADMIN — ACETAMINOPHEN 650 MG: 325 TABLET, FILM COATED ORAL at 04:06

## 2020-07-10 RX ADMIN — AMPICILLIN SODIUM AND SULBACTAM SODIUM 3 G: 2; 1 INJECTION, POWDER, FOR SOLUTION INTRAMUSCULAR; INTRAVENOUS at 05:56

## 2020-07-10 RX ADMIN — AMPICILLIN SODIUM AND SULBACTAM SODIUM 3 G: 2; 1 INJECTION, POWDER, FOR SOLUTION INTRAMUSCULAR; INTRAVENOUS at 12:48

## 2020-07-10 RX ADMIN — CHLORHEXIDINE GLUCONATE 0.12% ORAL RINSE 15 ML: 1.2 LIQUID ORAL at 20:54

## 2020-07-10 RX ADMIN — CHLORHEXIDINE GLUCONATE 0.12% ORAL RINSE 15 ML: 1.2 LIQUID ORAL at 17:15

## 2020-07-10 RX ADMIN — AMPICILLIN SODIUM AND SULBACTAM SODIUM 3 G: 2; 1 INJECTION, POWDER, FOR SOLUTION INTRAMUSCULAR; INTRAVENOUS at 17:15

## 2020-07-10 RX ADMIN — LISINOPRIL 10 MG: 10 TABLET ORAL at 05:56

## 2020-07-10 ASSESSMENT — ENCOUNTER SYMPTOMS
DOUBLE VISION: 0
FOCAL WEAKNESS: 0
HEARTBURN: 0
CLAUDICATION: 0
FEVER: 1
BRUISES/BLEEDS EASILY: 0
DIZZINESS: 0
VOMITING: 0
CHILLS: 0
DEPRESSION: 0
NERVOUS/ANXIOUS: 1
NECK PAIN: 0
NAUSEA: 0
HEADACHES: 0
TREMORS: 0
ABDOMINAL PAIN: 0
BLURRED VISION: 0
MYALGIAS: 1
COUGH: 0
BACK PAIN: 0
SPUTUM PRODUCTION: 0
FEVER: 0
ORTHOPNEA: 0
PALPITATIONS: 0
PHOTOPHOBIA: 0
WEIGHT LOSS: 0

## 2020-07-10 ASSESSMENT — FIBROSIS 4 INDEX: FIB4 SCORE: 10.74

## 2020-07-10 ASSESSMENT — LIFESTYLE VARIABLES: SUBSTANCE_ABUSE: 0

## 2020-07-10 NOTE — PROGRESS NOTES
Bedside report received and patient care assumed. Pt is resting in bed, A&O4, with no complaints of pain, and is on RA. Tele box on. All fall precautions are in place, belongings at bedside table.  Pt was updated on POC, no questions or concerns. Pt educated on use of call light for assistance. Will continue to monitor.

## 2020-07-10 NOTE — PROGRESS NOTES
Oncology/Hematology Progress Note               Author: Denise Cabello M.D. Date & Time created: 7/10/2020  1:59 PM     CC: Pancytopenia    Interval History:  Pt reports he feels fine today. He is still hoping his platelets increase. He has no pain. He feels his infection is controlled. He still feels anxious.     Review of Systems:  Review of Systems   Constitutional: Positive for malaise/fatigue. Negative for chills and fever.   HENT: Negative for hearing loss and tinnitus.    Eyes: Negative for blurred vision and double vision.   Respiratory: Negative for cough.    Cardiovascular: Negative for chest pain.   Gastrointestinal: Negative for heartburn and nausea.   Genitourinary: Negative for dysuria.   Musculoskeletal: Negative for back pain, joint pain and neck pain.   Skin: Negative for rash.   Neurological: Negative for dizziness and headaches.   Endo/Heme/Allergies: Does not bruise/bleed easily.   Psychiatric/Behavioral: The patient is nervous/anxious.        Physical Exam:  Physical Exam  Constitutional:       General: He is not in acute distress.     Appearance: Normal appearance.   HENT:      Head: Normocephalic and atraumatic.      Right Ear: External ear normal.      Left Ear: External ear normal.      Nose: Nose normal.   Eyes:      General: No scleral icterus.     Conjunctiva/sclera: Conjunctivae normal.   Neck:      Musculoskeletal: Neck supple.   Cardiovascular:      Rate and Rhythm: Normal rate.   Pulmonary:      Effort: Pulmonary effort is normal. No respiratory distress.   Musculoskeletal:         General: No swelling.   Skin:     Coloration: Skin is not jaundiced.      Findings: No bruising or erythema.   Neurological:      Mental Status: He is alert and oriented to person, place, and time.   Psychiatric:         Mood and Affect: Mood normal.         Behavior: Behavior normal.         Thought Content: Thought content normal.         Judgment: Judgment normal.         Labs:          Recent Labs      07/08/20  0255 07/10/20  0412   SODIUM 133* 132*   POTASSIUM 3.4* 3.8   CHLORIDE 99 97   CO2 22 23   BUN 11 12   CREATININE 0.61 0.73   CALCIUM 7.8* 7.9*     Recent Labs     07/08/20  0255 07/10/20  041   GLUCOSE 122* 109*     Recent Labs     07/08/20  0255 07/09/20  0332 07/10/20  0412 07/10/20  0600   RBC 2.87* 3.16* 2.92*  --    HEMOGLOBIN 7.9* 8.8* 8.2*  --    HEMATOCRIT 25.1* 28.4* 26.3*  --    PLATELETCT 54* 58* 60*  --    APTT 70.4* 72.4*  --  71.3*     Recent Labs     07/08/20  0255 07/09/20  0332 07/10/20  041   WBC 2.1* 1.8* 2.0*   NEUTSPOLYS  --  9.70* 12.20*   LYMPHOCYTES  --  60.20* 57.40*   MONOCYTES  --  26.50* 21.70*   EOSINOPHILS  --  0.90 1.70   BASOPHILS  --  1.80 5.20*     Recent Labs     07/08/20  0255 07/10/20  041   SODIUM 133* 132*   POTASSIUM 3.4* 3.8   CHLORIDE 99 97   CO2 22 23   GLUCOSE 122* 109*   BUN 11 12   CREATININE 0.61 0.73   CALCIUM 7.8* 7.9*     Hemodynamics:  Temp (24hrs), Av.5 °C (99.5 °F), Min:36.4 °C (97.6 °F), Max:38.9 °C (102.1 °F)  Temperature: 36.4 °C (97.6 °F)  Pulse  Av.1  Min: 49  Max: 110   Blood Pressure : 103/54     Respiratory:    Respiration: 18, Pulse Oximetry: 90 %           Fluids:    Intake/Output Summary (Last 24 hours) at 2020 1136  Last data filed at 2020 0900  Gross per 24 hour   Intake 240 ml   Output --   Net 240 ml     Weight: 62.3 kg (137 lb 5.6 oz)  GI/Nutrition:  Orders Placed This Encounter   Procedures   • Diet Order Low Fiber(GI Soft), Regular     Standing Status:   Standing     Number of Occurrences:   1     Order Specific Question:   Diet:     Answer:   Low Fiber(GI Soft) [2]     Order Specific Question:   Diet:     Answer:   Regular [1]     Medical Decision Making, by Problem:  Active Hospital Problems    Diagnosis   • *Fever [R50.9]   • Pancytopenia (HCC) [D61.818]   • S/P TAVR (transcatheter aortic valve replacement) [Z95.2]   • Essential hypertension [I10]   • Mixed hyperlipidemia [E78.2]   • Panlobular emphysema (HCC)  [J43.1]   • Tobacco dependence [F17.200]       Assessment and Plan:    1. Heparin induced thrombocytopenia -   --- HIT IgG antibody: Optical density: 2.9 positive, BULL also positive which confirms the diagnosis of HIT  --- anticoagulation argatroban started 7/4/2020 and continues  --- It is unclear if is platelets will fully normalize as he likely also has an underlying marrow disorder. His platelets have improved since admission and while on argatroban.   -- When ready for discharge, the plan would be to switch him to Eliquis 10 mg po BID. His platelet count has stayed consistently above 50.   -- NO WARFARIN as his platelet count is not normal  -- Hospital MD prepping patient for discharge so planning to switch to Eliquis today and monitor. Half life of argatroban is short so can stop argatroban then start Eliquis, please confirm with pharmacy.    2.  Neutropenia/Pancytopenia  -- Wait on the final bone marrow report from [place], still pending, spoke with pathology today, they think it could be up to another week to get the report from [place].  --  Preliminary report shows trilineage maturation without an increase in blasts    3.  Anemia  --- His hemoglobin has been stable around 8, monitor, awaiting BM biopsy results  --- Pt has not required a transfusion since 7/3/20    4.  Infectious disease  --- Patient had I&D of this abscess under his chin on 7/4/2020.  --- ID is following  ----COVID test negative on 7/2/2020  ----ID can decide if he needs coverage for gram-negative as well since he is neutropenic.    High complexity/complex decision making  This patient was seen under COVID 19 pandemic disaster response conditions.  During a disaster, the provisions of care is subject to the Crisis Standard of Care    yQuality-Core Measures   Reviewed items::  Labs reviewed, Radiology images reviewed and Medications reviewed

## 2020-07-10 NOTE — CARE PLAN
Problem: Communication  Goal: The ability to communicate needs accurately and effectively will improve  Outcome: PROGRESSING AS EXPECTED  Note: Pt actively participates in POC. Pt and board updated, all questions and concerns answered.       Problem: Safety  Goal: Will remain free from injury  Outcome: PROGRESSING AS EXPECTED  Note: Safety and fall education given. All fall precautions are in place with belongings at bedside table. Needs are tended to. Educated to use call light for assistance. Pt verbalized understanding.       Problem: Venous Thromboembolism (VTW)/Deep Vein Thrombosis (DVT) Prevention:  Goal: Patient will participate in Venous Thrombosis (VTE)/Deep Vein Thrombosis (DVT)Prevention Measures  Outcome: PROGRESSING AS EXPECTED  Flowsheets (Taken 7/9/2020 2100)  Pharmacologic Prophylaxis Used: (argatroban gtt) --  Note: Pt is ambulatory.     Problem: Bowel/Gastric:  Goal: Normal bowel function is maintained or improved  Outcome: PROGRESSING AS EXPECTED     Problem: Pain Management  Goal: Pain level will decrease to patient's comfort goal  Outcome: PROGRESSING AS EXPECTED     Problem: Mobility  Goal: Risk for activity intolerance will decrease  Outcome: PROGRESSING AS EXPECTED

## 2020-07-11 ENCOUNTER — APPOINTMENT (OUTPATIENT)
Dept: RADIOLOGY | Facility: MEDICAL CENTER | Age: 65
DRG: 982 | End: 2020-07-11
Attending: HOSPITALIST
Payer: COMMERCIAL

## 2020-07-11 LAB
ANION GAP SERPL CALC-SCNC: 14 MMOL/L (ref 7–16)
ANISOCYTOSIS BLD QL SMEAR: ABNORMAL
APTT PPP: 81.4 SEC (ref 24.7–36)
BASOPHILS # BLD AUTO: 3 % (ref 0–1.8)
BASOPHILS # BLD: 0.06 K/UL (ref 0–0.12)
BUN SERPL-MCNC: 16 MG/DL (ref 8–22)
CALCIUM SERPL-MCNC: 7.7 MG/DL (ref 8.5–10.5)
CHLORIDE SERPL-SCNC: 100 MMOL/L (ref 96–112)
CO2 SERPL-SCNC: 21 MMOL/L (ref 20–33)
CREAT SERPL-MCNC: 0.8 MG/DL (ref 0.5–1.4)
EOSINOPHIL # BLD AUTO: 0 K/UL (ref 0–0.51)
EOSINOPHIL NFR BLD: 0 % (ref 0–6.9)
ERYTHROCYTE [DISTWIDTH] IN BLOOD BY AUTOMATED COUNT: 47.8 FL (ref 35.9–50)
GLUCOSE SERPL-MCNC: 112 MG/DL (ref 65–99)
HCT VFR BLD AUTO: 24.7 % (ref 42–52)
HGB BLD-MCNC: 8.1 G/DL (ref 14–18)
LYMPHOCYTES # BLD AUTO: 1.33 K/UL (ref 1–4.8)
LYMPHOCYTES NFR BLD: 70 % (ref 22–41)
MACROCYTES BLD QL SMEAR: ABNORMAL
MANUAL DIFF BLD: NORMAL
MCH RBC QN AUTO: 28.4 PG (ref 27–33)
MCHC RBC AUTO-ENTMCNC: 32.8 G/DL (ref 33.7–35.3)
MCV RBC AUTO: 86.7 FL (ref 81.4–97.8)
MICROCYTES BLD QL SMEAR: ABNORMAL
MONOCYTES # BLD AUTO: 0.36 K/UL (ref 0–0.85)
MONOCYTES NFR BLD AUTO: 19 % (ref 0–13.4)
MORPHOLOGY BLD-IMP: NORMAL
NEUTROPHILS # BLD AUTO: 0.15 K/UL (ref 1.82–7.42)
NEUTROPHILS NFR BLD: 7 % (ref 44–72)
NEUTS BAND NFR BLD MANUAL: 1 % (ref 0–10)
NRBC # BLD AUTO: 0 K/UL
NRBC BLD-RTO: 0 /100 WBC
OVALOCYTES BLD QL SMEAR: NORMAL
PLATELET # BLD AUTO: 59 K/UL (ref 164–446)
PLATELET BLD QL SMEAR: NORMAL
PMV BLD AUTO: 12.4 FL (ref 9–12.9)
POIKILOCYTOSIS BLD QL SMEAR: NORMAL
POTASSIUM SERPL-SCNC: 3.7 MMOL/L (ref 3.6–5.5)
RBC # BLD AUTO: 2.85 M/UL (ref 4.7–6.1)
RBC BLD AUTO: PRESENT
SODIUM SERPL-SCNC: 135 MMOL/L (ref 135–145)
WBC # BLD AUTO: 1.9 K/UL (ref 4.8–10.8)

## 2020-07-11 PROCEDURE — 700102 HCHG RX REV CODE 250 W/ 637 OVERRIDE(OP): Performed by: ORAL & MAXILLOFACIAL SURGERY

## 2020-07-11 PROCEDURE — 700117 HCHG RX CONTRAST REV CODE 255: Performed by: HOSPITALIST

## 2020-07-11 PROCEDURE — 85027 COMPLETE CBC AUTOMATED: CPT

## 2020-07-11 PROCEDURE — 700102 HCHG RX REV CODE 250 W/ 637 OVERRIDE(OP): Performed by: HOSPITALIST

## 2020-07-11 PROCEDURE — A9270 NON-COVERED ITEM OR SERVICE: HCPCS | Performed by: INTERNAL MEDICINE

## 2020-07-11 PROCEDURE — 74177 CT ABD & PELVIS W/CONTRAST: CPT

## 2020-07-11 PROCEDURE — A9270 NON-COVERED ITEM OR SERVICE: HCPCS | Performed by: ORAL & MAXILLOFACIAL SURGERY

## 2020-07-11 PROCEDURE — 85007 BL SMEAR W/DIFF WBC COUNT: CPT

## 2020-07-11 PROCEDURE — 85730 THROMBOPLASTIN TIME PARTIAL: CPT

## 2020-07-11 PROCEDURE — 770020 HCHG ROOM/CARE - TELE (206)

## 2020-07-11 PROCEDURE — 700105 HCHG RX REV CODE 258: Performed by: INTERNAL MEDICINE

## 2020-07-11 PROCEDURE — 80048 BASIC METABOLIC PNL TOTAL CA: CPT

## 2020-07-11 PROCEDURE — 36415 COLL VENOUS BLD VENIPUNCTURE: CPT

## 2020-07-11 PROCEDURE — 700102 HCHG RX REV CODE 250 W/ 637 OVERRIDE(OP): Performed by: INTERNAL MEDICINE

## 2020-07-11 PROCEDURE — 700111 HCHG RX REV CODE 636 W/ 250 OVERRIDE (IP): Mod: JG | Performed by: HOSPITALIST

## 2020-07-11 PROCEDURE — 700111 HCHG RX REV CODE 636 W/ 250 OVERRIDE (IP): Performed by: INTERNAL MEDICINE

## 2020-07-11 PROCEDURE — 99232 SBSQ HOSP IP/OBS MODERATE 35: CPT | Performed by: HOSPITALIST

## 2020-07-11 PROCEDURE — 70491 CT SOFT TISSUE NECK W/DYE: CPT

## 2020-07-11 PROCEDURE — 99233 SBSQ HOSP IP/OBS HIGH 50: CPT | Performed by: INTERNAL MEDICINE

## 2020-07-11 PROCEDURE — 87040 BLOOD CULTURE FOR BACTERIA: CPT | Mod: 91

## 2020-07-11 PROCEDURE — A9270 NON-COVERED ITEM OR SERVICE: HCPCS | Performed by: HOSPITALIST

## 2020-07-11 RX ORDER — METRONIDAZOLE 500 MG/1
500 TABLET ORAL EVERY 8 HOURS
Status: DISCONTINUED | OUTPATIENT
Start: 2020-07-11 | End: 2020-07-15 | Stop reason: HOSPADM

## 2020-07-11 RX ADMIN — BACITRACIN ZINC: 500 OINTMENT TOPICAL at 05:19

## 2020-07-11 RX ADMIN — ACETAMINOPHEN 650 MG: 325 TABLET, FILM COATED ORAL at 01:15

## 2020-07-11 RX ADMIN — METRONIDAZOLE 500 MG: 500 TABLET ORAL at 14:51

## 2020-07-11 RX ADMIN — CHLORHEXIDINE GLUCONATE 0.12% ORAL RINSE 15 ML: 1.2 LIQUID ORAL at 16:00

## 2020-07-11 RX ADMIN — IOHEXOL 100 ML: 350 INJECTION, SOLUTION INTRAVENOUS at 17:03

## 2020-07-11 RX ADMIN — ARGATROBAN 0.89 MCG/KG/MIN: 50 INJECTION INTRAVENOUS at 10:05

## 2020-07-11 RX ADMIN — AMPICILLIN SODIUM AND SULBACTAM SODIUM 3 G: 2; 1 INJECTION, POWDER, FOR SOLUTION INTRAMUSCULAR; INTRAVENOUS at 05:16

## 2020-07-11 RX ADMIN — CEFEPIME 2 G: 2 INJECTION, POWDER, FOR SOLUTION INTRAVENOUS at 03:00

## 2020-07-11 RX ADMIN — ACETAMINOPHEN 650 MG: 325 TABLET, FILM COATED ORAL at 15:55

## 2020-07-11 RX ADMIN — AMPICILLIN SODIUM AND SULBACTAM SODIUM 3 G: 2; 1 INJECTION, POWDER, FOR SOLUTION INTRAMUSCULAR; INTRAVENOUS at 01:16

## 2020-07-11 RX ADMIN — METRONIDAZOLE 500 MG: 500 TABLET ORAL at 22:36

## 2020-07-11 RX ADMIN — LOVASTATIN 20 MG: 20 TABLET ORAL at 22:36

## 2020-07-11 RX ADMIN — CEFEPIME 2 G: 2 INJECTION, POWDER, FOR SOLUTION INTRAVENOUS at 14:51

## 2020-07-11 RX ADMIN — LISINOPRIL 10 MG: 10 TABLET ORAL at 05:15

## 2020-07-11 RX ADMIN — CHLORHEXIDINE GLUCONATE 0.12% ORAL RINSE 15 ML: 1.2 LIQUID ORAL at 22:37

## 2020-07-11 RX ADMIN — BACITRACIN ZINC: 500 OINTMENT TOPICAL at 16:00

## 2020-07-11 ASSESSMENT — ENCOUNTER SYMPTOMS
ORTHOPNEA: 0
PHOTOPHOBIA: 0
NERVOUS/ANXIOUS: 1
FOCAL WEAKNESS: 0
HEADACHES: 0
NAUSEA: 0
HEARTBURN: 0
NECK PAIN: 0
DEPRESSION: 0
BACK PAIN: 0
SPUTUM PRODUCTION: 0
TREMORS: 0
BRUISES/BLEEDS EASILY: 0
WEIGHT LOSS: 0
COUGH: 0
FEVER: 0
VOMITING: 0
DIZZINESS: 0
MYALGIAS: 1
BLURRED VISION: 0
CHILLS: 0
DIARRHEA: 0
CLAUDICATION: 0
PALPITATIONS: 0
FEVER: 1
ABDOMINAL PAIN: 0
CONSTIPATION: 0
DOUBLE VISION: 0
MYALGIAS: 0

## 2020-07-11 ASSESSMENT — LIFESTYLE VARIABLES: SUBSTANCE_ABUSE: 0

## 2020-07-11 NOTE — CARE PLAN
Problem: Safety  Goal: Will remain free from falls  Note: Patient encouraged to use call light when assistance needed. Hourly rounding in place. Patient room close to nursing station.     Problem: Knowledge Deficit  Goal: Knowledge of disease process/condition, treatment plan, diagnostic tests, and medications will improve  Note: Patient encouraged to ask questions and voice concerns. Questions and concerns addressed appropriately. Patient updated on plan of care. Interventions explained to patient.

## 2020-07-11 NOTE — PROGRESS NOTES
[facility] Medicine Daily Progress Note    Date of Service  7/11/2020    Chief Complaint  65 y.o. male admitted 6/23/2020 with tachycardia.  He had a TAVR on 6/22/2020, and was discharged home on 6/23/2020.  He returned to the ER due to persistent tachycardia.     He has a history of pancytopenia which is worsening, and has been hesitant to do a bone marrow biopsy.  He has tobacco dependence, essential hypertension, hyperlipidemia.       Hospital Course    Home Eliquis and aspirin were discontinued.  Heart rate stabilized. He continued to have decline in his hemoglobin and platelets. WBC stabilized. Briefly discussed with hem/onc.  Abdominal ultrasound from 6/25/2020 showed no acute abnormalities.  SPEP, smooth muscle antibodies, SEEMA, rheumatoid factor, B12 and folate levels, fibrinogen were ordered.  Patient wished to go home and agreed follow-up with hematology for bone marrow biopsy and further work-up.  He was discharged to go home on 6/26/2020, but developed a fever of 103.6.     Infectious disease were consulted on 6/27/2020 due to fever and neutropenia, Cefepime was empirically started.     B12, folate, and fibrinogen levels were normal.  Rheumatoid factor was 40, ESR was 45, LFTs were unremarkable, SEEMA was undetectable, HIV was nonreactive.      Interval Problem Update    FEVER again last night    Case d/w dr beauchamp ID    Low  Hb 8.1  Low plt 59    bp is BETTER    Wbc down to 1.9 WITH  ANC 0.15    Sodium 135    Chin pain, minimal      S/p orif mandible on 7/3    Hit positive-- BULL positive      echoCardiogram shows no vegetations on valves      Consultants/Specialty  ID dr beauchamp  Oncology dr mccray    Code Status  full    Disposition  home    Review of Systems  Review of Systems   Constitutional: Positive for fever. Negative for chills, malaise/fatigue and weight loss.   HENT: Negative for ear discharge, hearing loss and tinnitus.    Eyes: Negative for blurred vision, double vision and photophobia.    Respiratory: Negative for cough and sputum production.    Cardiovascular: Negative for palpitations, orthopnea and claudication.   Gastrointestinal: Negative for abdominal pain, heartburn, nausea and vomiting.   Genitourinary: Negative for frequency and urgency.   Musculoskeletal: Positive for joint pain and myalgias. Negative for back pain and neck pain.   Neurological: Negative for dizziness, tremors, focal weakness and headaches.   Endo/Heme/Allergies: Does not bruise/bleed easily.   Psychiatric/Behavioral: Negative for depression and substance abuse.        Physical Exam  Temp:  [36.4 °C (97.6 °F)-38.6 °C (101.4 °F)] 37.3 °C (99.1 °F)  Pulse:  [55-74] 63  Resp:  [18-30] 22  BP: (103-140)/(54-73) 127/66  SpO2:  [89 %-92 %] 92 %    Physical Exam  Constitutional:       Appearance: Normal appearance. He is not diaphoretic.   HENT:      Head:      Comments: Submandibular swelling/induration and tenderness- MINIMAL         Mouth/Throat:      Mouth: Mucous membranes are moist.   Eyes:      General: No scleral icterus.     Extraocular Movements: Extraocular movements intact.      Pupils: Pupils are equal, round, and reactive to light.   Neck:      Musculoskeletal: Normal range of motion and neck supple.   Cardiovascular:      Rate and Rhythm: Normal rate.      Heart sounds: No murmur. No gallop.    Pulmonary:      Effort: Pulmonary effort is normal. No respiratory distress.      Breath sounds: No stridor. No wheezing.   Chest:      Chest wall: No tenderness.   Abdominal:      General: Abdomen is flat. There is no distension.      Tenderness: There is no guarding.      Hernia: No hernia is present.   Musculoskeletal: Normal range of motion.         General: Deformity (right fifth toe) and signs of injury present. No swelling or tenderness.      Right lower leg: No edema.   Skin:     General: Skin is warm.      Capillary Refill: Capillary refill takes 2 to 3 seconds.      Coloration: Skin is not jaundiced or pale.       Findings: No bruising or rash.   Neurological:      General: No focal deficit present.      Mental Status: He is alert and oriented to person, place, and time.      Cranial Nerves: No cranial nerve deficit.      Deep Tendon Reflexes: Reflexes normal.   Psychiatric:         Mood and Affect: Mood normal.         Fluids    Intake/Output Summary (Last 24 hours) at 7/11/2020 1025  Last data filed at 7/11/2020 0917  Gross per 24 hour   Intake 4288.94 ml   Output 680 ml   Net 3608.94 ml       Laboratory  Recent Labs     07/09/20  0332 07/10/20  0412 07/11/20  0349   WBC 1.8* 2.0* 1.9*   RBC 3.16* 2.92* 2.85*   HEMOGLOBIN 8.8* 8.2* 8.1*   HEMATOCRIT 28.4* 26.3* 24.7*   MCV 89.9 90.1 86.7   MCH 27.8 28.1 28.4   MCHC 31.0* 31.2* 32.8*   RDW 48.8 48.2 47.8   PLATELETCT 58* 60* 59*   MPV 11.7 12.3 12.4     Recent Labs     07/10/20  0412 07/11/20  0349   SODIUM 132* 135   POTASSIUM 3.8 3.7   CHLORIDE 97 100   CO2 23 21   GLUCOSE 109* 112*   BUN 12 16   CREATININE 0.73 0.80   CALCIUM 7.9* 7.7*     Recent Labs     07/09/20 0332 07/10/20  0600 07/11/20  0349   APTT 72.4* 71.3* 81.4*               Imaging  DX-FOOT-2- RIGHT   Final Result         Irregular appearance of the base of the fifth proximal phalanx and fracture is possible.      US-EXTREMITY VENOUS LOWER BILAT   Final Result      EC-ECHOCARDIOGRAM COMPLETE W/O CONT   Final Result      IR-CYST ASPIRATION-MISC   Final Result         ULTRASOUND GUIDED ASPIRATION OF SUBMANDIBULAR ABSCESS..      CT-SOFT TISSUE NECK WITH   Final Result         1. Acute versus subacute fracture of the mid mandibular body.   2. A 2.9 cm collection subjacent to the fracture site, likely hematoma. Infected hematoma can be considered in the appropriate clinical settings.   3. No cervical lymphadenopathy.   4. Moderate mucosal thickening of the left maxillary sinus, likely odontogenic in nature, related to impacted remaining maxillary tooth.      CT-CHEST,ABDOMEN,PELVIS WITH   Final Result       Hypodensities in the spleen worrisome for splenic infarcts.      Trace nonspecific free fluid in the pelvis.      2.2 x 1.7 cm left common femoral artery pseudoaneurysm.      Bowel containing right inguinal hernia without evidence of obstruction.      Atherosclerotic plaque within an ectatic aorta.      Gallbladder wall thickening/pericholecystic fluid. Further evaluation can be performed with right upper quadrant ultrasound.      Small hypodense left renal lesions are too small to characterize but likely represent small cysts.      Emphysematous changes.      Irregular nodular opacity along the minor fissure measuring 8 mm.      4.7 mm right middle lobe pulmonary nodule.      Mild bibasilar and lingular atelectasis.      Emphysematous changes.      Sequelae of prior granulomatous exposure.      Right hilar lymph node is borderline enlarged and nonspecific, possibly reactive..      Enlarged periportal lymph nodes are nonspecific.      Fleischner Society pulmonary nodule recommendations:   Low Risk: CT at 3-6 months, then consider CT at 18-24 months      High Risk: CT at 3-6 months, then at 18-24 months      Comments: Use most suspicious nodule as guide to management. Follow-up intervals may vary according to size and risk.      Low Risk - Minimal or absent history of smoking and of other known risk factors.      High Risk - History of smoking or of other known risk factors.      Note: These recommendations do not apply to lung cancer screening, patients with immunosuppression, or patients with known primary cancer.      Fleischner Society 2017 Guidelines for Management of Incidentally Detected Pulmonary Nodules in Adults      DX-CHEST-2 VIEWS   Final Result      No acute cardiopulmonary abnormality.      US-EXTREMITY ARTERY LOWER UNILAT RIGHT   Final Result      US-ABDOMEN COMPLETE SURVEY   Final Result      Prominent trabeculated bladder wall, suggesting possible chronic bladder outlet obstruction. Unremarkable  findings otherwise.         DX-CHEST-PORTABLE (1 VIEW)   Final Result      No acute cardiac or pulmonary abnormalities are identified.           Assessment/Plan  * Fever- (present on admission)  Assessment & Plan  abx as per iD    Follow cbc    Follow all cultures    Last repeated on 7/11        Mandibular abscess- (present on admission)  Assessment & Plan  Growing strep viridans    Oral surgery dr vasquez s/p ORIF    F/u op report    Pancytopenia (HCC)- (present on admission)  Assessment & Plan  Present for a few months, unclear etiology    abx as per id    Hematology/oncology were consulted 6/28/2020  Bone marrow biopsy done      Transfuse plt on 7/3- to prepare for surgery on mandible    S/P TAVR (transcatheter aortic valve replacement)- (present on admission)  Assessment & Plan  S/P TAVR on 6/22/2020   . Continue statin  Per cardiology start B12 and folate supplements  Echocardiogram shows no vegetations  Cardiology is following    Mixed hyperlipidemia- (present on admission)  Assessment & Plan  Continue home Lovastatin    Essential hypertension- (present on admission)  Assessment & Plan    Continue Norvasc    Added lisinopril 10mg po daily 7/7    Toe fracture, right  Assessment & Plan  Splint    Pain control    HIT (heparin-induced thrombocytopenia) (HCC)  Assessment & Plan  huseyin positive    on agratraban on  7/4    Will transition to po eliquis prior to discharge    Hypokalemia  Assessment & Plan  Resolved     Panlobular emphysema (HCC)- (present on admission)  Assessment & Plan  History of tobacco use  Chest x-ray from admission shows no acute cardiopulmonary process    Tobacco dependence- (present on admission)  Assessment & Plan  Patient has been counseled on tobacco cessation  Nicotine replacement options were provided       VTE prophylaxis: scd      Check am cbc, BMP

## 2020-07-11 NOTE — PROGRESS NOTES
Received report from day shift RN. Assumed care of patient at 1900. Patient A&Ox 4. On room air, no signs of respiratory distress. Patient states no pain. POC discussed and agreed upon with patient. Call light and belongings within reach. Bed in lowest locked position. Upper side rails raised.  Fall risk precautions in place. All questions answered at this time. Hourly rounding in place. Patient on tele box. Will continue to monitor for bleeding.

## 2020-07-11 NOTE — PROGRESS NOTES
Oncology/Hematology Progress Note               Author: Denise Cabello M.D. Date & Time created: 7/11/2020  4:27 PM     CC: Pancytopenia    Interval History:  Pt had a fever. His family is at the bedside this afternoon. They were updated. Pt's wife states he seems to get more anxious shaking in the early evenings/late afternoon. She also feels like he gets fevers around this time. Pt is feeling about the same otherwise.     Review of Systems:  Review of Systems   Constitutional: Positive for malaise/fatigue. Negative for chills and fever.   HENT: Negative for hearing loss and tinnitus.    Eyes: Negative for blurred vision and double vision.   Respiratory: Negative for cough.    Cardiovascular: Negative for chest pain.   Gastrointestinal: Negative for heartburn and nausea.   Genitourinary: Negative for dysuria.   Musculoskeletal: Negative for back pain, joint pain and neck pain.   Skin: Negative for rash.   Neurological: Negative for dizziness and headaches.   Endo/Heme/Allergies: Does not bruise/bleed easily.   Psychiatric/Behavioral: The patient is nervous/anxious.        Physical Exam:  Physical Exam  Constitutional:       General: He is not in acute distress.     Appearance: Normal appearance.   HENT:      Head: Normocephalic and atraumatic.      Right Ear: External ear normal.      Left Ear: External ear normal.      Nose: Nose normal.   Eyes:      General: No scleral icterus.     Conjunctiva/sclera: Conjunctivae normal.   Neck:      Musculoskeletal: Neck supple.   Cardiovascular:      Rate and Rhythm: Normal rate.   Pulmonary:      Effort: Pulmonary effort is normal. No respiratory distress.   Musculoskeletal:         General: No swelling.   Skin:     Coloration: Skin is not jaundiced.      Findings: Bruising (on the extremities) present. No erythema.   Neurological:      Mental Status: He is alert and oriented to person, place, and time.   Psychiatric:         Mood and Affect: Mood normal.         Behavior:  Behavior normal.         Thought Content: Thought content normal.         Judgment: Judgment normal.         Labs:          Recent Labs     07/10/20  0412 07/11/20  0349   SODIUM 132* 135   POTASSIUM 3.8 3.7   CHLORIDE 97 100   CO2 23 21   BUN 12 16   CREATININE 0.73 0.80   CALCIUM 7.9* 7.7*     Recent Labs     07/10/20  0412 07/11/20  034   GLUCOSE 109* 112*     Recent Labs     07/09/20  0332 07/10/20  0412 07/10/20  0620  034   RBC 3.16* 2.92*  --  2.85*   HEMOGLOBIN 8.8* 8.2*  --  8.1*   HEMATOCRIT 28.4* 26.3*  --  24.7*   PLATELETCT 58* 60*  --  59*   APTT 72.4*  --  71.3* 81.4*     Recent Labs     07/09/20  0332 07/10/20  0412 07/11/20  0349   WBC 1.8* 2.0* 1.9*   NEUTSPOLYS 9.70* 12.20* 7.00*   LYMPHOCYTES 60.20* 57.40* 70.00*   MONOCYTES 26.50* 21.70* 19.00*   EOSINOPHILS 0.90 1.70 0.00   BASOPHILS 1.80 5.20* 3.00*     Recent Labs     07/10/20  0412 07/11/20  0349   SODIUM 132* 135   POTASSIUM 3.8 3.7   CHLORIDE 97 100   CO2 23 21   GLUCOSE 109* 112*   BUN 12 16   CREATININE 0.73 0.80   CALCIUM 7.9* 7.7*     Hemodynamics:  Temp (24hrs), Av.6 °C (99.6 °F), Min:37.2 °C (98.9 °F), Max:38.6 °C (101.4 °F)  Temperature: 37.4 °C (99.3 °F)  Pulse  Av.6  Min: 49  Max: 110   Blood Pressure : 117/79     Respiratory:    Respiration: (!) 22, Pulse Oximetry: 93 %           Fluids:    Intake/Output Summary (Last 24 hours) at 2020 1136  Last data filed at 2020 0900  Gross per 24 hour   Intake 240 ml   Output --   Net 240 ml     Weight: 63.5 kg (139 lb 15.9 oz)  GI/Nutrition:  Orders Placed This Encounter   Procedures   • Diet Order Low Fiber(GI Soft), Regular     Standing Status:   Standing     Number of Occurrences:   1     Order Specific Question:   Diet:     Answer:   Low Fiber(GI Soft) [2]     Order Specific Question:   Diet:     Answer:   Regular [1]     Medical Decision Making, by Problem:  Active Hospital Problems    Diagnosis   • *Fever [R50.9]   • Pancytopenia (HCC) [D61.818]   • S/P TAVR  (transcatheter aortic valve replacement) [Z95.2]   • Essential hypertension [I10]   • Mixed hyperlipidemia [E78.2]   • Panlobular emphysema (HCC) [J43.1]   • Tobacco dependence [F17.200]       Assessment and Plan:    1. Heparin induced thrombocytopenia -   --- HIT IgG antibody: Optical density: 2.9 positive, BULL also positive which confirms the diagnosis of HIT  --- anticoagulation argatroban started 7/4/2020 and continues  --- It is unclear if is platelets will fully normalize as he likely also has an underlying marrow disorder. His platelets have improved since admission and while on argatroban.   -- When ready for discharge, the plan would be to switch him to Eliquis 10 mg po BID. His platelet count has stayed consistently above 50.   -- NO WARFARIN as his platelet count is not normal    2.  Neutropenia/Pancytopenia  -- Wait on the final bone marrow report from [place], still pending, spoke with pathology on Firday, they think it could be up to another week to get the report from [place].  --  Preliminary report shows trilineage maturation without an increase in blasts    3.  Anemia  --- His hemoglobin has been stable around 8, monitor, awaiting BM biopsy results  --- Pt has not required a transfusion since 7/3/20    4.  Neutropenic Fevers  --- Patient had I&D of this abscess under his chin on 7/4/2020.  --- ID is following  ----COVID test negative on 7/2/2020  ----Febrile again yesterday.  --- Repeat blood cultures drawn and pending  --- ID switched antibiotics to Cefepime/Flagyl  --- Agree with CT CAP and neck for further evaluation    High complexity/complex decision making  This patient was seen under COVID 19 pandemic disaster response conditions.  During a disaster, the provisions of care is subject to the Crisis Standard of Care    yQuality-Core Measures   Reviewed items::  Labs reviewed, Radiology images reviewed and Medications reviewed

## 2020-07-11 NOTE — PROGRESS NOTES
Adrien from Lab called with critical result of WBC at 1.9, ANC of 0.012. Critical lab result read back to Adrien.   Dr. Ellington notified of critical lab result at 0542.  Critical lab result read back by Dr. Ellington.

## 2020-07-11 NOTE — PROGRESS NOTES
Infectious Disease Progress Note    Author: Ruthie Avitia M.D. Date & Time of service: 7/11/2020  1:33 PM    Chief Complaint:  Fevers, pancytopenia      Interval History:  6/29 T-max 102.9 WBC 3.7 patient remains neutropenic .  CT of the neck shows a 2.9 cm collection adjacent to the fracture site of the mid mandibular body.  An infected hematoma cannot be ruled out.  CT scan shows possible splenic infarcts and gallbladder wall thickening with pericholecystic fluid.  Patient continues to have fevers and chills.  He states his chin lesion is more painful and has increased in size.  Plan for bone marrow biopsy today  6/30 T-max 101.9, remains febrile.  Had bone biopsy yesterday and aspiration of submandibular abscess.  He did not sleep well overnight as he was very warm.  No new symptoms today  7/1 T-max 100.9 WBC 4.5 culture growing viridans Streptococcus.  Patient did not feel any fevers overnight.  Overall fever curve improving.  He states that his abscess drained throughout the night.  Bone marrow biopsy results pending.  Eager to go home  7/2 T-max 102.4 WBC 4.1 bone marrow results do not show any evidence of MDS or leukemia.  Ongoing chin pain.  Awaiting evaluation from oral surgeon  7/3 T-max 99.9 WBC 2.8.  Plan for ORIF with bone graft today by Dr. Morrison.  No new symptoms to report  7/4 afebrile WBC 2.2 underwent surgery yesterday.  Operative report not available.  Patient complaining of jaw pain from surgery.  Submental abscess cultures-pending  7/5 T-max 101.6 WBC 2.1 ongoing jaw pain.  Nurse states decreased drainage throughout the day yesterday and patient was noted to be somewhat confused overnight.  He is alert and oriented this morning answering questions appropriately  7/6  AF, O2 RA, submandibular pain and ongoing drainage.  Awaiting pathology results from [place].  7/7 AF, O2 RA, .pain and swelling in jaw but less drainage.    7/9 AF, O2 RA, drainage from [place] drain in submandibular  abscess.  Some ongoing edema and tenderness.  He denies any other symptoms.  Still with critically low ANC and thrombocytopenia.      Review of Systems:  Review of Systems   Constitutional: Positive for fever and malaise/fatigue. Negative for chills.   Respiratory: Negative for cough and sputum production.    Cardiovascular: Negative for chest pain.   Gastrointestinal: Negative for abdominal pain, constipation, diarrhea, nausea and vomiting.   Musculoskeletal: Negative for back pain, joint pain, myalgias and neck pain.   Psychiatric/Behavioral: The patient is nervous/anxious.        Hemodynamics:  Temp (24hrs), Av.6 °C (99.6 °F), Min:37.2 °C (98.9 °F), Max:38.6 °C (101.4 °F)  Temperature: 37.4 °C (99.3 °F)  Pulse  Av.6  Min: 49  Max: 110   Blood Pressure : 117/79       Physical Exam:  Physical Exam  Constitutional:       Appearance: Normal appearance.   HENT:      Head:      Comments: Mandible with edema, tenderness, some drainage  Cardiovascular:      Rate and Rhythm: Normal rate and regular rhythm.   Pulmonary:      Effort: Pulmonary effort is normal.      Comments: Some crackles in bases  Abdominal:      General: Abdomen is flat. Bowel sounds are normal. There is no distension.      Palpations: Abdomen is soft.      Tenderness: There is no abdominal tenderness. There is no guarding.   Musculoskeletal:      Right lower leg: No edema.      Left lower leg: No edema.   Skin:     General: Skin is warm and dry.   Neurological:      General: No focal deficit present.      Mental Status: He is oriented to person, place, and time.   Psychiatric:         Mood and Affect: Mood normal.         Behavior: Behavior normal.         Meds:    Current Facility-Administered Medications:   •  bacitracin  •  chlorhexidine  •  lisinopril  •  atenolol  •  MD Alert...Argatroban per Pharmacy  •  argatroban **AND** Protocol 448 INDICATIONS **AND** Protocol 448 INCLUSION CRITERIA **AND** Protocol 448 EXCLUSION CRITERIA  **AND**  Protocol 448 GOAL: To obtain aPTT value in the 50-90 range **AND** Protocol 448 Argatroban administration must be monitored and documented in EMR. Discontinue IV/subcutaneous heparin, heparin flushes, Enoxaparin (Lovenox), Dabigatran (Pradaxa), Rivaroxaban (Xarelto), Apixaban (Eliquis), Edoxaban (Savaysa, Lixiana), and Fondaparinux (Arixtra). **AND** Protocol 448 RN to order daily CBC w/o diff if newly-diagnosed heparin-induced thrombocytopenia **AND** Protocol 448 aPTT MONITORING - RN to place separate order for repeat aPTT as needed **AND** Start IV argatroban drip, initial rate per pharmacy dosing guidelines **AND** [COMPLETED] APTT **AND** Maximum dose 10 mcg/kg/min    •  HYDROcodone-acetaminophen  •  [CANCELED] HEPARIN INDUCED PLATELET AB(HIT) **AND** Pharmacy Consult:  •  zolpidem  •  lovastatin  •  senna-docusate **AND** polyethylene glycol/lytes **AND** magnesium hydroxide **AND** bisacodyl  •  acetaminophen  •  enalaprilat  •  ondansetron  •  ondansetron    Labs:  Recent Labs     07/09/20  0332 07/10/20  0412 07/11/20  0349   WBC 1.8* 2.0* 1.9*   RBC 3.16* 2.92* 2.85*   HEMOGLOBIN 8.8* 8.2* 8.1*   HEMATOCRIT 28.4* 26.3* 24.7*   MCV 89.9 90.1 86.7   MCH 27.8 28.1 28.4   RDW 48.8 48.2 47.8   PLATELETCT 58* 60* 59*   MPV 11.7 12.3 12.4   NEUTSPOLYS 9.70* 12.20* 7.00*   LYMPHOCYTES 60.20* 57.40* 70.00*   MONOCYTES 26.50* 21.70* 19.00*   EOSINOPHILS 0.90 1.70 0.00   BASOPHILS 1.80 5.20* 3.00*   RBCMORPHOLO Present Present Present     Recent Labs     07/10/20  0412 07/11/20  0349   SODIUM 132* 135   POTASSIUM 3.8 3.7   CHLORIDE 97 100   CO2 23 21   GLUCOSE 109* 112*   BUN 12 16     Recent Labs     07/10/20  0412 07/11/20  0349   CREATININE 0.73 0.80       Imaging:  Ct-chest,abdomen,pelvis With    Result Date: 6/28/2020 6/28/2020 4:20 PM HISTORY/REASON FOR EXAM:  Neutropenic fever TECHNIQUE/EXAM DESCRIPTION: CT scan of the chest, abdomen and pelvis with contrast. Helical scanning was obtained with intravenous  contrast from the lung apices through the pubic symphysis to include the chest, abdomen and pelvis.  80 mL of Omnipaque 350 nonionic contrast was administered intravenously without complication. Low dose optimization technique was utilized for this CT exam including automated exposure control and adjustment of the mA and/or kV according to patient size. COMPARISON: 4/2/2020 FINDINGS: Atherosclerotic plaque is seen in the aorta. Coronary artery calcification is seen. There is a prosthetic aortic valve. The heart is not enlarged. There is trace pericardial fluid. There are small mediastinal lymph nodes. Right hilar lymph node measures 1 cm in short axis dimension. There is no left hilar or axillary lymphadenopathy. There is no pleural effusion. There are emphysematous changes. There are calcified pulmonary granulomata. There is mild bibasilar and lingula atelectasis. Central airways are patent. 4.7 mm nodule is seen in the right middle lobe on image 227. There is an irregular density along the minor fissure measuring 8 mm which could be related to scarring. No free air is seen in the abdomen or pelvis. The liver appears unremarkable. Portal vein is patent. There is pericholecystic fluid/gallbladder wall thickening. Areas of hypoenhancement in the spleen are worrisome for splenic infarcts. No adrenal mass is  identified. There is no evidence of hydronephrosis. There is a small hypodense left renal lesions which are too small to characterize. Pancreas appears unremarkable. There is no biliary ductal dilatation. There is atherosclerotic plaque within an ectatic aorta. There is a left common femoral pseudoaneurysm measuring 2.2 x 1.7 cm. There are small retroperitoneal lymph nodes. Aortocaval retroperitoneal nodes measure up to 8 mm in short axis dimension. Periportal lymph nodes measure up to 1.4 cm in short axis dimension. There is no evidence of bowel obstruction. The visualized appendix appears unremarkable. The  appendix is not identified in its entirety, limiting evaluation. Bladder is mildly distended. There is a bladder diverticulum on the right. Prostate is mildly enlarged. There is trace free fluid in the pelvis. There is a right inguinal hernia containing bowel without evidence of obstruction. Degenerative changes are seen in the spine.     Hypodensities in the spleen worrisome for splenic infarcts. Trace nonspecific free fluid in the pelvis. 2.2 x 1.7 cm left common femoral artery pseudoaneurysm. Bowel containing right inguinal hernia without evidence of obstruction. Atherosclerotic plaque within an ectatic aorta. Gallbladder wall thickening/pericholecystic fluid. Further evaluation can be performed with right upper quadrant ultrasound. Small hypodense left renal lesions are too small to characterize but likely represent small cysts. Emphysematous changes. Irregular nodular opacity along the minor fissure measuring 8 mm. 4.7 mm right middle lobe pulmonary nodule. Mild bibasilar and lingular atelectasis. Emphysematous changes. Sequelae of prior granulomatous exposure. Right hilar lymph node is borderline enlarged and nonspecific, possibly reactive.. Enlarged periportal lymph nodes are nonspecific. Fleischner Society pulmonary nodule recommendations: Low Risk: CT at 3-6 months, then consider CT at 18-24 months High Risk: CT at 3-6 months, then at 18-24 months Comments: Use most suspicious nodule as guide to management. Follow-up intervals may vary according to size and risk. Low Risk - Minimal or absent history of smoking and of other known risk factors. High Risk - History of smoking or of other known risk factors. Note: These recommendations do not apply to lung cancer screening, patients with immunosuppression, or patients with known primary cancer. Fleischner Society 2017 Guidelines for Management of Incidentally Detected Pulmonary Nodules in Adults    Ct-soft Tissue Neck With    Result Date: 6/28/2020 6/28/2020  4:20 PM HISTORY/REASON FOR EXAM:  Palpable nodule/thyroid enlargement. TECHNIQUE/EXAM DESCRIPTION AND NUMBER OF VIEWS:  CT soft tissue neck with contrast. The study was performed on a helical multidetector CT scanner. Contiguous thin section helical images were obtained of the neck from the skull base through the thoracic inlet. 80 mL of Omnipaque 350 nonionic contrast was injected intravenously. Low dose optimization technique was utilized for this CT exam including automated exposure control and adjustment of the mA and/or kV according to patient size. COMPARISON: None. FINDINGS: PARANASAL SINUSES: Moderate mucosal thickening of left maxillary sinus. Last remaining tooth with associated periapical lucency protrudes into the left maxillary sinus floor. NASOPHARYNX: Normal. SUPRAHYOID NECK: Normal oropharynx, oral cavity, parapharyngeal space, and retropharyngeal space. EPIGLOTTIS: Normal. INFRAHYOID NECK: Normal larynx, hypopharynx, and supraglottis. THYROID: Normal. No thyroid nodules. UPPER CHEST: Reported separately. LYMPH NODES: No enlarged nodes by size criteria. VASCULAR STRUCTURES: Patent. Approximately 50% stenosis of the left ICA origin secondary to predominantly calcific plaque. OTHER FINDINGS: Acute versus subacute fracture of the mandibular body. Fluid collection subjacent to the fracture site measures 2.9 x 1.2 cm, likely a hematoma. Infection can be considered in the appropriate clinical setting. Visualized brain is unremarkable.     1. Acute versus subacute fracture of the mid mandibular body. 2. A 2.9 cm collection subjacent to the fracture site, likely hematoma. Infected hematoma can be considered in the appropriate clinical settings. 3. No cervical lymphadenopathy. 4. Moderate mucosal thickening of the left maxillary sinus, likely odontogenic in nature, related to impacted remaining maxillary tooth.    Dx-chest-2 Views    Result Date: 6/27/2020 6/27/2020 11:36 AM HISTORY/REASON FOR EXAM:  Fever  TECHNIQUE/EXAM DESCRIPTION AND NUMBER OF VIEWS: Two views of the chest. COMPARISON:  6/23/2020. FINDINGS: LUNGS: Slight hyperinflation. Chronic slightly hyperdense nodule in the right midlung, likely benign granuloma. No focal consolidation. No effusions. PNEUMOTHORAX: None. LINES AND TUBES: None. MEDIASTINUM: No cardiomegaly. TAVR. LAD stent. Atherosclerosis. MUSCULOSKELETAL STRUCTURES: No acute displaced fracture.     No acute cardiopulmonary abnormality.    Dx-chest-portable (1 View)    Result Date: 6/23/2020 6/23/2020 5:44 PM HISTORY/REASON FOR EXAM:  tachycardia. TECHNIQUE/EXAM DESCRIPTION AND NUMBER OF VIEWS: Single portable view of the chest. COMPARISON: Exam from 6:42 AM FINDINGS: Heart size is within normal limits. No pulmonary infiltrates or consolidations are noted. No pleural abnormalities are noted. There is an old left seventh rib fracture.     No acute cardiac or pulmonary abnormalities are identified.    Dx-chest-portable (1 View)    Result Date: 6/23/2020 6/23/2020 6:05 AM HISTORY/REASON FOR EXAM: Postop TAVR. TECHNIQUE/EXAM DESCRIPTION AND NUMBER OF VIEWS: Single portable view of the chest. COMPARISON: 6/22/2020 FINDINGS: LUNGS: Clear. No effusions. PNEUMOTHORAX: None. LINES AND TUBES: None. MEDIASTINUM: Stable cardiac silhouette. MUSCULOSKELETAL STRUCTURES: Unchanged.     Clear lungs. No pleural effusions.    Dx-chest-portable (1 View)    Result Date: 6/22/2020 6/22/2020 12:50 PM HISTORY/REASON FOR EXAM:  Status post aortic valve replacement. TECHNIQUE/EXAM DESCRIPTION AND NUMBER OF VIEWS: Single portable view of the chest. COMPARISON: 7/15/2019 FINDINGS: Single portable view of the chest demonstrates a normal cardiac silhouette and mediastinal contours. Calcification is in the aorta. There is emphysematous change. The lungs are hyperexpanded. No confluent opacity, pleural fluid, or pneumothorax. No suspicious bony lesions.     No acute findings status post TAVR    Dx-foot-2- Right    Result  Date: 7/7/2020 7/7/2020 8:13 AM HISTORY/REASON FOR EXAM:  Pain/Deformity Following Trauma; ASSESS FOR FRACTURE Traumatic right foot pain, possibly stubbed toes, pain and swelling to 4th and 5th digits TECHNIQUE/EXAM DESCRIPTION AND NUMBER OF VIEWS: 2 views of the RIGHT foot. COMPARISON:  None. FINDINGS: Irregular appearance of the base of the fifth proximal phalanx Moderate midfoot and hindfoot osteoarthritis. Moderate osteoarthritis of the first MTP joint and fifth MTP joint.     Irregular appearance of the base of the fifth proximal phalanx and fracture is possible.    Ir-cyst Aspiration-misc    Result Date: 6/29/2020 6/29/2020 1:13 PM HISTORY/REASON FOR EXAM: Redness and swelling in the anterior submandibular region TECHNIQUE/EXAM DESCRIPTION: Ultrasound guided aspiration of submandibular fluid collection. PROCEDURE: Informed consent was obtained. Localizing ultrasound images were obtained with the patient in supine position. A heterogeneous fluid collection was noted in the anterior submandibular region. The skin was prepped with chlorhexidine gluconate and draped in a sterile fashion. Local anesthesia was administered with 1% Lidocaine. Under real-time ultrasound guidance, a 17-gauge introducer needle was advanced into the submandibular collection and 4 mL of purulent material was aspirated and sent to lab for analysis. The patient tolerated the procedure well with no evidence of complication. COMPARISON: Recent CT scan of the neck FINDINGS: The localizing ultrasound demonstrates a heterogeneous submandibular fluid collection.     ULTRASOUND GUIDED ASPIRATION OF SUBMANDIBULAR ABSCESS..    Us-abdomen Complete Survey    Result Date: 6/26/2020 6/26/2020 6:27 AM HISTORY/REASON FOR EXAM:  Abnormal Labs Pain TECHNIQUE/EXAM DESCRIPTION AND NUMBER OF VIEWS:  Complete abdomen survey. COMPARISON: None FINDINGS: The liver is normal in contour. There is no evidence of solid mass lesion. The liver measures 14.57 cm. The  gallbladder is normal. There is no evidence of cholelithiasis. The gallbladder wall thickness measures 2.50 mm. There is no pericholecystic fluid. The common duct measures 3.40 mm. The visualized pancreas is unremarkable. The visualized aorta is normal in caliber. Intrahepatic IVC is patent. The portal vein is patent with hepatopetal flow. The MPV measures 1.22 cm. The right kidney measures 11.19 cm. The left kidney measures 11.17 cm. There is no hydronephrosis. The spleen measures 10.41 cm maximally. The bladder is trabeculated, which could indicate chronic bladder outlet obstruction. There is no ascites.     Prominent trabeculated bladder wall, suggesting possible chronic bladder outlet obstruction. Unremarkable findings otherwise.     Us-extremity Artery Lower Bilat    Result Date: 2020  Lower Extremity  Arterial Duplex Report  Vascular Laboratory  CONCLUSIONS  Right.  Echolucent thrombus partially fills the common femoral artery causing a  high grade stenosis. Velocities are consistent with > 75% stenosis.  Mild plaque visualized in the superficial femoral and popliteal arteries  without evidence of hemodynamically significant stenosis.  Monophasic waveforms demonstrated throughout the right lower extremity.  Left.  Mild plaque is seen in the common femoral, femoral, and popliteal arteries  with no elevated velocities to indicate hemodynamically significant  stenosis.  FADIA LANDIS  Exam Date:     2020 17:34  Room #:     Inpatient  Priority:     Stat  Ht (in):             Wt (lb):  Ordering Physician:        ANTHONY PIPER  Referring Physician:       ANTHONY PIPER  Sonographer:               Ewa Curtis RVT  Study Type:                Complete Bilateral  Technical Quality:         Adequate  Age:    65    Gender:     M  MRN:    [id number]  :    1955      BSA:  Indications:     Pain in leg, unspecified  CPT Codes:       54484   ICD Codes:       M79.606  History:         Pain in legs s/p TAVR. No prior duplex.  Limitations:                RIGHT  Waveform        Peak Systolic Velocity (cm/s)                  Prox    Prox-Mid  Mid    Mid-Dist  Distal  Monophasic                        307              143     CFA  Monophasic      55                                         PFA  Monophasic      53                51               34      SFA  Monophasic                        30                       POP  Monophasic      22                                 32      AT  Monophasic      19                                 11      PT  Monophasic      37                                 20      EVGENY                LEFT  Waveform        Peak Systolic Velocity (cm/s)                  Prox    Prox-Mid  Mid    Mid-Dist  Distal  Triphasic                         187                      CFA  Biphasic        78                                         PFA  Biphasic        103               142              106     SFA  Biphasic                          57                       POP  Biphasic        83                                 110     AT  Biphasic        76                                 24      PT  Biphasic        61                                 24      EVGENY  FINDINGS  Right.  Echolucent material consistent with thrombus partially fills the common  femoral artery causing a high grade stenosis. Stenosis of the common  femoral artery. Velocities are consistent with > 75% stenosis.  Mild plaque visualized in the femoral and popliteal arteries without  evidence of hemodynamic significance.  Monophasic waveforms demonstrated throughout the right lower extremity.  Left.  Mild plaque is seen in the common femoral, femoral, and popliteal arteries  with no elevated velocities to indicate hemodynamic significance.  Waveforms are biphasic throughout the left lower extremity.  Demetria Narvaez  (Electronically Signed)  Final Date:      22 June 2020                    18:45  Amended:         2020 19:58    Us-extremity Artery Lower Unilat Right    Result Date: 2020  Lower Extremity  Arterial Duplex Report  Vascular Laboratory  CONCLUSIONS  Pinching of the common femoral artery from perclose device closure.  Velcoity consistent with >75% stenosis. Intraluminal clot seen in prior  study is not readily seen.  FADIA LANDIS  Exam Date:     2020 07:49  Room #:     Inpatient  Priority:     Routine  Ht (in):             Wt (lb):  Ordering Physician:        MELVIN SIMONS  Referring Physician:       MELVIN SIMONS  Sonographer:               Blank Calles RVT  Study Type:                Limited Unilateral  Technical Quality:         Adequate  Age:    65    Gender:     M  MRN:    [id number]  :    1955      BSA:  Indications:     Unspecified atherosclerosis of native arteries of                   extremities, right leg  CPT Codes:       68702  ICD Codes:       I70.201  History:         Right common femoral steosis.  Limitations:                RIGHT  Waveform        Peak Systolic Velocity (cm/s)                  Prox    Prox-Mid  Mid    Mid-Dist  Distal  Monophasic      42                384              230     CFA  Biphasic        97                                         PFA  Biphasic        55                                         SFA                                                             POP                                                             AT                                                             PT                                                             EVGENY                LEFT  Waveform        Peak Systolic Velocity (cm/s)                  Prox    Prox-Mid  Mid    Mid-Dist  Distal                                                             CFA                                                             PFA                                                              SFA                                                             POP                                                             AT                                                             PT                                                             EVGENY  FINDINGS  Limited evaluation of the right common femoral artery known stenosis.  Right.  Stenosis of the mid common femoral artery. Velocities are consistent with >  75%stenosis.  Distal flow is turbulent and damped.  No evidence of hematoma or psuedoaneuysm seen at the right groin.  Luiz Dennis MD  (Electronically Signed)  Final Date:      2020                   08:49    Us-extremity Venous Lower Bilat    Result Date: 2020   Vascular Laboratory  CONCLUSIONS  Normal bilateral superficial and deep venous examination of the lower  extremities.  SABIHA FADIA  Exam Date:     2020 14:05  Room #:     Inpatient  Priority:     Routine  Ht (in):             Wt (lb):  Ordering Physician:        FLAQUITO BLACKMAN  Referring Physician:       HIRAL Dobbs  Sonographer:               Humberto Gonzalez RVT  Study Type:                Complete Bilateral  Technical Quality:         Adequate  Age:    65    Gender:     M  MRN:    [id number]  :    1955      BSA:  Indications:     Pain in leg, unspecified  CPT Codes:       49159  ICD Codes:       M79.606  History:         BLE pain, pressure, and edema.  Limitations:  PROCEDURES:  Venous duplex imaging was performed in both lower extremities including  serial compressions and spectral Doppler flow evaluation.  FINDINGS:  The veins of the lower extremities are readily compressible with normal  venous flow dynamics including spontaneous flow and respiratory phasic  variation.  No evidence of deep venous thrombosis.  Alfonso Mondragon MD  (Electronically Signed)  Final Date:      2020                   16:16    Ec-echocardiogram Complete W/o Cont    Result  Date: 2020  Transthoracic Echo Report Echocardiography Laboratory CONCLUSIONS Known TAVR aortic valve that has a mildly increased gradients. No paravaluvar leak or signficant aortic insufficiency seen. No vegetation seen FADIA LANDIS Exam Date:         2020                    14:21 Exam Location:     Inpatient Priority:          Routine Ordering Physician:        BRANDEN STOCKTON Referring Physician:       KATH DENSON Sonographer:               Susana Carson RDCS Age:    65     Gender:    M MRN:    [id number] :    1955 BSA:    1.68   Ht (in):    66     Wt (lb):    133 Exam Type:     Complete Indications:     Endocarditis ICD Codes:       421 CPT Codes:       96619 BP:   114    /   74     HR:   91 Technical Quality:       Fair MEASUREMENTS  (Male / Female) Normal Values 2D ECHO LV Diastolic Diameter PLAX        4.9 cm                4.2 - 5.9 / 3.9 - 5.3 cm LV Systolic Diameter PLAX         3.3 cm                2.1 - 4.0 cm IVS Diastolic Thickness           1.2 cm                LVPW Diastolic Thickness          1.4 cm                RV Diameter 4C                    3.5 cm                2.5 - 2.9 cm LVOT Diameter                     2.1 cm                RA Diameter                       4 cm                  Estimated LV Ejection Fraction    65 %                  LV Ejection Fraction MOD BP       56.1 %                >= 55  % LV Ejection Fraction MOD 4C       56.9 %                LV Ejection Fraction MOD 2C       51.3 %                LA Volume Index                   26 cm3/m2             16 - 28 cm3/m2 IVC Diameter                      0.98 cm               DOPPLER AV Peak Velocity                  2.9 m/s               AV Peak Gradient                  32.6 mmHg             AV Mean Gradient                  18.1 mmHg             LVOT Peak Velocity                1.1 m/s               AV Area Cont Eq vti               1.1 cm2               Mitral E Point Velocity           0.8 m/s                Mitral E to A Ratio               0.7                   MV Pressure Half Time             30.9 ms               MV Area PHT                       7.1 cm2               MV Deceleration Time              107 ms                TR Peak Velocity                  269 cm/s              PV Peak Velocity                  1.2 m/s               PV Peak Gradient                  5.5 mmHg              * Indicates values subject to auto-interpretation LV EF:  65    % FINDINGS Left Ventricle Normal left ventricular size and systolic function. Normal regional wall motion. Mild concentric left ventricular hypertrophy. A reliable estimation of diastolic function cannot be made due to conflicting data. Right Ventricle Normal right ventricular size and systolic function. Right Atrium Enlarged right atrium. Normal inferior vena cava size and inspiratory collapse. Left Atrium Normal left atrial size. Mitral Valve Structurally normal mitral valve without significant stenosis. Trace mitral regurgitation. Aortic Valve Known TAVR aortic valve that has a mildly increased gradients. Vmax is 3.0  m/s. Transvalvular gradients are - Peak: 36 mmHg,  Mean: 20 mmHg.  No paravaluvar leak or signficant aortic insufficiency. Tricuspid Valve Structurally normal tricuspid valve. Mild tricuspid regurgitation. Right atrial pressure is estimated to be 3 mmHg. Estimated right ventricular systolic pressure  is 35 mmHg. Pulmonic Valve Structurally normal pulmonic valve without significant stenosis or regurgitation. Pericardium Normal pericardium without effusion. Aorta The aortic root is normal.  Ascending aorta diameter is  2.7 cm. Ascending aorta not well visualized. Zoya Milton M.D. (Electronically Signed) Final Date:     29 June 2020                 17:26    Ec-echocardiogram Complete W/o Cont    Result Date: 6/23/2020  Transthoracic Echo Report Echocardiography Laboratory CONCLUSIONS Prior echo done on 06/22/2020, there is now a TAVR  valve present. Left ventricular ejection fraction is visually estimated to be 60%. Unable to estimate pulmonary artery pressure due to an inadequate tricuspid regurgitant jet. Known TAVR aortic valve. FADIA LANDIS Exam Date:         2020                    03:46 Exam Location:     Inpatient Priority:          Routine Ordering Physician:        ANTHONY PIPER Referring Physician:       [id number] Sonographer:               JOJO Ash Age:    65     Gender:    Mal                           e MRN:    [id number] :    1955 BSA:    1.64   Ht (in):    66     Wt (lb):    126 Exam Type:     Complete Indications:     Prosthetic Valve ICD Codes:       V43.3 CPT Codes:       35209 BP:   142    /   83     HR:   70 Technical Quality:       Fair MEASUREMENTS  (Male / Female) Normal Values 2D ECHO LV Diastolic Diameter PLAX        6 cm                  4.2 - 5.9 / 3.9 - 5.3 cm LV Systolic Diameter PLAX         3.7 cm                2.1 - 4.0 cm IVS Diastolic Thickness           0.71 cm               LVPW Diastolic Thickness          0.7 cm                LVOT Diameter                     1.5 cm                Estimated LV Ejection Fraction    60 %                  LV Ejection Fraction MOD BP       39.5 %                >= 55  % LV Ejection Fraction MOD 4C       40.2 %                LV Ejection Fraction MOD 2C       33.9 %                IVC Diameter                      1.8 cm                DOPPLER AV Peak Velocity                  0.99 m/s              AV Peak Gradient                  3.9 mmHg              AV Mean Gradient                  2.7 mmHg              LVOT Peak Velocity                1.1 m/s               AV Area Cont Eq vti               1.9 cm2               Mitral E Point Velocity           0.63 m/s              Mitral E to A Ratio               1.5                   MV Pressure Half Time             75.3 ms               MV Area PHT                       2.9 cm2               MV  Deceleration Time              260 ms                * Indicates values subject to auto-interpretation LV EF:  60    % FINDINGS Left Ventricle Left ventricle is mildly dilated. Normal left ventricular wall thickness. Normal left ventricular systolic function. Left ventricular ejection fraction is visually estimated to be 60%. Normal regional wall motion. Normal diastolic function. Right Ventricle Normal right ventricular size. Normal right ventricular systolic function. Right Atrium Normal right atrial size. Normal inferior vena cava size and inspiratory collapse. Left Atrium Normal left atrial size. Left atrial volume index is 26 mL/sq m. Mitral Valve Calcification of the mitral valve leaflets. No mitral stenosis. Trace mitral regurgitation. Aortic Valve Known TAVR aortic valve. Vmax is 1 m/s. Transvalvular gradients are - Peak: 3 mmHg, Mean: 2 mmHg. Tricuspid Valve Structurally normal tricuspid valve. No tricuspid stenosis. Trace tricuspid regurgitation. Right atrial pressure is estimated to be 8 mmHg. Unable to estimate pulmonary artery pressure due to an inadequate tricuspid regurgitant jet. Pulmonic Valve The pulmonic valve is not well visualized. Pericardium No pericardial effusion seen. Aorta Normal aortic root for body surface area. Ascending aorta diameter is 2.4 cm. Brenden Hansen MD (Electronically Signed) Final Date:     23 June 2020                 06:41    Ec-echocardiogram Ltd W/o Cont    Result Date: 6/23/2020  Transthoracic Echo Report Echocardiography Laboratory CONCLUSIONS Intraoperative TTE during TAVR.  Baseline images show mildly reduced LV function with EF =55%.  Calcified aortic valve leaflets. Severe aortic stenosis.  Post deployment images show preserved biventricular function.  A 26 mm Jewell Jose Manuel Ultra stented bioprosthetic valve is seen in the aortic position with normal leaflet motion, trace significant paravalvular leak, mean gradent of 5 mm Hg and calculated effective orifice  "area of 2.5 cm2.  Findings communicated at the time of exam. FADIA LANDIS Exam Date:         2020                    11:17 Exam Location:     Inpatient Priority:          Routine Ordering Physician:        SIMON PARSONS Referring Physician: Sonographer:               Antwan La RDCS Age:    65     Gender:    Jordy                           e MRN:    [id number] :    1955 BSA:    1.64   Ht (in):    66     Wt (lb):    126 Exam Type:     Limited Indications:     Pre-Op Surgery ICD Codes:       V72.83 CPT Codes:       54517 BP:          /          HR: Technical Quality:       Fair MEASUREMENTS  (Male / Female) Normal Values 2D ECHO Estimated LV Ejection Fraction    55 %                  * Indicates values subject to auto-interpretation LV EF:  55    % FINDINGS Left Ventricle The left ventricle was normal in size and function. Right Ventricle The right ventricle was normal in size and function. Right Atrium The right atrium is normal in size. Left Atrium The left atrium is normal in size. Mitral Valve Structurally normal mitral valve without significant stenosis or regurgitation. Aortic Valve Calcified aortic valve leaflets. Severe aortic stenosis. Tricuspid Valve Structurally normal tricuspid valve without significant stenosis or regurgitation. Pulmonic Valve Structurally normal pulmonic valve without significant stenosis or regurgitation. Pericardium Normal pericardium without effusion. Aorta The aortic root is normal. Simon Parsons (Electronically Signed) Final Date:     2020                 16:26 Amended:        2020 16:29      Micro:  Results     Procedure Component Value Units Date/Time    BLOOD CULTURE [[id number]] Collected:  20 1100    Order Status:  Completed Specimen:  Blood from Peripheral Updated:  20 1301    Narrative:       Protective  Per Hospital Policy: Only change Specimen Src: to \"Line\" if  specified by physician order.    BLOOD CULTURE [[id number]]     " Order Status:  Sent Specimen:  Blood from Peripheral     CULTURE WOUND W/ GRAM STAIN [[id number]]  (Abnormal) Collected:  07/03/20 1736    Order Status:  Completed Specimen:  Wound Updated:  07/07/20 1038     Significant Indicator POS     Source WND     Site Submental Abscess     Culture Result Growth noted after further incubation, see below for  organism identification.       Gram Stain Result Moderate WBCs.  Few Gram positive cocci.       Culture Result Viridans Streptococcus  Rare growth      Narrative:       Surgery - swabs received    Anaerobic Culture [[id number]] Collected:  07/03/20 1736    Order Status:  Completed Specimen:  Wound Updated:  07/07/20 1038     Significant Indicator NEG     Source WND     Site Submental Abscess     Culture Result No Anaerobes isolated.    Narrative:       Surgery - swabs received          Assessment:  Active Hospital Problems    Diagnosis   • *Fever [R50.9]   • Mandibular abscess [M27.2]   • Pancytopenia (HCC) [D61.818]   • S/P TAVR (transcatheter aortic valve replacement) [Z95.2]   • Essential hypertension [I10]   • Mixed hyperlipidemia [E78.2]   • Panlobular emphysema (HCC) [J43.1]   • Tobacco dependence [F17.200]   • Toe fracture, right [S92.911A]   • HIT (heparin-induced thrombocytopenia) ([facility]) [D75.82]   • Hypokalemia [E87.6]     Interval 24 hours:      101.4, O2 RA  Labs reviewed,    Micro reviewed    Patient still with pain swelling and drainage from submandibular area.  He has no other specific complaints.  Biking fevers for the past 2 days.  Will transition Unasyn to Cefepime and flagyl and recommend pan scan.       ASSESSMENT/PLAN:      65 y.o. man admitted 6/23/2020. Pt has a past medical history of TAVR on 6/22/2020 and pancytopenia.  He has had a decreased WBC since 2016 with neutropenia.  He also has some mild anemia and thrombocytopenia that is also chronic but worsened recently.  He presented the ER complaining of tachycardia the day after he been sent  home after his TAVR surgery.      Hospital Course:   Patient was admitted and was initially febrile and then again spiked a fever to 103.6 on 6/26.  Initial work-up for pancytopenia was started and plan was to complete this with hem-onc as an outpatient.  Blood cultures were obtained 6/26.  Ultrasound abdomen on 6/25 with trabeculated bladder wall suggesting possible chronic bladder outlet obstruction but otherwise unremarkable.  Ultrasound lower extremity with noted stenosis of the right common femoral artery report is known - no evidence of hematoma or pseudoaneurysm in right groin.  Chest x-rays have been unremarkable. He underwent aspiration of submental abscess on 6/29/2020.  Cultures are growing viridans Streptococcus.  Bone marrow biopsy does not show any evidence of leukemia however additional testing is being sent to [place]. S/p OR with Dr. Morrison on 7/3.      Problem List  Neutropenic fever, had improved but now spiking fevers for last 2 days  Bone marrow biopsy did not reveal any leukemia, however additional testing is being sent to [place], fevers may be related to the splenic infarcts rather than infection or potential malignancy  Neutropenia - ongoing, ANC 170 today   -Work-up initiated by hospitalist with SPEP, smooth muscle antibodies, SEEMA, rheumatoid factor, B12 and folate levels as well as a fibrinogen.   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  Pathology report- no evidence of leukemia.  Additional testing being sent to [place] and pending-Per note from oncology there is some concern for LGL - follow-up with oncology.  Concern for undiagnosed malignancy given his significant and ongoing neutropenia.  Results expected sometime late next week  CMV PCR- not detected  Parvovirus PCR- not detected  Per oncology note from 7/5 labs are consistent with HIT anticoagulation with artagroban started on 7/4/2020, oncology first 3 months of therapy with anticoagulation      Lump below chin, tender, prior infection   Fracture of mid mandibular body  Submandibular abscess  Possible infected hematoma-2.9 cm collection subjacent to fracture site noted on CT scan  -Reports oral surgery at the end of April and he was on antibiotics (augmentin) due to infection for approximately 1 month.  There had resolved but now with new swelling  Status post IR aspiration on 6/29/2020.  Gram stain+ GPC, GNR.  Culture- +viridans Streptococcus  On antibiotics above  Concerned about infection causing fracture of the mid mandibular body, possible osteomyelitis.  Patient underwent surgery with Dr. Morrison on 7/3/2020.  Open reduction and internal fixation of mandibular symphysis fracture. Incision and drainage of submental abscess, chronic appearing per op note.  Bone graft to mandible.  OR cultures from 7/3-  +viridans Streptococcus      --- See antibiotics as above - plan to a complete a 6 week course from date of surgery and can extend if needed - end 8/14/20 -final antibiotic choice to be determined  --- F/up with oral surgeon      Right 5th toe, pain and bruising   - Xray w/ possible fracture       Plan of care discussed with Dr. Urena and 30-minute discussion with patient's wife on the phone.  Will continue to follow.        Discussed with internal medicine . ID  will follow.

## 2020-07-12 ENCOUNTER — ANESTHESIA (OUTPATIENT)
Dept: SURGERY | Facility: MEDICAL CENTER | Age: 65
DRG: 982 | End: 2020-07-12
Payer: COMMERCIAL

## 2020-07-12 ENCOUNTER — ANESTHESIA EVENT (OUTPATIENT)
Dept: SURGERY | Facility: MEDICAL CENTER | Age: 65
DRG: 982 | End: 2020-07-12
Payer: COMMERCIAL

## 2020-07-12 PROBLEM — M27.2 ACUTE OSTEOMYELITIS OF MANDIBLE: Status: ACTIVE | Noted: 2020-07-12

## 2020-07-12 LAB
ANION GAP SERPL CALC-SCNC: 10 MMOL/L (ref 7–16)
APTT PPP: 66.5 SEC (ref 24.7–36)
BASOPHILS # BLD AUTO: 3.9 % (ref 0–1.8)
BASOPHILS # BLD: 0.08 K/UL (ref 0–0.12)
BUN SERPL-MCNC: 17 MG/DL (ref 8–22)
CALCIUM SERPL-MCNC: 8 MG/DL (ref 8.5–10.5)
CHLORIDE SERPL-SCNC: 97 MMOL/L (ref 96–112)
CO2 SERPL-SCNC: 23 MMOL/L (ref 20–33)
COVID ORDER STATUS COVID19: NORMAL
CREAT SERPL-MCNC: 0.75 MG/DL (ref 0.5–1.4)
EOSINOPHIL # BLD AUTO: 0.01 K/UL (ref 0–0.51)
EOSINOPHIL NFR BLD: 0.7 % (ref 0–6.9)
ERYTHROCYTE [DISTWIDTH] IN BLOOD BY AUTOMATED COUNT: 51 FL (ref 35.9–50)
GLUCOSE SERPL-MCNC: 113 MG/DL (ref 65–99)
HCT VFR BLD AUTO: 27.5 % (ref 42–52)
HGB BLD-MCNC: 8.7 G/DL (ref 14–18)
LG PLATELETS BLD QL SMEAR: NORMAL
LYMPHOCYTES # BLD AUTO: 1.24 K/UL (ref 1–4.8)
LYMPHOCYTES NFR BLD: 61.8 % (ref 22–41)
MANUAL DIFF BLD: NORMAL
MCH RBC QN AUTO: 28.4 PG (ref 27–33)
MCHC RBC AUTO-ENTMCNC: 31.6 G/DL (ref 33.7–35.3)
MCV RBC AUTO: 89.9 FL (ref 81.4–97.8)
METAMYELOCYTES NFR BLD MANUAL: 0.7 %
MONOCYTES # BLD AUTO: 0.39 K/UL (ref 0–0.85)
MONOCYTES NFR BLD AUTO: 19.7 % (ref 0–13.4)
MORPHOLOGY BLD-IMP: NORMAL
NEUTROPHILS # BLD AUTO: 0.26 K/UL (ref 1.82–7.42)
NEUTROPHILS NFR BLD: 13.2 % (ref 44–72)
NRBC # BLD AUTO: 0 K/UL
NRBC BLD-RTO: 0 /100 WBC
OVALOCYTES BLD QL SMEAR: NORMAL
PLATELET # BLD AUTO: 63 K/UL (ref 164–446)
PLATELET BLD QL SMEAR: NORMAL
POIKILOCYTOSIS BLD QL SMEAR: NORMAL
POLYCHROMASIA BLD QL SMEAR: NORMAL
POTASSIUM SERPL-SCNC: 4.2 MMOL/L (ref 3.6–5.5)
RBC # BLD AUTO: 3.06 M/UL (ref 4.7–6.1)
RBC BLD AUTO: PRESENT
SARS-COV-2 RDRP RESP QL NAA+PROBE: NOTDETECTED
SODIUM SERPL-SCNC: 130 MMOL/L (ref 135–145)
SPECIMEN SOURCE: NORMAL
WBC # BLD AUTO: 2 K/UL (ref 4.8–10.8)

## 2020-07-12 PROCEDURE — 160048 HCHG OR STATISTICAL LEVEL 1-5: Performed by: ORAL & MAXILLOFACIAL SURGERY

## 2020-07-12 PROCEDURE — 700105 HCHG RX REV CODE 258: Performed by: INTERNAL MEDICINE

## 2020-07-12 PROCEDURE — 700101 HCHG RX REV CODE 250: Performed by: ORAL & MAXILLOFACIAL SURGERY

## 2020-07-12 PROCEDURE — 700111 HCHG RX REV CODE 636 W/ 250 OVERRIDE (IP): Performed by: ANESTHESIOLOGY

## 2020-07-12 PROCEDURE — 501838 HCHG SUTURE GENERAL: Performed by: ORAL & MAXILLOFACIAL SURGERY

## 2020-07-12 PROCEDURE — A9270 NON-COVERED ITEM OR SERVICE: HCPCS | Performed by: ORAL & MAXILLOFACIAL SURGERY

## 2020-07-12 PROCEDURE — A9270 NON-COVERED ITEM OR SERVICE: HCPCS | Performed by: HOSPITALIST

## 2020-07-12 PROCEDURE — 80048 BASIC METABOLIC PNL TOTAL CA: CPT

## 2020-07-12 PROCEDURE — 160009 HCHG ANES TIME/MIN: Performed by: ORAL & MAXILLOFACIAL SURGERY

## 2020-07-12 PROCEDURE — 160035 HCHG PACU - 1ST 60 MINS PHASE I: Performed by: ORAL & MAXILLOFACIAL SURGERY

## 2020-07-12 PROCEDURE — 700102 HCHG RX REV CODE 250 W/ 637 OVERRIDE(OP): Performed by: INTERNAL MEDICINE

## 2020-07-12 PROCEDURE — 700101 HCHG RX REV CODE 250: Performed by: ANESTHESIOLOGY

## 2020-07-12 PROCEDURE — 700102 HCHG RX REV CODE 250 W/ 637 OVERRIDE(OP): Performed by: ORAL & MAXILLOFACIAL SURGERY

## 2020-07-12 PROCEDURE — 160002 HCHG RECOVERY MINUTES (STAT): Performed by: ORAL & MAXILLOFACIAL SURGERY

## 2020-07-12 PROCEDURE — 700111 HCHG RX REV CODE 636 W/ 250 OVERRIDE (IP): Mod: JG | Performed by: HOSPITALIST

## 2020-07-12 PROCEDURE — 85027 COMPLETE CBC AUTOMATED: CPT

## 2020-07-12 PROCEDURE — U0004 COV-19 TEST NON-CDC HGH THRU: HCPCS

## 2020-07-12 PROCEDURE — 0J910ZZ DRAINAGE OF FACE SUBCUTANEOUS TISSUE AND FASCIA, OPEN APPROACH: ICD-10-PCS | Performed by: ORAL & MAXILLOFACIAL SURGERY

## 2020-07-12 PROCEDURE — 700102 HCHG RX REV CODE 250 W/ 637 OVERRIDE(OP): Performed by: HOSPITALIST

## 2020-07-12 PROCEDURE — 36415 COLL VENOUS BLD VENIPUNCTURE: CPT

## 2020-07-12 PROCEDURE — 85730 THROMBOPLASTIN TIME PARTIAL: CPT

## 2020-07-12 PROCEDURE — A9270 NON-COVERED ITEM OR SERVICE: HCPCS | Performed by: INTERNAL MEDICINE

## 2020-07-12 PROCEDURE — 770020 HCHG ROOM/CARE - TELE (206)

## 2020-07-12 PROCEDURE — 85007 BL SMEAR W/DIFF WBC COUNT: CPT

## 2020-07-12 PROCEDURE — 99232 SBSQ HOSP IP/OBS MODERATE 35: CPT | Performed by: HOSPITALIST

## 2020-07-12 PROCEDURE — 160038 HCHG SURGERY MINUTES - EA ADDL 1 MIN LEVEL 2: Performed by: ORAL & MAXILLOFACIAL SURGERY

## 2020-07-12 PROCEDURE — 700111 HCHG RX REV CODE 636 W/ 250 OVERRIDE (IP): Performed by: INTERNAL MEDICINE

## 2020-07-12 PROCEDURE — 160027 HCHG SURGERY MINUTES - 1ST 30 MINS LEVEL 2: Performed by: ORAL & MAXILLOFACIAL SURGERY

## 2020-07-12 PROCEDURE — C9803 HOPD COVID-19 SPEC COLLECT: HCPCS | Performed by: HOSPITALIST

## 2020-07-12 PROCEDURE — 700105 HCHG RX REV CODE 258: Performed by: ANESTHESIOLOGY

## 2020-07-12 RX ORDER — SODIUM CHLORIDE, SODIUM LACTATE, POTASSIUM CHLORIDE, CALCIUM CHLORIDE 600; 310; 30; 20 MG/100ML; MG/100ML; MG/100ML; MG/100ML
INJECTION, SOLUTION INTRAVENOUS CONTINUOUS
Status: DISCONTINUED | OUTPATIENT
Start: 2020-07-12 | End: 2020-07-12 | Stop reason: HOSPADM

## 2020-07-12 RX ORDER — SUCCINYLCHOLINE/SOD CL,ISO/PF 200MG/10ML
SYRINGE (ML) INTRAVENOUS PRN
Status: DISCONTINUED | OUTPATIENT
Start: 2020-07-12 | End: 2020-07-12 | Stop reason: SURG

## 2020-07-12 RX ORDER — OXYCODONE HCL 5 MG/5 ML
10 SOLUTION, ORAL ORAL
Status: DISCONTINUED | OUTPATIENT
Start: 2020-07-12 | End: 2020-07-12 | Stop reason: HOSPADM

## 2020-07-12 RX ORDER — LIDOCAINE HYDROCHLORIDE 40 MG/ML
SOLUTION TOPICAL PRN
Status: DISCONTINUED | OUTPATIENT
Start: 2020-07-12 | End: 2020-07-12 | Stop reason: SURG

## 2020-07-12 RX ORDER — SODIUM CHLORIDE, SODIUM LACTATE, POTASSIUM CHLORIDE, CALCIUM CHLORIDE 600; 310; 30; 20 MG/100ML; MG/100ML; MG/100ML; MG/100ML
INJECTION, SOLUTION INTRAVENOUS
Status: DISCONTINUED | OUTPATIENT
Start: 2020-07-12 | End: 2020-07-12 | Stop reason: SURG

## 2020-07-12 RX ORDER — HYDRALAZINE HYDROCHLORIDE 20 MG/ML
5 INJECTION INTRAMUSCULAR; INTRAVENOUS
Status: DISCONTINUED | OUTPATIENT
Start: 2020-07-12 | End: 2020-07-12 | Stop reason: HOSPADM

## 2020-07-12 RX ORDER — LIDOCAINE HYDROCHLORIDE 20 MG/ML
INJECTION, SOLUTION EPIDURAL; INFILTRATION; INTRACAUDAL; PERINEURAL PRN
Status: DISCONTINUED | OUTPATIENT
Start: 2020-07-12 | End: 2020-07-12 | Stop reason: SURG

## 2020-07-12 RX ORDER — HALOPERIDOL 5 MG/ML
1 INJECTION INTRAMUSCULAR
Status: DISCONTINUED | OUTPATIENT
Start: 2020-07-12 | End: 2020-07-12 | Stop reason: HOSPADM

## 2020-07-12 RX ORDER — DIPHENHYDRAMINE HYDROCHLORIDE 50 MG/ML
12.5 INJECTION INTRAMUSCULAR; INTRAVENOUS
Status: DISCONTINUED | OUTPATIENT
Start: 2020-07-12 | End: 2020-07-12 | Stop reason: HOSPADM

## 2020-07-12 RX ORDER — ONDANSETRON 2 MG/ML
4 INJECTION INTRAMUSCULAR; INTRAVENOUS
Status: DISCONTINUED | OUTPATIENT
Start: 2020-07-12 | End: 2020-07-12 | Stop reason: HOSPADM

## 2020-07-12 RX ORDER — LIDOCAINE HYDROCHLORIDE AND EPINEPHRINE 10; 10 MG/ML; UG/ML
INJECTION, SOLUTION INFILTRATION; PERINEURAL
Status: DISCONTINUED | OUTPATIENT
Start: 2020-07-12 | End: 2020-07-12 | Stop reason: HOSPADM

## 2020-07-12 RX ORDER — OXYCODONE HCL 5 MG/5 ML
5 SOLUTION, ORAL ORAL
Status: DISCONTINUED | OUTPATIENT
Start: 2020-07-12 | End: 2020-07-12 | Stop reason: HOSPADM

## 2020-07-12 RX ORDER — LABETALOL HYDROCHLORIDE 5 MG/ML
5 INJECTION, SOLUTION INTRAVENOUS
Status: DISCONTINUED | OUTPATIENT
Start: 2020-07-12 | End: 2020-07-12 | Stop reason: HOSPADM

## 2020-07-12 RX ADMIN — SODIUM CHLORIDE, POTASSIUM CHLORIDE, SODIUM LACTATE AND CALCIUM CHLORIDE: 600; 310; 30; 20 INJECTION, SOLUTION INTRAVENOUS at 16:27

## 2020-07-12 RX ADMIN — Medication 63.6 MG: at 16:32

## 2020-07-12 RX ADMIN — METRONIDAZOLE 500 MG: 500 TABLET ORAL at 12:49

## 2020-07-12 RX ADMIN — CHLORHEXIDINE GLUCONATE 0.12% ORAL RINSE 15 ML: 1.2 LIQUID ORAL at 22:57

## 2020-07-12 RX ADMIN — CHLORHEXIDINE GLUCONATE 0.12% ORAL RINSE 15 ML: 1.2 LIQUID ORAL at 12:49

## 2020-07-12 RX ADMIN — LOVASTATIN 20 MG: 20 TABLET ORAL at 22:57

## 2020-07-12 RX ADMIN — METRONIDAZOLE 500 MG: 500 TABLET ORAL at 06:00

## 2020-07-12 RX ADMIN — BACITRACIN ZINC: 500 OINTMENT TOPICAL at 06:05

## 2020-07-12 RX ADMIN — ACETAMINOPHEN 650 MG: 325 TABLET, FILM COATED ORAL at 00:05

## 2020-07-12 RX ADMIN — METRONIDAZOLE 500 MG: 500 TABLET ORAL at 22:57

## 2020-07-12 RX ADMIN — CEFEPIME 2 G: 2 INJECTION, POWDER, FOR SOLUTION INTRAVENOUS at 22:57

## 2020-07-12 RX ADMIN — ARGATROBAN 0.89 MCG/KG/MIN: 50 INJECTION INTRAVENOUS at 10:38

## 2020-07-12 RX ADMIN — ATENOLOL 25 MG: 25 TABLET ORAL at 06:00

## 2020-07-12 RX ADMIN — LISINOPRIL 10 MG: 10 TABLET ORAL at 06:00

## 2020-07-12 RX ADMIN — LIDOCAINE HYDROCHLORIDE 100 MG: 20 INJECTION, SOLUTION EPIDURAL; INFILTRATION; INTRACAUDAL at 16:29

## 2020-07-12 RX ADMIN — PROPOFOL 50 MG: 10 INJECTION, EMULSION INTRAVENOUS at 16:51

## 2020-07-12 RX ADMIN — LIDOCAINE HYDROCHLORIDE 4 ML: 40 SOLUTION TOPICAL at 16:33

## 2020-07-12 RX ADMIN — CEFEPIME 2 G: 2 INJECTION, POWDER, FOR SOLUTION INTRAVENOUS at 05:57

## 2020-07-12 RX ADMIN — CEFEPIME 2 G: 2 INJECTION, POWDER, FOR SOLUTION INTRAVENOUS at 12:50

## 2020-07-12 RX ADMIN — PROPOFOL 100 MG: 10 INJECTION, EMULSION INTRAVENOUS at 16:32

## 2020-07-12 ASSESSMENT — ENCOUNTER SYMPTOMS
DOUBLE VISION: 0
NECK PAIN: 0
CHILLS: 0
FEVER: 1
BACK PAIN: 0
ABDOMINAL PAIN: 0
WEIGHT LOSS: 0
MYALGIAS: 1
HEADACHES: 0
HEARTBURN: 0
DEPRESSION: 0
CLAUDICATION: 0
PALPITATIONS: 0
NAUSEA: 0
ORTHOPNEA: 0
FOCAL WEAKNESS: 0
PHOTOPHOBIA: 0
VOMITING: 0
BLURRED VISION: 0
TREMORS: 0
BRUISES/BLEEDS EASILY: 0
COUGH: 0
DIZZINESS: 0
SPUTUM PRODUCTION: 0

## 2020-07-12 ASSESSMENT — LIFESTYLE VARIABLES: SUBSTANCE_ABUSE: 0

## 2020-07-12 NOTE — PROGRESS NOTES
Received report from day shift RN. Assumed care of patient at 1900. Patient A&Ox 4. On room air, no signs of respiratory distress. Patient states no pain. POC discussed and agreed upon with patient. Call light and belongings within reach. Bed in lowest locked position. Upper side rails raised. Fall risk precautions in place. All questions answered at this time. Hourly rounding in place. Patient on tele box. Will continue to monitor temperatures and WBC.

## 2020-07-12 NOTE — PROGRESS NOTES
ORAL AND MAXILLOFACIAL SURGERY     Re-eval due to continued fevers   CT neck reveals small possible fluid collection inferior to the inferior border of the mandible     A/P: 10 days s/p ORIF mandible, bone graft to mandible, and I+D submental abscess     1. While the CT results are not overly impressive given the patients clinical status (continued fevers and depressed immune system) will plan for repeat I+D and will place a new drain in the space.     Plan to OR today.     Tamiko

## 2020-07-12 NOTE — ANESTHESIA PROCEDURE NOTES
Airway    Date/Time: 7/12/2020 4:33 PM  Performed by: Amalia Che M.D.  Authorized by: Amalia Che M.D.     Location:  OR  Urgency:  Elective  Indications for Airway Management:  Anesthesia      Spontaneous Ventilation: absent    Sedation Level:  Deep  Preoxygenated: Yes    Patient Position:  Sniffing  Mask Difficulty Assessment:  0 - not attempted  Final Airway Type:  Endotracheal airway  Final Endotracheal Airway:  ETT  Cuffed: Yes    Technique Used for Successful ETT Placement:  Direct laryngoscopy    Insertion Site:  Oral  Blade Type:  Lawson  Laryngoscope Blade/Videolaryngoscope Blade Size:  3  ETT Size (mm):  6.5  Measured from:  Gums  ETT to Gums (cm):  22  Placement Verified by: auscultation and capnometry    Cormack-Lehane Classification:  Grade IIb - view of arytenoids or posterior of glottis only  Number of Attempts at Approach:  1   Grade III view by MS-III

## 2020-07-12 NOTE — PROGRESS NOTES
[facility] Medicine Daily Progress Note    Date of Service  7/12/2020    Chief Complaint  65 y.o. male admitted 6/23/2020 with tachycardia.  He had a TAVR on 6/22/2020, and was discharged home on 6/23/2020.  He returned to the ER due to persistent tachycardia.     He has a history of pancytopenia which is worsening, and has been hesitant to do a bone marrow biopsy.  He has tobacco dependence, essential hypertension, hyperlipidemia.       Hospital Course    Home Eliquis and aspirin were discontinued.  Heart rate stabilized. He continued to have decline in his hemoglobin and platelets. WBC stabilized. Briefly discussed with hem/onc.  Abdominal ultrasound from 6/25/2020 showed no acute abnormalities.  SPEP, smooth muscle antibodies, SEEMA, rheumatoid factor, B12 and folate levels, fibrinogen were ordered.  Patient wished to go home and agreed follow-up with hematology for bone marrow biopsy and further work-up.  He was discharged to go home on 6/26/2020, but developed a fever of 103.6.     Infectious disease were consulted on 6/27/2020 due to fever and neutropenia, Cefepime was empirically started.     B12, folate, and fibrinogen levels were normal.  Rheumatoid factor was 40, ESR was 45, LFTs were unremarkable, SEEMA was undetectable, HIV was nonreactive.      Interval Problem Update    Low grade temp last night 100.3    Case d/w dr beauchamp ID.. abx broadened    pancytopenia persists    Who body scan shows recurrence or residual fluid in submental area. Case d/w dr vasquez oral surgery. Patient may go for washout today    Chin pain, minimal      S/p orif mandible on 7/3      echoCardiogram shows no vegetations on valves      Consultants/Specialty  ID dr beauchamp  Oncology dr mccray    Code Status  full    Disposition  home    Review of Systems  Review of Systems   Constitutional: Positive for fever. Negative for chills, malaise/fatigue and weight loss.   HENT: Negative for ear discharge, hearing loss and tinnitus.    Eyes:  Negative for blurred vision, double vision and photophobia.   Respiratory: Negative for cough and sputum production.    Cardiovascular: Negative for palpitations, orthopnea and claudication.   Gastrointestinal: Negative for abdominal pain, heartburn, nausea and vomiting.   Genitourinary: Negative for frequency and urgency.   Musculoskeletal: Positive for joint pain and myalgias. Negative for back pain and neck pain.   Neurological: Negative for dizziness, tremors, focal weakness and headaches.   Endo/Heme/Allergies: Does not bruise/bleed easily.   Psychiatric/Behavioral: Negative for depression and substance abuse.        Physical Exam  Temp:  [36.8 °C (98.3 °F)-37.9 °C (100.3 °F)] 37.2 °C (98.9 °F)  Pulse:  [53-93] 53  Resp:  [15-22] 18  BP: (117-143)/(53-79) 129/59  SpO2:  [92 %-99 %] 93 %    Physical Exam  Constitutional:       Appearance: Normal appearance. He is not diaphoretic.   HENT:      Head:      Comments: Submandibular swelling/induration and tenderness- MINIMAL         Mouth/Throat:      Mouth: Mucous membranes are moist.   Eyes:      General: No scleral icterus.     Extraocular Movements: Extraocular movements intact.      Pupils: Pupils are equal, round, and reactive to light.   Neck:      Musculoskeletal: Normal range of motion and neck supple.   Cardiovascular:      Rate and Rhythm: Normal rate.      Heart sounds: No murmur. No gallop.    Pulmonary:      Effort: Pulmonary effort is normal. No respiratory distress.      Breath sounds: No stridor. No wheezing.   Chest:      Chest wall: No tenderness.   Abdominal:      General: Abdomen is flat. There is no distension.      Tenderness: There is no guarding.      Hernia: No hernia is present.   Musculoskeletal: Normal range of motion.         General: Deformity (right fifth toe) and signs of injury present. No swelling or tenderness.      Right lower leg: No edema.   Skin:     General: Skin is warm.      Capillary Refill: Capillary refill takes 2 to 3  seconds.      Coloration: Skin is not jaundiced or pale.      Findings: No bruising or rash.   Neurological:      General: No focal deficit present.      Mental Status: He is alert and oriented to person, place, and time.      Cranial Nerves: No cranial nerve deficit.      Deep Tendon Reflexes: Reflexes normal.   Psychiatric:         Mood and Affect: Mood normal.         Fluids    Intake/Output Summary (Last 24 hours) at 7/12/2020 1133  Last data filed at 7/12/2020 0659  Gross per 24 hour   Intake 740.69 ml   Output 600 ml   Net 140.69 ml       Laboratory  Recent Labs     07/10/20  0412 07/11/20  0349 07/12/20  0001   WBC 2.0* 1.9* 2.0*   RBC 2.92* 2.85* 3.06*   HEMOGLOBIN 8.2* 8.1* 8.7*   HEMATOCRIT 26.3* 24.7* 27.5*   MCV 90.1 86.7 89.9   MCH 28.1 28.4 28.4   MCHC 31.2* 32.8* 31.6*   RDW 48.2 47.8 51.0*   PLATELETCT 60* 59* 63*   MPV 12.3 12.4  --      Recent Labs     07/10/20  0412 07/11/20  0349 07/12/20  0001   SODIUM 132* 135 130*   POTASSIUM 3.8 3.7 4.2   CHLORIDE 97 100 97   CO2 23 21 23   GLUCOSE 109* 112* 113*   BUN 12 16 17   CREATININE 0.73 0.80 0.75   CALCIUM 7.9* 7.7* 8.0*     Recent Labs     07/10/20  0600 07/11/20  0349 07/12/20  0001   APTT 71.3* 81.4* 66.5*               Imaging  CT-CHEST,ABDOMEN,PELVIS WITH   Final Result      1.  No evidence of bowel obstruction or focal inflammatory change.      2.  Emphysematous and bullous change of the lungs with areas of pulmonary atelectasis or consolidation most prominently seen within the left lung base.      3.  Again seen appearance of the spleen highly suggestive of splenic infarcts.      4.  Prior aortic valvular replacement.      5.  After cirrhotic change of aorta with infrarenal ectasia.      6.  Small right inguinal hernia which contains bowel.      CT-SOFT TISSUE NECK WITH   Final Result      1.  Fluid collection anterior/inferior to the mandible containing gas bubbles measuring 4.1 x 0.8 x 0.9 cm. This could be postoperative fluid or abscess       2.  Erosive change of the anterior aspect and left maxilla suspicious for chronic osteomyelitis      3.  There is a molar within the left maxillary sinus      4.  Left maxillary sinus mucosal thickening      5.  Aortic and branch vessel atherosclerotic plaque      6.  Emphysema      DX-FOOT-2- RIGHT   Final Result         Irregular appearance of the base of the fifth proximal phalanx and fracture is possible.      US-EXTREMITY VENOUS LOWER BILAT   Final Result      EC-ECHOCARDIOGRAM COMPLETE W/O CONT   Final Result      IR-CYST ASPIRATION-MISC   Final Result         ULTRASOUND GUIDED ASPIRATION OF SUBMANDIBULAR ABSCESS..      CT-SOFT TISSUE NECK WITH   Final Result         1. Acute versus subacute fracture of the mid mandibular body.   2. A 2.9 cm collection subjacent to the fracture site, likely hematoma. Infected hematoma can be considered in the appropriate clinical settings.   3. No cervical lymphadenopathy.   4. Moderate mucosal thickening of the left maxillary sinus, likely odontogenic in nature, related to impacted remaining maxillary tooth.      CT-CHEST,ABDOMEN,PELVIS WITH   Final Result      Hypodensities in the spleen worrisome for splenic infarcts.      Trace nonspecific free fluid in the pelvis.      2.2 x 1.7 cm left common femoral artery pseudoaneurysm.      Bowel containing right inguinal hernia without evidence of obstruction.      Atherosclerotic plaque within an ectatic aorta.      Gallbladder wall thickening/pericholecystic fluid. Further evaluation can be performed with right upper quadrant ultrasound.      Small hypodense left renal lesions are too small to characterize but likely represent small cysts.      Emphysematous changes.      Irregular nodular opacity along the minor fissure measuring 8 mm.      4.7 mm right middle lobe pulmonary nodule.      Mild bibasilar and lingular atelectasis.      Emphysematous changes.      Sequelae of prior granulomatous exposure.      Right hilar lymph  node is borderline enlarged and nonspecific, possibly reactive..      Enlarged periportal lymph nodes are nonspecific.      Fleischner Society pulmonary nodule recommendations:   Low Risk: CT at 3-6 months, then consider CT at 18-24 months      High Risk: CT at 3-6 months, then at 18-24 months      Comments: Use most suspicious nodule as guide to management. Follow-up intervals may vary according to size and risk.      Low Risk - Minimal or absent history of smoking and of other known risk factors.      High Risk - History of smoking or of other known risk factors.      Note: These recommendations do not apply to lung cancer screening, patients with immunosuppression, or patients with known primary cancer.      Fleischner Society 2017 Guidelines for Management of Incidentally Detected Pulmonary Nodules in Adults      DX-CHEST-2 VIEWS   Final Result      No acute cardiopulmonary abnormality.      US-EXTREMITY ARTERY LOWER UNILAT RIGHT   Final Result      US-ABDOMEN COMPLETE SURVEY   Final Result      Prominent trabeculated bladder wall, suggesting possible chronic bladder outlet obstruction. Unremarkable findings otherwise.         DX-CHEST-PORTABLE (1 VIEW)   Final Result      No acute cardiac or pulmonary abnormalities are identified.           Assessment/Plan  * Fever- (present on admission)  Assessment & Plan  abx as per iD    Follow cbc    Follow all cultures    Last repeated on 7/11        Mandibular abscess- (present on admission)  Assessment & Plan  Growing strep viridans    Oral surgery dr vasquez s/p ORIF    Washout possible on 7/12    Pancytopenia (HCC)- (present on admission)  Assessment & Plan  Present for a few months, unclear etiology    abx as per id    Hematology/oncology were consulted 6/28/2020  Bone marrow biopsy done      Transfuse plt on 7/3- to prepare for surgery on mandible    S/P TAVR (transcatheter aortic valve replacement)- (present on admission)  Assessment & Plan  S/P TAVR on 6/22/2020   .  Continue statin  Per cardiology start B12 and folate supplements  Echocardiogram shows no vegetations  Cardiology is following    Mixed hyperlipidemia- (present on admission)  Assessment & Plan  Continue home Lovastatin    Essential hypertension- (present on admission)  Assessment & Plan    Continue Norvasc    Added lisinopril 10mg po daily 7/7    Acute osteomyelitis of mandible- (present on admission)  Assessment & Plan  abx as per ID    Washout possible on 7/12    Toe fracture, right  Assessment & Plan  Splint    Pain control    HIT (heparin-induced thrombocytopenia) (HCC)  Assessment & Plan  huseyin positive    on agratraban on  7/4    Will transition to po eliquis prior to discharge    Hypokalemia  Assessment & Plan  Resolved     Panlobular emphysema (HCC)- (present on admission)  Assessment & Plan  History of tobacco use  Chest x-ray from admission shows no acute cardiopulmonary process    Tobacco dependence- (present on admission)  Assessment & Plan  Patient has been counseled on tobacco cessation  Nicotine replacement options were provided       VTE prophylaxis: scd      Check am cbc, BMP

## 2020-07-12 NOTE — PROGRESS NOTES
RN spoke to Esperanza with pharmacy regarding Argatroban infusion.   RN was instructed to stop infusion 3 hours prior to procedure.

## 2020-07-12 NOTE — PROGRESS NOTES
Karen from Lab called with critical result of WBC at 2 and preliminary ABC 0.19. Critical lab result read back to Karen.   Dr. Ellington notified of critical lab result at 0100.  Critical lab result read back by Dr. Ellington.

## 2020-07-12 NOTE — CARE PLAN
Problem: Communication  Goal: The ability to communicate needs accurately and effectively will improve  Outcome: PROGRESSING AS EXPECTED     Problem: Safety  Goal: Will remain free from injury  Outcome: PROGRESSING AS EXPECTED  Goal: Will remain free from falls  Outcome: PROGRESSING AS EXPECTED     Problem: Infection  Goal: Will remain free from infection  Outcome: PROGRESSING SLOWER THAN EXPECTED

## 2020-07-12 NOTE — CARE PLAN
Problem: Safety  Goal: Will remain free from falls  Note: Patient encouraged to use call light when assistance needed. Hourly rounding in place. Room close to nursing station.      Problem: Pain Management  Goal: Pain level will decrease to patient's comfort goal  Note: Patient's pain goal established. Pain levels assessed and interventions provided as prescribed to help reach pain goal. Non-pharmacological interventions encouraged.

## 2020-07-12 NOTE — ANESTHESIA PREPROCEDURE EVALUATION
65M with mandible ORIF on 7/3, now with submandibulat abscess here for I & D    Of note, had a TAVR on 6/22, about 2-3 weeks ago. Off anticoagulation now; developed HIT postop, was on argatroban then eliquis.    He has a hx of pancytopenia and now with neutropenic fever; HemoOnc/ID on board and bone marrow bx done and currently undergoing autoimmune workup. Hg 8.7 and Plt 63 today    Relevant Problems   PULMONARY   (+) Panlobular emphysema (HCC)      CARDIAC   (+) Coronary artery disease due to lipid rich plaque   (+) Essential hypertension   (+) PAD (peripheral artery disease) (HCC)- left femoral bruit; diminished right dt/pt pulse      Other   (+) Acute osteomyelitis of mandible   (+) Pancytopenia (HCC) (unk origin; HemeOnc and ID involved for workup)   (+) Tobacco dependence       Physical Exam    Airway   Mallampati: II  TM distance: >3 FB  Neck ROM: full       Cardiovascular - normal exam  Rhythm: regular  Rate: normal  (+) murmur     Dental   (+) upper dentures, lower dentures           Pulmonary - normal exam  Breath sounds clear to auscultation     Abdominal    Neurological - normal exam               Anesthesia Plan    ASA 3- EMERGENT   ASA physical status 3 criteria: CAD/stents (> 3 months)ASA physical status emergent criteria: acute deteriorating condition due to infection    Plan - general       Airway plan will be ETT        Induction: intravenous and rapid sequence    Postoperative Plan: Postoperative administration of opioids is intended.    Pertinent diagnostic labs and testing reviewed    Informed Consent:    Anesthetic plan and risks discussed with patient.    Use of blood products discussed with: patient whom consented to blood products.

## 2020-07-13 PROBLEM — R00.1 BRADYCARDIA: Status: ACTIVE | Noted: 2020-07-13

## 2020-07-13 LAB
ANION GAP SERPL CALC-SCNC: 9 MMOL/L (ref 7–16)
ANISOCYTOSIS BLD QL SMEAR: ABNORMAL
BASOPHILS # BLD AUTO: 0.9 % (ref 0–1.8)
BASOPHILS # BLD: 0.02 K/UL (ref 0–0.12)
BUN SERPL-MCNC: 14 MG/DL (ref 8–22)
CALCIUM SERPL-MCNC: 8 MG/DL (ref 8.5–10.5)
CHLORIDE SERPL-SCNC: 100 MMOL/L (ref 96–112)
CO2 SERPL-SCNC: 22 MMOL/L (ref 20–33)
CREAT SERPL-MCNC: 0.67 MG/DL (ref 0.5–1.4)
EOSINOPHIL # BLD AUTO: 0.04 K/UL (ref 0–0.51)
EOSINOPHIL NFR BLD: 1.8 % (ref 0–6.9)
ERYTHROCYTE [DISTWIDTH] IN BLOOD BY AUTOMATED COUNT: 51.8 FL (ref 35.9–50)
GLUCOSE SERPL-MCNC: 109 MG/DL (ref 65–99)
HCT VFR BLD AUTO: 24.6 % (ref 42–52)
HGB BLD-MCNC: 7.9 G/DL (ref 14–18)
HYPOCHROMIA BLD QL SMEAR: ABNORMAL
LYMPHOCYTES # BLD AUTO: 1.2 K/UL (ref 1–4.8)
LYMPHOCYTES NFR BLD: 54.4 % (ref 22–41)
MACROCYTES BLD QL SMEAR: ABNORMAL
MANUAL DIFF BLD: NORMAL
MCH RBC QN AUTO: 28.5 PG (ref 27–33)
MCHC RBC AUTO-ENTMCNC: 32.1 G/DL (ref 33.7–35.3)
MCV RBC AUTO: 88.8 FL (ref 81.4–97.8)
MICROCYTES BLD QL SMEAR: ABNORMAL
MONOCYTES # BLD AUTO: 0.68 K/UL (ref 0–0.85)
MONOCYTES NFR BLD AUTO: 30.7 % (ref 0–13.4)
MORPHOLOGY BLD-IMP: NORMAL
NEUTROPHILS # BLD AUTO: 0.27 K/UL (ref 1.82–7.42)
NEUTROPHILS NFR BLD: 12.3 % (ref 44–72)
NRBC # BLD AUTO: 0 K/UL
NRBC BLD-RTO: 0 /100 WBC
OVALOCYTES BLD QL SMEAR: NORMAL
PLATELET # BLD AUTO: 63 K/UL (ref 164–446)
PLATELET BLD QL SMEAR: NORMAL
PMV BLD AUTO: 12.4 FL (ref 9–12.9)
POIKILOCYTOSIS BLD QL SMEAR: NORMAL
POLYCHROMASIA BLD QL SMEAR: NORMAL
POTASSIUM SERPL-SCNC: 3.8 MMOL/L (ref 3.6–5.5)
RBC # BLD AUTO: 2.77 M/UL (ref 4.7–6.1)
RBC BLD AUTO: PRESENT
SMUDGE CELLS BLD QL SMEAR: NORMAL
SODIUM SERPL-SCNC: 131 MMOL/L (ref 135–145)
WBC # BLD AUTO: 2.2 K/UL (ref 4.8–10.8)

## 2020-07-13 PROCEDURE — 700102 HCHG RX REV CODE 250 W/ 637 OVERRIDE(OP): Performed by: ORAL & MAXILLOFACIAL SURGERY

## 2020-07-13 PROCEDURE — A9270 NON-COVERED ITEM OR SERVICE: HCPCS | Performed by: INTERNAL MEDICINE

## 2020-07-13 PROCEDURE — A9270 NON-COVERED ITEM OR SERVICE: HCPCS | Performed by: HOSPITALIST

## 2020-07-13 PROCEDURE — 700102 HCHG RX REV CODE 250 W/ 637 OVERRIDE(OP): Performed by: INTERNAL MEDICINE

## 2020-07-13 PROCEDURE — 700111 HCHG RX REV CODE 636 W/ 250 OVERRIDE (IP): Mod: JG | Performed by: HOSPITALIST

## 2020-07-13 PROCEDURE — A9270 NON-COVERED ITEM OR SERVICE: HCPCS | Performed by: ORAL & MAXILLOFACIAL SURGERY

## 2020-07-13 PROCEDURE — 700105 HCHG RX REV CODE 258: Performed by: INTERNAL MEDICINE

## 2020-07-13 PROCEDURE — 770020 HCHG ROOM/CARE - TELE (206)

## 2020-07-13 PROCEDURE — 85027 COMPLETE CBC AUTOMATED: CPT

## 2020-07-13 PROCEDURE — 80048 BASIC METABOLIC PNL TOTAL CA: CPT

## 2020-07-13 PROCEDURE — 36415 COLL VENOUS BLD VENIPUNCTURE: CPT

## 2020-07-13 PROCEDURE — 99233 SBSQ HOSP IP/OBS HIGH 50: CPT | Performed by: INTERNAL MEDICINE

## 2020-07-13 PROCEDURE — 85007 BL SMEAR W/DIFF WBC COUNT: CPT

## 2020-07-13 PROCEDURE — 700111 HCHG RX REV CODE 636 W/ 250 OVERRIDE (IP): Performed by: INTERNAL MEDICINE

## 2020-07-13 PROCEDURE — 99232 SBSQ HOSP IP/OBS MODERATE 35: CPT | Performed by: HOSPITALIST

## 2020-07-13 PROCEDURE — 700102 HCHG RX REV CODE 250 W/ 637 OVERRIDE(OP): Performed by: HOSPITALIST

## 2020-07-13 RX ORDER — ATENOLOL 25 MG/1
12.5 TABLET ORAL
Status: DISCONTINUED | OUTPATIENT
Start: 2020-07-14 | End: 2020-07-15 | Stop reason: HOSPADM

## 2020-07-13 RX ORDER — LEVOFLOXACIN 750 MG/1
750 TABLET, FILM COATED ORAL DAILY
Status: DISCONTINUED | OUTPATIENT
Start: 2020-07-13 | End: 2020-07-15 | Stop reason: HOSPADM

## 2020-07-13 RX ADMIN — LISINOPRIL 10 MG: 10 TABLET ORAL at 06:14

## 2020-07-13 RX ADMIN — CHLORHEXIDINE GLUCONATE 0.12% ORAL RINSE 15 ML: 1.2 LIQUID ORAL at 14:19

## 2020-07-13 RX ADMIN — CEFEPIME 2 G: 2 INJECTION, POWDER, FOR SOLUTION INTRAVENOUS at 07:19

## 2020-07-13 RX ADMIN — METRONIDAZOLE 500 MG: 500 TABLET ORAL at 21:49

## 2020-07-13 RX ADMIN — HYDROCODONE BITARTRATE AND ACETAMINOPHEN 1 TABLET: 5; 325 TABLET ORAL at 16:59

## 2020-07-13 RX ADMIN — APIXABAN 10 MG: 5 TABLET, FILM COATED ORAL at 17:00

## 2020-07-13 RX ADMIN — HYDROCODONE BITARTRATE AND ACETAMINOPHEN 1 TABLET: 5; 325 TABLET ORAL at 01:45

## 2020-07-13 RX ADMIN — METRONIDAZOLE 500 MG: 500 TABLET ORAL at 06:10

## 2020-07-13 RX ADMIN — CHLORHEXIDINE GLUCONATE 0.12% ORAL RINSE 15 ML: 1.2 LIQUID ORAL at 17:00

## 2020-07-13 RX ADMIN — HYDROCODONE BITARTRATE AND ACETAMINOPHEN 1 TABLET: 5; 325 TABLET ORAL at 21:49

## 2020-07-13 RX ADMIN — HYDROCODONE BITARTRATE AND ACETAMINOPHEN 1 TABLET: 5; 325 TABLET ORAL at 09:50

## 2020-07-13 RX ADMIN — LEVOFLOXACIN 750 MG: 750 TABLET, FILM COATED ORAL at 14:19

## 2020-07-13 RX ADMIN — LOVASTATIN 20 MG: 20 TABLET ORAL at 21:49

## 2020-07-13 RX ADMIN — CHLORHEXIDINE GLUCONATE 0.12% ORAL RINSE 15 ML: 1.2 LIQUID ORAL at 21:50

## 2020-07-13 RX ADMIN — CHLORHEXIDINE GLUCONATE 0.12% ORAL RINSE 15 ML: 1.2 LIQUID ORAL at 09:50

## 2020-07-13 RX ADMIN — ARGATROBAN 0.89 MCG/KG/MIN: 50 INJECTION INTRAVENOUS at 00:00

## 2020-07-13 RX ADMIN — METRONIDAZOLE 500 MG: 500 TABLET ORAL at 14:19

## 2020-07-13 ASSESSMENT — ENCOUNTER SYMPTOMS
SPUTUM PRODUCTION: 0
CLAUDICATION: 0
BRUISES/BLEEDS EASILY: 0
HEARTBURN: 0
MYALGIAS: 1
DIARRHEA: 0
BLURRED VISION: 0
BACK PAIN: 0
COUGH: 0
NECK PAIN: 0
DIZZINESS: 0
NERVOUS/ANXIOUS: 1
NAUSEA: 0
WEIGHT LOSS: 0
CONSTIPATION: 0
DEPRESSION: 0
FOCAL WEAKNESS: 0
CHILLS: 0
ABDOMINAL PAIN: 0
FEVER: 0
DOUBLE VISION: 0
ORTHOPNEA: 0
SHORTNESS OF BREATH: 0
TREMORS: 0
PALPITATIONS: 0
VOMITING: 0
HEADACHES: 0
PHOTOPHOBIA: 0

## 2020-07-13 ASSESSMENT — FIBROSIS 4 INDEX: FIB4 SCORE: 10.23

## 2020-07-13 ASSESSMENT — LIFESTYLE VARIABLES: SUBSTANCE_ABUSE: 0

## 2020-07-13 NOTE — ANESTHESIA POSTPROCEDURE EVALUATION
Patient: Magalis Kumari    Procedure Summary     Date:  07/12/20 Room / Location:  [place] [address] / SURGERY [facility]    Anesthesia Start:  1627 Anesthesia Stop:  1718    Procedure:  INCISION AND DRAINAGE, ABSCESS, SUBMANDIBULAR REGION (N/A Face) Diagnosis:  (SUBMANDIBULAR ABCESS)    Surgeon:  Brenden Morrison D.D.S. Responsible Provider:  Amalia Che M.D.    Anesthesia Type:  general ASA Status:  3 - Emergent          Final Anesthesia Type: general  Last vitals  BP   Blood Pressure : 151/66    Temp   37.4 °C (99.3 °F)    Pulse   Pulse: 62   Resp   18    SpO2   92 %      Anesthesia Post Evaluation    Patient location during evaluation: PACU  Patient participation: complete - patient participated  Level of consciousness: awake and alert    Airway patency: patent  Anesthetic complications: no  Cardiovascular status: hemodynamically stable  Respiratory status: acceptable  Hydration status: euvolemic    PONV: none           Nurse Pain Score: 4 (NPRS)

## 2020-07-13 NOTE — PROGRESS NOTES
Infectious Disease Progress Note    Author: Ruthie Avitia M.D. Date & Time of service: 7/13/2020  12:04 PM    Chief Complaint:  Fevers, pancytopenia, submandibular abscess      Interval History:  6/29 T-max 102.9 WBC 3.7 patient remains neutropenic .  CT of the neck shows a 2.9 cm collection adjacent to the fracture site of the mid mandibular body.  An infected hematoma cannot be ruled out.  CT scan shows possible splenic infarcts and gallbladder wall thickening with pericholecystic fluid.  Patient continues to have fevers and chills.  He states his chin lesion is more painful and has increased in size.  Plan for bone marrow biopsy today  6/30 T-max 101.9, remains febrile.  Had bone biopsy yesterday and aspiration of submandibular abscess.  He did not sleep well overnight as he was very warm.  No new symptoms today  7/1 T-max 100.9 WBC 4.5 culture growing viridans Streptococcus.  Patient did not feel any fevers overnight.  Overall fever curve improving.  He states that his abscess drained throughout the night.  Bone marrow biopsy results pending.  Eager to go home  7/2 T-max 102.4 WBC 4.1 bone marrow results do not show any evidence of MDS or leukemia.  Ongoing chin pain.  Awaiting evaluation from oral surgeon  7/3 T-max 99.9 WBC 2.8.  Plan for ORIF with bone graft today by Dr. Morrison.  No new symptoms to report  7/4 afebrile WBC 2.2 underwent surgery yesterday.  Operative report not available.  Patient complaining of jaw pain from surgery.  Submental abscess cultures-pending  7/5 T-max 101.6 WBC 2.1 ongoing jaw pain.  Nurse states decreased drainage throughout the day yesterday and patient was noted to be somewhat confused overnight.  He is alert and oriented this morning answering questions appropriately  7/6  AF, O2 RA, submandibular pain and ongoing drainage.  Awaiting pathology results from [facility].  7/7 AF, O2 RA, .pain and swelling in jaw but less drainage.    7/9 AF, O2 RA, drainage from  [facility] drain in submandibular abscess.  Some ongoing edema and tenderness.  He denies any other symptoms.  Still with critically low ANC and thrombocytopenia.     101.4, O2 RA,  , pain, swelling and drainage from submandibular area.  He has no other specific complaints.  Fevers for the past 2 days.  Will transition Unasyn to Cefepime and flagyl and recommend pan scan.      Review of Systems:  Review of Systems   Constitutional: Negative for chills, fever and malaise/fatigue.   Respiratory: Negative for cough, sputum production and shortness of breath.    Gastrointestinal: Negative for abdominal pain, constipation, diarrhea, nausea and vomiting.   Musculoskeletal: Positive for myalgias. Negative for back pain and joint pain.       Hemodynamics:  Temp (24hrs), Av.2 °C (99 °F), Min:36.7 °C (98 °F), Max:37.4 °C (99.3 °F)  Temperature: 37.4 °C (99.3 °F)  Pulse  Av.1  Min: 47  Max: 110   Blood Pressure : 138/62, NIBP: 121/55       Physical Exam:  Physical Exam  Constitutional:       Appearance: Normal appearance.   HENT:      Head:      Comments: Mandibular edema, ttp, drain in   Cardiovascular:      Rate and Rhythm: Normal rate and regular rhythm.      Heart sounds: Normal heart sounds.   Pulmonary:      Effort: Pulmonary effort is normal.      Breath sounds: Normal breath sounds.   Abdominal:      General: Abdomen is flat. Bowel sounds are normal.      Palpations: Abdomen is soft.   Musculoskeletal:      Right lower leg: No edema.      Left lower leg: No edema.   Skin:     General: Skin is warm and dry.   Neurological:      General: No focal deficit present.      Mental Status: He is alert and oriented to person, place, and time.   Psychiatric:         Mood and Affect: Mood normal.         Behavior: Behavior normal.         Meds:    Current Facility-Administered Medications:   •  [START ON 2020] atenolol  •  cefepime  •  metroNIDAZOLE  •  chlorhexidine  •  lisinopril  •  [Held by provider] MD  Alert...Argatroban per Pharmacy  •  argatroban **AND** Protocol 448 INDICATIONS **AND** Protocol 448 INCLUSION CRITERIA **AND** Protocol 448 EXCLUSION CRITERIA  **AND** Protocol 448 GOAL: To obtain aPTT value in the 50-90 range **AND** Protocol 448 Argatroban administration must be monitored and documented in EMR. Discontinue IV/subcutaneous heparin, heparin flushes, Enoxaparin (Lovenox), Dabigatran (Pradaxa), Rivaroxaban (Xarelto), Apixaban (Eliquis), Edoxaban (Savaysa, Lixiana), and Fondaparinux (Arixtra). **AND** Protocol 448 RN to order daily CBC w/o diff if newly-diagnosed heparin-induced thrombocytopenia **AND** Protocol 448 aPTT MONITORING - RN to place separate order for repeat aPTT as needed **AND** Start IV argatroban drip, initial rate per pharmacy dosing guidelines **AND** [COMPLETED] APTT **AND** Maximum dose 10 mcg/kg/min    •  HYDROcodone-acetaminophen  •  [CANCELED] HEPARIN INDUCED PLATELET AB(HIT) **AND** Pharmacy Consult:  •  zolpidem  •  lovastatin  •  senna-docusate **AND** polyethylene glycol/lytes **AND** magnesium hydroxide **AND** bisacodyl  •  acetaminophen  •  enalaprilat  •  ondansetron  •  ondansetron    Labs:  Recent Labs     07/11/20  0349 07/12/20  0001 07/13/20  0437   WBC 1.9* 2.0* 2.2*   RBC 2.85* 3.06* 2.77*   HEMOGLOBIN 8.1* 8.7* 7.9*   HEMATOCRIT 24.7* 27.5* 24.6*   MCV 86.7 89.9 88.8   MCH 28.4 28.4 28.5   RDW 47.8 51.0* 51.8*   PLATELETCT 59* 63* 63*   MPV 12.4  --  12.4   NEUTSPOLYS 7.00* 13.20* 12.30*   LYMPHOCYTES 70.00* 61.80* 54.40*   MONOCYTES 19.00* 19.70* 30.70*   EOSINOPHILS 0.00 0.70 1.80   BASOPHILS 3.00* 3.90* 0.90   RBCMORPHOLO Present Present Present     Recent Labs     07/11/20  0349 07/12/20  0001 07/13/20  0437   SODIUM 135 130* 131*   POTASSIUM 3.7 4.2 3.8   CHLORIDE 100 97 100   CO2 21 23 22   GLUCOSE 112* 113* 109*   BUN 16 17 14     Recent Labs     07/11/20  0349 07/12/20  0001 07/13/20  0437   CREATININE 0.80 0.75 0.67        Imaging:  Ct-chest,abdomen,pelvis With    Result Date: 7/11/2020 7/11/2020 4:46 PM HISTORY/REASON FOR EXAM: Sepsis and fever. TECHNIQUE/EXAM DESCRIPTION: CT scan of the chest, abdomen and pelvis with contrast. Thin-section helical scanning was obtained with intravenous contrast from the lung apices through the pubic symphysis to include the chest, abdomen and pelvis. 100 mL of Omnipaque 350 nonionic contrast was administered intravenously without complication. Low dose optimization technique was utilized for this CT exam including automated exposure control and adjustment of the mA and/or kV according to patient size. COMPARISON: 6/28/2020 FINDINGS: CT CHEST: There is biapical pulmonary scarring. There is emphysematous and bullous change of the lungs. There is a calcified granuloma within the right upper lobe inferiorly. There are areas of atelectasis or scarring within the lung bases bilaterally, left greater than right. There is no evidence of mediastinal or hilar adenopathy. There is no evidence of pericardial effusion. There is moderate atherosclerotic change of the aorta or coronary vessels. There has been prior aortic valvular replacement. CT ABDOMEN: The liver is normal. There is again seen abnormal appearance of the spleen characterized by areas of linear low-attenuation as well as peripheral wedge-shaped low-attenuation possibly representing splenic infarcts. There is a small simple appearing left upper pole renal cyst. No follow-up recommended. The adrenal glands are normal. The pancreas is normal. There is atherosclerotic change of the aorta with infrarenal ectasia. Maximum aortic dimension is measured at 2.6 cm in diameter. CT PELVIS: There is a small right inguinal hernia which contains bowel. There is no evidence of bowel obstruction or inflammatory change. There is no evidence of free fluid.     1.  No evidence of bowel obstruction or focal inflammatory change. 2.  Emphysematous and bullous  change of the lungs with areas of pulmonary atelectasis or consolidation most prominently seen within the left lung base. 3.  Again seen appearance of the spleen highly suggestive of splenic infarcts. 4.  Prior aortic valvular replacement. 5.  After cirrhotic change of aorta with infrarenal ectasia. 6.  Small right inguinal hernia which contains bowel.    Ct-chest,abdomen,pelvis With    Result Date: 6/28/2020 6/28/2020 4:20 PM HISTORY/REASON FOR EXAM:  Neutropenic fever TECHNIQUE/EXAM DESCRIPTION: CT scan of the chest, abdomen and pelvis with contrast. Helical scanning was obtained with intravenous contrast from the lung apices through the pubic symphysis to include the chest, abdomen and pelvis.  80 mL of Omnipaque 350 nonionic contrast was administered intravenously without complication. Low dose optimization technique was utilized for this CT exam including automated exposure control and adjustment of the mA and/or kV according to patient size. COMPARISON: 4/2/2020 FINDINGS: Atherosclerotic plaque is seen in the aorta. Coronary artery calcification is seen. There is a prosthetic aortic valve. The heart is not enlarged. There is trace pericardial fluid. There are small mediastinal lymph nodes. Right hilar lymph node measures 1 cm in short axis dimension. There is no left hilar or axillary lymphadenopathy. There is no pleural effusion. There are emphysematous changes. There are calcified pulmonary granulomata. There is mild bibasilar and lingula atelectasis. Central airways are patent. 4.7 mm nodule is seen in the right middle lobe on image 227. There is an irregular density along the minor fissure measuring 8 mm which could be related to scarring. No free air is seen in the abdomen or pelvis. The liver appears unremarkable. Portal vein is patent. There is pericholecystic fluid/gallbladder wall thickening. Areas of hypoenhancement in the spleen are worrisome for splenic infarcts. No adrenal mass is  identified.  There is no evidence of hydronephrosis. There is a small hypodense left renal lesions which are too small to characterize. Pancreas appears unremarkable. There is no biliary ductal dilatation. There is atherosclerotic plaque within an ectatic aorta. There is a left common femoral pseudoaneurysm measuring 2.2 x 1.7 cm. There are small retroperitoneal lymph nodes. Aortocaval retroperitoneal nodes measure up to 8 mm in short axis dimension. Periportal lymph nodes measure up to 1.4 cm in short axis dimension. There is no evidence of bowel obstruction. The visualized appendix appears unremarkable. The appendix is not identified in its entirety, limiting evaluation. Bladder is mildly distended. There is a bladder diverticulum on the right. Prostate is mildly enlarged. There is trace free fluid in the pelvis. There is a right inguinal hernia containing bowel without evidence of obstruction. Degenerative changes are seen in the spine.     Hypodensities in the spleen worrisome for splenic infarcts. Trace nonspecific free fluid in the pelvis. 2.2 x 1.7 cm left common femoral artery pseudoaneurysm. Bowel containing right inguinal hernia without evidence of obstruction. Atherosclerotic plaque within an ectatic aorta. Gallbladder wall thickening/pericholecystic fluid. Further evaluation can be performed with right upper quadrant ultrasound. Small hypodense left renal lesions are too small to characterize but likely represent small cysts. Emphysematous changes. Irregular nodular opacity along the minor fissure measuring 8 mm. 4.7 mm right middle lobe pulmonary nodule. Mild bibasilar and lingular atelectasis. Emphysematous changes. Sequelae of prior granulomatous exposure. Right hilar lymph node is borderline enlarged and nonspecific, possibly reactive.. Enlarged periportal lymph nodes are nonspecific. Fleischner Society pulmonary nodule recommendations: Low Risk: CT at 3-6 months, then consider CT at 18-24 months High Risk: CT at  3-6 months, then at 18-24 months Comments: Use most suspicious nodule as guide to management. Follow-up intervals may vary according to size and risk. Low Risk - Minimal or absent history of smoking and of other known risk factors. High Risk - History of smoking or of other known risk factors. Note: These recommendations do not apply to lung cancer screening, patients with immunosuppression, or patients with known primary cancer. Fleischner Society 2017 Guidelines for Management of Incidentally Detected Pulmonary Nodules in Adults    Ct-soft Tissue Neck With    Result Date: 7/11/2020 7/11/2020 4:46 PM HISTORY/REASON FOR EXAM:  Neck mass, pulsatile (Age > 15y); s/p mandible surgery.. recurrent fever. assess for abnormal fluid collection. TECHNIQUE/EXAM DESCRIPTION AND NUMBER OF VIEWS:  CT soft tissue neck with contrast. The study was performed on a helical multidetector CT scanner. Contiguous thin section helical images were obtained of the neck from the skull base through the thoracic inlet. 100 mL of Omnipaque 350 nonionic contrast was injected intravenously. Low dose optimization technique was utilized for this CT exam including automated exposure control and adjustment of the mA and/or kV according to patient size. COMPARISON: CT neck 6/28/2020 FINDINGS: There is hardware across midline mandibular fracture. There is adjacent soft tissue air consistent with recent surgery. There is atherosclerotic plaque of the aortic arch and branch vessels. There is soft tissue swelling anterior to the left maxilla. There are erosive changes of the left maxilla suggesting chronic osteomyelitis. There is a fluid collection anterior/inferior to the mandible containing gas bubbles. It measures 4.1 x 0.8 cm transversely and 9 mm craniocaudal. This could be postoperative fluid collection or abscess. BRAIN: Visualized portions of the brain are normal in appearance. TEMPORAL bones: The mastoid air cells and middle ear are clear.  PARANASAL SINUSES: There is left maxillary sinus mucosal thickening. There appears to be a molar within the left maxillary sinus. CERVICAL spine: There is no significant cervical spine abnormality. NODES: No adenopathy within the neck. SALIVARY and THYROID gland: The parotid, submandibular, and thyroid gland are normal in appearance. AIRWAY The airway appears patent. MEDIASTINUM: The superior mediastinum is normal in appearance. LUNGS: There is emphysema which appears moderate to severe in degree.     1.  Fluid collection anterior/inferior to the mandible containing gas bubbles measuring 4.1 x 0.8 x 0.9 cm. This could be postoperative fluid or abscess 2.  Erosive change of the anterior aspect and left maxilla suspicious for chronic osteomyelitis 3.  There is a molar within the left maxillary sinus 4.  Left maxillary sinus mucosal thickening 5.  Aortic and branch vessel atherosclerotic plaque 6.  Emphysema    Ct-soft Tissue Neck With    Result Date: 6/28/2020 6/28/2020 4:20 PM HISTORY/REASON FOR EXAM:  Palpable nodule/thyroid enlargement. TECHNIQUE/EXAM DESCRIPTION AND NUMBER OF VIEWS:  CT soft tissue neck with contrast. The study was performed on a helical multidetector CT scanner. Contiguous thin section helical images were obtained of the neck from the skull base through the thoracic inlet. 80 mL of Omnipaque 350 nonionic contrast was injected intravenously. Low dose optimization technique was utilized for this CT exam including automated exposure control and adjustment of the mA and/or kV according to patient size. COMPARISON: None. FINDINGS: PARANASAL SINUSES: Moderate mucosal thickening of left maxillary sinus. Last remaining tooth with associated periapical lucency protrudes into the left maxillary sinus floor. NASOPHARYNX: Normal. SUPRAHYOID NECK: Normal oropharynx, oral cavity, parapharyngeal space, and retropharyngeal space. EPIGLOTTIS: Normal. INFRAHYOID NECK: Normal larynx, hypopharynx, and supraglottis.  THYROID: Normal. No thyroid nodules. UPPER CHEST: Reported separately. LYMPH NODES: No enlarged nodes by size criteria. VASCULAR STRUCTURES: Patent. Approximately 50% stenosis of the left ICA origin secondary to predominantly calcific plaque. OTHER FINDINGS: Acute versus subacute fracture of the mandibular body. Fluid collection subjacent to the fracture site measures 2.9 x 1.2 cm, likely a hematoma. Infection can be considered in the appropriate clinical setting. Visualized brain is unremarkable.     1. Acute versus subacute fracture of the mid mandibular body. 2. A 2.9 cm collection subjacent to the fracture site, likely hematoma. Infected hematoma can be considered in the appropriate clinical settings. 3. No cervical lymphadenopathy. 4. Moderate mucosal thickening of the left maxillary sinus, likely odontogenic in nature, related to impacted remaining maxillary tooth.    Dx-chest-2 Views    Result Date: 6/27/2020 6/27/2020 11:36 AM HISTORY/REASON FOR EXAM:  Fever TECHNIQUE/EXAM DESCRIPTION AND NUMBER OF VIEWS: Two views of the chest. COMPARISON:  6/23/2020. FINDINGS: LUNGS: Slight hyperinflation. Chronic slightly hyperdense nodule in the right midlung, likely benign granuloma. No focal consolidation. No effusions. PNEUMOTHORAX: None. LINES AND TUBES: None. MEDIASTINUM: No cardiomegaly. TAVR. LAD stent. Atherosclerosis. MUSCULOSKELETAL STRUCTURES: No acute displaced fracture.     No acute cardiopulmonary abnormality.    Dx-chest-portable (1 View)    Result Date: 6/23/2020 6/23/2020 5:44 PM HISTORY/REASON FOR EXAM:  tachycardia. TECHNIQUE/EXAM DESCRIPTION AND NUMBER OF VIEWS: Single portable view of the chest. COMPARISON: Exam from 6:42 AM FINDINGS: Heart size is within normal limits. No pulmonary infiltrates or consolidations are noted. No pleural abnormalities are noted. There is an old left seventh rib fracture.     No acute cardiac or pulmonary abnormalities are identified.    Dx-chest-portable (1  View)    Result Date: 6/23/2020 6/23/2020 6:05 AM HISTORY/REASON FOR EXAM: Postop TAVR. TECHNIQUE/EXAM DESCRIPTION AND NUMBER OF VIEWS: Single portable view of the chest. COMPARISON: 6/22/2020 FINDINGS: LUNGS: Clear. No effusions. PNEUMOTHORAX: None. LINES AND TUBES: None. MEDIASTINUM: Stable cardiac silhouette. MUSCULOSKELETAL STRUCTURES: Unchanged.     Clear lungs. No pleural effusions.    Dx-chest-portable (1 View)    Result Date: 6/22/2020 6/22/2020 12:50 PM HISTORY/REASON FOR EXAM:  Status post aortic valve replacement. TECHNIQUE/EXAM DESCRIPTION AND NUMBER OF VIEWS: Single portable view of the chest. COMPARISON: 7/15/2019 FINDINGS: Single portable view of the chest demonstrates a normal cardiac silhouette and mediastinal contours. Calcification is in the aorta. There is emphysematous change. The lungs are hyperexpanded. No confluent opacity, pleural fluid, or pneumothorax. No suspicious bony lesions.     No acute findings status post TAVR    Dx-foot-2- Right    Result Date: 7/7/2020 7/7/2020 8:13 AM HISTORY/REASON FOR EXAM:  Pain/Deformity Following Trauma; ASSESS FOR FRACTURE Traumatic right foot pain, possibly stubbed toes, pain and swelling to 4th and 5th digits TECHNIQUE/EXAM DESCRIPTION AND NUMBER OF VIEWS: 2 views of the RIGHT foot. COMPARISON:  None. FINDINGS: Irregular appearance of the base of the fifth proximal phalanx Moderate midfoot and hindfoot osteoarthritis. Moderate osteoarthritis of the first MTP joint and fifth MTP joint.     Irregular appearance of the base of the fifth proximal phalanx and fracture is possible.    Ir-cyst Aspiration-misc    Result Date: 6/29/2020 6/29/2020 1:13 PM HISTORY/REASON FOR EXAM: Redness and swelling in the anterior submandibular region TECHNIQUE/EXAM DESCRIPTION: Ultrasound guided aspiration of submandibular fluid collection. PROCEDURE: Informed consent was obtained. Localizing ultrasound images were obtained with the patient in supine position. A  heterogeneous fluid collection was noted in the anterior submandibular region. The skin was prepped with chlorhexidine gluconate and draped in a sterile fashion. Local anesthesia was administered with 1% Lidocaine. Under real-time ultrasound guidance, a 17-gauge introducer needle was advanced into the submandibular collection and 4 mL of purulent material was aspirated and sent to lab for analysis. The patient tolerated the procedure well with no evidence of complication. COMPARISON: Recent CT scan of the neck FINDINGS: The localizing ultrasound demonstrates a heterogeneous submandibular fluid collection.     ULTRASOUND GUIDED ASPIRATION OF SUBMANDIBULAR ABSCESS..    Us-abdomen Complete Survey    Result Date: 6/26/2020 6/26/2020 6:27 AM HISTORY/REASON FOR EXAM:  Abnormal Labs Pain TECHNIQUE/EXAM DESCRIPTION AND NUMBER OF VIEWS:  Complete abdomen survey. COMPARISON: None FINDINGS: The liver is normal in contour. There is no evidence of solid mass lesion. The liver measures 14.57 cm. The gallbladder is normal. There is no evidence of cholelithiasis. The gallbladder wall thickness measures 2.50 mm. There is no pericholecystic fluid. The common duct measures 3.40 mm. The visualized pancreas is unremarkable. The visualized aorta is normal in caliber. Intrahepatic IVC is patent. The portal vein is patent with hepatopetal flow. The MPV measures 1.22 cm. The right kidney measures 11.19 cm. The left kidney measures 11.17 cm. There is no hydronephrosis. The spleen measures 10.41 cm maximally. The bladder is trabeculated, which could indicate chronic bladder outlet obstruction. There is no ascites.     Prominent trabeculated bladder wall, suggesting possible chronic bladder outlet obstruction. Unremarkable findings otherwise.     Us-extremity Artery Lower Bilat    Result Date: 6/22/2020  Lower Extremity  Arterial Duplex Report  Vascular Laboratory  CONCLUSIONS  Right.  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 Velocities are consistent with > 75% stenosis.  Mild plaque visualized in the superficial femoral and popliteal arteries  without evidence of hemodynamically significant stenosis.  Monophasic waveforms demonstrated throughout the right lower extremity.  Left.  Mild plaque is seen in the common femoral, femoral, and popliteal arteries  with no elevated velocities to indicate hemodynamically significant  stenosis.  FADIA LANDIS  Exam Date:     2020 17:34  Room #:     Inpatient  Priority:     Stat  Ht (in):             Wt (lb):  Ordering Physician:        ANTHONY PIPER  Referring Physician:       ANTHONY PIPER  Sonographer:               Ewa Curtis RVT  Study Type:                Complete Bilateral  Technical Quality:         Adequate  Age:    65    Gender:     M  MRN:    [id number]  :    1955      BSA:  Indications:     Pain in leg, unspecified  CPT Codes:       53093  ICD Codes:       M79.606  History:         Pain in legs s/p TAVR. No prior duplex.  Limitations:                RIGHT  Waveform        Peak Systolic Velocity (cm/s)                  Prox    Prox-Mid  Mid    Mid-Dist  Distal  Monophasic                        307              143     CFA  Monophasic      55                                         PFA  Monophasic      53                51               34      SFA  Monophasic                        30                       POP  Monophasic      22                                 32      AT  Monophasic      19                                 11      PT  Monophasic      37                                 20      EVGENY                LEFT  Waveform        Peak Systolic Velocity (cm/s)                  Prox    Prox-Mid  Mid    Mid-Dist  Distal  Triphasic                         187                      CFA  Biphasic        78                                         PFA  Biphasic         103               142              106     SFA  Biphasic                          57                       POP  Biphasic        83                                 110     AT  Biphasic        76                                 24      PT  Biphasic        61                                 24      EVGENY  FINDINGS  Right.  Echolucent material consistent with thrombus partially fills the common  femoral artery causing a high grade stenosis. Stenosis of the common  femoral artery. Velocities are consistent with > 75% stenosis.  Mild plaque visualized in the femoral and popliteal arteries without  evidence of hemodynamic significance.  Monophasic waveforms demonstrated throughout the right lower extremity.  Left.  Mild plaque is seen in the common femoral, femoral, and popliteal arteries  with no elevated velocities to indicate hemodynamic significance.  Waveforms are biphasic throughout the left lower extremity.  Demetria Narvaez  (Electronically Signed)  Final Date:      2020                   18:45  Amended:         2020 19:58    Us-extremity Artery Lower Unilat Right    Result Date: 2020  Lower Extremity  Arterial Duplex Report  Vascular Laboratory  CONCLUSIONS  Pinching of the common femoral artery from perclose device closure.  Velcoity consistent with >75% stenosis. Intraluminal clot seen in prior  study is not readily seen.  FADIA LANDIS  Exam Date:     2020 07:49  Room #:     Inpatient  Priority:     Routine  Ht (in):             Wt (lb):  Ordering Physician:        MELVIN SIMONS  Referring Physician:       MELVIN SIMONS  Sonographer:               Blank Calles RVT  Study Type:                Limited Unilateral  Technical Quality:         Adequate  Age:    65    Gender:     M  MRN:    [id number]  :    1955      BSA:  Indications:     Unspecified atherosclerosis of native  arteries of                   extremities, right leg  CPT Codes:       54093  ICD Codes:       I70.201  History:         Right common femoral steosis.  Limitations:                RIGHT  Waveform        Peak Systolic Velocity (cm/s)                  Prox    Prox-Mid  Mid    Mid-Dist  Distal  Monophasic      42                384              230     CFA  Biphasic        97                                         PFA  Biphasic        55                                         SFA                                                             POP                                                             AT                                                             PT                                                             EVGENY                LEFT  Waveform        Peak Systolic Velocity (cm/s)                  Prox    Prox-Mid  Mid    Mid-Dist  Distal                                                             CFA                                                             PFA                                                             SFA                                                             POP                                                             AT                                                             PT                                                             EVGENY  FINDINGS  Limited evaluation of the right common femoral artery known stenosis.  Right.  Stenosis of the mid common femoral artery. Velocities are consistent with >  75%stenosis.  Distal flow is turbulent and damped.  No evidence of hematoma or psuedoaneuysm seen at the right groin.  Luiz Dennis MD  (Electronically Signed)  Final Date:      26 June 2020                   08:49    Us-extremity Venous Lower Bilat    Result Date: 7/4/2020   Vascular Laboratory  CONCLUSIONS  Normal bilateral superficial and deep venous examination of the lower  extremities.  FADIA LANDIS  Exam Date:     07/04/2020 14:05  Room #:      Inpatient  Priority:     Routine  Ht (in):             Wt (lb):  Ordering Physician:        FLAQUITO BLACKMAN  Referring Physician:       [id number] Case  Sonographer:               Humberto Gonzalez RVT  Study Type:                Complete Bilateral  Technical Quality:         Adequate  Age:    65    Gender:     M  MRN:    [id number]  :    1955      BSA:  Indications:     Pain in leg, unspecified  CPT Codes:       02442  ICD Codes:       M79.606  History:         BLE pain, pressure, and edema.  Limitations:  PROCEDURES:  Venous duplex imaging was performed in both lower extremities including  serial compressions and spectral Doppler flow evaluation.  FINDINGS:  The veins of the lower extremities are readily compressible with normal  venous flow dynamics including spontaneous flow and respiratory phasic  variation.  No evidence of deep venous thrombosis.  Alfonso Mondragon MD  (Electronically Signed)  Final Date:      2020                   16:16    Ec-echocardiogram Complete W/o Cont    Result Date: 2020  Transthoracic Echo Report Echocardiography Laboratory CONCLUSIONS Known TAVR aortic valve that has a mildly increased gradients. No paravaluvar leak or signficant aortic insufficiency seen. No vegetation seen FADIA LANDIS Exam Date:         2020                    14:21 Exam Location:     Inpatient Priority:          Routine Ordering Physician:        BRANDEN STOCKTON Referring Physician:       [id number] Beckwith Sonographer:               Susana Carson RDCS Age:    65     Gender:    M MRN:    [id number] :    1955 BSA:    1.68   Ht (in):    66     Wt (lb):    133 Exam Type:     Complete Indications:     Endocarditis ICD Codes:       421 CPT Codes:       71730 BP:   114    /   74     HR:   91 Technical Quality:       Fair MEASUREMENTS  (Male / Female) Normal Values 2D ECHO LV Diastolic Diameter PLAX        4.9 cm                4.2 - 5.9 / 3.9 - 5.3 cm LV Systolic Diameter PLAX         3.3 cm                 2.1 - 4.0 cm IVS Diastolic Thickness           1.2 cm                LVPW Diastolic Thickness          1.4 cm                RV Diameter 4C                    3.5 cm                2.5 - 2.9 cm LVOT Diameter                     2.1 cm                RA Diameter                       4 cm                  Estimated LV Ejection Fraction    65 %                  LV Ejection Fraction MOD BP       56.1 %                >= 55  % LV Ejection Fraction MOD 4C       56.9 %                LV Ejection Fraction MOD 2C       51.3 %                LA Volume Index                   26 cm3/m2             16 - 28 cm3/m2 IVC Diameter                      0.98 cm               DOPPLER AV Peak Velocity                  2.9 m/s               AV Peak Gradient                  32.6 mmHg             AV Mean Gradient                  18.1 mmHg             LVOT Peak Velocity                1.1 m/s               AV Area Cont Eq vti               1.1 cm2               Mitral E Point Velocity           0.8 m/s               Mitral E to A Ratio               0.7                   MV Pressure Half Time             30.9 ms               MV Area PHT                       7.1 cm2               MV Deceleration Time              107 ms                TR Peak Velocity                  269 cm/s              PV Peak Velocity                  1.2 m/s               PV Peak Gradient                  5.5 mmHg              * Indicates values subject to auto-interpretation LV EF:  65    % FINDINGS Left Ventricle Normal left ventricular size and systolic function. Normal regional wall motion. Mild concentric left ventricular hypertrophy. A reliable estimation of diastolic function cannot be made due to conflicting data. Right Ventricle Normal right ventricular size and systolic function. Right Atrium Enlarged right atrium. Normal inferior vena cava size and inspiratory collapse. Left Atrium Normal left atrial size. Mitral Valve Structurally normal  mitral valve without significant stenosis. Trace mitral regurgitation. Aortic Valve Known TAVR aortic valve that has a mildly increased gradients. Vmax is 3.0  m/s. Transvalvular gradients are - Peak: 36 mmHg,  Mean: 20 mmHg.  No paravaluvar leak or signficant aortic insufficiency. Tricuspid Valve Structurally normal tricuspid valve. Mild tricuspid regurgitation. Right atrial pressure is estimated to be 3 mmHg. Estimated right ventricular systolic pressure  is 35 mmHg. Pulmonic Valve Structurally normal pulmonic valve without significant stenosis or regurgitation. Pericardium Normal pericardium without effusion. Aorta The aortic root is normal.  Ascending aorta diameter is  2.7 cm. Ascending aorta not well visualized. Zoya Milton M.D. (Electronically Signed) Final Date:     2020                 17:26    Ec-echocardiogram Complete W/o Cont    Result Date: 2020  Transthoracic Echo Report Echocardiography Laboratory CONCLUSIONS Prior echo done on 2020, there is now a TAVR valve present. Left ventricular ejection fraction is visually estimated to be 60%. Unable to estimate pulmonary artery pressure due to an inadequate tricuspid regurgitant jet. Known TAVR aortic valve. FADIA LANDIS Exam Date:         2020                    03:46 Exam Location:     Inpatient Priority:          Routine Ordering Physician:        ANTHONY PIPER Referring Physician:       [id number] Cintron Sonographer:               JOJO Ash Age:    65     Gender:    Mal                           e MRN:    [id number] :    1955 BSA:    1.64   Ht (in):    66     Wt (lb):    126 Exam Type:     Complete Indications:     Prosthetic Valve ICD Codes:       V43.3 CPT Codes:       25856 BP:   142    /   83     HR:   70 Technical Quality:       Fair MEASUREMENTS  (Male / Female) Normal Values 2D ECHO LV Diastolic Diameter PLAX        6 cm                  4.2 - 5.9 / 3.9 - 5.3 cm LV Systolic Diameter PLAX          3.7 cm                2.1 - 4.0 cm IVS Diastolic Thickness           0.71 cm               LVPW Diastolic Thickness          0.7 cm                LVOT Diameter                     1.5 cm                Estimated LV Ejection Fraction    60 %                  LV Ejection Fraction MOD BP       39.5 %                >= 55  % LV Ejection Fraction MOD 4C       40.2 %                LV Ejection Fraction MOD 2C       33.9 %                IVC Diameter                      1.8 cm                DOPPLER AV Peak Velocity                  0.99 m/s              AV Peak Gradient                  3.9 mmHg              AV Mean Gradient                  2.7 mmHg              LVOT Peak Velocity                1.1 m/s               AV Area Cont Eq vti               1.9 cm2               Mitral E Point Velocity           0.63 m/s              Mitral E to A Ratio               1.5                   MV Pressure Half Time             75.3 ms               MV Area PHT                       2.9 cm2               MV Deceleration Time              260 ms                * Indicates values subject to auto-interpretation LV EF:  60    % FINDINGS Left Ventricle Left ventricle is mildly dilated. Normal left ventricular wall thickness. Normal left ventricular systolic function. Left ventricular ejection fraction is visually estimated to be 60%. Normal regional wall motion. Normal diastolic function. Right Ventricle Normal right ventricular size. Normal right ventricular systolic function. Right Atrium Normal right atrial size. Normal inferior vena cava size and inspiratory collapse. Left Atrium Normal left atrial size. Left atrial volume index is 26 mL/sq m. Mitral Valve Calcification of the mitral valve leaflets. No mitral stenosis. Trace mitral regurgitation. Aortic Valve Known TAVR aortic valve. Vmax is 1 m/s. Transvalvular gradients are - Peak: 3 mmHg, Mean: 2 mmHg. Tricuspid Valve Structurally normal tricuspid valve. No tricuspid  stenosis. Trace tricuspid regurgitation. Right atrial pressure is estimated to be 8 mmHg. Unable to estimate pulmonary artery pressure due to an inadequate tricuspid regurgitant jet. Pulmonic Valve The pulmonic valve is not well visualized. Pericardium No pericardial effusion seen. Aorta Normal aortic root for body surface area. Ascending aorta diameter is 2.4 cm. Brenden Hansen MD (Electronically Signed) Final Date:     2020                 06:41    Ec-echocardiogram Ltd W/o Cont    Result Date: 2020  Transthoracic Echo Report Echocardiography Laboratory CONCLUSIONS Intraoperative TTE during TAVR.  Baseline images show mildly reduced LV function with EF =55%.  Calcified aortic valve leaflets. Severe aortic stenosis.  Post deployment images show preserved biventricular function.  A 26 mm Jewell Jose Manuel Ultra stented bioprosthetic valve is seen in the aortic position with normal leaflet motion, trace significant paravalvular leak, mean gradent of 5 mm Hg and calculated effective orifice area of 2.5 cm2.  Findings communicated at the time of exam. FADIA LANDIS Exam Date:         2020                    11:17 Exam Location:     Inpatient Priority:          Routine Ordering Physician:        CLEOPATRA JONES Referring Physician: Sonographer:               Antwan La RDCS Age:    65     Gender:    Mal                           e MRN:    [id number] :    1955 BSA:    1.64   Ht (in):    66     Wt (lb):    126 Exam Type:     Limited Indications:     Pre-Op Surgery ICD Codes:       V72.83 CPT Codes:       83950 BP:          /          HR: Technical Quality:       Fair MEASUREMENTS  (Male / Female) Normal Values 2D ECHO Estimated LV Ejection Fraction    55 %                  * Indicates values subject to auto-interpretation LV EF:  55    % FINDINGS Left Ventricle The left ventricle was normal in size and function. Right Ventricle The right ventricle was normal in size and function. Right  Atrium The right atrium is normal in size. Left Atrium The left atrium is normal in size. Mitral Valve Structurally normal mitral valve without significant stenosis or regurgitation. Aortic Valve Calcified aortic valve leaflets. Severe aortic stenosis. Tricuspid Valve Structurally normal tricuspid valve without significant stenosis or regurgitation. Pulmonic Valve Structurally normal pulmonic valve without significant stenosis or regurgitation. Pericardium Normal pericardium without effusion. Aorta The aortic root is normal. Simon Parsons (Electronically Signed) Final Date:     23 June 2020                 16:26 Amended:        23 June 2020 16:29      Micro:  Results     Procedure Component Value Units Date/Time    SARS-CoV-2, PCR (In-House) [[id number]] Collected:  07/12/20 1415    Order Status:  Completed Updated:  07/12/20 1453     SARS-CoV-2 Source NP Swab     SARS-CoV-2 (RdRp gene) NotDetected     Comment: [facility] providers: PLEASE REFER TO DE-ESCALATION AND RETESTING PROTOCOL  on [email or web address]  **The [employer] ID Now COVID-19 Test has been made available for use under the  Emergency Use Authorization (EUA) only.         Narrative:       [id number] GÉNESIS FAWN  Pre procedure  Expected Turn around time?->Rush (Cepheid 2-4 hours)    COVID/SARS CoV-2 PCR [[id number]] Collected:  07/12/20 1415    Order Status:  Completed Specimen:  Respirate from Nasopharyngeal Updated:  07/12/20 1431     COVID Order Status Received    Narrative:       [id number] GÉNESIS FAWN  Pre procedure  Expected Turn around time?->Rush (Cepheid 2-4 hours)    BLOOD CULTURE [[id number]] Collected:  07/11/20 1100    Order Status:  Completed Specimen:  Blood from Peripheral Updated:  07/12/20 0722     Significant Indicator NEG     Source BLD     Site PERIPHERAL     Culture Result No Growth  Note: Blood cultures are incubated for 5 days and  are monitored continuously.Positive blood cultures  are called to the RN and reported as  "soon as  they are identified.      Narrative:       Protective  Per Hospital Policy: Only change Specimen Src: to \"Line\" if  specified by physician order.  Right AC    BLOOD CULTURE [[id number]] Collected:  07/11/20 1606    Order Status:  Completed Specimen:  Blood from Peripheral Updated:  07/12/20 0722     Significant Indicator NEG     Source BLD     Site PERIPHERAL     Culture Result No Growth  Note: Blood cultures are incubated for 5 days and  are monitored continuously.Positive blood cultures  are called to the RN and reported as soon as  they are identified.      Narrative:       Protective  Per Hospital Policy: Only change Specimen Src: to \"Line\" if  specified by physician order.  Right AC    CULTURE WOUND W/ GRAM STAIN [[id number]]  (Abnormal) Collected:  07/03/20 1736    Order Status:  Completed Specimen:  Wound Updated:  07/07/20 1038     Significant Indicator POS     Source WND     Site Submental Abscess     Culture Result Growth noted after further incubation, see below for  organism identification.       Gram Stain Result Moderate WBCs.  Few Gram positive cocci.       Culture Result Viridans Streptococcus  Rare growth      Narrative:       Surgery - swabs received    Anaerobic Culture [[id number]] Collected:  07/03/20 1736    Order Status:  Completed Specimen:  Wound Updated:  07/07/20 1038     Significant Indicator NEG     Source WND     Site Submental Abscess     Culture Result No Anaerobes isolated.    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 Per verbal report the results from [place] are benign.      ASSESSMENT/PLAN:      65 y.o. man admitted 6/23/2020. Pt has a past medical history of TAVR on 6/22/2020 and pancytopenia.  He has had a decreased WBC since 2016 with neutropenia.  He also has some mild anemia and thrombocytopenia that is also chronic but worsened recently.  He presented the ER complaining of tachycardia the day after he been sent home after his TAVR surgery.      Hospital Course:   Patient was admitted and was initially febrile and then again spiked a fever to 103.6 on 6/26.  Initial work-up for pancytopenia was started and plan was to complete this with hem-onc as an outpatient.  Blood cultures were obtained 6/26.  Ultrasound abdomen on 6/25 with trabeculated bladder wall suggesting possible chronic bladder outlet obstruction but otherwise unremarkable.  Ultrasound lower extremity with noted stenosis of the right common femoral artery report is known - no evidence of hematoma or pseudoaneurysm in right groin.  Chest x-rays have been unremarkable. He underwent aspiration of submental abscess on 6/29/2020.  Cultures are growing viridans Streptococcus.  Bone marrow biopsy does not show any evidence of leukemia however additional testing is being sent to [place]. S/p OR with Dr. Morrison on 7/3.      Problem List  Neutropenic fever, fevers improved  Bone marrow biopsy did not reveal any leukemia, however additional testing is being sent to [place], fevers may be related to the splenic infarcts rather than infection or potential malignancy  Neutropenia - ongoing  -Work-up initiated by hospitalist with SPEP, smooth muscle antibodies, SEEMA,  rheumatoid factor, B12 and folate levels as well as a fibrinogen.   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  Pathology report- no evidence of leukemia.  Additional testing being sent to [place], awaiting oncology review - follow-up with hem/onc as an outpatient   CMV PCR- not detected  Parvovirus PCR- not detected  Per oncology note from 7/5 labs are consistent with HIT anticoagulation with artagroban started on 7/4/2020, oncology first 3 months of therapy with anticoagulation      Lump below chin, tender, prior infection   Fracture of mid mandibular body  Submandibular abscess  Possible infected hematoma-2.9 cm collection subjacent to fracture site noted on CT scan  -Reports oral surgery at the end of April and he was on antibiotics (augmentin) due to infection for approximately 1 month.  There had resolved but now with new swelling  Status post IR aspiration on 6/29/2020.  Gram stain+ GPC, GNR.  Culture- +viridans Streptococcus  On antibiotics above  Concerned about infection causing fracture of the mid mandibular body, possible osteomyelitis.  Patient underwent surgery with Dr. Morrison on 7/3/2020.  Open reduction and internal fixation of mandibular symphysis fracture. Incision and drainage of submental abscess, chronic appearing per op note.  Bone graft to mandible.  OR cultures from 7/3-  +viridans  Streptococcus   - Repeat I&D of submandibular abscess with oral surgery on 7/12, of note does not describe purulent drainage and no cultures were taken     --- See antibiotics as above - plan to a complete a 6 week course from date of surgery and can extend if needed - (end 8/14/20)   --- F/up with oral surgeon for drain removal      Right 5th toe, pain and bruising   - Xray w/ possible fracture         Discussed with internal medicine Dr. Urena.  ID will follow.

## 2020-07-13 NOTE — PROGRESS NOTES
Destiny from Lab called with critical result of ANC 0.27 at 0702. Critical lab result read back to Destiny.   Dr. Urena notified of critical lab result at 0711.  Critical lab result read back by Dr. Urena.

## 2020-07-13 NOTE — PROGRESS NOTES
Oncology/Hematology Progress Note               Author: Chiki Orellana M.D. Date & Time created: 7/13/2020  11:47 AM     CC: Pancytopenia    Interval History:   s/p  I/D washout of submental area, afebrile, feels the same    Review of Systems:  Review of Systems   Constitutional: Positive for malaise/fatigue. Negative for chills and fever.   HENT: Negative for hearing loss and tinnitus.    Eyes: Negative for blurred vision and double vision.   Respiratory: Negative for cough.    Cardiovascular: Negative for chest pain.   Gastrointestinal: Negative for heartburn and nausea.   Genitourinary: Negative for dysuria.   Musculoskeletal: Negative for back pain, joint pain and neck pain.   Skin: Negative for rash.   Neurological: Negative for dizziness and headaches.   Endo/Heme/Allergies: Does not bruise/bleed easily.   Psychiatric/Behavioral: The patient is nervous/anxious.        Physical Exam:  Physical Exam  Constitutional:       General: He is not in acute distress.     Appearance: Normal appearance.   HENT:      Head: Normocephalic and atraumatic.      Right Ear: External ear normal.      Left Ear: External ear normal.      Nose: Nose normal.      Mouth/Throat:      Comments: Neck dressings on    Eyes:      General: No scleral icterus.     Conjunctiva/sclera: Conjunctivae normal.   Neck:      Musculoskeletal: Neck supple.   Cardiovascular:      Rate and Rhythm: Normal rate.   Pulmonary:      Effort: Pulmonary effort is normal. No respiratory distress.   Musculoskeletal:         General: No swelling.   Skin:     Coloration: Skin is not jaundiced.      Findings: Bruising (on the extremities) present. No erythema.   Neurological:      Mental Status: He is alert and oriented to person, place, and time.   Psychiatric:         Mood and Affect: Mood normal.         Behavior: Behavior normal.         Thought Content: Thought content normal.         Judgment: Judgment normal.         Labs:          Recent Labs      20   SODIUM 135 130* 131*   POTASSIUM 3.7 4.2 3.8   CHLORIDE 100 97 100   CO2 21 23 22   BUN 16 17 14   CREATININE 0.80 0.75 0.67   CALCIUM 7.7* 8.0* 8.0*     Recent Labs     20   GLUCOSE 112* 113* 109*     Recent Labs     20   RBC 2.85* 3.06* 2.77*   HEMOGLOBIN 8.1* 8.7* 7.9*   HEMATOCRIT 24.7* 27.5* 24.6*   PLATELETCT 59* 63* 63*   APTT 81.4* 66.5*  --      Recent Labs     20   WBC 1.9* 2.0* 2.2*   NEUTSPOLYS 7.00* 13.20* 12.30*   LYMPHOCYTES 70.00* 61.80* 54.40*   MONOCYTES 19.00* 19.70* 30.70*   EOSINOPHILS 0.00 0.70 1.80   BASOPHILS 3.00* 3.90* 0.90     Recent Labs     20   SODIUM 135 130* 131*   POTASSIUM 3.7 4.2 3.8   CHLORIDE 100 97 100   CO2 21 23 22   GLUCOSE 112* 113* 109*   BUN 16 17 14   CREATININE 0.80 0.75 0.67   CALCIUM 7.7* 8.0* 8.0*     Hemodynamics:  Temp (24hrs), Av.2 °C (99 °F), Min:36.7 °C (98 °F), Max:37.4 °C (99.3 °F)  Temperature: 37.4 °C (99.3 °F)  Pulse  Av.1  Min: 47  Max: 110   Blood Pressure : 138/62, NIBP: 121/55     Respiratory:    Respiration: 18, Pulse Oximetry: 98 %           Fluids:    Intake/Output Summary (Last 24 hours) at 2020 1136  Last data filed at 2020 0900  Gross per 24 hour   Intake 240 ml   Output --   Net 240 ml        GI/Nutrition:  Orders Placed This Encounter   Procedures   • Diet Order Regular (DENTAL SOFT )     Standing Status:   Standing     Number of Occurrences:   1     Order Specific Question:   Diet:     Answer:   Regular [1]     Comments:   DENTAL SOFT      Medical Decision Making, by Problem:  Active Hospital Problems    Diagnosis   • *Fever [R50.9]   • Pancytopenia (HCC) [D61.818]   • S/P TAVR (transcatheter aortic valve replacement) [Z95.2]   • Essential hypertension [I10]   • Mixed hyperlipidemia [E78.2]   • Panlobular emphysema  ([facility]) [J43.1]   • Tobacco dependence [F17.200]       Assessment and Plan:    1. Heparin induced thrombocytopenia -   --- HIT IgG antibody: Optical density: 2.9 positive, BULL also positive which confirms the diagnosis of HIT  --- anticoagulation argatroban started 7/4/2020 and continues  ---His platelets have improved since admission and while on argatroban.   -- When ready for discharge, the plan would be to switch him to Eliquis 10 mg po BID. His platelet count has stayed consistently above 50.       2.  Neutropenia/Pancytopenia  -- Wait on the final bone marrow report from [facility], still pending, spoke with pathology on Firday, they think it could be up to another week to get the report from [facility].  --  Preliminary report shows trilineage maturation without an increase in blasts.  If no malignancy, some of it could be chronic as he had low WBC few years ago. Unclear whether he has RA induced neutropenia ,possible  occult cirrhosis, though not seen well in imaging       3.  Anemia  --- His hemoglobin has been stable around 8, monitor, awaiting BM biopsy results  --- Pt has not required a transfusion since 7/3/20    4.  Neutropenic Fevers  --- Patient had I&D of this abscess under his chin on 7/4/2020.  --- ID is following  ----COVID test negative on 7/2/2020  ----Febrile again s/p washout of submental infection, currently afebrile . Cefepime Flagyl  ---  CT CAP , no new changes, splenic infarcts+, no splenomegaly , he has RA +, unlikely to be Feltys given absence of splenomegaly     High complexity/complex decision making  This patient was seen under COVID 19 pandemic disaster response conditions.  During a disaster, the provisions of care is subject to the Crisis Standard of Care    yQuality-Core Measures   Reviewed items::  Labs reviewed, Radiology images reviewed and Medications reviewed

## 2020-07-13 NOTE — PROGRESS NOTES
Received report from day shift RN. Assumed care of patient at 1900. Patient A&Ox 4. On 2L NC, no signs of respiratory distress. Patient states no pain. POC discussed and agreed upon with patient. Call light and belongings within reach. Bed in lowest locked position. Upper side rails raised. Fall risk precautions in place. All questions answered at this time. Hourly rounding in place. Patient on tele box. Will continue to monitor penrose drain.

## 2020-07-13 NOTE — PROGRESS NOTES
Instructed by pharmacy to restart argatroban drip at previously therapeutic rate of 0.892 mcg/kg/min and recheck aPTT in 24 hours from restarting.

## 2020-07-13 NOTE — PROGRESS NOTES
[facility] Medicine Daily Progress Note    Date of Service  7/13/2020    Chief Complaint  65 y.o. male admitted 6/23/2020 with tachycardia.  He had a TAVR on 6/22/2020, and was discharged home on 6/23/2020.  He returned to the ER due to persistent tachycardia.     He has a history of pancytopenia which is worsening, and has been hesitant to do a bone marrow biopsy.  He has tobacco dependence, essential hypertension, hyperlipidemia.       Hospital Course    Home Eliquis and aspirin were discontinued.  Heart rate stabilized. He continued to have decline in his hemoglobin and platelets. WBC stabilized. Briefly discussed with hem/onc.  Abdominal ultrasound from 6/25/2020 showed no acute abnormalities.  SPEP, smooth muscle antibodies, SEEMA, rheumatoid factor, B12 and folate levels, fibrinogen were ordered.  Patient wished to go home and agreed follow-up with hematology for bone marrow biopsy and further work-up.  He was discharged to go home on 6/26/2020, but developed a fever of 103.6.     Infectious disease were consulted on 6/27/2020 due to fever and neutropenia, Cefepime was empirically started.     B12, folate, and fibrinogen levels were normal.  Rheumatoid factor was 40, ESR was 45, LFTs were unremarkable, SEEMA was undetectable, HIV was nonreactive.      Interval Problem Update      pancytopenia persists    S/p washout yesterday of submental area(7/12)    Chin pain, dull , intensity 3/10      S/p initial orif mandible on 7/3    Asymptomatic bradycardia        Consultants/Specialty  ID dr beauchamp  Oncology dr mccray    Code Status  full    Disposition  home    Review of Systems  Review of Systems   Constitutional: Negative for chills, fever, malaise/fatigue and weight loss.   HENT: Negative for ear discharge, hearing loss and tinnitus.    Eyes: Negative for blurred vision, double vision and photophobia.   Respiratory: Negative for cough and sputum production.    Cardiovascular: Negative for palpitations, orthopnea and  claudication.   Gastrointestinal: Negative for abdominal pain, constipation, diarrhea, heartburn, nausea and vomiting.   Genitourinary: Negative for frequency and urgency.   Musculoskeletal: Positive for joint pain and myalgias. Negative for back pain and neck pain.   Neurological: Negative for dizziness, tremors, focal weakness and headaches.   Endo/Heme/Allergies: Does not bruise/bleed easily.   Psychiatric/Behavioral: Negative for depression and substance abuse.        Physical Exam  Temp:  [36.7 °C (98 °F)-37.4 °C (99.3 °F)] 37.4 °C (99.3 °F)  Pulse:  [47-62] 47  Resp:  [16-35] 18  BP: (122-151)/(49-86) 138/62  SpO2:  [91 %-100 %] 98 %    Physical Exam  Constitutional:       Appearance: Normal appearance. He is not diaphoretic.   HENT:      Head:      Comments: Submandibular swelling/induration and tenderness- MINIMAL         Mouth/Throat:      Mouth: Mucous membranes are moist.   Eyes:      General: No scleral icterus.     Extraocular Movements: Extraocular movements intact.      Pupils: Pupils are equal, round, and reactive to light.   Neck:      Musculoskeletal: Normal range of motion and neck supple.   Cardiovascular:      Rate and Rhythm: Normal rate.      Heart sounds: No murmur. No gallop.    Pulmonary:      Effort: Pulmonary effort is normal. No respiratory distress.      Breath sounds: No stridor. No wheezing.   Chest:      Chest wall: No tenderness.   Abdominal:      General: Abdomen is flat. There is no distension.      Tenderness: There is no guarding.      Hernia: No hernia is present.   Musculoskeletal: Normal range of motion.         General: Deformity (right fifth toe) and signs of injury present. No swelling or tenderness.      Right lower leg: No edema.   Skin:     General: Skin is warm.      Capillary Refill: Capillary refill takes 2 to 3 seconds.      Coloration: Skin is not jaundiced or pale.      Findings: No bruising or rash.   Neurological:      General: No focal deficit present.       Mental Status: He is alert and oriented to person, place, and time.      Cranial Nerves: No cranial nerve deficit.      Deep Tendon Reflexes: Reflexes normal.   Psychiatric:         Mood and Affect: Mood normal.         Fluids    Intake/Output Summary (Last 24 hours) at 7/13/2020 1029  Last data filed at 7/13/2020 0800  Gross per 24 hour   Intake 1400 ml   Output 645 ml   Net 755 ml       Laboratory  Recent Labs     07/11/20  0349 07/12/20  0001 07/13/20  0437   WBC 1.9* 2.0* 2.2*   RBC 2.85* 3.06* 2.77*   HEMOGLOBIN 8.1* 8.7* 7.9*   HEMATOCRIT 24.7* 27.5* 24.6*   MCV 86.7 89.9 88.8   MCH 28.4 28.4 28.5   MCHC 32.8* 31.6* 32.1*   RDW 47.8 51.0* 51.8*   PLATELETCT 59* 63* 63*   MPV 12.4  --  12.4     Recent Labs     07/11/20 0349 07/12/20  0001 07/13/20 0437   SODIUM 135 130* 131*   POTASSIUM 3.7 4.2 3.8   CHLORIDE 100 97 100   CO2 21 23 22   GLUCOSE 112* 113* 109*   BUN 16 17 14   CREATININE 0.80 0.75 0.67   CALCIUM 7.7* 8.0* 8.0*     Recent Labs     07/11/20  0349 07/12/20  0001   APTT 81.4* 66.5*               Imaging  CT-CHEST,ABDOMEN,PELVIS WITH   Final Result      1.  No evidence of bowel obstruction or focal inflammatory change.      2.  Emphysematous and bullous change of the lungs with areas of pulmonary atelectasis or consolidation most prominently seen within the left lung base.      3.  Again seen appearance of the spleen highly suggestive of splenic infarcts.      4.  Prior aortic valvular replacement.      5.  After cirrhotic change of aorta with infrarenal ectasia.      6.  Small right inguinal hernia which contains bowel.      CT-SOFT TISSUE NECK WITH   Final Result      1.  Fluid collection anterior/inferior to the mandible containing gas bubbles measuring 4.1 x 0.8 x 0.9 cm. This could be postoperative fluid or abscess      2.  Erosive change of the anterior aspect and left maxilla suspicious for chronic osteomyelitis      3.  There is a molar within the left maxillary sinus      4.  Left  maxillary sinus mucosal thickening      5.  Aortic and branch vessel atherosclerotic plaque      6.  Emphysema      DX-FOOT-2- RIGHT   Final Result         Irregular appearance of the base of the fifth proximal phalanx and fracture is possible.      US-EXTREMITY VENOUS LOWER BILAT   Final Result      EC-ECHOCARDIOGRAM COMPLETE W/O CONT   Final Result      IR-CYST ASPIRATION-MISC   Final Result         ULTRASOUND GUIDED ASPIRATION OF SUBMANDIBULAR ABSCESS..      CT-SOFT TISSUE NECK WITH   Final Result         1. Acute versus subacute fracture of the mid mandibular body.   2. A 2.9 cm collection subjacent to the fracture site, likely hematoma. Infected hematoma can be considered in the appropriate clinical settings.   3. No cervical lymphadenopathy.   4. Moderate mucosal thickening of the left maxillary sinus, likely odontogenic in nature, related to impacted remaining maxillary tooth.      CT-CHEST,ABDOMEN,PELVIS WITH   Final Result      Hypodensities in the spleen worrisome for splenic infarcts.      Trace nonspecific free fluid in the pelvis.      2.2 x 1.7 cm left common femoral artery pseudoaneurysm.      Bowel containing right inguinal hernia without evidence of obstruction.      Atherosclerotic plaque within an ectatic aorta.      Gallbladder wall thickening/pericholecystic fluid. Further evaluation can be performed with right upper quadrant ultrasound.      Small hypodense left renal lesions are too small to characterize but likely represent small cysts.      Emphysematous changes.      Irregular nodular opacity along the minor fissure measuring 8 mm.      4.7 mm right middle lobe pulmonary nodule.      Mild bibasilar and lingular atelectasis.      Emphysematous changes.      Sequelae of prior granulomatous exposure.      Right hilar lymph node is borderline enlarged and nonspecific, possibly reactive..      Enlarged periportal lymph nodes are nonspecific.      Fleischner Society pulmonary nodule  recommendations:   Low Risk: CT at 3-6 months, then consider CT at 18-24 months      High Risk: CT at 3-6 months, then at 18-24 months      Comments: Use most suspicious nodule as guide to management. Follow-up intervals may vary according to size and risk.      Low Risk - Minimal or absent history of smoking and of other known risk factors.      High Risk - History of smoking or of other known risk factors.      Note: These recommendations do not apply to lung cancer screening, patients with immunosuppression, or patients with known primary cancer.      Fleischner Society 2017 Guidelines for Management of Incidentally Detected Pulmonary Nodules in Adults      DX-CHEST-2 VIEWS   Final Result      No acute cardiopulmonary abnormality.      US-EXTREMITY ARTERY LOWER UNILAT RIGHT   Final Result      US-ABDOMEN COMPLETE SURVEY   Final Result      Prominent trabeculated bladder wall, suggesting possible chronic bladder outlet obstruction. Unremarkable findings otherwise.         DX-CHEST-PORTABLE (1 VIEW)   Final Result      No acute cardiac or pulmonary abnormalities are identified.           Assessment/Plan  * Fever- (present on admission)  Assessment & Plan  abx as per iD    Follow cbc    Follow all cultures    Last repeated on 7/11        Mandibular abscess- (present on admission)  Assessment & Plan  Growing strep viridans    Oral surgery dr vasquez s/p ORIF    Washout  on 7/12    Pancytopenia (HCC)- (present on admission)  Assessment & Plan  Present for a few months, unclear etiology    abx as per id    Hematology/oncology were consulted 6/28/2020  Bone marrow biopsy - results reviewed by me- does not state anything conclusive      Transfuse plt on 7/3- to prepare for surgery on mandible    S/P TAVR (transcatheter aortic valve replacement)- (present on admission)  Assessment & Plan  S/P TAVR on 6/22/2020   . Continue statin  Per cardiology start B12 and folate supplements  Echocardiogram shows no  vegetations  Cardiology is following    Mixed hyperlipidemia- (present on admission)  Assessment & Plan  Continue home Lovastatin    Essential hypertension- (present on admission)  Assessment & Plan    Continue Norvasc    Added lisinopril 10mg po daily 7/7    Atenolol dose decreased due to bradycardia    Bradycardia  Assessment & Plan  Asymptomatic    Decreased dose of atenolol on 7/13    Acute osteomyelitis of mandible- (present on admission)  Assessment & Plan  abx as per ID    Washout on 7/12.    Toe fracture, right  Assessment & Plan  Splint    Pain control    HIT (heparin-induced thrombocytopenia) (HCC)  Assessment & Plan  huseyin positive    on agratraban on  7/4    Will transition to po eliquis prior to discharge    Hypokalemia  Assessment & Plan  Resolved     Panlobular emphysema (HCC)- (present on admission)  Assessment & Plan  History of tobacco use  Chest x-ray from admission shows no acute cardiopulmonary process    Tobacco dependence- (present on admission)  Assessment & Plan  Patient has been counseled on tobacco cessation  Nicotine replacement options were provided       VTE prophylaxis: scd      Check am cbc, BMP

## 2020-07-13 NOTE — CARE PLAN
Problem: Safety  Goal: Will remain free from falls  Note: Patient encouraged to use call light when assistance needed. Room close to nursing station. Hourly rounding in place.      Problem: Knowledge Deficit  Goal: Knowledge of disease process/condition, treatment plan, diagnostic tests, and medications will improve  Note: Patient updated on plan of care. Understanding of interventions and disease process assessed and gaps in knowledge addressed.

## 2020-07-13 NOTE — PROGRESS NOTES
Keesha from Lab called with critical result of WBC 2.2 and preliminary ANC 0.32 at 0538. Critical lab result read back to Keesha.   Dr. Ellington notified of critical lab result at 0542.  Critical lab result read back by Dr. Ellington.

## 2020-07-13 NOTE — ANESTHESIA QCDR
2019 [facility] Clinical Data Registry (for Quality Improvement)     Postoperative nausea/vomiting risk protocol (Adult = 18 yrs and Pediatric 3-17 yrs)- (430 and 463)  General inhalation anesthetic (NOT TIVA) with PONV risk factors: Yes  Provision of anti-emetic therapy with at least 2 different classes of agents: Yes   Patient DID NOT receive anti-emetic therapy and reason is documented in Medical Record:  N/A    Multimodal Pain Management- (477)  Non-emergent surgery AND patient age >= 18: No  Use of Multimodal Pain Management, two or more drugs and/or interventions, NOT including systemic opioids:   Exception: Documented allergy to multiple classes of analgesics:     Smoking Abstinence (404)  Patient is current smoker (cigarette, pipe, e-cig, marijuanna): No  Elective Surgery:   Abstinence instructions provided prior to day of surgery:   Patient abstained from smoking on day of surgery:     Pre-Op Beta-Blocker in Isolated CABG (44)  Isolated CABG AND patient age >= 18: No  Beta-blocker admin within 24 hours of surgical incision:   Exception:of medical reason(s) for not administering beta blocker within 24 hours prior to surgical incision (e.g., not  indicated,other medical reason):     PACU assessment of acute postoperative pain prior to Anesthesia Care End- Applies to Patients Age = 18- (ABG7)  Initial PACU pain score is which of the following: < 7/10  Patient unable to report pain score: N/A    Post-anesthetic transfer of care checklist/protocol to PACU/ICU- (426 and 427)  Upon conclusion of case, patient transferred to which of the following locations: PACU/Non-ICU  Use of transfer checklist/protocol: Yes  Exclusion: Service Performed in Patient Hospital Room (and thus did not require transfer): N/A  Unplanned admission to ICU related to anesthesia service up through end of PACU care- (MD51)  Unplanned admission to ICU (not initially anticipated at anesthesia start time): No

## 2020-07-13 NOTE — OP REPORT
DATE OF SERVICE:  07/12/2020    SERVICE:  Oral maxillofacial surgery.    PRIMARY SURGEON:  Brenden Morrison DDS, MD    PREOPERATIVE DIAGNOSES:  1.  Mandible fracture.  2.  Submental infection.    POSTOPERATIVE DIAGNOSES:  1.  Mandible fracture.  2.  Submental infection.    PROCEDURE PERFORMED:  Incision and drainage extraorally of submental abscess.    ANESTHESIA:  General oral endotracheal.    FLUIDS:  See anesthesia.    BLOOD LOSS:  Minimal.    COMPLICATIONS:  None.    SPECIMENS:  None.    HISTORY OF PRESENT ILLNESS:  The patient is a gentleman who is roughly 10 days   status post ORIF mandible fracture and I and D submental abscess.  Patient   still admitted in the hospital with neutropenic fever.  Full workup revealed   fluid collection in the submental region.  Decision was made to proceed with   repeat washout and incision and drainage of the area.  Risks, benefits,   alternatives were discussed.  Consent was obtained in preparation of the   operating room.    DESCRIPTION OF PROCEDURE:  Patient was taken to the OR and placed in supine   position, where he remained for the entire procedure.  Patient was placed   under general anesthesia via oral endotracheal intubation.  Endotracheal tube   was secured.  Patient was prepped and draped in normal sterile fashion.  The   infection was approached extraorally in the submental region.  A 15 blade was   used for skin incision.  Dissection was carried out to the inferior border of   the mandible.  Patient's mandibular hardware was identified.  The area was   copiously irrigated with normal saline.  A quarter-inch Penrose was placed in   the submental region.  This was secured with 3-0 silk.  Loose closure with 3-0   chromic gut suture.  The previous intraoral incision was resecured with a 3-0   silk.  Adequate closure was obtained.  Wound was grossly hemostatic.  Care   was turned over to the anesthesia service.  Patient was extubated in the   operating room without  difficulty with plan to return to the floor.       ____________________________________     PETER Parker    DD:  07/12/2020 17:11:57  DT:  07/12/2020 19:09:00    D#:  [id number]  Job#:  [id number]

## 2020-07-13 NOTE — PROGRESS NOTES
POST OP NOTE     Dx: Submental infection - post op ORIF mandible fx     Procedure: I+D/washout submental infection     Plan:     Procedure went well   1/4 inch penrose secured with 3.0 silk - can remain for at least 1 week   Okay for dental soft diet   Submental drip pad PRN oozing.     Call [phone] with questions

## 2020-07-14 LAB
ANION GAP SERPL CALC-SCNC: 10 MMOL/L (ref 7–16)
ANISOCYTOSIS BLD QL SMEAR: ABNORMAL
BASOPHILS # BLD AUTO: 1.7 % (ref 0–1.8)
BASOPHILS # BLD: 0.04 K/UL (ref 0–0.12)
BUN SERPL-MCNC: 16 MG/DL (ref 8–22)
CALCIUM SERPL-MCNC: 8.1 MG/DL (ref 8.5–10.5)
CHLORIDE SERPL-SCNC: 100 MMOL/L (ref 96–112)
CO2 SERPL-SCNC: 23 MMOL/L (ref 20–33)
CREAT SERPL-MCNC: 0.77 MG/DL (ref 0.5–1.4)
EOSINOPHIL # BLD AUTO: 0.12 K/UL (ref 0–0.51)
EOSINOPHIL NFR BLD: 5.2 % (ref 0–6.9)
ERYTHROCYTE [DISTWIDTH] IN BLOOD BY AUTOMATED COUNT: 50.4 FL (ref 35.9–50)
GLUCOSE SERPL-MCNC: 95 MG/DL (ref 65–99)
HCT VFR BLD AUTO: 26.4 % (ref 42–52)
HGB BLD-MCNC: 8.4 G/DL (ref 14–18)
HYPOCHROMIA BLD QL SMEAR: ABNORMAL
LYMPHOCYTES # BLD AUTO: 1.18 K/UL (ref 1–4.8)
LYMPHOCYTES NFR BLD: 51.3 % (ref 22–41)
MACROCYTES BLD QL SMEAR: ABNORMAL
MANUAL DIFF BLD: NORMAL
MCH RBC QN AUTO: 28.1 PG (ref 27–33)
MCHC RBC AUTO-ENTMCNC: 31.8 G/DL (ref 33.7–35.3)
MCV RBC AUTO: 88.3 FL (ref 81.4–97.8)
METAMYELOCYTES NFR BLD MANUAL: 0.9 %
MICROCYTES BLD QL SMEAR: ABNORMAL
MONOCYTES # BLD AUTO: 0.64 K/UL (ref 0–0.85)
MONOCYTES NFR BLD AUTO: 27.8 % (ref 0–13.4)
MORPHOLOGY BLD-IMP: NORMAL
NEUTROPHILS # BLD AUTO: 0.3 K/UL (ref 1.82–7.42)
NEUTROPHILS NFR BLD: 13 % (ref 44–72)
NRBC # BLD AUTO: 0 K/UL
NRBC BLD-RTO: 0 /100 WBC
OVALOCYTES BLD QL SMEAR: NORMAL
PLATELET # BLD AUTO: 66 K/UL (ref 164–446)
PLATELET BLD QL SMEAR: NORMAL
POIKILOCYTOSIS BLD QL SMEAR: NORMAL
POLYCHROMASIA BLD QL SMEAR: NORMAL
POTASSIUM SERPL-SCNC: 4.4 MMOL/L (ref 3.6–5.5)
RBC # BLD AUTO: 2.99 M/UL (ref 4.7–6.1)
RBC BLD AUTO: PRESENT
SODIUM SERPL-SCNC: 133 MMOL/L (ref 135–145)
WBC # BLD AUTO: 2.3 K/UL (ref 4.8–10.8)

## 2020-07-14 PROCEDURE — A9270 NON-COVERED ITEM OR SERVICE: HCPCS | Performed by: INTERNAL MEDICINE

## 2020-07-14 PROCEDURE — 99232 SBSQ HOSP IP/OBS MODERATE 35: CPT | Performed by: HOSPITALIST

## 2020-07-14 PROCEDURE — 36415 COLL VENOUS BLD VENIPUNCTURE: CPT

## 2020-07-14 PROCEDURE — 770020 HCHG ROOM/CARE - TELE (206)

## 2020-07-14 PROCEDURE — 700102 HCHG RX REV CODE 250 W/ 637 OVERRIDE(OP): Performed by: INTERNAL MEDICINE

## 2020-07-14 PROCEDURE — 85027 COMPLETE CBC AUTOMATED: CPT

## 2020-07-14 PROCEDURE — 700102 HCHG RX REV CODE 250 W/ 637 OVERRIDE(OP): Performed by: HOSPITALIST

## 2020-07-14 PROCEDURE — 700102 HCHG RX REV CODE 250 W/ 637 OVERRIDE(OP): Performed by: ORAL & MAXILLOFACIAL SURGERY

## 2020-07-14 PROCEDURE — A9270 NON-COVERED ITEM OR SERVICE: HCPCS | Performed by: ORAL & MAXILLOFACIAL SURGERY

## 2020-07-14 PROCEDURE — 85007 BL SMEAR W/DIFF WBC COUNT: CPT

## 2020-07-14 PROCEDURE — A9270 NON-COVERED ITEM OR SERVICE: HCPCS | Performed by: HOSPITALIST

## 2020-07-14 PROCEDURE — 80048 BASIC METABOLIC PNL TOTAL CA: CPT

## 2020-07-14 RX ADMIN — METRONIDAZOLE 500 MG: 500 TABLET ORAL at 21:32

## 2020-07-14 RX ADMIN — CHLORHEXIDINE GLUCONATE 0.12% ORAL RINSE 15 ML: 1.2 LIQUID ORAL at 13:35

## 2020-07-14 RX ADMIN — METRONIDAZOLE 500 MG: 500 TABLET ORAL at 06:43

## 2020-07-14 RX ADMIN — HYDROCODONE BITARTRATE AND ACETAMINOPHEN 1 TABLET: 5; 325 TABLET ORAL at 17:35

## 2020-07-14 RX ADMIN — LEVOFLOXACIN 750 MG: 750 TABLET, FILM COATED ORAL at 06:42

## 2020-07-14 RX ADMIN — LISINOPRIL 10 MG: 10 TABLET ORAL at 06:41

## 2020-07-14 RX ADMIN — ATENOLOL 12.5 MG: 25 TABLET ORAL at 06:41

## 2020-07-14 RX ADMIN — CHLORHEXIDINE GLUCONATE 0.12% ORAL RINSE 15 ML: 1.2 LIQUID ORAL at 20:25

## 2020-07-14 RX ADMIN — LOVASTATIN 20 MG: 20 TABLET ORAL at 20:25

## 2020-07-14 RX ADMIN — METRONIDAZOLE 500 MG: 500 TABLET ORAL at 13:35

## 2020-07-14 RX ADMIN — APIXABAN 10 MG: 5 TABLET, FILM COATED ORAL at 17:36

## 2020-07-14 RX ADMIN — APIXABAN 10 MG: 5 TABLET, FILM COATED ORAL at 06:45

## 2020-07-14 RX ADMIN — CHLORHEXIDINE GLUCONATE 0.12% ORAL RINSE 15 ML: 1.2 LIQUID ORAL at 17:35

## 2020-07-14 RX ADMIN — CHLORHEXIDINE GLUCONATE 0.12% ORAL RINSE 15 ML: 1.2 LIQUID ORAL at 09:36

## 2020-07-14 ASSESSMENT — ENCOUNTER SYMPTOMS
TREMORS: 0
FOCAL WEAKNESS: 0
HEARTBURN: 0
DIZZINESS: 0
DIARRHEA: 0
NAUSEA: 0
BRUISES/BLEEDS EASILY: 0
MYALGIAS: 1
HEADACHES: 0
CHILLS: 0
CLAUDICATION: 0
DOUBLE VISION: 0
BACK PAIN: 0
WEIGHT LOSS: 0
NERVOUS/ANXIOUS: 1
COUGH: 0
ORTHOPNEA: 0
NECK PAIN: 0
PALPITATIONS: 0
PHOTOPHOBIA: 0
ABDOMINAL PAIN: 0
DEPRESSION: 0
CONSTIPATION: 0
VOMITING: 0
FEVER: 0
SPUTUM PRODUCTION: 0
BLURRED VISION: 0

## 2020-07-14 ASSESSMENT — FIBROSIS 4 INDEX: FIB4 SCORE: 9.76

## 2020-07-14 ASSESSMENT — LIFESTYLE VARIABLES: SUBSTANCE_ABUSE: 0

## 2020-07-14 NOTE — DISCHARGE PLANNING
Anticipated Discharge Disposition: Home w/ no needs    Action: Patient was discussed in IDT Rounds.  Possible dc home tomorrow as long as he remains afebrile and stable on po abx and po anti-coag meds.  Per MD, patient to f/u w/ hematology as an OP.  Pt. Was on Eliquis prior to admission.  No need for prior auth or concern for cost of coverage.      Barriers to Discharge: none    Plan: Patient will dc tomorrow.  RN CM remains available if any dc needs arise.

## 2020-07-14 NOTE — CARE PLAN
Problem: Safety  Goal: Will remain free from injury  Outcome: PROGRESSING AS EXPECTED     Pt educated on safety precautions and precautions in place. Treaded socks on, bed locked and in lowest position, belongings and call light within reach.      Problem: Knowledge Deficit  Goal: Knowledge of disease process/condition, treatment plan, diagnostic tests, and medications will improve  Outcome: PROGRESSING AS EXPECTED     Patient educated on disease process, treatment plan, medications, and plan of care for today. Patient verbalized understanding and no additional questions at this time.

## 2020-07-14 NOTE — CARE PLAN
Problem: Communication  Goal: The ability to communicate needs accurately and effectively will improve  Outcome: PROGRESSING AS EXPECTED     Problem: Infection  Goal: Will remain free from infection  Outcome: PROGRESSING AS EXPECTED     Problem: Bowel/Gastric:  Goal: Normal bowel function is maintained or improved  Outcome: PROGRESSING AS EXPECTED     Problem: Mobility  Goal: Risk for activity intolerance will decrease  Outcome: PROGRESSING AS EXPECTED

## 2020-07-14 NOTE — PROGRESS NOTES
[facility] Medicine Daily Progress Note    Date of Service  7/14/2020    Chief Complaint  65 y.o. male admitted 6/23/2020 with tachycardia.  He had a TAVR on 6/22/2020, and was discharged home on 6/23/2020.  He returned to the ER due to persistent tachycardia.     He has a history of pancytopenia which is worsening, and has been hesitant to do a bone marrow biopsy.  He has tobacco dependence, essential hypertension, hyperlipidemia.       Hospital Course    Home Eliquis and aspirin were discontinued.  Heart rate stabilized. He continued to have decline in his hemoglobin and platelets. WBC stabilized. Briefly discussed with hem/onc.  Abdominal ultrasound from 6/25/2020 showed no acute abnormalities.  SPEP, smooth muscle antibodies, SEEMA, rheumatoid factor, B12 and folate levels, fibrinogen were ordered.  Patient wished to go home and agreed follow-up with hematology for bone marrow biopsy and further work-up.  He was discharged to go home on 6/26/2020, but developed a fever of 103.6.     Infectious disease were consulted on 6/27/2020 due to fever and neutropenia, Cefepime was empirically started.     B12, folate, and fibrinogen levels were normal.  Rheumatoid factor was 40, ESR was 45, LFTs were unremarkable, SEEMA was undetectable, HIV was nonreactive.      Interval Problem Update      pancytopenia  But improved    tmax 99.3    S/p washout of submental area(7/12)    Chin pain, dull , intensity 2/10      S/p initial orif mandible on 7/3     bradycardia improved    On po abx    Po eliquis        Consultants/Specialty  ID dr beauchamp  Oncology dr mccray    Code Status  full    Disposition  home    Review of Systems  Review of Systems   Constitutional: Negative for chills, fever, malaise/fatigue and weight loss.   HENT: Negative for ear discharge, hearing loss and tinnitus.    Eyes: Negative for blurred vision, double vision and photophobia.   Respiratory: Negative for cough and sputum production.    Cardiovascular: Negative  for palpitations, orthopnea and claudication.   Gastrointestinal: Negative for abdominal pain, constipation, diarrhea, heartburn, nausea and vomiting.   Genitourinary: Negative for frequency and urgency.   Musculoskeletal: Positive for joint pain and myalgias. Negative for back pain and neck pain.   Neurological: Negative for dizziness, tremors, focal weakness and headaches.   Endo/Heme/Allergies: Does not bruise/bleed easily.   Psychiatric/Behavioral: Negative for depression and substance abuse.        Physical Exam  Temp:  [36.8 °C (98.2 °F)-37.3 °C (99.2 °F)] 37.3 °C (99.2 °F)  Pulse:  [59-64] 60  Resp:  [16-18] 18  BP: ()/(52-99) 143/66  SpO2:  [90 %-93 %] 90 %    Physical Exam  Constitutional:       Appearance: Normal appearance. He is not diaphoretic.   HENT:      Head:      Comments: Submandibular swelling/induration and tenderness- MINIMAL         Mouth/Throat:      Mouth: Mucous membranes are moist.   Eyes:      General: No scleral icterus.     Extraocular Movements: Extraocular movements intact.      Pupils: Pupils are equal, round, and reactive to light.   Neck:      Musculoskeletal: Normal range of motion and neck supple.   Cardiovascular:      Rate and Rhythm: Normal rate.      Heart sounds: No murmur. No gallop.    Pulmonary:      Effort: Pulmonary effort is normal. No respiratory distress.      Breath sounds: No stridor. No wheezing.   Chest:      Chest wall: No tenderness.   Abdominal:      General: Abdomen is flat. There is no distension.      Tenderness: There is no guarding.      Hernia: No hernia is present.   Musculoskeletal: Normal range of motion.         General: Deformity (right fifth toe) and signs of injury present. No swelling or tenderness.      Right lower leg: No edema.   Skin:     General: Skin is warm.      Capillary Refill: Capillary refill takes 2 to 3 seconds.      Coloration: Skin is not jaundiced or pale.      Findings: No bruising or rash.   Neurological:      General: No  focal deficit present.      Mental Status: He is alert and oriented to person, place, and time.      Cranial Nerves: No cranial nerve deficit.      Deep Tendon Reflexes: Reflexes normal.   Psychiatric:         Mood and Affect: Mood normal.         Fluids    Intake/Output Summary (Last 24 hours) at 7/14/2020 0953  Last data filed at 7/14/2020 0800  Gross per 24 hour   Intake 980 ml   Output 850 ml   Net 130 ml       Laboratory  Recent Labs     07/12/20  0001 07/13/20 0437 07/14/20 0413   WBC 2.0* 2.2* 2.3*   RBC 3.06* 2.77* 2.99*   HEMOGLOBIN 8.7* 7.9* 8.4*   HEMATOCRIT 27.5* 24.6* 26.4*   MCV 89.9 88.8 88.3   MCH 28.4 28.5 28.1   MCHC 31.6* 32.1* 31.8*   RDW 51.0* 51.8* 50.4*   PLATELETCT 63* 63* 66*   MPV  --  12.4  --      Recent Labs     07/12/20  0001 07/13/20 0437 07/14/20 0413   SODIUM 130* 131* 133*   POTASSIUM 4.2 3.8 4.4   CHLORIDE 97 100 100   CO2 23 22 23   GLUCOSE 113* 109* 95   BUN 17 14 16   CREATININE 0.75 0.67 0.77   CALCIUM 8.0* 8.0* 8.1*     Recent Labs     07/12/20  0001   APTT 66.5*               Imaging  CT-CHEST,ABDOMEN,PELVIS WITH   Final Result      1.  No evidence of bowel obstruction or focal inflammatory change.      2.  Emphysematous and bullous change of the lungs with areas of pulmonary atelectasis or consolidation most prominently seen within the left lung base.      3.  Again seen appearance of the spleen highly suggestive of splenic infarcts.      4.  Prior aortic valvular replacement.      5.  After cirrhotic change of aorta with infrarenal ectasia.      6.  Small right inguinal hernia which contains bowel.      CT-SOFT TISSUE NECK WITH   Final Result      1.  Fluid collection anterior/inferior to the mandible containing gas bubbles measuring 4.1 x 0.8 x 0.9 cm. This could be postoperative fluid or abscess      2.  Erosive change of the anterior aspect and left maxilla suspicious for chronic osteomyelitis      3.  There is a molar within the left maxillary sinus      4.  Left  maxillary sinus mucosal thickening      5.  Aortic and branch vessel atherosclerotic plaque      6.  Emphysema      DX-FOOT-2- RIGHT   Final Result         Irregular appearance of the base of the fifth proximal phalanx and fracture is possible.      US-EXTREMITY VENOUS LOWER BILAT   Final Result      EC-ECHOCARDIOGRAM COMPLETE W/O CONT   Final Result      IR-CYST ASPIRATION-MISC   Final Result         ULTRASOUND GUIDED ASPIRATION OF SUBMANDIBULAR ABSCESS..      CT-SOFT TISSUE NECK WITH   Final Result         1. Acute versus subacute fracture of the mid mandibular body.   2. A 2.9 cm collection subjacent to the fracture site, likely hematoma. Infected hematoma can be considered in the appropriate clinical settings.   3. No cervical lymphadenopathy.   4. Moderate mucosal thickening of the left maxillary sinus, likely odontogenic in nature, related to impacted remaining maxillary tooth.      CT-CHEST,ABDOMEN,PELVIS WITH   Final Result      Hypodensities in the spleen worrisome for splenic infarcts.      Trace nonspecific free fluid in the pelvis.      2.2 x 1.7 cm left common femoral artery pseudoaneurysm.      Bowel containing right inguinal hernia without evidence of obstruction.      Atherosclerotic plaque within an ectatic aorta.      Gallbladder wall thickening/pericholecystic fluid. Further evaluation can be performed with right upper quadrant ultrasound.      Small hypodense left renal lesions are too small to characterize but likely represent small cysts.      Emphysematous changes.      Irregular nodular opacity along the minor fissure measuring 8 mm.      4.7 mm right middle lobe pulmonary nodule.      Mild bibasilar and lingular atelectasis.      Emphysematous changes.      Sequelae of prior granulomatous exposure.      Right hilar lymph node is borderline enlarged and nonspecific, possibly reactive..      Enlarged periportal lymph nodes are nonspecific.      Fleischner Society pulmonary nodule  recommendations:   Low Risk: CT at 3-6 months, then consider CT at 18-24 months      High Risk: CT at 3-6 months, then at 18-24 months      Comments: Use most suspicious nodule as guide to management. Follow-up intervals may vary according to size and risk.      Low Risk - Minimal or absent history of smoking and of other known risk factors.      High Risk - History of smoking or of other known risk factors.      Note: These recommendations do not apply to lung cancer screening, patients with immunosuppression, or patients with known primary cancer.      Fleischner Society 2017 Guidelines for Management of Incidentally Detected Pulmonary Nodules in Adults      DX-CHEST-2 VIEWS   Final Result      No acute cardiopulmonary abnormality.      US-EXTREMITY ARTERY LOWER UNILAT RIGHT   Final Result      US-ABDOMEN COMPLETE SURVEY   Final Result      Prominent trabeculated bladder wall, suggesting possible chronic bladder outlet obstruction. Unremarkable findings otherwise.         DX-CHEST-PORTABLE (1 VIEW)   Final Result      No acute cardiac or pulmonary abnormalities are identified.           Assessment/Plan  * Fever- (present on admission)  Assessment & Plan  Last blood Cultures NGTD from 7/11        Mandibular abscess- (present on admission)  Assessment & Plan  Growing strep viridans    Oral surgery dr vasquez s/p ORIF    Washout  on 7/12    Pancytopenia (HCC)- (present on admission)  Assessment & Plan  Present for a few months, unclear etiology    abx as per id    Hematology/oncology were consulted 6/28/2020  Bone marrow biopsy - results reviewed by me- does not state anything conclusive      Transfuse plt on 7/3- to prepare for surgery on mandible    S/P TAVR (transcatheter aortic valve replacement)- (present on admission)  Assessment & Plan  S/P TAVR on 6/22/2020   . Continue statin  Per cardiology start B12 and folate supplements  Echocardiogram shows no vegetations  Cardiology is following    Mixed hyperlipidemia-  (present on admission)  Assessment & Plan  Continue home Lovastatin    Essential hypertension- (present on admission)  Assessment & Plan    Continue Norvasc    Added lisinopril 10mg po daily 7/7    Atenolol dose decreased due to bradycardia    Bradycardia  Assessment & Plan  Asymptomatic    Decreased dose of atenolol on 7/13    Acute osteomyelitis of mandible- (present on admission)  Assessment & Plan  abx as per ID    Washout on 7/12.    Toe fracture, right  Assessment & Plan  Splint    Pain control    HIT (heparin-induced thrombocytopenia) (HCC)  Assessment & Plan  huseyin positive    on agratraban  Stopped on 7/13    Po eliquis      Hypokalemia  Assessment & Plan  Resolved     Panlobular emphysema (HCC)- (present on admission)  Assessment & Plan  History of tobacco use  Chest x-ray from admission shows no acute cardiopulmonary process    Tobacco dependence- (present on admission)  Assessment & Plan  Patient has been counseled on tobacco cessation  Nicotine replacement options were provided       VTE prophylaxis: scd      Check am cbc,

## 2020-07-14 NOTE — PROGRESS NOTES
Received bedside report from RN, pt care assumed, VSS, pt assessment complete. Pt AAOx4,  c/o 5/10 pain at this time. No signs of acute distress noted at this time. POC discussed with pt and verbalizes no questions. Pt denies any additional needs at this time. Bed in lowest position, bed alarm on, pt educated on fall risk and verbalized understanding, call light within reach, hourly rounding initiated.

## 2020-07-14 NOTE — PROGRESS NOTES
Oncology/Hematology Progress Note               Author: Chiki Orellana M.D. Date & Time created: 7/14/2020  12:16 PM     CC: Pancytopenia    Interval History:   s/p  I/D washout of submental area 7/12/, on PO Abx, feels better    Review of Systems:  Review of Systems   Constitutional: Positive for malaise/fatigue. Negative for chills and fever.   HENT: Negative for hearing loss and tinnitus.    Eyes: Negative for blurred vision and double vision.   Respiratory: Negative for cough.    Cardiovascular: Negative for chest pain.   Gastrointestinal: Negative for heartburn and nausea.   Genitourinary: Negative for dysuria.   Musculoskeletal: Negative for back pain, joint pain and neck pain.   Skin: Negative for rash.   Neurological: Negative for dizziness and headaches.   Endo/Heme/Allergies: Does not bruise/bleed easily.   Psychiatric/Behavioral: The patient is nervous/anxious.        Physical Exam:  Physical Exam  Constitutional:       General: He is not in acute distress.     Appearance: Normal appearance.   HENT:      Head: Normocephalic and atraumatic.      Right Ear: External ear normal.      Left Ear: External ear normal.      Nose: Nose normal.      Mouth/Throat:      Comments: Neck dressings on    Eyes:      General: No scleral icterus.     Conjunctiva/sclera: Conjunctivae normal.   Neck:      Musculoskeletal: Neck supple.   Cardiovascular:      Rate and Rhythm: Normal rate.   Pulmonary:      Effort: Pulmonary effort is normal. No respiratory distress.   Musculoskeletal:         General: No swelling.   Skin:     Coloration: Skin is not jaundiced.      Findings: Bruising (on the extremities) present. No erythema.   Neurological:      Mental Status: He is alert and oriented to person, place, and time.   Psychiatric:         Mood and Affect: Mood normal.         Behavior: Behavior normal.         Thought Content: Thought content normal.         Judgment: Judgment normal.         Labs:          Recent Labs      20  0001 20   SODIUM 130* 131* 133*   POTASSIUM 4.2 3.8 4.4   CHLORIDE 97 100 100   CO2 23 22 23   BUN 17 14 16   CREATININE 0.75 0.67 0.77   CALCIUM 8.0* 8.0* 8.1*     Recent Labs     20  0001 20   GLUCOSE 113* 109* 95     Recent Labs     20   RBC 3.06* 2.77* 2.99*   HEMOGLOBIN 8.7* 7.9* 8.4*   HEMATOCRIT 27.5* 24.6* 26.4*   PLATELETCT 63* 63* 66*   APTT 66.5*  --   --      Recent Labs     20   WBC 2.0* 2.2* 2.3*   NEUTSPOLYS 13.20* 12.30* 13.00*   LYMPHOCYTES 61.80* 54.40* 51.30*   MONOCYTES 19.70* 30.70* 27.80*   EOSINOPHILS 0.70 1.80 5.20   BASOPHILS 3.90* 0.90 1.70     Recent Labs     20   SODIUM 130* 131* 133*   POTASSIUM 4.2 3.8 4.4   CHLORIDE 97 100 100   CO2 23 22 23   GLUCOSE 113* 109* 95   BUN 17 14 16   CREATININE 0.75 0.67 0.77   CALCIUM 8.0* 8.0* 8.1*     Hemodynamics:  Temp (24hrs), Av.1 °C (98.7 °F), Min:36.8 °C (98.2 °F), Max:37.3 °C (99.2 °F)  Temperature: 37.2 °C (99 °F)  Pulse  Av.6  Min: 47  Max: 110   Blood Pressure : 143/66     Respiratory:    Respiration: 18, Pulse Oximetry: 94 %        RUL Breath Sounds: Clear, RML Breath Sounds: Diminished, RLL Breath Sounds: Diminished, DESIRE Breath Sounds: Clear, LLL Breath Sounds: Diminished  Fluids:    Intake/Output Summary (Last 24 hours) at 2020 1136  Last data filed at 2020 0900  Gross per 24 hour   Intake 240 ml   Output --   Net 240 ml     Weight: 63.9 kg (140 lb 14 oz)  GI/Nutrition:  Orders Placed This Encounter   Procedures   • Diet Order Regular (DENTAL SOFT )     Standing Status:   Standing     Number of Occurrences:   1     Order Specific Question:   Diet:     Answer:   Regular [1]     Comments:   DENTAL SOFT      Medical Decision Making, by Problem:  Active Hospital Problems    Diagnosis   • *Fever [R50.9]   • Pancytopenia (HCC) [D61.818]    • S/P TAVR (transcatheter aortic valve replacement) [Z95.2]   • Essential hypertension [I10]   • Mixed hyperlipidemia [E78.2]   • Panlobular emphysema (HCC) [J43.1]   • Tobacco dependence [F17.200]       Assessment and Plan:    1. Heparin induced thrombocytopenia -   --- HIT IgG antibody: Optical density: 2.9 positive, BULL also positive which confirms the diagnosis of HIT  --- anticoagulation argatroban started 7/4/2020 and continues  ---His platelets have improved since admission and while on argatroban.   -- When ready for discharge, the plan would be to switch him to Eliquis 10 mg po BID. His platelet count has stayed consistently above 50.       2.  Neutropenia/Pancytopenia  -- Wait on the final bone marrow report from [facility], still pending, spoke with pathology on Firday, they think it could be up to another week to get the report from [facility].  --  Preliminary report shows trilineage maturation without an increase in blasts.  If no malignancy, some of it could be chronic as he had low WBC few years ago. Unclear whether he has RA induced neutropenia or other etiologies     3.  Anemia  --- His hemoglobin has been stable around 8, monitor  --- Pt has not required a transfusion since 7/3/20    4.  Neutropenic Fevers  --- Patient had I&D of this abscess under his chin on 7/4/2020.  --- ID is following  ----COVID test negative on 7/2/2020  ----Febrile again s/p washout of submental infection, currently afebrile . Cefepime Flagyl switched to PO Abx   ---  CT CAP , no new changes, splenic infarcts+, no splenomegaly , he has RA +, unlikely to be Feltys given absence of splenomegaly .    No new recommendation.  If patient is discharged, please make appointment with  next week to follow-up on his CBC and discuss bone marrow biopsy final results.  He can be switched to Eliquis for anticoagulation.    High complexity/complex decision making  This patient was seen under COVID 19 pandemic disaster response  conditions.  During a disaster, the provisions of care is subject to the Crisis Standard of Care    yQuality-Core Measures   Reviewed items::  Labs reviewed, Radiology images reviewed and Medications reviewed

## 2020-07-15 VITALS
DIASTOLIC BLOOD PRESSURE: 70 MMHG | RESPIRATION RATE: 18 BRPM | SYSTOLIC BLOOD PRESSURE: 137 MMHG | HEART RATE: 53 BPM | WEIGHT: 130.73 LBS | BODY MASS INDEX: 21.01 KG/M2 | OXYGEN SATURATION: 93 % | HEIGHT: 66 IN | TEMPERATURE: 97.5 F

## 2020-07-15 PROBLEM — M27.2 MANDIBULAR ABSCESS: Status: RESOLVED | Noted: 2020-07-01 | Resolved: 2020-07-15

## 2020-07-15 PROBLEM — R50.9 FEVER: Status: RESOLVED | Noted: 2020-06-27 | Resolved: 2020-07-15

## 2020-07-15 PROBLEM — D75.829 HIT (HEPARIN-INDUCED THROMBOCYTOPENIA) (HCC): Status: RESOLVED | Noted: 2020-07-04 | Resolved: 2020-07-15

## 2020-07-15 PROBLEM — E87.6 HYPOKALEMIA: Status: RESOLVED | Noted: 2020-07-02 | Resolved: 2020-07-15

## 2020-07-15 LAB
ANISOCYTOSIS BLD QL SMEAR: ABNORMAL
BASOPHILS # BLD AUTO: 0 % (ref 0–1.8)
BASOPHILS # BLD: 0 K/UL (ref 0–0.12)
EOSINOPHIL # BLD AUTO: 0.07 K/UL (ref 0–0.51)
EOSINOPHIL NFR BLD: 3 % (ref 0–6.9)
ERYTHROCYTE [DISTWIDTH] IN BLOOD BY AUTOMATED COUNT: 51.3 FL (ref 35.9–50)
HCT VFR BLD AUTO: 25.9 % (ref 42–52)
HGB BLD-MCNC: 8.3 G/DL (ref 14–18)
HYPOCHROMIA BLD QL SMEAR: ABNORMAL
LYMPHOCYTES # BLD AUTO: 1.28 K/UL (ref 1–4.8)
LYMPHOCYTES NFR BLD: 58 % (ref 22–41)
MACROCYTES BLD QL SMEAR: ABNORMAL
MANUAL DIFF BLD: NORMAL
MCH RBC QN AUTO: 28.4 PG (ref 27–33)
MCHC RBC AUTO-ENTMCNC: 32 G/DL (ref 33.7–35.3)
MCV RBC AUTO: 88.7 FL (ref 81.4–97.8)
MICROCYTES BLD QL SMEAR: ABNORMAL
MONOCYTES # BLD AUTO: 0.57 K/UL (ref 0–0.85)
MONOCYTES NFR BLD AUTO: 26 % (ref 0–13.4)
MORPHOLOGY BLD-IMP: NORMAL
NEUTROPHILS # BLD AUTO: 0.29 K/UL (ref 1.82–7.42)
NEUTROPHILS NFR BLD: 13 % (ref 44–72)
NRBC # BLD AUTO: 0 K/UL
NRBC BLD-RTO: 0 /100 WBC
OVALOCYTES BLD QL SMEAR: NORMAL
PLATELET # BLD AUTO: 81 K/UL (ref 164–446)
PLATELET BLD QL SMEAR: NORMAL
PMV BLD AUTO: 12.2 FL (ref 9–12.9)
POIKILOCYTOSIS BLD QL SMEAR: NORMAL
POLYCHROMASIA BLD QL SMEAR: NORMAL
RBC # BLD AUTO: 2.92 M/UL (ref 4.7–6.1)
RBC BLD AUTO: PRESENT
WBC # BLD AUTO: 2.2 K/UL (ref 4.8–10.8)

## 2020-07-15 PROCEDURE — A9270 NON-COVERED ITEM OR SERVICE: HCPCS | Performed by: HOSPITALIST

## 2020-07-15 PROCEDURE — 36415 COLL VENOUS BLD VENIPUNCTURE: CPT

## 2020-07-15 PROCEDURE — 700102 HCHG RX REV CODE 250 W/ 637 OVERRIDE(OP): Performed by: HOSPITALIST

## 2020-07-15 PROCEDURE — 99239 HOSP IP/OBS DSCHRG MGMT >30: CPT | Performed by: HOSPITALIST

## 2020-07-15 PROCEDURE — 700102 HCHG RX REV CODE 250 W/ 637 OVERRIDE(OP): Performed by: INTERNAL MEDICINE

## 2020-07-15 PROCEDURE — A9270 NON-COVERED ITEM OR SERVICE: HCPCS | Performed by: INTERNAL MEDICINE

## 2020-07-15 PROCEDURE — 85027 COMPLETE CBC AUTOMATED: CPT

## 2020-07-15 PROCEDURE — 85007 BL SMEAR W/DIFF WBC COUNT: CPT

## 2020-07-15 RX ORDER — LEVOFLOXACIN 750 MG/1
750 TABLET, FILM COATED ORAL DAILY
Qty: 30 TAB | Refills: 0 | Status: SHIPPED | OUTPATIENT
Start: 2020-07-16 | End: 2020-10-19

## 2020-07-15 RX ORDER — CHLORHEXIDINE GLUCONATE ORAL RINSE 1.2 MG/ML
15 SOLUTION DENTAL 4 TIMES DAILY
Qty: 300 ML | Refills: 0 | Status: SHIPPED | OUTPATIENT
Start: 2020-07-15 | End: 2020-07-20

## 2020-07-15 RX ORDER — LISINOPRIL 10 MG/1
10 TABLET ORAL DAILY
Qty: 30 TAB | Refills: 3 | Status: SHIPPED | OUTPATIENT
Start: 2020-07-16 | End: 2020-07-29

## 2020-07-15 RX ORDER — METRONIDAZOLE 500 MG/1
500 TABLET ORAL EVERY 8 HOURS
Qty: 90 TAB | Refills: 0 | Status: SHIPPED | OUTPATIENT
Start: 2020-07-15 | End: 2020-08-14

## 2020-07-15 RX ORDER — HYDROCODONE BITARTRATE AND ACETAMINOPHEN 5; 325 MG/1; MG/1
1 TABLET ORAL EVERY 4 HOURS PRN
Qty: 20 TAB | Refills: 0 | Status: SHIPPED | OUTPATIENT
Start: 2020-07-15 | End: 2020-07-22

## 2020-07-15 RX ADMIN — APIXABAN 10 MG: 5 TABLET, FILM COATED ORAL at 05:19

## 2020-07-15 RX ADMIN — LISINOPRIL 10 MG: 10 TABLET ORAL at 05:18

## 2020-07-15 RX ADMIN — HYDROCODONE BITARTRATE AND ACETAMINOPHEN 1 TABLET: 5; 325 TABLET ORAL at 03:41

## 2020-07-15 RX ADMIN — METRONIDAZOLE 500 MG: 500 TABLET ORAL at 05:19

## 2020-07-15 RX ADMIN — LEVOFLOXACIN 750 MG: 750 TABLET, FILM COATED ORAL at 05:19

## 2020-07-15 RX ADMIN — ATENOLOL 12.5 MG: 25 TABLET ORAL at 05:17

## 2020-07-15 ASSESSMENT — COGNITIVE AND FUNCTIONAL STATUS - GENERAL
SUGGESTED CMS G CODE MODIFIER MOBILITY: CJ
CLIMB 3 TO 5 STEPS WITH RAILING: A LITTLE
SUGGESTED CMS G CODE MODIFIER DAILY ACTIVITY: CH
DAILY ACTIVITIY SCORE: 24
MOBILITY SCORE: 21
STANDING UP FROM CHAIR USING ARMS: A LITTLE
WALKING IN HOSPITAL ROOM: A LITTLE

## 2020-07-15 NOTE — PROGRESS NOTES
Patient reviewed and understood discharge instructions. IV and telemetry discontinued. Penrose drain left in place per MD. Follow up appointments made. Patient discharged to [facility] to await  from wife.

## 2020-07-15 NOTE — PROGRESS NOTES
Discussed patient with Dr. Urena.  He remains afebrile and appears to be improving from a clinical standpoint.  He still has critically low neutrophils and overall pancytopenia.      --- Follow-up plan from last ID note for antibiotics x 6 weeks   --- Follow-up with dentist for exam and drain removal  --- Gave instructions to hospitalist on proper fluoroquinolones including avoiding multivitamin and monitoring for tendinopathy.  He will discussed with patient and if any concerns patient to call ID clinic.   --- Follow-up with hematology/oncology    -Okay for discharge from ID perspective    ID will sign off.       Ruthie Avitia MD  Infectious Diseases

## 2020-07-15 NOTE — CARE PLAN
Problem: Communication  Goal: The ability to communicate needs accurately and effectively will improve  Outcome: PROGRESSING AS EXPECTED     Problem: Safety  Goal: Will remain free from injury  Outcome: PROGRESSING AS EXPECTED  Goal: Will remain free from falls  Outcome: PROGRESSING AS EXPECTED     Problem: Infection  Goal: Will remain free from infection  Outcome: PROGRESSING AS EXPECTED     Problem: Venous Thromboembolism (VTW)/Deep Vein Thrombosis (DVT) Prevention:  Goal: Patient will participate in Venous Thrombosis (VTE)/Deep Vein Thrombosis (DVT)Prevention Measures  Outcome: PROGRESSING AS EXPECTED     Problem: Pain Management  Goal: Pain level will decrease to patient's comfort goal  Outcome: PROGRESSING AS EXPECTED

## 2020-07-15 NOTE — DISCHARGE SUMMARY
Discharge Summary    CHIEF COMPLAINT ON ADMISSION  Chief Complaint   Patient presents with   • Rapid Heart Beat     pt presented to follow-up appt today following valve replacement surgery yesterday; pt on arrival had heart rate was 170, pt advised by MD to present to ED. pt states he was non symptomatic during episodes.    • Sent by MD       Reason for Admission  irregular heart rate     Admission Date  6/23/2020    CODE STATUS  Full Code    HPI & HOSPITAL COURSE  This is a 65 y.o. male here with recent heart valve replacement, presents emergency department with tachycardia.  Patient was found to be pancytopenic.  During his hospital stay patient was having intermittent fevers.  Patient was seen by ID MD who put the patient on broad-spectrum antibiotics for neutropenic fever.  Initial cause of the fevers was unclear until we noted that the patient had had dental surgery of a month and a half prior.  CAT scan showed a fractured mandible and a fluid collection close to the mandible.  We consulted Dr. Morrison of oral surgery who repaired the mandible and did a washout of the area.  A second washout was necessary during his hospital stay .see operative report below    Patient was seen by oncology due to his pancytopenia.  Bone marrow was sent to [facility].  From my reading does not appear to have any evidence of malignancy.  His wound culture from the jaw surgery grew out Streptococcus viridans.  Patient did improve and was afebrile for greater than 48 hours prior to discharge.  Leukopenia did improve his thrombocytopenia did improve.  Patient was HIT positive based on  laboratory data AND  was given IV agrataban during his hospital stay , that was transitioned to p.o. Eliquis upon discharge.  Scans of the jaw area shows findings consistent with osteomyelitis of the mandible the patient sent home with long-term antibiotics he has 4 more weeks of p.o. Levaquin and flagyl    He was sent with the Penrose drain in his draw  still in place and he will follow-up with Dr. Morrison within next 48 hours    During his hospital stay he apparently stubbed his toe against the bed and he sustained a right fifth toe fracture that was treated conservatively with splinting the toe with the adjacent toe    Therefore, he is discharged in good and stable condition to home with close outpatient follow-up.    The patient recovered much more quickly than anticipated on admission.    Discharge Date  7/16    FOLLOW UP ITEMS POST DISCHARGE      DISCHARGE DIAGNOSES  Principal Problem (Resolved):    Fever POA: Yes  Active Problems:    Pancytopenia (HCC) POA: Yes    Essential hypertension POA: Yes    Mixed hyperlipidemia POA: Yes    S/P TAVR (transcatheter aortic valve replacement) POA: Yes      Overview: Echo 2019    Toe fracture, right POA: No    Acute osteomyelitis of mandible POA: Yes    Bradycardia POA: No    Tobacco dependence POA: Yes    Panlobular emphysema (HCC) POA: Yes  Resolved Problems:    Tachycardia POA: Yes    Mandibular abscess POA: Yes    Hypokalemia POA: No    HIT (heparin-induced thrombocytopenia) (HCC) POA: Clinically Undetermined      FOLLOW UP  Future Appointments   Date Time Provider Department Center   7/16/2020  9:00 AM Thelma Sanchez A.P.N. [facility] None   10/19/2020  7:00 AM Thelma Sanchez A.P.N. [facility] None     Brenden Morrison D.D.S.  [address]  [place] [address] [phone]    Schedule an appointment as soon as possible for a visit in 1 week      Ruthie Avitia M.D.  [address]  [place] [id number] [phone]    Call  As needed    Thelma Sanchez A.P.N.  [address]  [place] [id number] [phone]    Schedule an appointment as soon as possible for a visit in 1 week      Denise Cabello M.D.  [address] [place] [id number] [phone]    Schedule an appointment as soon as possible for a visit        MEDICATIONS ON DISCHARGE     Medication List      START  taking these medications      Instructions   chlorhexidine 0.12 % Soln  Commonly known as:  PERIDEX   Take 15 mL by mouth 4 times a day for 5 days.  Dose:  15 mL     HYDROcodone-acetaminophen 5-325 MG Tabs per tablet  Commonly known as:  NORCO   Take 1 Tab by mouth every four hours as needed for up to 7 days.  Dose:  1 Tab     levoFLOXacin 750 MG tablet  Start taking on:  July 16, 2020  Commonly known as:  LEVAQUIN   Take 1 Tab by mouth every day.  Dose:  750 mg     lisinopril 10 MG Tabs  Start taking on:  July 16, 2020  Commonly known as:  PRINIVIL   Take 1 Tab by mouth every day.  Dose:  10 mg     metroNIDAZOLE 500 MG Tabs  Commonly known as:  FLAGYL   Take 1 Tab by mouth every 8 hours for 30 days.  Dose:  500 mg        CHANGE how you take these medications      Instructions   apixaban 5mg Tabs  Start taking on:  July 20, 2020  What changed:  These instructions start on July 20, 2020. If you are unsure what to do until then, ask your doctor or other care provider.  Commonly known as:  ELIQUIS   Take 1 Tab by mouth 2 Times a Day. Indications: DVT/PE  Dose:  5 mg        CONTINUE taking these medications      Instructions   amLODIPine 5 MG Tabs  Commonly known as:  NORVASC   Doctor's comments:  Stopping losartan  Take 1 Tab by mouth every day. At night  Dose:  5 mg     lovastatin 20 MG Tabs  Commonly known as:  MEVACOR   Take 1 Tab by mouth every day.  Dose:  20 mg        STOP taking these medications    aspirin 81 MG tablet            Allergies  Allergies   Allergen Reactions   • Heparin      Heparin antibody positive 7/4/2020  BULL postive 7/8/2020   • Seasonal Cough and Runny Nose       DIET  Orders Placed This Encounter   Procedures   • Diet Order Regular (DENTAL SOFT )     Standing Status:   Standing     Number of Occurrences:   1     Order Specific Question:   Diet:     Answer:   Regular [1]     Comments:   DENTAL SOFT        ACTIVITY  As tolerated.  Weight bearing as tolerated    CONSULTATIONS  Dr christina Major  SURGERY    DR LEVINE HEM/ON    DR KARAN tafoya    PROCEDURES     SERVICE:  Oral maxillofacial surgery.     PRIMARY SURGEON:  Brenden Morrison DDS, MD     PREOPERATIVE DIAGNOSES:  1.  Mandible fracture.  2.  Submental infection.     POSTOPERATIVE DIAGNOSES:  1.  Mandible fracture.  2.  Submental infection.     PROCEDURE PERFORMED:  Incision and drainage extraorally of submental abscess.     ANESTHESIA:  General oral endotracheal.     FLUIDS:  See anesthesia.     BLOOD LOSS:  Minimal.     COMPLICATIONS:  None.     SPECIMENS:  None.     HISTORY OF PRESENT ILLNESS:  The patient is a gentleman who is roughly 10 days   status post ORIF mandible fracture and I and D submental abscess.  Patient   still admitted in the hospital with neutropenic fever.  Full workup revealed   fluid collection in the submental region.  Decision was made to proceed with   repeat washout and incision and drainage of the area.  Risks, benefits,   alternatives were discussed.  Consent was obtained in preparation of the   operating room.     DESCRIPTION OF PROCEDURE:  Patient was taken to the OR and placed in supine   position, where he remained for the entire procedure.  Patient was placed   under general anesthesia via oral endotracheal intubation.  Endotracheal tube   was secured.  Patient was prepped and draped in normal sterile fashion.  The   infection was approached extraorally in the submental region.  A 15 blade was   used for skin incision.  Dissection was carried out to the inferior border of   the mandible.  Patient's mandibular hardware was identified.  The area was   copiously irrigated with normal saline.  A quarter-inch Penrose was placed in   the submental region.  This was secured with 3-0 silk.  Loose closure with 3-0   chromic gut suture.  The previous intraoral incision was resecured with a 3-0   silk.  Adequate closure was obtained.  Wound was grossly hemostatic.  Care   was turned over to the anesthesia  service.  Patient was extubated in the   operating room without difficulty with plan to return to the floor.        ____________________________________     Brenden Morrison DDS    --------------------------------------------------------------------------       SERVICE:  Oral and maxillofacial surgery.     PRIMARY SURGEON:  Brenden Morrison DDS, MD     ASSISTANT:  None.     PREOPERATIVE DIAGNOSES:  1.  Mandibular symphysis fracture.  2.  Submental abscess.     POSTOPERATIVE DIAGNOSES:  1.  Mandibular symphysis fracture.  2.  Submental abscess.     PROCEDURES PERFORMED:  1.  Open reduction and internal fixation of mandibular symphysis fracture.  2.  Incision and drainage of submental abscess.  3.  Bone graft to mandible.     ANESTHESIA:  General, oral endotracheal.     FLUIDS:  See anesthesia.     BLOOD LOSS:  Approximately 50 mL.     COMPLICATIONS:  None.     SPECIMENS:  Purulent fluid from the submental region was sent for cultures and   sensitivity, anaerobic, aerobic and Gram stain.     IMPLANTS:  [place] [employer] was used for mandibular plating.     HISTORY OF PRESENT ILLNESS:  The patient has been admitted to [facility] with a   possible hematoma versus submental infection in the anterior mandible.  CT   scan revealed fracture of the mandible.  The patient had dental extractions   and a large cyst removal performed as an outpatient a couple of months prior.    The patient appears to have a pathologic fracture to that site causing a   secondary infection.  Risks, benefits and alternatives were discussed with the   family, planned for incision and drainage of the abscess achieving mandibular   fixation and stability with plating and bone grafting the mandibular defect.    Risks, benefits and alternatives were discussed.  Consent was obtained in   preparation for the operating room.     DESCRIPTION OF PROCEDURE:  The patient was taken to the OR and placed in   supine position, where he remained for the entire  procedure.  The patient was   placed under general anesthesia via oral endotracheal intubation.    Endotracheal tube was secured.  The patient was prepped and draped in normal   sterile fashion.  The mandible was approached from an extraoral incision in   the submental region in the area of his already draining submental fistula.  A   #15 blade was used for skin incision.  Dissection was carried out to the   inferior border of the mandible.  Full thickness mucoperiosteal flaps were   reflected.  The fracture was identified in the mandibular symphysis region.    Fracture was cleaned out and debrided.  The fractured segments were mobilized   and reduced.  A 1.5 miniplate was placed along the inferior border for   temporary stabilization.  A 2.5 locking reconstruction plate was placed across   the mandibular symphysis.  Three screws on either side of the fracture; 3.3   mm screws were used.  Adequate fixation was obtained.  The inferior border   plate was removed.  Again, debridement and irrigation of the fractured   segment; 4 mL of allograft putty was used for bone grafting the mandibular   defect.  There is approximately a 2 to 3 cm defect of the anterior mandible   area of the previous cyst appears to have expanded from a chronic infection.    Adequate bone graft was placed.  Purulent fluid was sent for cultures.    Multilayer closure was performed from the extraoral approach, Vicryl for deep   sutures, superficial with chromic gut.  A quarter-inch Penrose was placed,   allow for drainage.  Intraoral closure with chromic gut suture.  There was a   small exposure previously existing fistula in the area of the mandibular cyst.    This was adequately closed.  Care was then turned over to the anesthesia   service.  The patient was extubated in the operating room without difficulty   with plan to return to the floor for IV antibiotics and close observation.        ____________________________________     Brenden  PETER Morrison     CG / NTS     DD:  07/07/2020 08:16:54  DT:  07/07/2020 09:12:33     D#:  [id number]  Job#:  [id number]     CG / N[facility]  LABORATORY    CT-CHEST,ABDOMEN,PELVIS WITH   Order: [id number]   Status:  Final result   Visible to patient:  No (not released) Next appt:  10/19/2020 at 07:00 AM [facility] ([facility])   Details     Reading Physician  Reading Date  Result Priority    Fabrizio Veloz M.D.  [phone]  7/11/2020        Narrative & Impression         7/11/2020 4:46 PM     HISTORY/REASON FOR EXAM:  Sepsis and fever.     TECHNIQUE/EXAM DESCRIPTION:  CT scan of the chest, abdomen and pelvis with contrast.     Thin-section helical scanning was obtained with intravenous contrast from the lung apices through the pubic symphysis to include the chest, abdomen and pelvis. 100 mL of Omnipaque 350 nonionic contrast was administered intravenously without complication.     Low dose optimization technique was utilized for this CT exam including automated exposure control and adjustment of the mA and/or kV according to patient size.     COMPARISON: 6/28/2020     FINDINGS:     CT CHEST:  There is biapical pulmonary scarring. There is emphysematous and bullous change of the lungs. There is a calcified granuloma within the right upper lobe inferiorly. There are areas of atelectasis or scarring within the lung bases bilaterally, left   greater than right.  There is no evidence of mediastinal or hilar adenopathy.  There is no evidence of pericardial effusion.  There is moderate atherosclerotic change of the aorta or coronary vessels.  There has been prior aortic valvular replacement.        CT ABDOMEN:  The liver is normal.  There is again seen abnormal appearance of the spleen characterized by areas of linear low-attenuation as well as peripheral wedge-shaped low-attenuation possibly representing splenic infarcts.  There is a small simple appearing left upper pole renal cyst. No follow-up recommended.  The  adrenal glands are normal.  The pancreas is normal.  There is atherosclerotic change of the aorta with infrarenal ectasia. Maximum aortic dimension is measured at 2.6 cm in diameter.        CT PELVIS:  There is a small right inguinal hernia which contains bowel.  There is no evidence of bowel obstruction or inflammatory change.  There is no evidence of free fluid.              IMPRESSION:     1.  No evidence of bowel obstruction or focal inflammatory change.     2.  Emphysematous and bullous change of the lungs with areas of pulmonary atelectasis or consolidation most prominently seen within the left lung base.     3.  Again seen appearance of the spleen highly suggestive of splenic infarcts.     4.  Prior aortic valvular replacement.     5.  After cirrhotic change of aorta with infrarenal ectasia.     6.  Small right inguinal hernia which contains bowel         -----------------------------------------------------------------      Narrative & Impression         7/11/2020 4:46 PM     HISTORY/REASON FOR EXAM:  Neck mass, pulsatile (Age > 15y); s/p mandible surgery.. recurrent fever. assess for abnormal fluid collection.        TECHNIQUE/EXAM DESCRIPTION AND NUMBER OF VIEWS:  CT soft tissue neck with contrast.     The study was performed on a helical multidetector CT scanner. Contiguous thin section helical images were obtained of the neck from the skull base through the thoracic inlet.     100 mL of Omnipaque 350 nonionic contrast was injected intravenously.     Low dose optimization technique was utilized for this CT exam including automated exposure control and adjustment of the mA and/or kV according to patient size.     COMPARISON: CT neck 6/28/2020     FINDINGS:     There is hardware across midline mandibular fracture. There is adjacent soft tissue air consistent with recent surgery.     There is atherosclerotic plaque of the aortic arch and branch vessels.     There is soft tissue swelling anterior to the left  maxilla. There are erosive changes of the left maxilla suggesting chronic osteomyelitis.     There is a fluid collection anterior/inferior to the mandible containing gas bubbles. It measures 4.1 x 0.8 cm transversely and 9 mm craniocaudal. This could be postoperative fluid collection or abscess.  BRAIN: Visualized portions of the brain are normal in appearance.     TEMPORAL bones: The mastoid air cells and middle ear are clear.     PARANASAL SINUSES: There is left maxillary sinus mucosal thickening. There appears to be a molar within the left maxillary sinus.     CERVICAL spine: There is no significant cervical spine abnormality.     NODES: No adenopathy within the neck.     SALIVARY and THYROID gland: The parotid, submandibular, and thyroid gland are normal in appearance.     AIRWAY The airway appears patent.     MEDIASTINUM: The superior mediastinum is normal in appearance.     LUNGS: There is emphysema which appears moderate to severe in degree.     IMPRESSION:     1.  Fluid collection anterior/inferior to the mandible containing gas bubbles measuring 4.1 x 0.8 x 0.9 cm. This could be postoperative fluid or abscess     2.  Erosive change of the anterior aspect and left maxilla suspicious for chronic osteomyelitis     3.  There is a molar within the left maxillary sinus     4.  Left maxillary sinus mucosal thickening     5.  Aortic and branch vessel atherosclerotic plaque     6.  Emphysema             Last Resulted: 07/11/20  5:25 PM  Order Details View Encounter Lab and Collection Details Routing Result History             CT-SOFT TISSUE NECK WITH [[id number]]     Electronically signed by: Uriel Urena M.D. on 07/11/20 1542         Lab Results   Component Value Date    SODIUM 133 (L) 07/14/2020    POTASSIUM 4.4 07/14/2020    CHLORIDE 100 07/14/2020    CO2 23 07/14/2020    GLUCOSE 95 07/14/2020    BUN 16 07/14/2020    CREATININE 0.77 07/14/2020        Lab Results   Component Value Date    WBC 2.2 (LL)  07/15/2020    HEMOGLOBIN 8.3 (L) 07/15/2020    HEMATOCRIT 25.9 (L) 07/15/2020    PLATELETCT 81 (L) 07/15/2020        Total time of the discharge process exceeds 42minutes.

## 2020-07-15 NOTE — DISCHARGE INSTRUCTIONS
Discharge Instructions per Uriel Urena M.D.            DIET: regular    ACTIVITY: as tolerated    DIAGNOSIS: Jaw abscess   Pancytopenia    F/u dr vasquez(oral surgery) this week    F/u with ID dr [email or web address] Infectious disease- if you develop tendon pain or diarrhea or fever recurs    Do NOT take antibiotics with multi vitamins            Discharge Instructions    Discharged to home by car with relative. Discharged via wheelchair, hospital escort: Yes.  Special equipment needed: Not Applicable    Be sure to schedule a follow-up appointment with your primary care doctor or any specialists as instructed.     Discharge Plan:   Smoking Cessation Offered: Patient Refused    I understand that a diet low in cholesterol, fat, and sodium is recommended for good health. Unless I have been given specific instructions below for another diet, I accept this instruction as my diet prescription.   Other diet: Heart Healthy    Special Instructions: Please follow-up with your primary care physician on Monday, June 29, 2020.  Labs will need to be monitored closely. Hematology appointment needs to be made as soon as possible.    Please let your primary care physician know that hematology labs were ordered and the results have to be followed up upon.    If you note any bleeding or increased bruising please come to the emergency room immediately.    Follow-up with cardiology within 1 week per cardiology recommendation.      · Is patient discharged on Warfarin / Coumadin?   No       Hypokalemia  Hypokalemia means a low potassium level in the blood. Symptoms may include muscle weakness and cramping, fatigue, abdominal pain, vomiting, constipation, or irregularities of the heartbeat. Sometimes hypokalemia is discovered by your caregiver if you are taking certain medicines for high blood pressure or kidney disease.   Potassium is an electrolyte that helps regulate the amount of fluid in the body. It also stimulates muscle contraction and  maintains a stable acid-base balance. If potassium levels go too low or too high, your health may be in danger. You are at risk for developing shock, heart, and lung problems. Hypokalemia can occur if you have excessive diarrhea, vomiting, or sweating. Potassium can be lost through your kidneys in the urine. Certain common medicines can also cause potassium loss, especially water pills (diuretics). The same is possible with cortisone medications or certain types of antibiotics. Low potassium can be dangerous if you are taking certain heart medicines. In diabetes, your potassium may fall after you take insulin, especially if your diabetes had been out of control for a while. In rare cases, potassium may be low because you are not getting enough in your diet.   In adults, a potassium level below 3.5 mEq/L is usually considered low.  Hypokalemia can be treated with potassium supplements taken by mouth and a diet that is high in potassium. Foods with high potassium content are:  · Peas, lentils, lima beans, nuts, and dried fruit.   · Whole grain and bran cereals and breads.   · Fresh fruit and vegetables. Examples include:   · Bananas.   · Cantaloupe.   · Grapefruit.   · Oranges.   · Tomatoes.   · Honeydew melons.   · Potatoes.   · Peaches.   · Orange and tomato juices.   · Meats.   See your caregiver as instructed for a follow-up blood test to be sure your potassium is back to normal.  SEEK MEDICAL CARE IF:   · You have nausea, vomiting, constipation, or abdominal pain.   · You have palpitations or irregular heartbeats, chest pain or shortness of breath.   · You have muscle cramps or weakness or fatigue.   · You have lethargy.   SEEK IMMEDIATE MEDICAL CARE IF:   · You have paralysis.   · You have confusion or other mental status changes.   Document Released: 12/18/2006 Document Revised: 03/11/2013 Document Reviewed: 04/13/2011  [employer]® Patient Information ©2013 [employer].        Depression / Suicide Risk    As  you are discharged from this [facility] facility, it is important to learn how to keep safe from harming yourself.    Recognize the warning signs:  · Abrupt changes in personality, positive or negative- including increase in energy   · Giving away possessions  · Change in eating patterns- significant weight changes-  positive or negative  · Change in sleeping patterns- unable to sleep or sleeping all the time   · Unwillingness or inability to communicate  · Depression  · Unusual sadness, discouragement and loneliness  · Talk of wanting to die  · Neglect of personal appearance   · Rebelliousness- reckless behavior  · Withdrawal from people/activities they love  · Confusion- inability to concentrate     If you or a loved one observes any of these behaviors or has concerns about self-harm, here's what you can do:  · Talk about it- your feelings and reasons for harming yourself  · Remove any means that you might use to hurt yourself (examples: pills, rope, extension cords, firearm)  · Get professional help from the community (Mental Health, Substance Abuse, psychological counseling)  · Do not be alone:Call your Safe Contact- someone whom you trust who will be there for you.  · Call your local CRISIS HOTLINE 713-0877 or 388-360-3582  · Call your local Children's Mobile Crisis Response Team [place] (097) 342-9334 or www.[employer]  · Call the toll free National Suicide Prevention Hotlines   · National Suicide Prevention Lifeline 956-347-PJEU (5483)  · National Hope Line Network 800-SUICIDE (563-5189)        Infection Prevention in the Home  If you have an infection, may have been exposed to an infection, or are taking care of someone who has an infection, it is important to know how to keep the infection from spreading. Follow your health care provider's instructions and use these guidelines to help stop the spread of infection.  How infections are spread  In order for an infection to spread, the following  must be present:  · A germ. This may be a virus, bacteria, fungus, or parasite.  · A place for the germ to live. This may be:  ? On or in a person, animal, plant, or food.  ? In soil or water.  ? On surfaces, such as a door handle.  · A person or animal who can develop a disease if the germ enters the body (host). The host does not have resistance to the germ.  · A way for the germ to enter the host. This may occur by:  ? Direct contact with an infected person or animal. This can happen through shaking hands or hugging. Some germs can also travel through the air and spread to others. This can happen when an infected person coughs or sneezes on or near other people.  ? Indirect contact. This occurs when the germ enters the host through contact with an infected object. Examples include:  § Eating or drinking food or water that has the germ (is contaminated).  § Touching a contaminated surface with your hands, and then touching your face, eyes, nose, or mouth.  Supplies needed:  · Soap.  · Alcohol-based hand .  · Standard cleaning products.  · Disinfectants, such as bleach.  · Reusable cleaning cloths, sponges, or paper towels.  · Disposable or reusable utility gloves.  How to prevent infection from spreading  There are several things that you can do to help prevent infection from spreading.  Take these general actions  Everyone should take the following actions to prevent the spread of infection:  · Wash your hands often with soap and water for at least 20 seconds. If soap and water are not available, use alcohol-based hand .  · Avoid touching your face, mouth, nose, or eyes.  · Cough or sneeze into a tissue, sleeve, or elbow instead of into your hand or into the air.  ? If you cough or sneeze into a tissue, throw it away immediately and wash your hands.    Keep your bathroom clean  · Provide soap.  · Change towels and washcloths frequently.  · Change toothbrushes often and store them separately in a  clean, dry place.  · Clean and disinfect all surfaces, including the toilet, floor, tub, shower, and sink.  · Do not share personal items, such as razors, toothbrushes, deodorant, sadler, brushes, towels, and washcloths.  Maintain hygiene in the kitchen    · Wash your hands before and after preparing food and before you eat.  · Clean the inside of your refrigerator each week.  · Keep your refrigerator set at 40°F (4°C) or less, and set your freezer at 0°F (-18°C) or less.  · Keep work surfaces clean. Disinfect them regularly.  · Wash your dishes in hot, soapy water. Air-dry your dishes or use a .  · Do not share dishes or eating utensils.  Handle food safely  · Store food carefully.  · Refrigerate leftovers promptly in covered containers.  · Throw out stale or spoiled food.  · Thaw foods in the refrigerator or microwave, not at room temperature.  · Serve foods at the proper temperature. Do not eat raw meat. Make sure it is cooked to the appropriate temperature. Cook eggs until they are firm.  · Wash fruits and vegetables under running water.  · Use separate cutting boards, plates, and utensils for raw foods and cooked foods.  · Use a clean spoon each time you sample food while cooking.  Do laundry the right way  · Wear gloves if laundry is visibly soiled.  · Do not shake soiled laundry. Doing that may send germs into the air.  · Wash laundry in hot water.  · If you cannot wash the laundry right away, place it in a plastic bag and wash it as soon as possible.  Be careful around animals and pets  · Wash your hands before and after touching animals.  · If you have a pet, ensure that your pet stays clean. Do not let people with weak immune systems touch bird droppings, fish tank water, or a litter box.  ? If you have a pet cage or litter box, be sure to clean it every day.  · If you are sick, stay away from animals and have someone else care for them if possible.  How to clean and disinfect objects and  surfaces  Precautions  · Some disinfectants work for certain germs and not others. Read the 's instructions or read online resources to determine if the product you are using will work for the germ you are trying to remove.  · If you choose to use bleach, use it safely. Never mix it with other cleaning products, especially those that contain ammonia. This mixture can create a dangerous gas that may be deadly.  · Keep proper movement of fresh air in your home (ventilation).  · Pour used mop water down the utility sink or toilet. Do not pour this water down the kitchen sink.  Objects and surfaces    · If surfaces are visibly soiled, clean them first with soap and water before disinfecting.  · Disinfect surfaces that are frequently touched every day. This may include:  ? Counters.  ? Tables.  ? Doorknobs.  ? Sinks and faucets.  ? Electronics, such as:  § Phones.  § Remote controls.  § Keyboards.  § Computers and tablets.  Cleaning supplies  Some cleaning supplies can breed germs. Take good care of them to prevent germs from spreading. To do this:  · Soak toilet brushes, mops, and sponges in bleach and water for 5 minutes after each use, or according to 's instructions.  · Wash reusable cleaning cloths and sanitize sponges after each use.  · Throw away disposable gloves after one use.  · Replace reusable utility gloves if they are cracked or torn or if they start to peel.  Additional actions if you are sick  If you live with other people:    · Avoid close contact with those around you. Stay at least 3 ft (1 m) away from others, if possible.  · Use a separate bathroom, if possible.  · If possible, sleep in a separate bedroom or in a separate bed to prevent infecting other household members.  ? Change bedroom linens each week or whenever they are soiled.  · Have everyone in the household wash hands often with soap and water. If soap and water are not available, use alcohol-based hand .  In  general:  · Stay home except to get medical care. Call ahead before visiting your health care provider.  · Ask others to get groceries and household supplies and to refill prescriptions for you.  · Avoid public areas. Try not to take public transportation.  · If you can, wear a mask if you need to go out of the house, or if you are in close contact with someone who is not sick.  · Avoid visitors until you have completely recovered, or until you have no signs and symptoms of infection.  · Avoid preparing food or providing care for others. If you must prepare food or provide care for others, wear a mask and wash your hands before and after doing these things.  Where to find more information  · Centers for Disease Control and Prevention: www.cdc.gov/nonpharmaceutical-interventions/index.html  · World Health Organization (WHO): www.who.int/infection-prevention/about/en/  · Association for Professionals in Infection Control and Epidemiology: professionals.site.apic.org/settings-of-care/non-healthcare-setting/home/  Summary  · It is important to know how to keep the infection from spreading.  · Make sure everyone in your household washes their hands often with soap and water.  · Disinfect surfaces that are frequently touched every day.  · If you are sick, stay home except to get medical care.  This information is not intended to replace advice given to you by your health care provider. Make sure you discuss any questions you have with your health care provider.  Document Released: 09/26/2009 Document Revised: 04/14/2020 Document Reviewed: 03/13/2020  [employer]vier Patient Education © 2020 [employer].    Antibiotic Medicine, Adult    Antibiotic medicines treat infections caused by a type of germ called bacteria. They work by killing the bacteria that make you sick.  When do I need to take antibiotics?  You often need these medicines to treat bacterial infections, such as:  · A urinary tract infection (UTI).  · Strep  throat.  · Meningitis. This affects the spinal cord and brain.  · A bad lung infection.  You may start the medicines while your doctor waits for tests to come back. When the tests come back, your doctor may change or stop your medicine.  When are antibiotics not needed?  You do not need these medicines for most common illnesses, such as:  · A cold.  · The flu.  · A sore throat.  Antibiotics are not always needed for all infections caused by bacteria. Do not ask for these medicines, or take them, when they are not needed.  What are the risks of taking antibiotics?  Most antibiotics can cause an infection called Clostridioides difficile (C. diff). This causes watery poop (diarrhea). Let your doctor know right away if:  · You have watery poop while taking an antibiotic.  · You have watery poop after you stop taking an antibiotic. The illness can happen weeks after you stop the medicine.  You also have a risk of getting an infection in the future that antibiotics cannot treat (antibiotic-resistant infection). This type of infection can be dangerous.  What else should I know about taking antibiotics?    · You need to take the entire prescription.  ? Take the medicine for as long as told by your doctor.  ? Do not stop taking it even if you start to feel better.  · Try not to miss any doses. If you miss a dose, call your doctor.  · Birth control pills may not work. If you take birth control pills:  ? Keep on taking them.  ? Use a second form of birth control, such as a condom. Do this for as long as told by your doctor.  · Ask your doctor:  ? How long to wait in between doses.  ? If you should take the medicine with food.  ? If there is anything you should stay away from while taking the antibiotic, such as:  ? Food.  ? Drinks.  ? Medicines.  ? If there are any side effects you should watch for.  · Only take the medicines that your doctor told you to take. Do not take medicines that were given to someone else.  · Drink a  large glass of water with the medicine.  · Ask the pharmacist for a tool to measure the medicine, such as:  ? A syringe.  ? A cup.  ? A spoon.  · Throw away any extra medicine.  Contact a doctor if:  · You get worse.  · You have new joint pain or muscle aches after starting the medicine.  · You have side effects from the medicine, such as:  ? Stomach pain.  ? Watery poop.  ? Feeling sick to your stomach (nausea).  Get help right away if:  · You have signs of a very bad allergic reaction. If this happens, stop taking the medicine right away. Signs may include:  ? Hives. These are raised, itchy, red bumps on the skin.  ? Skin rash.  ? Trouble breathing.  ? Wheezing.  ? Swelling.  ? Feeling dizzy.  ? Throwing up (vomiting).  · Your pee (urine) is dark, or is the color of blood.  · Your skin turns yellow.  · You bruise easily.  · You bleed easily.  · You have very bad watery poop and cramps in your belly.  · You have a very bad headache.  Summary  · Antibiotics are often used to treat infections caused by bacteria.  · Only take these medicines when needed.  · Let your doctor know if you have watery poop while taking an antibiotic.  · You need to take the entire prescription.  This information is not intended to replace advice given to you by your health care provider. Make sure you discuss any questions you have with your health care provider.  Document Released: 09/26/2009 Document Revised: 01/24/2020 Document Reviewed: 12/20/2017  [employer] Patient Education © 2020 [employer]    Pancytopenia  Pancytopenia is a condition in which a person has an abnormally low amount (deficiency) of the following blood cells:  · Red blood cells (RBCs). Having too few RBCs is called anemia.  · White blood cells (WBCs). Having too few WBCs is called leukopenia.  · Cells that help the blood clot (platelets). Having too few platelets is called thrombocytopenia.  Cells that become blood cells (stem cells) are made in the soft tissue  inside the bones (bone marrow). All blood cells have a limited lifespan. Blood cells are constantly replaced with new blood cells from the bone marrow. Pancytopenia can be caused by any condition or disease that:  · Destroys the ability of bone marrow to make blood cells.  · Causes bone marrow to make blood cells that cannot survive after they leave the bone marrow.  What are the causes?  There are many possible causes of this condition. In some cases, the cause is not known. Common causes of the condition include:  · A disease that causes bone marrow to make immature blood cells (megaloblastic anemia).  · A blood disorder that makes bone marrow unable to produce enough new RBCs (aplastic anemia or bone marrow failure).  · An enlarged spleen (hypersplenism). An enlarged spleen can trap blood cells and destroy them faster than they can be replaced.  · Inherited diseases of the blood or bone marrow.  · Cancers that affect bone marrow.  · Certain medicines, such as:  ? Chemotherapy.  ? Medicines that reduce the activity of the immune system (immunosuppressant medicines).  · Exposure to radiation.  · Severe infections.  What increases the risk?  You are more likely to develop this condition if:  · You are 30?50 years old.  · You are male.  · You have a family history of a blood or bone marrow disease.  · You have certain conditions, such as:  ? Alcohol use disorder.  ? HIV (human immunodeficiency virus) or AIDS (acquired immunodeficiency syndrome).  ? Cancer.  ? Conditions in which the body's disease-fighting system attacks normal tissues (autoimmune diseases).  What are the signs or symptoms?  Symptoms of this condition vary depending on the cause and may include:  · Anemia.  · Weakness.  · Shortness of breath.  · Unusual bruising and bleeding.  · Frequent infections.  · Fatigue.  · Fever.  · Pale skin.  · Bone pain.  · Night sweats.  · Weight loss.  · Headache.  · Dizziness.  · Feeling unusually cold.  How is this  diagnosed?  This condition may be diagnosed based on:  · Your symptoms.  · Your medical history.  · A physical exam.  · Tests. These may include:  ? Removal of a sample of bone marrow to be examined under a microscope (biopsy). This is done by inserting a needle into a bone to remove fluid and cells (aspiration).  ? A complete blood count (CBC). This is a group of tests that measures characteristics of WBCs, RBCs, and platelets.  ? A peripheral blood smear. This test examines your blood under a microscope to provide information about drugs and diseases that affect RBCs, WBCs, and platelets.  ? Reticulocyte count. This is a test that measures the amount of new or immature RBCs (reticulocytes) that are made by your bone marrow.  ? Imaging studies of your spleen or liver, such as X-rays.  ? A test to measure your vitamin B12 level.  ? Tests for viruses.  How is this treated?  Treatment for this condition depends on the cause. Treatment may include:  · Immunosuppressant medicines.  · Antibiotic medicine.  · Vitamin B12. This may be given as a treatment for megaloblastic anemia.  · Medicines that help the bone marrow make blood cells (bone marrow stimulating drugs).  · A bone marrow transplant.  · Receiving donated blood through an IV (blood transfusion).  · A procedure to remove your spleen (splenectomy). This may be done as a treatment for hypersplenism.  Follow these instructions at home:  Caring for your body         · Wash your hands often with soap and water. If soap and water are not available, use hand .  · Brush your teeth twice a day, and floss at least once a day. It is recommended that you visit the dentist every 6 months.  · Stay up to date on your vaccinations, including a yearly (annual) flu shot. Ask your health care provider which vaccines you should get. These may include a pneumonia vaccine.  Medicines  · Take over-the-counter and prescription medicines only as told by your health care  provider.  · If you were prescribed an antibiotic medicine, take it as told by your health care provider. Do not stop taking the antibiotic even if you start to feel better.  Lifestyle  · Do not participate in contact sports or dangerous activities. Ask your health care provider what activities are safe for you.  · During cold and flu season, avoid crowded places and avoid contact with people who are sick. Flu season is typically between the months of October and May.  General instructions  · Work with your health care provider to manage your condition and educate yourself about your condition.  · Follow food safety recommendations as told by your health care provider.  · Keep all follow-up visits as told by your health care provider. This is important.  Contact a health care provider if you:  · Have a fever.  · Bruise or bleed easily.  · Are dizzy.  · Feel unusually weak or tired.  Get help right away if you have:  · Bleeding that does not stop.  · Wheezing or shortness of breath.  · Chest pain.  These symptoms may represent a serious problem that is an emergency. Do not wait to see if the symptoms will go away. Get medical help right away. Call your local emergency services (911 in the [place] Do not drive yourself to the hospital.  Summary  · Pancytopenia is a condition in which a person has an abnormally low amount of red blood cells, white blood cells, and platelets.  · There are many possible causes of this condition.  · Treatment for this condition depends on the cause.  · Stay up to date on your vaccinations.  · Do not participate in contact sports or dangerous activities. Ask your health care provider what activities are safe for you.  This information is not intended to replace advice given to you by your health care provider. Make sure you discuss any questions you have with your health care provider.  Document Released: 01/13/2017 Document Revised: 09/23/2019 Document Reviewed: 09/23/2019  [employer]  Patient Education © 2020 [employer].

## 2020-07-15 NOTE — PROGRESS NOTES
Received bedside report from RN, pt care assumed, VSS, pt assessment complete. Pt AAOx4,  c/o 0/10 pain at this time. No signs of acute distress noted at this time. POC discussed with pt and verbalizes no questions. Pt denies any additional needs at this time. Bed in lowest position, pt educated on fall risk and verbalized understanding, call light within reach, hourly rounding initiated.

## 2020-07-16 ENCOUNTER — PATIENT MESSAGE (OUTPATIENT)
Dept: MEDICAL GROUP | Facility: LAB | Age: 65
End: 2020-07-16

## 2020-07-16 ENCOUNTER — OFFICE VISIT (OUTPATIENT)
Dept: MEDICAL GROUP | Facility: LAB | Age: 65
End: 2020-07-16
Payer: COMMERCIAL

## 2020-07-16 VITALS
OXYGEN SATURATION: 92 % | TEMPERATURE: 98.5 F | SYSTOLIC BLOOD PRESSURE: 120 MMHG | HEART RATE: 94 BPM | HEIGHT: 66 IN | DIASTOLIC BLOOD PRESSURE: 60 MMHG | BODY MASS INDEX: 21.1 KG/M2

## 2020-07-16 DIAGNOSIS — M27.2 ACUTE OSTEOMYELITIS OF MANDIBLE: ICD-10-CM

## 2020-07-16 DIAGNOSIS — D61.818 PANCYTOPENIA (HCC): ICD-10-CM

## 2020-07-16 DIAGNOSIS — I10 ESSENTIAL HYPERTENSION: ICD-10-CM

## 2020-07-16 DIAGNOSIS — S92.504A CLOSED NONDISPLACED FRACTURE OF PHALANX OF LESSER TOE OF RIGHT FOOT, UNSPECIFIED PHALANX, INITIAL ENCOUNTER: ICD-10-CM

## 2020-07-16 DIAGNOSIS — Z09 HOSPITAL DISCHARGE FOLLOW-UP: ICD-10-CM

## 2020-07-16 DIAGNOSIS — Z95.2 S/P TAVR (TRANSCATHETER AORTIC VALVE REPLACEMENT): ICD-10-CM

## 2020-07-16 LAB
BACTERIA BLD CULT: NORMAL
BACTERIA BLD CULT: NORMAL
SIGNIFICANT IND 70042: NORMAL
SIGNIFICANT IND 70042: NORMAL
SITE SITE: NORMAL
SITE SITE: NORMAL
SOURCE SOURCE: NORMAL
SOURCE SOURCE: NORMAL

## 2020-07-16 PROCEDURE — 99215 OFFICE O/P EST HI 40 MIN: CPT | Performed by: NURSE PRACTITIONER

## 2020-07-16 NOTE — PATIENT COMMUNICATION
[phone] and a bone marrow biopsy report is being faxed straight to Dr. Chacon.

## 2020-07-16 NOTE — PROGRESS NOTES
CC  Follow-up    HPI  Miguelito is a 65-year-old established male here for hospital follow-up.  Had TAVR 6/2020, admitted again the following day because of fever and not feeling well.  Previous oral surgery 4/2020.   He was tachycardic in the ER and found to be pancytopenic with fever, therefore and seen by infectious disease and placed on broad-spectrum antibiotics.  CT scan showed fractured mandible and possible abscess, therefore oral surgery repaired the mandible and cleaned out possible infection.  While hospitalized he was also seen by oncology because of his CBC and bone marrow biopsy was done which was sent to [facility].  Unfortunately follow-up scan of his mandible showed osteomyelitis and he was sent home on 4 weeks of Levaquin with Flagyl.  He was discharged yesterday to home and his wife is caring for him.    Subsequently fractured toe while hospitalized.  He is keeping this elevated and states the pain is present but not too bothersome.    Imaging while hospitalized  CT abdomen/chest/pelvis on 7/11 essentially normal except emphysema, cirrhotic aorta, inguinal hernia.   CT soft tissue neck: Osteomyelitis of left maxilla   Echo showed known TAVR aortic valve    Since being d/c from the hospital yesterday morning states he is just tired but otherwise fine.  Having formed stool.  Denies n/v or abdominal pain.  Ate dinner last night and this was fine.  Not sleeping great but knows this is expected after being woken up a lot while in the hospital.  Not in pain except when he walks b/c of broken toe.    Has a call into Dr. Morrison, oral surgery, to schedule a follow up.  Needs urgent f/u with Dr. Cabello, hematology but this has not been scheduled yet.    WBC variation of 4.5 - 1.8.    Component      Latest Ref Rng & Units 7/10/2020 7/11/2020 7/12/2020 7/13/2020           4:12 AM  3:49 AM 12:01 AM  4:37 AM   WBC      4.8 - 10.8 K/uL 2.0 (LL) 1.9 (LL) 2.0 (LL) 2.2 (LL)   RBC      4.70 - 6.10 M/uL 2.92 (L) 2.85 (L)  "3.06 (L) 2.77 (L)   Hemoglobin      14.0 - 18.0 g/dL 8.2 (L) 8.1 (L) 8.7 (L) 7.9 (L)   Hematocrit      42.0 - 52.0 % 26.3 (L) 24.7 (L) 27.5 (L) 24.6 (L)   MCV      81.4 - 97.8 fL 90.1 86.7 89.9 88.8     Component      Latest Ref Rng & Units 2020 7/15/2020           4:13 AM  4:16 AM   WBC      4.8 - 10.8 K/uL 2.3 (LL) 2.2 (LL)   RBC      4.70 - 6.10 M/uL 2.99 (L) 2.92 (L)   Hemoglobin      14.0 - 18.0 g/dL 8.4 (L) 8.3 (L)   Hematocrit      42.0 - 52.0 % 26.4 (L) 25.9 (L)   MCV      81.4 - 97.8 fL 88.3 88.7       Past medical, surgical, family, and social history is reviewed and updated in Epic chart by me today.   Medications and allergies reviewed and updated in Epic chart by me today.     ROS:   As documented in history of present illness above    Exam:  /60 (BP Location: Left arm, Patient Position: Sitting, BP Cuff Size: Adult)   Pulse 94   Temp 36.9 °C (98.5 °F) (Temporal)   Ht 1.676 m (5' 6\")   SpO2 92%     Constitutional: Alert, no distress, plus 3 vital signs  Skin:  Warm, dry, no rashes invisible areas  Eye: Equal, round and reactive, conjunctiva clear  ENMT: Lips without lesions, good dentition, oropharynx clear    Neck: Trachea midline, no masses, no thyromegaly  Respiratory: Unlabored respiration, lungs clear to auscultation, no wheezes, no rhonchi  Cardiovascular: Normal rate and rhythm  Musculoskeletal: Right great toe is swollen and ecchymotic, nondeformed and has full sensation.  Psych: Alert, pleasant, well-groomed, normal affect    Assessment / Plan / Medical Decision makin. Hospital discharge follow-up  -Reviewed patient's hospitalization with him and his wife in depth and answered all of their questions.  Reviewed medications, imaging, lab work and discharge summaries.    2. Pancytopenia (HCC)  -Awaiting follow-up with hematology/oncology -I left a message with Dr. Cabello's office to make sure that patient is seen as it does appear that his bone marrow biopsy result has " "returned and is in Epic  \"care everywhere\" through [facility].  I did not discuss a bone marrow biopsy results with the patient or his wife today as they plan on seeing hematology/oncology as soon as they can get in.  We discussed his white blood cell count in depth and the importance of social isolation at home.    3. Essential hypertension  -Stable.  Continue same.    4. S/P TAVR (transcatheter aortic valve replacement)  -Followed closely by cardiology.    5. Closed nondisplaced fracture of phalanx of lesser toe of right foot, unspecified phalanx, initial encounter  -Doing fine with this.  Discussed the importance of elevation, staying off of it and following up if the toe does not feel back to normal within 4 weeks.    6. Acute osteomyelitis of mandible  -Has a scheduled follow-up with his oral surgeon.  Doing well with metronidazole and Levaquin.  Not in pain.    Total face to face time 40 minutes of which over 50% of this visit is spent in counseling, education and outlining a plan of treatment and coordination of care for the above conditions. This included but was not limited to discussion of medication options and potential risks related to the medications, referral and specialty care options. All patient questions were answered        "

## 2020-07-29 ENCOUNTER — DOCUMENTATION (OUTPATIENT)
Dept: CARDIOLOGY | Facility: MEDICAL CENTER | Age: 65
End: 2020-07-29

## 2020-07-29 ENCOUNTER — OFFICE VISIT (OUTPATIENT)
Dept: CARDIOLOGY | Facility: MEDICAL CENTER | Age: 65
End: 2020-07-29
Payer: COMMERCIAL

## 2020-07-29 VITALS
SYSTOLIC BLOOD PRESSURE: 94 MMHG | BODY MASS INDEX: 19.54 KG/M2 | HEIGHT: 66 IN | WEIGHT: 121.58 LBS | OXYGEN SATURATION: 94 % | RESPIRATION RATE: 12 BRPM | HEART RATE: 89 BPM | DIASTOLIC BLOOD PRESSURE: 60 MMHG

## 2020-07-29 DIAGNOSIS — Z95.2 S/P TAVR (TRANSCATHETER AORTIC VALVE REPLACEMENT): ICD-10-CM

## 2020-07-29 PROCEDURE — 99214 OFFICE O/P EST MOD 30 MIN: CPT | Performed by: INTERNAL MEDICINE

## 2020-07-29 RX ORDER — HYDROCODONE BITARTRATE AND ACETAMINOPHEN 5; 325 MG/1; MG/1
1 TABLET ORAL EVERY 4 HOURS PRN
COMMUNITY
End: 2020-10-19

## 2020-07-29 ASSESSMENT — FIBROSIS 4 INDEX: FIB4 SCORE: 7.95

## 2020-07-30 NOTE — PROGRESS NOTES
Cardiology Follow-up Consultation Note    Date of note:    7/29/2020    Primary Care Provider: MARIA GUADALUPE Sims    Name:             Magalis Kumari   YOB: 1955  MRN:               [id number]    CC: Post TAVR 1 month follow-up    Patient ID/HPI:   65-year-old male patient with history of hypertension hyperlipidemia, severe aortic stenosis underwent transcatheter aortic valve replacement.  His procedure was uncomplicated however he had pancytopenia, high fevers, found to have osteomyelitis of mandible which he had surgery.  He was also found to have heparin-induced thrombocytopenia, bone marrow biopsy reveals leukemia.  He is awaiting to finish antibiotics and start immunotherapy for his leukemia.  He has exertional fatigue but overall doing okay.  Denies chest pain, shortness of breath.  Trying to gain some weight and eat more.  No more fevers.    ROS  Fatigue, back pain.  All other systems reviewed and negative.    Past Medical History:   Diagnosis Date   • Dental disorder     upper lower  dentures   • Dental disorder     dental extraction 4/22/20   • Heart valve disease     aortic   • High cholesterol    • Hyperlipidemia    • Hypertension    • Stented coronary artery 2008    3 vessel         Past Surgical History:   Procedure Laterality Date   • SUBMANDIBLE ABSCESS INCISION AND DRAINAGE N/A 7/12/2020    Procedure: INCISION AND DRAINAGE, ABSCESS, SUBMANDIBULAR REGION;  Surgeon: Brenden Morrison D.D.SKym;  Location: [facility];  Service: Oral Surgery   • MANDIBLE FRACTURE ORIF Bilateral 7/3/2020    Procedure: ORIF, FRACTURE, MANDIBLE;  Surgeon: Brenden Morrison D.D.SKym;  Location: [facility];  Service: Oral Surgery   • TRANSCATHETER AORTIC VALVE REPLACEMENT N/A 6/22/2020    Procedure: REPLACEMENT, AORTIC VALVE, TRANSCATHETER- WITH ANY ASSOCIATED PROCEDURES;  Surgeon: Luiz Dennis M.D.;  Location: [facility];  Service:  Cardiac   • JOON N/A 6/22/2020    Procedure: ECHOCARDIOGRAM,;  Surgeon: Luiz Dennis M.D.;  [facility] SURGERY [facility];  Service: Cardiac   • ANGIOGRAM  05/22/2020   • OTHER CARDIAC SURGERY  2009    stents x3   • BONE SPUR EXCISION  1993    left ankle   • STAPEDECTOMY  1980s         Current Outpatient Medications   Medication Sig Dispense Refill   • HYDROcodone-acetaminophen (NORCO) 5-325 MG Tab per tablet Take 1 Tab by mouth every four hours as needed.     • Multiple Vitamins-Minerals (ONE-A-DAY MENS 50+ ADVANTAGE PO) Take  by mouth.     • metroNIDAZOLE (FLAGYL) 500 MG Tab Take 1 Tab by mouth every 8 hours for 30 days. 90 Tab 0   • levoFLOXacin (LEVAQUIN) 750 MG tablet Take 1 Tab by mouth every day. 30 Tab 0   • apixaban (ELIQUIS) 5mg Tab Take 1 Tab by mouth 2 Times a Day. Indications: DVT/PE 60 Tab 3   • lovastatin (MEVACOR) 20 MG Tab Take 1 Tab by mouth every day. 90 Tab 4   • amLODIPine (NORVASC) 5 MG Tab Take 1 Tab by mouth every day. At night 90 Tab 4     No current facility-administered medications for this visit.          Allergies   Allergen Reactions   • Heparin      Heparin antibody positive 7/4/2020  BULL postive 7/8/2020   • Seasonal Cough and Runny Nose         Family History   Problem Relation Age of Onset   • Heart Disease Mother    • Hypertension Mother    • Diabetes Mother    • No Known Problems Father    • Heart Disease Maternal Uncle    • Psychiatric Illness Paternal Uncle    • Diabetes Paternal Uncle    • Heart Disease Paternal Uncle          Social History     Socioeconomic History   • Marital status:      Spouse name: Not on file   • Number of children: 1   • Years of education: Not on file   • Highest education level: Not on file   Occupational History   • Not on file   Social Needs   • Financial resource strain: Not on file   • Food insecurity     Worry: Not on file     Inability: Not on file   • Transportation needs     Medical: Not on file     Non-medical: Not on file  "  Tobacco Use   • Smoking status: Current Every Day Smoker     Packs/day: 0.50     Years: 45.00     Pack years: 22.50     Types: Cigarettes   • Smokeless tobacco: Never Used   Substance and Sexual Activity   • Alcohol use: Yes     Alcohol/week: 0.0 oz     Frequency: 2-3 times a week     Drinks per session: 1 or 2     Binge frequency: Never     Comment: 1-2 per day   • Drug use: No   • Sexual activity: Not on file   Lifestyle   • Physical activity     Days per week: Not on file     Minutes per session: Not on file   • Stress: Not on file   Relationships   • Social connections     Talks on phone: Not on file     Gets together: Not on file     Attends Christian service: Not on file     Active member of club or organization: Not on file     Attends meetings of clubs or organizations: Not on file     Relationship status: Not on file   • Intimate partner violence     Fear of current or ex partner: Not on file     Emotionally abused: Not on file     Physically abused: Not on file     Forced sexual activity: Not on file   Other Topics Concern   • Not on file   Social History Narrative   • Not on file         Physical Exam:  Ambulatory Vitals  BP (!) 94/60 (BP Location: Left arm, Patient Position: Sitting, BP Cuff Size: Adult)   Pulse 89   Resp 12   Ht 1.676 m (5' 6\")   Wt 55.2 kg (121 lb 9.3 oz)   SpO2 94%    Oxygen Therapy:  Pulse Oximetry: 94 %  BP Readings from Last 4 Encounters:   07/29/20 (!) 94/60   07/16/20 120/60   07/15/20 137/70   06/23/20 124/97       Weight/BMI: Body mass index is 19.62 kg/m².  Wt Readings from Last 4 Encounters:   07/29/20 55.2 kg (121 lb 9.3 oz)   07/14/20 59.3 kg (130 lb 11.7 oz)   06/22/20 57.6 kg (126 lb 15.8 oz)   05/22/20 58.5 kg (128 lb 15.5 oz)     General: Well appearing and in no apparent distress  Head: atrumatic  Eyes: No conjunctival pallor   ENT: normal external appearance of nose and ears  Neck: JVD absent, carotid bruits absent  Lungs: respiratory sounds  normal, additional " breath sounds absent  Heart: Regular rhythm,   No palpable thrills on palpation, murmurs absent, no rubs,   Lower extremity edema absent.   Pedal pulses normal  Abdomen: soft, non tender, non distended.  Extremities/MSK: no clubbing, no cyanosis  Neurological: normal orientation, Gait normal   Psychiatric: Appropriate affect, intact judgement and insight  Skin: Warm extremities        Lab Data Review:  Lab Results   Component Value Date/Time    CHOLSTRLTOT 117 03/09/2020 01:50 PM    LDL 70 03/09/2020 01:50 PM    HDL 29 (A) 03/09/2020 01:50 PM    TRIGLYCERIDE 89 03/09/2020 01:50 PM       Lab Results   Component Value Date/Time    SODIUM 133 (L) 07/14/2020 04:13 AM    POTASSIUM 4.4 07/14/2020 04:13 AM    CHLORIDE 100 07/14/2020 04:13 AM    CO2 23 07/14/2020 04:13 AM    GLUCOSE 95 07/14/2020 04:13 AM    BUN 16 07/14/2020 04:13 AM    CREATININE 0.77 07/14/2020 04:13 AM     Lab Results   Component Value Date/Time    ALKPHOSPHAT 50 06/27/2020 06:10 AM    ASTSGOT 43 06/27/2020 06:10 AM    ALTSGPT 20 06/27/2020 06:10 AM    TBILIRUBIN 0.7 06/27/2020 06:10 AM      Lab Results   Component Value Date/Time    WBC 2.2 (LL) 07/15/2020 04:16 AM     Echo 6/29/20  CONCLUSIONS  Known TAVR aortic valve that has a mildly increased gradients.  No paravaluvar leak or signficant aortic insufficiency seen.  No vegetation seen    Impression and Plan:  65-year-old male patient with  1.  Severe aortic stenosis status post TAVR, NYHA class II stage C currently  2.  Nonocclusive right common femoral artery thrombus related to procedure  3.  Leukemia, pancytopenia  4.  Possible heparin-induced thrombocytopenia  5.  COPD    He is doing well from cardiac standpoint.  Regarding his common femoral artery thrombus he has normal circulation, we will not plan on any intervention.  Continue anticoagulation per heme-onc.  Avoid heparin in all future procedures.  Follow-up with heme-onc as planned for leukemia.  Blood pressure is on the low side, stop  lisinopril discontinue amlodipine.  Continue lovastatin.  1 year follow-up with echocardiogram.  Sooner if problems arise    Luiz GUTIERREZ  Interventional cardiologist  [facility], [facility]  [address] [address]  Phone: [phone]  Fax: [phone]

## 2020-08-18 ENCOUNTER — TELEPHONE (OUTPATIENT)
Dept: MEDICAL GROUP | Facility: LAB | Age: 65
End: 2020-08-18

## 2020-08-18 NOTE — TELEPHONE ENCOUNTER
1. Caller Name:SUE padilla mgr                     Call Back Number: [phone] ext [phone]    How would the patient prefer to be contacted with a response:     Sue is pt's . If you have any questions feel free to call her.

## 2020-08-21 ENCOUNTER — PATIENT MESSAGE (OUTPATIENT)
Dept: MEDICAL GROUP | Facility: LAB | Age: 65
End: 2020-08-21

## 2020-08-21 DIAGNOSIS — B19.20 HEPATITIS C VIRUS INFECTION WITHOUT HEPATIC COMA, UNSPECIFIED CHRONICITY: ICD-10-CM

## 2020-08-24 ENCOUNTER — TELEPHONE (OUTPATIENT)
Dept: CARDIOLOGY | Facility: MEDICAL CENTER | Age: 65
End: 2020-08-24

## 2020-08-24 NOTE — TELEPHONE ENCOUNTER
Message  Received: 3 days ago  Message Contents   DAI Sepulveda R.N.          Previous Messages    ----- Message -----   From: Nery Gama, Med Ass't   Sent: 8/18/2020   9:16 AM PDT   To: DAI Sepulveda, pt's nurse  says she would like a call back to introduce herself on behalf of owen, no further info. [phone] ext. [phone]      --------------------------------------------------------------------    Called Ashanti and left message on confidential voicemail with patient info and direct call back number.

## 2020-09-01 DIAGNOSIS — B17.10 ACUTE HEPATITIS C VIRUS INFECTION WITHOUT HEPATIC COMA: ICD-10-CM

## 2020-09-03 ENCOUNTER — HOSPITAL ENCOUNTER (OUTPATIENT)
Dept: LAB | Facility: MEDICAL CENTER | Age: 65
End: 2020-09-03
Attending: NURSE PRACTITIONER
Payer: COMMERCIAL

## 2020-09-03 PROCEDURE — 87522 HEPATITIS C REVRS TRNSCRPJ: CPT

## 2020-09-03 PROCEDURE — 36415 COLL VENOUS BLD VENIPUNCTURE: CPT

## 2020-09-04 DIAGNOSIS — R76.8 POSITIVE HEPATITIS C ANTIBODY TEST: ICD-10-CM

## 2020-09-06 LAB
HCV RNA SERPL NAA+PROBE-ACNC: ABNORMAL K[IU]/ML
HCV RNA SERPL NAA+PROBE-LOG IU: 3.68 LOG IU/ML
HCV RNA SERPL QL NAA+PROBE: DETECTED

## 2020-09-25 ENCOUNTER — HOSPITAL ENCOUNTER (OUTPATIENT)
Facility: MEDICAL CENTER | Age: 65
End: 2020-09-25
Attending: OBSTETRICS & GYNECOLOGY
Payer: COMMERCIAL

## 2020-09-25 ENCOUNTER — HOSPITAL ENCOUNTER (OUTPATIENT)
Dept: LAB | Facility: MEDICAL CENTER | Age: 65
End: 2020-09-25
Attending: INTERNAL MEDICINE
Payer: COMMERCIAL

## 2020-09-25 LAB
25(OH)D3 SERPL-MCNC: 29 NG/ML (ref 30–100)
ALBUMIN SERPL BCP-MCNC: 4 G/DL (ref 3.2–4.9)
ALBUMIN/GLOB SERPL: 0.9 G/DL
ALP SERPL-CCNC: 128 U/L (ref 30–99)
ALT SERPL-CCNC: 39 U/L (ref 2–50)
AMMONIA PLAS-SCNC: 18 UMOL/L (ref 11–45)
ANION GAP SERPL CALC-SCNC: 11 MMOL/L (ref 7–16)
ANISOCYTOSIS BLD QL SMEAR: ABNORMAL
AST SERPL-CCNC: 55 U/L (ref 12–45)
BASOPHILS # BLD AUTO: 2.7 % (ref 0–1.8)
BASOPHILS # BLD: 0.11 K/UL (ref 0–0.12)
BILIRUB SERPL-MCNC: 0.4 MG/DL (ref 0.1–1.5)
BUN SERPL-MCNC: 31 MG/DL (ref 8–22)
CALCIUM SERPL-MCNC: 9 MG/DL (ref 8.5–10.5)
CHLORIDE SERPL-SCNC: 102 MMOL/L (ref 96–112)
CO2 SERPL-SCNC: 23 MMOL/L (ref 20–33)
CREAT SERPL-MCNC: 0.67 MG/DL (ref 0.5–1.4)
EOSINOPHIL # BLD AUTO: 0.4 K/UL (ref 0–0.51)
EOSINOPHIL NFR BLD: 9.7 % (ref 0–6.9)
ERYTHROCYTE [DISTWIDTH] IN BLOOD BY AUTOMATED COUNT: 53.7 FL (ref 35.9–50)
FASTING STATUS PATIENT QL REPORTED: NORMAL
FERRITIN SERPL-MCNC: 55.1 NG/ML (ref 22–322)
FOLATE SERPL-MCNC: 19.7 NG/ML
FORWARD REASON: SPWHY: NORMAL
FORWARDED TO LAB: SPWHR: NORMAL
GGT SERPL-CCNC: 71 U/L (ref 7–51)
GLOBULIN SER CALC-MCNC: 4.3 G/DL (ref 1.9–3.5)
GLUCOSE SERPL-MCNC: 100 MG/DL (ref 65–99)
HBV CORE AB SERPL QL IA: NONREACTIVE
HBV SURFACE AB SERPL IA-ACNC: <3.5 MIU/ML (ref 0–10)
HBV SURFACE AG SER QL: NORMAL
HCT VFR BLD AUTO: 41.6 % (ref 42–52)
HGB BLD-MCNC: 13.6 G/DL (ref 14–18)
HIV 1+2 AB+HIV1 P24 AG SERPL QL IA: NORMAL
INR PPP: 1.2 (ref 0.87–1.13)
IRON SATN MFR SERPL: 14 % (ref 15–55)
IRON SERPL-MCNC: 60 UG/DL (ref 50–180)
LYMPHOCYTES # BLD AUTO: 2.61 K/UL (ref 1–4.8)
LYMPHOCYTES NFR BLD: 63.7 % (ref 22–41)
MACROCYTES BLD QL SMEAR: ABNORMAL
MANUAL DIFF BLD: NORMAL
MCH RBC QN AUTO: 28.7 PG (ref 27–33)
MCHC RBC AUTO-ENTMCNC: 32.7 G/DL (ref 33.7–35.3)
MCV RBC AUTO: 87.8 FL (ref 81.4–97.8)
MONOCYTES # BLD AUTO: 0.51 K/UL (ref 0–0.85)
MONOCYTES NFR BLD AUTO: 12.4 % (ref 0–13.4)
MORPHOLOGY BLD-IMP: NORMAL
NEUTROPHILS # BLD AUTO: 0.47 K/UL (ref 1.82–7.42)
NEUTROPHILS NFR BLD: 11.5 % (ref 44–72)
NRBC # BLD AUTO: 0 K/UL
NRBC BLD-RTO: 0 /100 WBC
PLATELET # BLD AUTO: 141 K/UL (ref 164–446)
PLATELET # BLD AUTO: 146 K/UL (ref 164–446)
PLATELET BLD QL SMEAR: NORMAL
PMV BLD AUTO: 11.1 FL (ref 9–12.9)
POTASSIUM SERPL-SCNC: 4.1 MMOL/L (ref 3.6–5.5)
PROT SERPL-MCNC: 8.3 G/DL (ref 6–8.2)
PROTHROMBIN TIME: 15.6 SEC (ref 12–14.6)
RBC # BLD AUTO: 4.74 M/UL (ref 4.7–6.1)
RBC BLD AUTO: PRESENT
SODIUM SERPL-SCNC: 136 MMOL/L (ref 135–145)
SPECIMEN SENT: SPWT1: NORMAL
TIBC SERPL-MCNC: 437 UG/DL (ref 250–450)
UIBC SERPL-MCNC: 377 UG/DL (ref 110–370)
VIT B12 SERPL-MCNC: 540 PG/ML (ref 211–911)
WBC # BLD AUTO: 4.1 K/UL (ref 4.8–10.8)

## 2020-09-25 PROCEDURE — 82180 ASSAY OF ASCORBIC ACID: CPT

## 2020-09-25 PROCEDURE — 86038 ANTINUCLEAR ANTIBODIES: CPT

## 2020-09-25 PROCEDURE — 84450 TRANSFERASE (AST) (SGOT): CPT

## 2020-09-25 PROCEDURE — 85027 COMPLETE CBC AUTOMATED: CPT

## 2020-09-25 PROCEDURE — 87340 HEPATITIS B SURFACE AG IA: CPT

## 2020-09-25 PROCEDURE — 82140 ASSAY OF AMMONIA: CPT

## 2020-09-25 PROCEDURE — 36415 COLL VENOUS BLD VENIPUNCTURE: CPT

## 2020-09-25 PROCEDURE — 82746 ASSAY OF FOLIC ACID SERUM: CPT

## 2020-09-25 PROCEDURE — 82977 ASSAY OF GGT: CPT

## 2020-09-25 PROCEDURE — 82784 ASSAY IGA/IGD/IGG/IGM EACH: CPT

## 2020-09-25 PROCEDURE — 83540 ASSAY OF IRON: CPT

## 2020-09-25 PROCEDURE — 84460 ALANINE AMINO (ALT) (SGPT): CPT

## 2020-09-25 PROCEDURE — 84630 ASSAY OF ZINC: CPT

## 2020-09-25 PROCEDURE — 85007 BL SMEAR W/DIFF WBC COUNT: CPT

## 2020-09-25 PROCEDURE — 84252 ASSAY OF VITAMIN B-2: CPT

## 2020-09-25 PROCEDURE — 84520 ASSAY OF UREA NITROGEN: CPT

## 2020-09-25 PROCEDURE — 86706 HEP B SURFACE ANTIBODY: CPT

## 2020-09-25 PROCEDURE — 83516 IMMUNOASSAY NONANTIBODY: CPT | Mod: 91

## 2020-09-25 PROCEDURE — 86256 FLUORESCENT ANTIBODY TITER: CPT

## 2020-09-25 PROCEDURE — 84207 ASSAY OF VITAMIN B-6: CPT

## 2020-09-25 PROCEDURE — 82728 ASSAY OF FERRITIN: CPT

## 2020-09-25 PROCEDURE — 82107 ALPHA-FETOPROTEIN L3: CPT

## 2020-09-25 PROCEDURE — 87389 HIV-1 AG W/HIV-1&-2 AB AG IA: CPT

## 2020-09-25 PROCEDURE — 83550 IRON BINDING TEST: CPT

## 2020-09-25 PROCEDURE — 86376 MICROSOMAL ANTIBODY EACH: CPT

## 2020-09-25 PROCEDURE — 84590 ASSAY OF VITAMIN A: CPT

## 2020-09-25 PROCEDURE — 83883 ASSAY NEPHELOMETRY NOT SPEC: CPT

## 2020-09-25 PROCEDURE — 86704 HEP B CORE ANTIBODY TOTAL: CPT

## 2020-09-25 PROCEDURE — 85049 AUTOMATED PLATELET COUNT: CPT

## 2020-09-25 PROCEDURE — 84425 ASSAY OF VITAMIN B-1: CPT

## 2020-09-25 PROCEDURE — 87902 NFCT AGT GNTYP ALYS HEP C: CPT

## 2020-09-25 PROCEDURE — 80053 COMPREHEN METABOLIC PANEL: CPT

## 2020-09-25 PROCEDURE — 85610 PROTHROMBIN TIME: CPT

## 2020-09-25 PROCEDURE — 82306 VITAMIN D 25 HYDROXY: CPT

## 2020-09-25 PROCEDURE — 86708 HEPATITIS A ANTIBODY: CPT

## 2020-09-25 PROCEDURE — 82607 VITAMIN B-12: CPT

## 2020-09-27 LAB — HAV AB SER QL IA: NEGATIVE

## 2020-09-28 LAB
A2 MACROGLOB SERPL-MCNC: 317 MG/DL (ref 131–293)
AFP L3 MFR SERPL: <0.5 % (ref 0–9.9)
AFP SERPL-MCNC: 2 NG/ML (ref 0–15)
ALT SERPL-CCNC: 41 U/L (ref 5–50)
ANNOTATION COMMENT IMP: ABNORMAL
AST SERPL-CCNC: 58 U/L (ref 9–50)
BUN SERPL-SCNC: 33 MG/DL (ref 7–20)
CIRRHOMETER PT SCORE Q4850: 0.88
FIBROSIS STAGE SERPL QL: ABNORMAL
GGT SERPL-CCNC: 75 U/L (ref 7–51)
GLIADIN PEPTIDE+TTG IGA+IGG SER QL IA: 18 UNITS (ref 0–19)
IGA SERPL-MCNC: 484 MG/DL (ref 68–408)
IGG SERPL-MCNC: 2150 MG/DL (ref 768–1632)
INFLAMETER META CLASS Q4853: ABNORMAL
INFLAMETER PT SCORE Q4852: 0.73
LIVER FIBR SCORE SERPL CALC.FIBROMETER: 0.95
LKM AB TITR SER IF: NORMAL {TITER}
MITOCHONDRIA M2 IGG SER-ACNC: 55.3 UNITS (ref 0–24.9)
NUCLEAR IGG SER QL IA: NORMAL
PATHOLOGY STUDY: ABNORMAL
PROTHROM ACT/NOR PPP: 62 % (ref 90–120)

## 2020-09-29 LAB
ANNOTATION COMMENT IMP: NORMAL
RETINYL PALMITATE SERPL-MCNC: 0.03 MG/L (ref 0–0.1)
SMA IGG SER-ACNC: 25 UNITS (ref 0–19)
SMOOTH MUSCLE IGG TITR SER: ABNORMAL {TITER}
VIT A SERPL-MCNC: 0.45 MG/L (ref 0.3–1.2)
VIT B1 BLD-MCNC: 165 NMOL/L (ref 70–180)
VIT B6 SERPL-MCNC: 102 NMOL/L (ref 20–125)
VIT C SERPL-MCNC: 44 UMOL/L (ref 23–114)
ZINC SERPL-MCNC: 70.4 UG/DL (ref 60–120)

## 2020-09-30 LAB — HCV GENTYP SERPL NAA+PROBE: NORMAL

## 2020-10-01 LAB — VIT B2 SERPL-SCNC: 12 NMOL/L (ref 5–50)

## 2020-10-13 ENCOUNTER — HOSPITAL ENCOUNTER (OUTPATIENT)
Dept: LAB | Facility: MEDICAL CENTER | Age: 65
End: 2020-10-13
Attending: INTERNAL MEDICINE
Payer: COMMERCIAL

## 2020-10-13 LAB
ALBUMIN SERPL BCP-MCNC: 3.7 G/DL (ref 3.2–4.9)
ALBUMIN/GLOB SERPL: 0.8 G/DL
ALP SERPL-CCNC: 89 U/L (ref 30–99)
ALT SERPL-CCNC: 19 U/L (ref 2–50)
ANION GAP SERPL CALC-SCNC: 7 MMOL/L (ref 7–16)
ANISOCYTOSIS BLD QL SMEAR: ABNORMAL
AST SERPL-CCNC: 31 U/L (ref 12–45)
BASOPHILS # BLD AUTO: 0 % (ref 0–1.8)
BASOPHILS # BLD: 0 K/UL (ref 0–0.12)
BILIRUB SERPL-MCNC: 0.5 MG/DL (ref 0.1–1.5)
BUN SERPL-MCNC: 20 MG/DL (ref 8–22)
CALCIUM SERPL-MCNC: 8.6 MG/DL (ref 8.5–10.5)
CHLORIDE SERPL-SCNC: 102 MMOL/L (ref 96–112)
CO2 SERPL-SCNC: 26 MMOL/L (ref 20–33)
CREAT SERPL-MCNC: 0.6 MG/DL (ref 0.5–1.4)
EOSINOPHIL # BLD AUTO: 0.03 K/UL (ref 0–0.51)
EOSINOPHIL NFR BLD: 0.9 % (ref 0–6.9)
ERYTHROCYTE [DISTWIDTH] IN BLOOD BY AUTOMATED COUNT: 52.6 FL (ref 35.9–50)
ERYTHROCYTE [SEDIMENTATION RATE] IN BLOOD BY WESTERGREN METHOD: 51 MM/HOUR (ref 0–20)
FASTING STATUS PATIENT QL REPORTED: NORMAL
GLOBULIN SER CALC-MCNC: 4.4 G/DL (ref 1.9–3.5)
GLUCOSE SERPL-MCNC: 106 MG/DL (ref 65–99)
HCT VFR BLD AUTO: 40.4 % (ref 42–52)
HGB BLD-MCNC: 13.1 G/DL (ref 14–18)
LDH SERPL L TO P-CCNC: 375 U/L (ref 107–266)
LYMPHOCYTES # BLD AUTO: 2.01 K/UL (ref 1–4.8)
LYMPHOCYTES NFR BLD: 64.9 % (ref 22–41)
MACROCYTES BLD QL SMEAR: ABNORMAL
MANUAL DIFF BLD: NORMAL
MCH RBC QN AUTO: 29.2 PG (ref 27–33)
MCHC RBC AUTO-ENTMCNC: 32.4 G/DL (ref 33.7–35.3)
MCV RBC AUTO: 90.2 FL (ref 81.4–97.8)
MICROCYTES BLD QL SMEAR: ABNORMAL
MONOCYTES # BLD AUTO: 0.76 K/UL (ref 0–0.85)
MONOCYTES NFR BLD AUTO: 24.6 % (ref 0–13.4)
MORPHOLOGY BLD-IMP: NORMAL
NEUTROPHILS # BLD AUTO: 0.3 K/UL (ref 1.82–7.42)
NEUTROPHILS NFR BLD: 9.6 % (ref 44–72)
NRBC # BLD AUTO: 0 K/UL
NRBC BLD-RTO: 0 /100 WBC
PLATELET # BLD AUTO: 106 K/UL (ref 164–446)
PLATELET BLD QL SMEAR: NORMAL
PMV BLD AUTO: 11.6 FL (ref 9–12.9)
POTASSIUM SERPL-SCNC: 3.7 MMOL/L (ref 3.6–5.5)
PROT SERPL-MCNC: 8.1 G/DL (ref 6–8.2)
RBC # BLD AUTO: 4.48 M/UL (ref 4.7–6.1)
RBC BLD AUTO: PRESENT
SODIUM SERPL-SCNC: 135 MMOL/L (ref 135–145)
WBC # BLD AUTO: 3.1 K/UL (ref 4.8–10.8)

## 2020-10-13 PROCEDURE — 85027 COMPLETE CBC AUTOMATED: CPT

## 2020-10-13 PROCEDURE — 83615 LACTATE (LD) (LDH) ENZYME: CPT

## 2020-10-13 PROCEDURE — 36415 COLL VENOUS BLD VENIPUNCTURE: CPT

## 2020-10-13 PROCEDURE — 85007 BL SMEAR W/DIFF WBC COUNT: CPT

## 2020-10-13 PROCEDURE — 85652 RBC SED RATE AUTOMATED: CPT

## 2020-10-13 PROCEDURE — 80053 COMPREHEN METABOLIC PANEL: CPT

## 2020-10-19 ENCOUNTER — OFFICE VISIT (OUTPATIENT)
Dept: MEDICAL GROUP | Facility: LAB | Age: 65
End: 2020-10-19
Payer: COMMERCIAL

## 2020-10-19 VITALS
OXYGEN SATURATION: 94 % | HEART RATE: 88 BPM | RESPIRATION RATE: 16 BRPM | TEMPERATURE: 97.9 F | SYSTOLIC BLOOD PRESSURE: 165 MMHG | HEIGHT: 66 IN | WEIGHT: 125 LBS | BODY MASS INDEX: 20.09 KG/M2 | DIASTOLIC BLOOD PRESSURE: 78 MMHG

## 2020-10-19 DIAGNOSIS — C95.10 CHRONIC LEUKEMIA, NOT HAVING ACHIEVED REMISSION (HCC): ICD-10-CM

## 2020-10-19 DIAGNOSIS — I49.1 PAC (PREMATURE ATRIAL CONTRACTION): ICD-10-CM

## 2020-10-19 DIAGNOSIS — Z95.2 S/P TAVR (TRANSCATHETER AORTIC VALVE REPLACEMENT): ICD-10-CM

## 2020-10-19 DIAGNOSIS — I10 ESSENTIAL HYPERTENSION: ICD-10-CM

## 2020-10-19 DIAGNOSIS — M79.0 RHEUMATIC DISEASE: ICD-10-CM

## 2020-10-19 DIAGNOSIS — B17.10 ACUTE HEPATITIS C VIRUS INFECTION WITHOUT HEPATIC COMA: ICD-10-CM

## 2020-10-19 PROBLEM — R00.1 BRADYCARDIA: Status: RESOLVED | Noted: 2020-07-13 | Resolved: 2020-10-19

## 2020-10-19 PROCEDURE — 99215 OFFICE O/P EST HI 40 MIN: CPT | Performed by: NURSE PRACTITIONER

## 2020-10-19 RX ORDER — AMLODIPINE BESYLATE 5 MG/1
7.5 TABLET ORAL DAILY
Qty: 90 TAB | Refills: 4
Start: 2020-10-19 | End: 2020-11-30 | Stop reason: SDUPTHER

## 2020-10-19 RX ORDER — VELPATASVIR AND SOFOSBUVIR 100; 400 MG/1; MG/1
TABLET, FILM COATED ORAL
COMMUNITY
Start: 2020-10-14 | End: 2021-12-15

## 2020-10-19 ASSESSMENT — FIBROSIS 4 INDEX: FIB4 SCORE: 4.5

## 2020-10-19 ASSESSMENT — PATIENT HEALTH QUESTIONNAIRE - PHQ9: CLINICAL INTERPRETATION OF PHQ2 SCORE: 0

## 2020-10-19 NOTE — LETTER
BLOOD PRESSURE LOG FOR: Magalis Kumari    DATE/TIME  AM WEIGHT BLOOD  PRESSURE PULSE TIME  PM BLOOD  PRESSURE   PULSE                                                                                                                                                                                                                                        Current Blood Pressure Medications: (dose and frequency)    MEDICATION DOSE FREQUENCY   amlodipine 7.5 (one and 1/2 pills) At night                    Please tari any medication change (increase/decrease/new med) with a *.

## 2020-10-19 NOTE — PROGRESS NOTES
"CC  f/u    HPI  Miguelito is a 65-year-old established male here to follow-up on chronic issues.  Past medical history of hypertension, hyperlipidemia and he is status post TAVR for severe aortic stenosis.  Continues Eliquis but thinks that he can stop that this week.  During TAVR procedure pancytopenia was found and he has been diagnosed with leukemia, now followed by Dr. Chacon.  Last imaging with a CT scan of chest, abdomen and pelvis which showed a small right inguinal hernia cirrhotic change of the aorta, stable aortic valve replacement, history of splenic infarcts, and emphysema.  No current leukemia tx - has appt to see a rheumatologist in Dec and he will start dual tx.  Next appt with Dr. Chacon is tomorrow.      Hypertension: Chronic issue.  At his last visit with cardiology in July, he had hypotension and lisinopril was discontinued - he continues on amlodpine 5 mg at night.  He has not been checking his home blood pressures.  He has gained 4 pounds since July.  Last blood work was done October 13 which showed normal kidney and liver function.  States at most recent appointments, BP has been 130/80's.  Has had 2 cups of coffee this morning.  Smoker.  Feeling stressed about all of his upcoming appointments and medical issues.  Denies swelling in his ankles.    Hep C:  Cared for by Dr. Villalobos.  Now going through hep A&B immunization - twinrix accelerated.  tx with sofosbuvir -velpatasvir.  Denies abdominal pain, n/v.  Labs are UTD - next appt there is dec 7th.        Past medical, surgical, family, and social history is reviewed and updated in Epic chart by me today.   Medications and allergies reviewed and updated in Epic chart by me today.     ROS:   Denies SOB, abdominal pain, n/v.  Denies CP.  Denies headaches.  Denies heart palpitations.      Exam:  BP (!) 184/78   Pulse 88   Temp 36.6 °C (97.9 °F)   Resp 16   Ht 1.676 m (5' 6\")   Wt 56.7 kg (125 lb)   SpO2 94%    Constitutional: Alert, no distress, " plus 3 vital signs  Skin:  Warm, dry, no rashes invisible areas  Eye: Equal, round and reactive, conjunctiva clear  ENMT: Lips without lesions.  Neck: Trachea midline, no masses, no thyromegaly  Respiratory: Unlabored respiration, lungs clear to auscultation, no wheezes, no rhonchi  Cardiovascular: Normal rate and rhythm with occasional ectopy.  No edema  Psych: Alert, pleasant, well-groomed, normal affect    Assessment / Plan / Medical Decision makin. Essential hypertension  -EKG today shows sinus rhythm with PACs.  EKG will be scanned into media and sent to his cardiologist.  He did have 2 and half cups of coffee this morning.  He is feeling a bit stressed about all of his specialty appointments.  We discussed reducing his intake of caffeine, practicing stress relieving techniques such as deep breathing exercises and walking.  Encouraged him to stop smoking.  Amlodipine was increased to 7.5 mg.  He was given a blood pressure log and will keep track of his blood pressure twice daily, sending me this log weekly.  Encouraged him to the emergency department if he has the worst headache of his life or onset chest pain.  Encouraged him to reduce his salt and sugar intake.  Depending on home blood pressure readings over the next week, I will see him back here in 1 to 2 weeks.  -BP recheck prior to leaving office - 165/78 by myself.    - EKG - Clinic Performed    2. 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    3. 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 Chacon  -Has a follow-up with oncology tomorrow.  Overall moods are fine, states he is very slightly anxious but denies depression.  Influenza vaccine is up-to-date.    4. S/P TAVR (transcatheter aortic valve replacement)  - EKG - Clinic Performed.  Looking forward to discontinuing chronic anticoagulation at the end of this week.    5. Acute hepatitis C virus infection without hepatic coma  -Closely  by gastroenterology.  Tolerating hepatitis C medications well.  Appetite is good.  Obtaining Twinrix No. 2 today.    Total face to face time 40 minutes of which over 50% of this visit is spent in counseling, education and outlining a plan of treatment and coordination of care for the above conditions including updating his chart and answering any questions that he has regarding blood work/treatment ordered by his specialists. This included but was not limited to discussion of current medications, imaging results from specialists, current diet, smoking. all patient questions were answered

## 2020-10-29 ENCOUNTER — PATIENT MESSAGE (OUTPATIENT)
Dept: CARDIOLOGY | Facility: MEDICAL CENTER | Age: 65
End: 2020-10-29

## 2020-10-29 NOTE — PATIENT COMMUNICATION
Per Dr. Dennis, pt can stop oac from a cardiology standpoint but needs to run it by his hematologists.

## 2020-11-23 ENCOUNTER — HOSPITAL ENCOUNTER (OUTPATIENT)
Dept: LAB | Facility: MEDICAL CENTER | Age: 65
End: 2020-11-23
Attending: INTERNAL MEDICINE
Payer: COMMERCIAL

## 2020-11-23 LAB
ALBUMIN SERPL BCP-MCNC: 3.7 G/DL (ref 3.2–4.9)
ALBUMIN/GLOB SERPL: 0.8 G/DL
ALP SERPL-CCNC: 94 U/L (ref 30–99)
ALT SERPL-CCNC: 21 U/L (ref 2–50)
ANION GAP SERPL CALC-SCNC: 8 MMOL/L (ref 7–16)
AST SERPL-CCNC: 41 U/L (ref 12–45)
BASOPHILS # BLD AUTO: 2.7 % (ref 0–1.8)
BASOPHILS # BLD: 0.09 K/UL (ref 0–0.12)
BILIRUB SERPL-MCNC: 0.5 MG/DL (ref 0.1–1.5)
BUN SERPL-MCNC: 25 MG/DL (ref 8–22)
CALCIUM SERPL-MCNC: 9.1 MG/DL (ref 8.5–10.5)
CHLORIDE SERPL-SCNC: 101 MMOL/L (ref 96–112)
CO2 SERPL-SCNC: 26 MMOL/L (ref 20–33)
CREAT SERPL-MCNC: 0.89 MG/DL (ref 0.5–1.4)
EOSINOPHIL # BLD AUTO: 0.29 K/UL (ref 0–0.51)
EOSINOPHIL NFR BLD: 8.8 % (ref 0–6.9)
ERYTHROCYTE [DISTWIDTH] IN BLOOD BY AUTOMATED COUNT: 50.4 FL (ref 35.9–50)
GLOBULIN SER CALC-MCNC: 4.6 G/DL (ref 1.9–3.5)
GLUCOSE SERPL-MCNC: 105 MG/DL (ref 65–99)
HCT VFR BLD AUTO: 40 % (ref 42–52)
HGB BLD-MCNC: 13.3 G/DL (ref 14–18)
LYMPHOCYTES # BLD AUTO: 1.81 K/UL (ref 1–4.8)
LYMPHOCYTES NFR BLD: 54.9 % (ref 22–41)
MANUAL DIFF BLD: NORMAL
MCH RBC QN AUTO: 29.4 PG (ref 27–33)
MCHC RBC AUTO-ENTMCNC: 33.3 G/DL (ref 33.7–35.3)
MCV RBC AUTO: 88.3 FL (ref 81.4–97.8)
MONOCYTES # BLD AUTO: 0.93 K/UL (ref 0–0.85)
MONOCYTES NFR BLD AUTO: 28.3 % (ref 0–13.4)
MORPHOLOGY BLD-IMP: NORMAL
NEUTROPHILS # BLD AUTO: 0.17 K/UL (ref 1.82–7.42)
NEUTROPHILS NFR BLD: 4.4 % (ref 44–72)
NEUTS BAND NFR BLD MANUAL: 0.9 % (ref 0–10)
NRBC # BLD AUTO: 0 K/UL
NRBC BLD-RTO: 0 /100 WBC
PLATELET # BLD AUTO: 126 K/UL (ref 164–446)
PLATELET BLD QL SMEAR: NORMAL
PMV BLD AUTO: 10.6 FL (ref 9–12.9)
POTASSIUM SERPL-SCNC: 4.2 MMOL/L (ref 3.6–5.5)
PROT SERPL-MCNC: 8.3 G/DL (ref 6–8.2)
RBC # BLD AUTO: 4.53 M/UL (ref 4.7–6.1)
RBC BLD AUTO: PRESENT
SMUDGE CELLS BLD QL SMEAR: NORMAL
SODIUM SERPL-SCNC: 135 MMOL/L (ref 135–145)
WBC # BLD AUTO: 3.3 K/UL (ref 4.8–10.8)

## 2020-11-23 PROCEDURE — 36415 COLL VENOUS BLD VENIPUNCTURE: CPT

## 2020-11-23 PROCEDURE — 80053 COMPREHEN METABOLIC PANEL: CPT

## 2020-11-23 PROCEDURE — 87522 HEPATITIS C REVRS TRNSCRPJ: CPT

## 2020-11-23 PROCEDURE — 85027 COMPLETE CBC AUTOMATED: CPT

## 2020-11-23 PROCEDURE — 85007 BL SMEAR W/DIFF WBC COUNT: CPT

## 2020-11-25 LAB
HCV RNA SERPL NAA+PROBE-ACNC: NOT DETECTED IU/ML
HCV RNA SERPL NAA+PROBE-LOG IU: NOT DETECTED LOG IU/ML
HCV RNA SERPL QL NAA+PROBE: NOT DETECTED

## 2020-11-30 RX ORDER — AMLODIPINE BESYLATE 5 MG/1
7.5 TABLET ORAL DAILY
Qty: 90 TAB | Refills: 4 | Status: SHIPPED | OUTPATIENT
Start: 2020-11-30 | End: 2021-02-18 | Stop reason: SDUPTHER

## 2020-11-30 NOTE — TELEPHONE ENCOUNTER
Received request via: Patient    Was the patient seen in the last year in this department? Yes  10/19/2020  Does the patient have an active prescription (recently filled or refills available) for medication(s) requested? No

## 2020-11-30 NOTE — TELEPHONE ENCOUNTER
----- Message from Magalis Kumari sent at 11/30/2020 10:45 AM PST -----  Regarding: RE:BP  Contact: [phone]  Jarrett YuanThelmaMiguelito michel has begun takin 10mg of the amlodipine.  The pills that he currently has are 5mg.  Could you call in a new prescription for him and up the dosage to the 10mg please and thank you.  I believe he only has a few pills left.  Thanks.

## 2021-01-15 NOTE — PROGRESS NOTES
Valve Program Functional Assessment: post op    [id number]   1a) Showering/bathin  1b) Walking 1 block on ground: 4  1c) Hurrying or joggin  2) Swellin  3) Fatigue: 5  4) Shortness of breath: 7  5) Sleep sitting up: 5  6) Limited enjoyment of life: 5  7) Spend the rest of your life with HF: 2  8a) Hobbies, recreational activities:4  8b) Working or doing household chores:4  8c) Visiting family or friends: 525    5 meter walk test deferred due to COVID     Strength   1) _25_____ kg  2) _22_____ kg  3) _22_____ kg    SCHILLING ADLs  Patient independently preforms...   - Bathing: Yes   - Dressing: Yes   - Toileting: Yes   - Transferring: Yes   - Continence: Yes   - Feeding: Yes     Living Situation  Patient lives:  with spouse    Mobility Aids   Patient uses:  none

## 2021-01-22 ENCOUNTER — HOSPITAL ENCOUNTER (OUTPATIENT)
Dept: LAB | Facility: MEDICAL CENTER | Age: 66
End: 2021-01-22
Attending: INTERNAL MEDICINE
Payer: COMMERCIAL

## 2021-01-22 LAB
ALBUMIN SERPL BCP-MCNC: 4.1 G/DL (ref 3.2–4.9)
ALBUMIN/GLOB SERPL: 1.3 G/DL
ALP SERPL-CCNC: 89 U/L (ref 30–99)
ALT SERPL-CCNC: 29 U/L (ref 2–50)
ANION GAP SERPL CALC-SCNC: 10 MMOL/L (ref 7–16)
AST SERPL-CCNC: 33 U/L (ref 12–45)
BASOPHILS # BLD AUTO: 0 % (ref 0–1.8)
BASOPHILS # BLD: 0 K/UL (ref 0–0.12)
BILIRUB SERPL-MCNC: 0.6 MG/DL (ref 0.1–1.5)
BUN SERPL-MCNC: 25 MG/DL (ref 8–22)
CALCIUM SERPL-MCNC: 9 MG/DL (ref 8.5–10.5)
CHLORIDE SERPL-SCNC: 100 MMOL/L (ref 96–112)
CO2 SERPL-SCNC: 23 MMOL/L (ref 20–33)
CREAT SERPL-MCNC: 0.84 MG/DL (ref 0.5–1.4)
EOSINOPHIL # BLD AUTO: 0 K/UL (ref 0–0.51)
EOSINOPHIL NFR BLD: 0 % (ref 0–6.9)
ERYTHROCYTE [DISTWIDTH] IN BLOOD BY AUTOMATED COUNT: 59.3 FL (ref 35.9–50)
GLOBULIN SER CALC-MCNC: 3.1 G/DL (ref 1.9–3.5)
GLUCOSE SERPL-MCNC: 157 MG/DL (ref 65–99)
HCT VFR BLD AUTO: 39.8 % (ref 42–52)
HGB BLD-MCNC: 13.1 G/DL (ref 14–18)
IMM GRANULOCYTES # BLD AUTO: 0.01 K/UL (ref 0–0.11)
IMM GRANULOCYTES NFR BLD AUTO: 0.4 % (ref 0–0.9)
LYMPHOCYTES # BLD AUTO: 0.38 K/UL (ref 1–4.8)
LYMPHOCYTES NFR BLD: 14.4 % (ref 22–41)
MCH RBC QN AUTO: 31.3 PG (ref 27–33)
MCHC RBC AUTO-ENTMCNC: 32.9 G/DL (ref 33.7–35.3)
MCV RBC AUTO: 95 FL (ref 81.4–97.8)
MONOCYTES # BLD AUTO: 0.31 K/UL (ref 0–0.85)
MONOCYTES NFR BLD AUTO: 11.8 % (ref 0–13.4)
NEUTROPHILS # BLD AUTO: 1.93 K/UL (ref 1.82–7.42)
NEUTROPHILS NFR BLD: 73.4 % (ref 44–72)
NRBC # BLD AUTO: 0 K/UL
NRBC BLD-RTO: 0 /100 WBC
PLATELET # BLD AUTO: 136 K/UL (ref 164–446)
PMV BLD AUTO: 11.8 FL (ref 9–12.9)
POTASSIUM SERPL-SCNC: 4 MMOL/L (ref 3.6–5.5)
PROT SERPL-MCNC: 7.2 G/DL (ref 6–8.2)
RBC # BLD AUTO: 4.19 M/UL (ref 4.7–6.1)
SODIUM SERPL-SCNC: 133 MMOL/L (ref 135–145)
WBC # BLD AUTO: 2.6 K/UL (ref 4.8–10.8)

## 2021-01-22 PROCEDURE — 80053 COMPREHEN METABOLIC PANEL: CPT

## 2021-01-22 PROCEDURE — 87522 HEPATITIS C REVRS TRNSCRPJ: CPT

## 2021-01-22 PROCEDURE — 36415 COLL VENOUS BLD VENIPUNCTURE: CPT

## 2021-01-22 PROCEDURE — 85025 COMPLETE CBC W/AUTO DIFF WBC: CPT

## 2021-02-18 RX ORDER — AMLODIPINE BESYLATE 10 MG/1
10 TABLET ORAL DAILY
Qty: 90 TABLET | Refills: 1 | Status: SHIPPED | OUTPATIENT
Start: 2021-02-18 | End: 2021-08-30

## 2021-03-03 DIAGNOSIS — Z23 NEED FOR VACCINATION: ICD-10-CM

## 2021-05-05 ENCOUNTER — OFFICE VISIT (OUTPATIENT)
Dept: MEDICAL GROUP | Facility: LAB | Age: 66
End: 2021-05-05
Payer: COMMERCIAL

## 2021-05-05 VITALS
OXYGEN SATURATION: 94 % | SYSTOLIC BLOOD PRESSURE: 140 MMHG | WEIGHT: 126 LBS | HEART RATE: 88 BPM | HEIGHT: 66 IN | BODY MASS INDEX: 20.25 KG/M2 | DIASTOLIC BLOOD PRESSURE: 70 MMHG | TEMPERATURE: 97.6 F | RESPIRATION RATE: 12 BRPM

## 2021-05-05 DIAGNOSIS — C95.10 CHRONIC LEUKEMIA, NOT HAVING ACHIEVED REMISSION (HCC): ICD-10-CM

## 2021-05-05 DIAGNOSIS — Z91.09 ENVIRONMENTAL ALLERGIES: ICD-10-CM

## 2021-05-05 DIAGNOSIS — E78.2 MIXED HYPERLIPIDEMIA: ICD-10-CM

## 2021-05-05 DIAGNOSIS — J41.1 MUCOPURULENT CHRONIC BRONCHITIS (HCC): ICD-10-CM

## 2021-05-05 DIAGNOSIS — Z23 NEED FOR TDAP VACCINATION: ICD-10-CM

## 2021-05-05 DIAGNOSIS — Z95.2 S/P TAVR (TRANSCATHETER AORTIC VALVE REPLACEMENT): ICD-10-CM

## 2021-05-05 DIAGNOSIS — R06.02 SOB (SHORTNESS OF BREATH): ICD-10-CM

## 2021-05-05 PROBLEM — M27.2 ACUTE OSTEOMYELITIS OF MANDIBLE: Status: RESOLVED | Noted: 2020-07-12 | Resolved: 2021-05-05

## 2021-05-05 PROCEDURE — 90471 IMMUNIZATION ADMIN: CPT | Performed by: NURSE PRACTITIONER

## 2021-05-05 PROCEDURE — 99214 OFFICE O/P EST MOD 30 MIN: CPT | Mod: 25 | Performed by: NURSE PRACTITIONER

## 2021-05-05 PROCEDURE — 90715 TDAP VACCINE 7 YRS/> IM: CPT | Performed by: NURSE PRACTITIONER

## 2021-05-05 RX ORDER — IPRATROPIUM BROMIDE 17 UG/1
1 AEROSOL, METERED RESPIRATORY (INHALATION) EVERY 6 HOURS
Qty: 17 G | Refills: 3 | Status: SHIPPED | OUTPATIENT
Start: 2021-05-05

## 2021-05-05 RX ORDER — TRAZODONE HYDROCHLORIDE 50 MG/1
TABLET ORAL
COMMUNITY
Start: 2021-02-24 | End: 2022-10-27 | Stop reason: SDUPTHER

## 2021-05-05 RX ORDER — LOVASTATIN 20 MG/1
20 TABLET ORAL
Qty: 90 TABLET | Refills: 3 | Status: SHIPPED | OUTPATIENT
Start: 2021-05-05 | End: 2022-05-04

## 2021-05-05 RX ORDER — LORATADINE 10 MG/1
10 TABLET ORAL DAILY
Qty: 30 TABLET | Refills: 5 | Status: SHIPPED | OUTPATIENT
Start: 2021-05-05 | End: 2021-08-23

## 2021-05-05 RX ORDER — ALBUTEROL SULFATE 90 UG/1
2 AEROSOL, METERED RESPIRATORY (INHALATION) EVERY 4 HOURS PRN
Qty: 1 EACH | Refills: 5 | Status: SHIPPED | OUTPATIENT
Start: 2021-05-05 | End: 2023-06-06 | Stop reason: SDUPTHER

## 2021-05-05 RX ORDER — AMLODIPINE BESYLATE 5 MG/1
TABLET ORAL
COMMUNITY
Start: 2021-03-09 | End: 2021-12-15

## 2021-05-05 RX ORDER — FOLIC ACID 1 MG/1
1 TABLET ORAL
COMMUNITY
Start: 2021-02-24 | End: 2021-12-15

## 2021-05-05 RX ORDER — PREDNISONE 20 MG/1
TABLET ORAL
COMMUNITY
Start: 2021-02-18 | End: 2021-12-15

## 2021-05-05 ASSESSMENT — FIBROSIS 4 INDEX: FIB4 SCORE: 3.06

## 2021-05-05 ASSESSMENT — PATIENT HEALTH QUESTIONNAIRE - PHQ9: CLINICAL INTERPRETATION OF PHQ2 SCORE: 0

## 2021-05-05 NOTE — PROGRESS NOTES
"Chief Complaint   Patient presents with   • Shortness of Breath     mostly morning   • Cough     mornings and nights       HPI  Miguelito is a 66-year-old established male here with complaint of worsening on chronic cough / congestion / SOB.  Breathing has been worsening now for over a month.  States allergies are very bothersome right now, especially with windy environments.  Eyes are itchy and nose runs constantly.  Has a lot of mucus in the morning and that quickly makes him SOB.  SOB  / coughing is occurring a few d per week, not daily.  Cough is worse AM and again in evening but not every evening.  Denies waking up in the middle of the night coughing or SOB / does not prop self up on pillows at night.  Taking benadryl which helps a few d per week.  Chronic smoker for over 40 years.  Down to 6-8 cigarettes per day.  Activity:  Yard work but getting \"winded easily.\"    Scheduled for follow-up echocardiogram June 22.    Seeing Dr. Chacon bimonthly for leukemia, currently taking methotrexate 2.5 mg q 2 weeks - blood cell counts have drastically fluctuated recently.  Was on prednisone about a month ago - now off, does not remember breathing improving on prednisone.     Hyperlipidemia: Chronic issue.  Requesting a refill of lovastatin, came off of this for several months but is ready to go back on it.  States he came off of lovastatin per recommendation through oncology because he was experiencing nausea and they were trying to eliminate all prescription medication causes.    Past medical, surgical, family, and social history is reviewed and updated in Epic chart by me today.   Medications and allergies reviewed and updated in Epic chart by me today.     ROS:   As documented in history of present illness above    Exam:  /70 (BP Location: Left arm, Patient Position: Sitting, BP Cuff Size: Adult)   Pulse 88   Temp 36.4 °C (97.6 °F)   Resp 12   Ht 1.676 m (5' 6\")   Wt 57.2 kg (126 lb)   SpO2 94% "   Constitutional: Alert, no distress, plus 3 vital signs  Skin:  Warm, dry, no rashes invisible areas  Eye: Equal, round and reactive, conjunctiva clear  Respiratory: Unlabored respiration, lungs clear to auscultation, no wheezes, no rhonchi.  Fortunately his lungs sound pretty good today in our office.  Cardiovascular: Normal rate and rhythm.  Psych: Alert, pleasant, well-groomed, normal affect    Assessment / Plan / Medical Decision makin. Mixed hyperlipidemia  -Refilled lovastatin.  Has a follow-up coming with cardiology.  - lovastatin (MEVACOR) 20 MG Tab; Take 1 tablet by mouth every day.  Dispense: 90 tablet; Refill: 3    2. SOB (shortness of breath)  -Recommend pulmonary function testing, using albuterol 1 to 2 puffs every 4-6 hours as needed for shortness of breath or wheezing and starting a maintenance anticholinergic.  Also encouraged more intense treatment of allergies with morning Claritin and evening Benadryl.  Discussed potential dry mouth with an anticholinergic along with 2 antihistamines and he states he is a good water drinker.  I will follow-up with him after pulmonary function test returns.  - PULMONARY FUNCTION TESTS -Test requested: Complete Pulmonary Function Test; Include MIPS/MEPS? Yes; Future  - albuterol 108 (90 Base) MCG/ACT Aero Soln inhalation aerosol; Inhale 2 Puffs every four hours as needed for Shortness of Breath.  Dispense: 1 Each; Refill: 5  - Umeclidinium Bromide (INCRUSE ELLIPTA) 62.5 MCG/INH AEROSOL POWDER, BREATH ACTIVATED; Inhale 1 Inhalation every day.  Dispense: 30 Each; Refill: 11    3. Mucopurulent chronic bronchitis (HCC)  - PULMONARY FUNCTION TESTS -Test requested: Complete Pulmonary Function Test; Include MIPS/MEPS? Yes; Future  - albuterol 108 (90 Base) MCG/ACT Aero Soln inhalation aerosol; Inhale 2 Puffs every four hours as needed for Shortness of Breath.  Dispense: 1 Each; Refill: 5  - Umeclidinium Bromide (INCRUSE ELLIPTA) 62.5 MCG/INH AEROSOL POWDER, BREATH  ACTIVATED; Inhale 1 Inhalation every day.  Dispense: 30 Each; Refill: 11    4. Need for Tdap vaccination  - Tdap =>6yo IM    5. Environmental allergies  - loratadine (CLARITIN) 10 MG Tab; Take 1 tablet by mouth every day. In AM  Dispense: 30 tablet; Refill: 5

## 2021-05-17 ENCOUNTER — HOSPITAL ENCOUNTER (OUTPATIENT)
Dept: LAB | Facility: MEDICAL CENTER | Age: 66
End: 2021-05-17
Attending: NURSE PRACTITIONER
Payer: COMMERCIAL

## 2021-05-17 LAB
ALBUMIN SERPL BCP-MCNC: 4.4 G/DL (ref 3.2–4.9)
ALBUMIN/GLOB SERPL: 1.1 G/DL
ALP SERPL-CCNC: 85 U/L (ref 30–99)
ALT SERPL-CCNC: 20 U/L (ref 2–50)
ANION GAP SERPL CALC-SCNC: 11 MMOL/L (ref 7–16)
AST SERPL-CCNC: 30 U/L (ref 12–45)
BASOPHILS # BLD AUTO: 2.2 % (ref 0–1.8)
BASOPHILS # BLD: 0.1 K/UL (ref 0–0.12)
BILIRUB SERPL-MCNC: 0.5 MG/DL (ref 0.1–1.5)
BUN SERPL-MCNC: 24 MG/DL (ref 8–22)
CALCIUM SERPL-MCNC: 9.7 MG/DL (ref 8.5–10.5)
CHLORIDE SERPL-SCNC: 102 MMOL/L (ref 96–112)
CO2 SERPL-SCNC: 23 MMOL/L (ref 20–33)
CREAT SERPL-MCNC: 0.88 MG/DL (ref 0.5–1.4)
EOSINOPHIL # BLD AUTO: 0.12 K/UL (ref 0–0.51)
EOSINOPHIL NFR BLD: 2.6 % (ref 0–6.9)
ERYTHROCYTE [DISTWIDTH] IN BLOOD BY AUTOMATED COUNT: 43.8 FL (ref 35.9–50)
FASTING STATUS PATIENT QL REPORTED: NORMAL
FERRITIN SERPL-MCNC: 88.2 NG/ML (ref 22–322)
GLOBULIN SER CALC-MCNC: 3.9 G/DL (ref 1.9–3.5)
GLUCOSE SERPL-MCNC: 102 MG/DL (ref 65–99)
HCT VFR BLD AUTO: 44.7 % (ref 42–52)
HGB BLD-MCNC: 15.2 G/DL (ref 14–18)
IMM GRANULOCYTES # BLD AUTO: 0.01 K/UL (ref 0–0.11)
IMM GRANULOCYTES NFR BLD AUTO: 0.2 % (ref 0–0.9)
INR PPP: 1.04 (ref 0.87–1.13)
IRON SATN MFR SERPL: 19 % (ref 15–55)
IRON SERPL-MCNC: 62 UG/DL (ref 50–180)
LYMPHOCYTES # BLD AUTO: 1.51 K/UL (ref 1–4.8)
LYMPHOCYTES NFR BLD: 32.8 % (ref 22–41)
MCH RBC QN AUTO: 29.8 PG (ref 27–33)
MCHC RBC AUTO-ENTMCNC: 34 G/DL (ref 33.7–35.3)
MCV RBC AUTO: 87.6 FL (ref 81.4–97.8)
MONOCYTES # BLD AUTO: 1.31 K/UL (ref 0–0.85)
MONOCYTES NFR BLD AUTO: 28.5 % (ref 0–13.4)
NEUTROPHILS # BLD AUTO: 1.55 K/UL (ref 1.82–7.42)
NEUTROPHILS NFR BLD: 33.7 % (ref 44–72)
NRBC # BLD AUTO: 0 K/UL
NRBC BLD-RTO: 0 /100 WBC
PLATELET # BLD AUTO: 178 K/UL (ref 164–446)
PMV BLD AUTO: 11.1 FL (ref 9–12.9)
POTASSIUM SERPL-SCNC: 4.1 MMOL/L (ref 3.6–5.5)
PROT SERPL-MCNC: 8.3 G/DL (ref 6–8.2)
PROTHROMBIN TIME: 13.9 SEC (ref 12–14.6)
RBC # BLD AUTO: 5.1 M/UL (ref 4.7–6.1)
SODIUM SERPL-SCNC: 136 MMOL/L (ref 135–145)
TIBC SERPL-MCNC: 327 UG/DL (ref 250–450)
UIBC SERPL-MCNC: 265 UG/DL (ref 110–370)
WBC # BLD AUTO: 4.6 K/UL (ref 4.8–10.8)

## 2021-05-17 PROCEDURE — 83540 ASSAY OF IRON: CPT

## 2021-05-17 PROCEDURE — 85025 COMPLETE CBC W/AUTO DIFF WBC: CPT

## 2021-05-17 PROCEDURE — 83516 IMMUNOASSAY NONANTIBODY: CPT

## 2021-05-17 PROCEDURE — 82107 ALPHA-FETOPROTEIN L3: CPT

## 2021-05-17 PROCEDURE — 87522 HEPATITIS C REVRS TRNSCRPJ: CPT

## 2021-05-17 PROCEDURE — 85610 PROTHROMBIN TIME: CPT

## 2021-05-17 PROCEDURE — 83550 IRON BINDING TEST: CPT

## 2021-05-17 PROCEDURE — 36415 COLL VENOUS BLD VENIPUNCTURE: CPT

## 2021-05-17 PROCEDURE — 80053 COMPREHEN METABOLIC PANEL: CPT

## 2021-05-17 PROCEDURE — 82728 ASSAY OF FERRITIN: CPT

## 2021-05-19 LAB
MITOCHONDRIA M2 IGG SER-ACNC: 38.7 UNITS (ref 0–24.9)
SMA IGG SER-ACNC: 18 UNITS (ref 0–19)

## 2021-05-22 LAB
AFP L3 MFR SERPL: <0.5 % (ref 0–9.9)
AFP SERPL-MCNC: 2 NG/ML (ref 0–15)

## 2021-06-09 ENCOUNTER — HOSPITAL ENCOUNTER (OUTPATIENT)
Dept: PULMONOLOGY | Facility: MEDICAL CENTER | Age: 66
End: 2021-06-09
Attending: NURSE PRACTITIONER
Payer: COMMERCIAL

## 2021-06-09 DIAGNOSIS — R06.02 SOB (SHORTNESS OF BREATH): ICD-10-CM

## 2021-06-09 DIAGNOSIS — J41.1 MUCOPURULENT CHRONIC BRONCHITIS (HCC): ICD-10-CM

## 2021-06-09 PROCEDURE — 94060 EVALUATION OF WHEEZING: CPT

## 2021-06-09 PROCEDURE — 94729 DIFFUSING CAPACITY: CPT

## 2021-06-09 PROCEDURE — 94726 PLETHYSMOGRAPHY LUNG VOLUMES: CPT

## 2021-06-09 RX ORDER — ALBUTEROL SULFATE 90 UG/1
2 AEROSOL, METERED RESPIRATORY (INHALATION)
Status: DISCONTINUED | OUTPATIENT
Start: 2021-06-09 | End: 2021-06-10 | Stop reason: HOSPADM

## 2021-06-09 ASSESSMENT — PULMONARY FUNCTION TESTS
FVC: 3.37
FEV1/FVC_PERCENT_LLN: 64.29
FEV1/FVC_PERCENT_PREDICTED: 58
FEV1_PERCENT_CHANGE: 15
FEV1_PREDICTED: 2.89
FEV1_LLN: 2.41
FEV1_PERCENT_PREDICTED: 57
FEV1/FVC_PERCENT_CHANGE: 5
FEV1/FVC_PERCENT_PREDICTED: 55
FEV1/FVC_PERCENT_CHANGE: 167
FEV1/FVC_PERCENT_PREDICTED: 56
FEV1: 1.45
FEV1/FVC: 45.26
FEV1_PERCENT_PREDICTED: 50
FEV1/FVC: 45.26
FVC_PREDICTED: 3.75
FVC: 3.69
FVC_LLN: 3.14
FEV1/FVC_PERCENT_PREDICTED: 58
FEV1/FVC: 43
FEV1_PERCENT_CHANGE: 9
FEV1_LLN: 2.41
FVC_PERCENT_PREDICTED: 89
FEV1/FVC_PREDICTED: 76.99
FVC_LLN: 3.14
FVC_PERCENT_PREDICTED: 98
FEV1/FVC_PERCENT_PREDICTED: 77
FEV1/FVC_PERCENT_LLN: 64.29
FEV1/FVC: 43.01
FEV1: 1.67

## 2021-06-10 PROCEDURE — 94729 DIFFUSING CAPACITY: CPT | Mod: 26 | Performed by: INTERNAL MEDICINE

## 2021-06-10 PROCEDURE — 94060 EVALUATION OF WHEEZING: CPT | Mod: 26 | Performed by: INTERNAL MEDICINE

## 2021-06-10 PROCEDURE — 94726 PLETHYSMOGRAPHY LUNG VOLUMES: CPT | Mod: 26 | Performed by: INTERNAL MEDICINE

## 2021-06-10 NOTE — PROCEDURES
DATE OF SERVICE:  06/09/2021     PULMONARY FUNCTION TEST INTERPRETATION    This exam is performed with a primary diagnosis of shortness of breath to help establish a pre-test probability of the patient’s diagnostic findings.    The Flow Volume Loop is of sufficient quality and the volume-time graph demonstrates an adequate exhalation to provide a confident interpretation. Review of the flow-volume loop reveals a concave pattern suggestive of obstruction. The volume-time loop fails to demonstrate a plateau which may indicate an incomplete exhalation phase.    The FEV1/FVC ratio is reduced with an abnormally reduced FEV1 and normal FVC based on predicted values. This pattern is consistent with an obstructive ventilatory defect which is moderate by percent predicted FEV1. After administration of 2 puffs of albuterol, the patient achieved a significant bronchodilator response (12% and 200 ml in FEV1).      Total lung capacity and residual volume are elevated indicating hyperinflation and air trapping. There is a moderate (40-59% percent predicted) reduction in DLCO.     Impression:   1. Moderate airflow obstruction   2. There is significant response to bronchodilator. In the proper clinical setting of cough, wheeze, chest pressure, and/or dyspnea, this study would be supportive of a diagnosis of asthma.  3. Air trapping and hyperinflation     ______________________________  MD ADAN Hubbard/ARNALDO/TIMOTHY    DD:  06/09/2021 17:02  DT:  06/09/2021 18:22    Job#:  [id number]

## 2021-06-22 ENCOUNTER — HOSPITAL ENCOUNTER (OUTPATIENT)
Dept: RADIOLOGY | Facility: MEDICAL CENTER | Age: 66
End: 2021-06-22
Attending: INTERNAL MEDICINE
Payer: COMMERCIAL

## 2021-06-22 ENCOUNTER — HOSPITAL ENCOUNTER (OUTPATIENT)
Dept: CARDIOLOGY | Facility: MEDICAL CENTER | Age: 66
End: 2021-06-22
Attending: INTERNAL MEDICINE
Payer: COMMERCIAL

## 2021-06-22 DIAGNOSIS — K74.60 CIRRHOSIS OF LIVER WITHOUT ASCITES, UNSPECIFIED HEPATIC CIRRHOSIS TYPE (HCC): ICD-10-CM

## 2021-06-22 DIAGNOSIS — C91.Z0 LARGE GRANULAR LYMPHOCYTIC LEUKEMIA (HCC): ICD-10-CM

## 2021-06-22 DIAGNOSIS — R76.8 HEPATITIS C ANTIBODY POSITIVE IN BLOOD: ICD-10-CM

## 2021-06-22 DIAGNOSIS — Z95.2 S/P TAVR (TRANSCATHETER AORTIC VALVE REPLACEMENT): ICD-10-CM

## 2021-06-22 PROCEDURE — 76700 US EXAM ABDOM COMPLETE: CPT

## 2021-06-22 PROCEDURE — 93306 TTE W/DOPPLER COMPLETE: CPT

## 2021-06-23 LAB
LV EJECT FRACT  99904: 50
LV EJECT FRACT MOD 2C 99903: 54.53
LV EJECT FRACT MOD 4C 99902: 52.37
LV EJECT FRACT MOD BP 99901: 53.39

## 2021-06-23 PROCEDURE — 93306 TTE W/DOPPLER COMPLETE: CPT | Mod: 26 | Performed by: INTERNAL MEDICINE

## 2021-08-21 DIAGNOSIS — Z91.09 ENVIRONMENTAL ALLERGIES: ICD-10-CM

## 2021-08-23 RX ORDER — LORATADINE 10 MG/1
TABLET ORAL
Qty: 90 TABLET | Refills: 1 | Status: SHIPPED | OUTPATIENT
Start: 2021-08-23 | End: 2022-10-27

## 2021-08-23 NOTE — TELEPHONE ENCOUNTER
Received request via: Pharmacy  5/5/2021LOV  Was the patient seen in the last year in this department? Yes    Does the patient have an active prescription (recently filled or refills available) for medication(s) requested? No

## 2021-08-30 RX ORDER — AMLODIPINE BESYLATE 10 MG/1
10 TABLET ORAL DAILY
Qty: 30 TABLET | Refills: 5 | Status: SHIPPED | OUTPATIENT
Start: 2021-08-30 | End: 2022-03-07

## 2021-12-15 ENCOUNTER — TELEPHONE (OUTPATIENT)
Dept: CARDIOLOGY | Facility: MEDICAL CENTER | Age: 66
End: 2021-12-15

## 2021-12-15 ENCOUNTER — OFFICE VISIT (OUTPATIENT)
Dept: CARDIOLOGY | Facility: MEDICAL CENTER | Age: 66
End: 2021-12-15
Payer: COMMERCIAL

## 2021-12-15 VITALS
DIASTOLIC BLOOD PRESSURE: 60 MMHG | RESPIRATION RATE: 12 BRPM | SYSTOLIC BLOOD PRESSURE: 140 MMHG | OXYGEN SATURATION: 93 % | HEART RATE: 82 BPM | HEIGHT: 66 IN | WEIGHT: 121 LBS | BODY MASS INDEX: 19.44 KG/M2

## 2021-12-15 DIAGNOSIS — I73.9 PVD (PERIPHERAL VASCULAR DISEASE) (HCC): ICD-10-CM

## 2021-12-15 DIAGNOSIS — Z95.2 S/P TAVR (TRANSCATHETER AORTIC VALVE REPLACEMENT): ICD-10-CM

## 2021-12-15 PROCEDURE — 99214 OFFICE O/P EST MOD 30 MIN: CPT | Performed by: INTERNAL MEDICINE

## 2021-12-15 ASSESSMENT — FIBROSIS 4 INDEX: FIB4 SCORE: 2.56

## 2021-12-15 NOTE — PROGRESS NOTES
Cardiology Follow-up Consultation Note    Date of note:   12/15/2021  Primary Care Provider: MARIA GUADALUPE Sims    Name:             Magalis Kumari   YOB: 1955  MRN:               [id number]    CC: Post TAVR 1 year follow-up    Patient ID/HPI:   66-year-old male patient with history of hypertension hyperlipidemia, severe aortic stenosis s/p transcatheter aortic valve replacement, leukemia here for follow-up.  He feels well no chest pain.  He has episodic shortness of breath, diagnosed with asthma, he has rescue inhalers, which helps with these episodes.  Following up with heme-onc regarding his leukemia.    Past Medical History:   Diagnosis Date   • Dental disorder     upper lower  dentures   • Dental disorder     dental extraction 4/22/20   • Heart valve disease     aortic   • High cholesterol    • Hyperlipidemia    • Hypertension    • Stented coronary artery 2008    3 vessel         Past Surgical History:   Procedure Laterality Date   • SUBMANDIBLE ABSCESS INCISION AND DRAINAGE N/A 7/12/2020    Procedure: INCISION AND DRAINAGE, ABSCESS, SUBMANDIBULAR REGION;  Surgeon: Brenden Morrison D.D.SKym;  Location: [facility];  Service: Oral Surgery   • MANDIBLE FRACTURE ORIF Bilateral 7/3/2020    Procedure: ORIF, FRACTURE, MANDIBLE;  Surgeon: Brenden Morrison D.D.S.;  Location: [facility];  Service: Oral Surgery   • TRANSCATHETER AORTIC VALVE REPLACEMENT N/A 6/22/2020    Procedure: REPLACEMENT, AORTIC VALVE, TRANSCATHETER- WITH ANY ASSOCIATED PROCEDURES;  Surgeon: Luiz Dennis M.D.;  Location: [facility];  Service: Cardiac   • JOON N/A 6/22/2020    Procedure: ECHOCARDIOGRAM,;  Surgeon: Luiz Dennis M.D.;  Location: [facility];  Service: Cardiac   • ANGIOGRAM  05/22/2020   • OTHER CARDIAC SURGERY  2009    stents x3   • BONE SPUR EXCISION  1993    left ankle   • STAPEDECTOMY  1980s         Current Outpatient  Medications   Medication Sig Dispense Refill   • Zinc Sulfate (ZINC 15 PO) Take  by mouth.     • aspirin EC (ECOTRIN) 81 MG Tablet Delayed Response Take 81 mg by mouth every day.     • amLODIPine (NORVASC) 10 MG Tab TAKE 1 TABLET BY MOUTH EVERY DAY. AT NIGHT 30 Tablet 5   • loratadine (CLARITIN) 10 MG Tab TAKE 1 TABLET BY MOUTH EVERY DAY IN THE AM 90 Tablet 1   • methotrexate 2.5 MG Tab      • lovastatin (MEVACOR) 20 MG Tab Take 1 tablet by mouth every day. 90 tablet 3   • albuterol 108 (90 Base) MCG/ACT Aero Soln inhalation aerosol Inhale 2 Puffs every four hours as needed for Shortness of Breath. 1 Each 5   • ipratropium (ATROVENT HFA) 17 MCG/ACT Aero Soln Inhale 1 Puff every 6 hours. Every day while awake 17 g 3   • IRON, FERROUS SULFATE, PO One per day     • Multiple Vitamins-Minerals (ONE-A-DAY MENS 50+ ADVANTAGE PO) Take  by mouth.     • traZODone (DESYREL) 50 MG Tab        No current facility-administered medications for this visit.         Allergies   Allergen Reactions   • Heparin      Heparin antibody positive 7/4/2020  BULL postive 7/8/2020   • Seasonal Cough and Runny Nose         Family History   Problem Relation Age of Onset   • Heart Disease Mother    • Hypertension Mother    • Diabetes Mother    • No Known Problems Father    • Heart Disease Maternal Uncle    • Psychiatric Illness Paternal Uncle    • Diabetes Paternal Uncle    • Heart Disease Paternal Uncle          Social History     Socioeconomic History   • Marital status:      Spouse name: Not on file   • Number of children: 1   • Years of education: Not on file   • Highest education level: 12th grade   Occupational History   • Not on file   Tobacco Use   • Smoking status: Current Every Day Smoker     Packs/day: 0.25     Years: 45.00     Pack years: 11.25     Types: Cigarettes   • Smokeless tobacco: Never Used   Vaping Use   • Vaping Use: Never used   Substance and Sexual Activity   • Alcohol use: Yes     Alcohol/week: 0.0 oz     Comment:  "1-2 per day occ   • Drug use: No   • Sexual activity: Not on file   Other Topics Concern   • Not on file   Social History Narrative   • Not on file     Social Determinants of Health     Financial Resource Strain:    • Difficulty of Paying Living Expenses: Not on file   Food Insecurity:    • Worried About Running Out of Food in the Last Year: Not on file   • Ran Out of Food in the Last Year: Not on file   Transportation Needs:    • Lack of Transportation (Medical): Not on file   • Lack of Transportation (Non-Medical): Not on file   Physical Activity:    • Days of Exercise per Week: Not on file   • Minutes of Exercise per Session: Not on file   Stress:    • Feeling of Stress : Not on file   Social Connections:    • Frequency of Communication with Friends and Family: Not on file   • Frequency of Social Gatherings with Friends and Family: Not on file   • Attends Anabaptism Services: Not on file   • Active Member of Clubs or Organizations: Not on file   • Attends Club or Organization Meetings: Not on file   • Marital Status: Not on file   Intimate Partner Violence:    • Fear of Current or Ex-Partner: Not on file   • Emotionally Abused: Not on file   • Physically Abused: Not on file   • Sexually Abused: Not on file   Housing Stability:    • Unable to Pay for Housing in the Last Year: Not on file   • Number of Places Lived in the Last Year: Not on file   • Unstable Housing in the Last Year: Not on file         Physical Exam:  Ambulatory Vitals  /60 (BP Location: Left arm, Patient Position: Sitting, BP Cuff Size: Adult)   Pulse 82   Resp 12   Ht 1.676 m (5' 6\")   Wt 54.9 kg (121 lb)   SpO2 93%    Oxygen Therapy:  Pulse Oximetry: 93 %  BP Readings from Last 4 Encounters:   12/15/21 140/60   05/05/21 140/70   10/19/20 (!) 165/78   07/29/20 (!) 94/60       Weight/BMI: Body mass index is 19.53 kg/m².  Wt Readings from Last 4 Encounters:   12/15/21 54.9 kg (121 lb)   05/05/21 57.2 kg (126 lb)   10/19/20 56.7 kg (125 " lb)   07/29/20 55.2 kg (121 lb 9.3 oz)     General: Well appearing and in no apparent distress  Head: atrumatic  Eyes: No conjunctival pallor   ENT: normal external appearance of nose and ears  Neck: JVD absent, carotid bruits absent  Lungs: respiratory sounds  normal, additional breath sounds absent  Heart: Regular rhythm,   No palpable thrills on palpation, murmurs absent, no rubs,   Lower extremity edema absent.   Pedal pulses 1+ on the right side, 2+ left side        Lab Data Review:  Lab Results   Component Value Date/Time    CHOLSTRLTOT 117 03/09/2020 01:50 PM    LDL 70 03/09/2020 01:50 PM    HDL 29 (A) 03/09/2020 01:50 PM    TRIGLYCERIDE 89 03/09/2020 01:50 PM       Lab Results   Component Value Date/Time    SODIUM 136 05/17/2021 09:58 AM    POTASSIUM 4.1 05/17/2021 09:58 AM    CHLORIDE 102 05/17/2021 09:58 AM    CO2 23 05/17/2021 09:58 AM    GLUCOSE 102 (H) 05/17/2021 09:58 AM    BUN 24 (H) 05/17/2021 09:58 AM    CREATININE 0.88 05/17/2021 09:58 AM     Lab Results   Component Value Date/Time    ALKPHOSPHAT 85 05/17/2021 09:58 AM    ASTSGOT 30 05/17/2021 09:58 AM    ALTSGPT 20 05/17/2021 09:58 AM    TBILIRUBIN 0.5 05/17/2021 09:58 AM      Lab Results   Component Value Date/Time    WBC 4.6 (L) 05/17/2021 09:58 AM     Echo 6/29/20  CONCLUSIONS  Known TAVR aortic valve that has a mildly increased gradients.  No paravaluvar leak or signficant aortic insufficiency seen.  No vegetation seen    Impression and Plan:  66-year-old male patient with  1.  Severe aortic stenosis status post TAVR, NYHA class II stage C  2.  Nonocclusive right common femoral artery thrombus related to procedure  3.  Leukemia, pancytopenia  4.  Possible heparin-induced thrombocytopenia  5.  COPD/asthma    He is stable from cardiovascular standpoint.  Follow-up echocardiogram in June 2021 shows normal valve function.  We will get arterial Doppler right lower extremity follow-up on his right common femoral artery stenosis.  Continue current  therapy with aspirin, amlodipine.    1 year follow-up, sooner if problems arise        Luiz GUTIERREZ  Interventional cardiologist  [facility][1] Medicine, [facility]  [address] [address]  [place] [address]  Phone: [phone]  Fax: [phone]

## 2021-12-15 NOTE — TELEPHONE ENCOUNTER
Valve Program Functional Assessment: 1 year s/p TAVR    KCCQ12   1a) Showering/bathin  1b) Walking 1 block on ground: 4  1c) Hurrying or joggin  2) Swellin  3) Fatigue: 7  4) Shortness of breath: 7  5) Sleep sitting up: 5  6) Limited enjoyment of life: 5  7) Spend the rest of your life with HF: 5  8a) Hobbies, recreational activities:5  8b) Working or doing household chores:5  8c) Visiting family or friends: 5    5 meter walk test  1) __4.56____ s/5m  2) __5.61____ s/5m  3) __4.64____ s/5m     Strength   1) _34_____ kg  2) _34_____ kg  3) _30_____ kg    SCHILLING ADLs  Patient independently preforms...   - Bathing: Yes   - Dressing: Yes   - Toileting: Yes   - Transferring: Yes   - Continence: Yes   - Feeding: Yes     Living Situation  Patient lives: with spouse    Mobility Aids   Patient uses: none

## 2022-03-07 RX ORDER — AMLODIPINE BESYLATE 10 MG/1
10 TABLET ORAL DAILY
Qty: 30 TABLET | Refills: 5 | Status: SHIPPED | OUTPATIENT
Start: 2022-03-07 | End: 2022-08-31

## 2022-05-03 ENCOUNTER — OFFICE VISIT (OUTPATIENT)
Dept: MEDICAL GROUP | Facility: LAB | Age: 67
End: 2022-05-03
Payer: MEDICARE

## 2022-05-03 VITALS
HEART RATE: 98 BPM | SYSTOLIC BLOOD PRESSURE: 130 MMHG | OXYGEN SATURATION: 96 % | WEIGHT: 113 LBS | TEMPERATURE: 97.5 F | RESPIRATION RATE: 16 BRPM | HEIGHT: 66 IN | BODY MASS INDEX: 18.16 KG/M2 | DIASTOLIC BLOOD PRESSURE: 64 MMHG

## 2022-05-03 DIAGNOSIS — Z23 NEED FOR PNEUMOCOCCAL VACCINATION: ICD-10-CM

## 2022-05-03 DIAGNOSIS — M25.561 ACUTE PAIN OF RIGHT KNEE: ICD-10-CM

## 2022-05-03 PROCEDURE — G0009 ADMIN PNEUMOCOCCAL VACCINE: HCPCS | Performed by: NURSE PRACTITIONER

## 2022-05-03 PROCEDURE — 99213 OFFICE O/P EST LOW 20 MIN: CPT | Mod: 25 | Performed by: NURSE PRACTITIONER

## 2022-05-03 PROCEDURE — 90677 PCV20 VACCINE IM: CPT | Performed by: NURSE PRACTITIONER

## 2022-05-03 RX ORDER — MELOXICAM 15 MG/1
15 TABLET ORAL DAILY
Qty: 30 TABLET | Refills: 1 | Status: SHIPPED | OUTPATIENT
Start: 2022-05-03 | End: 2022-10-27

## 2022-05-03 ASSESSMENT — PATIENT HEALTH QUESTIONNAIRE - PHQ9: CLINICAL INTERPRETATION OF PHQ2 SCORE: 0

## 2022-05-03 ASSESSMENT — FIBROSIS 4 INDEX: FIB4 SCORE: 2.6

## 2022-05-03 NOTE — PROGRESS NOTES
"CC  Knee pain      HPI:  Miguelito is a 67-year-old established male here with complaint of new onset right knee pain:  Newly sore, swollen and needs brace for pain relief.  Using sports tape and muscle rub.  No hx of same.   Duration: 3 weeks after working in yard without a fall / injury.  Denies popping / clicking / locking.  Better in AM than throughout the day.   Taking tylenol only for knee.  Known osteo and rheumatoid arthritis.  No hx of GI bleeding or PUD.   No hx of CKD.    Taking Tylenol for pain which is not helping.    Exam:  /64 (BP Location: Right arm, Patient Position: Sitting, BP Cuff Size: Adult)   Pulse 98   Temp 36.4 °C (97.5 °F)   Resp 16   Ht 1.676 m (5' 6\")   Wt 51.3 kg (113 lb)   SpO2 96%   Gen. appears healthy in no distress   Skin appropriate for sex and age   Neck trachea is midline  Lungs unlabored breathing  Heart regular rate  Musculoskeletal: He does have obvious swelling and an effusion to his right knee but does not have any bony tenderness, erythema or deformity.  He does have full range of motion of his right knee.  Neuro gait and station normal   Psych appropriate, calm, interactive, well-groomed    A/P:    1. Acute pain of right knee     2. Need for pneumococcal vaccination  Pneumococcal Conjugate Vaccine 20-Valent (19 yrs+)     Acute right knee pain without injury, x-ray not indicated.  He requested prescription strength ibuprofen but we discussed a trial of once daily meloxicam 15 mg with food.  Discussed GI and renal precautions with NSAIDs and he will notify me if he begins to have upper abdominal pain/stop meloxicam.  May continue Tylenol if desired for pain, below 2000 mg/day because of his history of cirrhosis from hepatitis C.  Discussed ice, elevation and continued brace use.  Fortunately pain is slowly improving.  Discussed intra-articular injection if needed and imaging in a few weeks if pain has not resolved.    PCV 20 given today.  Patient was counseled " regarding all immunizations, what the patient is being immunized against, possible side effects, and the importance of immunization.       In terms of healthcare maintenance, encouraged him to call his GI doctor for colon cancer screening.

## 2022-05-04 DIAGNOSIS — E78.2 MIXED HYPERLIPIDEMIA: ICD-10-CM

## 2022-05-04 RX ORDER — LOVASTATIN 20 MG/1
20 TABLET ORAL
Qty: 90 TABLET | Refills: 3 | Status: SHIPPED | OUTPATIENT
Start: 2022-05-04 | End: 2023-05-05

## 2022-08-10 NOTE — PROGRESS NOTES
[facility] Medicine Daily Progress Note    Date of Service  7/10/2020    Chief Complaint  65 y.o. male admitted 6/23/2020 with tachycardia.  He had a TAVR on 6/22/2020, and was discharged home on 6/23/2020.  He returned to the ER due to persistent tachycardia.     He has a history of pancytopenia which is worsening, and has been hesitant to do a bone marrow biopsy.  He has tobacco dependence, essential hypertension, hyperlipidemia.       Hospital Course    Home Eliquis and aspirin were discontinued.  Heart rate stabilized. He continued to have decline in his hemoglobin and platelets. WBC stabilized. Briefly discussed with hem/onc.  Abdominal ultrasound from 6/25/2020 showed no acute abnormalities.  SPEP, smooth muscle antibodies, SEEMA, rheumatoid factor, B12 and folate levels, fibrinogen were ordered.  Patient wished to go home and agreed follow-up with hematology for bone marrow biopsy and further work-up.  He was discharged to go home on 6/26/2020, but developed a fever of 103.6.     Infectious disease were consulted on 6/27/2020 due to fever and neutropenia, Cefepime was empirically started.     B12, folate, and fibrinogen levels were normal.  Rheumatoid factor was 40, ESR was 45, LFTs were unremarkable, SEEMA was undetectable, HIV was nonreactive.      Interval Problem Update    FEVER 103 THIS MORNING    Low  Hb 8.2  Low plt 60    bp is BETTER    Wbc INCREASED TO 2.0 WITH INCREASED ANC 0.24    Sodium 132    Chin pain, minimal    Discussed with ID MD as well as oncology MD      S/p orif mandible on 7/3    Hit positive-- BULL positive        echoCardiogram shows no vegetations on valves      Consultants/Specialty  ID dr beauchamp  Oncology dr mccray    Code Status  full    Disposition  home    Review of Systems  Review of Systems   Constitutional: Positive for fever. Negative for chills, malaise/fatigue and weight loss.   HENT: Negative for ear discharge, hearing loss and tinnitus.    Eyes: Negative for blurred vision,  Look for B-12 1,000 mcg - SUBLINGUAL tablet - allow one to melt under tongue daily.   double vision and photophobia.   Respiratory: Negative for cough and sputum production.    Cardiovascular: Negative for palpitations, orthopnea and claudication.   Gastrointestinal: Negative for abdominal pain, heartburn, nausea and vomiting.   Genitourinary: Negative for frequency and urgency.   Musculoskeletal: Positive for joint pain and myalgias. Negative for back pain and neck pain.   Neurological: Negative for dizziness, tremors, focal weakness and headaches.   Endo/Heme/Allergies: Does not bruise/bleed easily.   Psychiatric/Behavioral: Negative for depression and substance abuse.        Physical Exam  Temp:  [36.6 °C (97.8 °F)-38.9 °C (102.1 °F)] 36.6 °C (97.8 °F)  Pulse:  [57-85] 57  Resp:  [17-18] 17  BP: (104-157)/(53-75) 104/61  SpO2:  [89 %-94 %] 92 %    Physical Exam  Constitutional:       Appearance: Normal appearance. He is not diaphoretic.   HENT:      Head:      Comments: Submandibular swelling/induration and tenderness- MINIMAL         Mouth/Throat:      Mouth: Mucous membranes are moist.   Eyes:      General: No scleral icterus.     Extraocular Movements: Extraocular movements intact.      Pupils: Pupils are equal, round, and reactive to light.   Neck:      Musculoskeletal: Normal range of motion and neck supple.   Cardiovascular:      Rate and Rhythm: Normal rate.      Heart sounds: No murmur. No gallop.    Pulmonary:      Effort: Pulmonary effort is normal. No respiratory distress.      Breath sounds: No stridor. No wheezing.   Chest:      Chest wall: No tenderness.   Abdominal:      General: Abdomen is flat. There is no distension.      Tenderness: There is no guarding.      Hernia: No hernia is present.   Musculoskeletal: Normal range of motion.         General: Deformity (right fifth toe) and signs of injury present. No swelling or tenderness.      Right lower leg: No edema.   Skin:     General: Skin is warm.      Capillary Refill: Capillary refill takes 2 to 3 seconds.      Coloration: Skin  is not jaundiced or pale.      Findings: No bruising or rash.   Neurological:      General: No focal deficit present.      Mental Status: He is alert and oriented to person, place, and time.      Cranial Nerves: No cranial nerve deficit.      Deep Tendon Reflexes: Reflexes normal.   Psychiatric:         Mood and Affect: Mood normal.         Fluids    Intake/Output Summary (Last 24 hours) at 7/10/2020 1118  Last data filed at 7/10/2020 0500  Gross per 24 hour   Intake 660 ml   Output 700 ml   Net -40 ml       Laboratory  Recent Labs     07/08/20 0255 07/09/20  0332 07/10/20  0412   WBC 2.1* 1.8* 2.0*   RBC 2.87* 3.16* 2.92*   HEMOGLOBIN 7.9* 8.8* 8.2*   HEMATOCRIT 25.1* 28.4* 26.3*   MCV 87.5 89.9 90.1   MCH 27.5 27.8 28.1   MCHC 31.5* 31.0* 31.2*   RDW 46.9 48.8 48.2   PLATELETCT 54* 58* 60*   MPV 11.2 11.7 12.3     Recent Labs     07/08/20  0255 07/10/20  0412   SODIUM 133* 132*   POTASSIUM 3.4* 3.8   CHLORIDE 99 97   CO2 22 23   GLUCOSE 122* 109*   BUN 11 12   CREATININE 0.61 0.73   CALCIUM 7.8* 7.9*     Recent Labs     07/08/20 0255 07/09/20  0332 07/10/20  0600   APTT 70.4* 72.4* 71.3*               Imaging  DX-FOOT-2- RIGHT   Final Result         Irregular appearance of the base of the fifth proximal phalanx and fracture is possible.      US-EXTREMITY VENOUS LOWER BILAT   Final Result      EC-ECHOCARDIOGRAM COMPLETE W/O CONT   Final Result      IR-CYST ASPIRATION-MISC   Final Result         ULTRASOUND GUIDED ASPIRATION OF SUBMANDIBULAR ABSCESS..      CT-SOFT TISSUE NECK WITH   Final Result         1. Acute versus subacute fracture of the mid mandibular body.   2. A 2.9 cm collection subjacent to the fracture site, likely hematoma. Infected hematoma can be considered in the appropriate clinical settings.   3. No cervical lymphadenopathy.   4. Moderate mucosal thickening of the left maxillary sinus, likely odontogenic in nature, related to impacted remaining maxillary tooth.      CT-CHEST,ABDOMEN,PELVIS WITH    Final Result      Hypodensities in the spleen worrisome for splenic infarcts.      Trace nonspecific free fluid in the pelvis.      2.2 x 1.7 cm left common femoral artery pseudoaneurysm.      Bowel containing right inguinal hernia without evidence of obstruction.      Atherosclerotic plaque within an ectatic aorta.      Gallbladder wall thickening/pericholecystic fluid. Further evaluation can be performed with right upper quadrant ultrasound.      Small hypodense left renal lesions are too small to characterize but likely represent small cysts.      Emphysematous changes.      Irregular nodular opacity along the minor fissure measuring 8 mm.      4.7 mm right middle lobe pulmonary nodule.      Mild bibasilar and lingular atelectasis.      Emphysematous changes.      Sequelae of prior granulomatous exposure.      Right hilar lymph node is borderline enlarged and nonspecific, possibly reactive..      Enlarged periportal lymph nodes are nonspecific.      Fleischner Society pulmonary nodule recommendations:   Low Risk: CT at 3-6 months, then consider CT at 18-24 months      High Risk: CT at 3-6 months, then at 18-24 months      Comments: Use most suspicious nodule as guide to management. Follow-up intervals may vary according to size and risk.      Low Risk - Minimal or absent history of smoking and of other known risk factors.      High Risk - History of smoking or of other known risk factors.      Note: These recommendations do not apply to lung cancer screening, patients with immunosuppression, or patients with known primary cancer.      Fleischner Society 2017 Guidelines for Management of Incidentally Detected Pulmonary Nodules in Adults      DX-CHEST-2 VIEWS   Final Result      No acute cardiopulmonary abnormality.      US-EXTREMITY ARTERY LOWER UNILAT RIGHT   Final Result      US-ABDOMEN COMPLETE SURVEY   Final Result      Prominent trabeculated bladder wall, suggesting possible chronic bladder outlet  obstruction. Unremarkable findings otherwise.         DX-CHEST-PORTABLE (1 VIEW)   Final Result      No acute cardiac or pulmonary abnormalities are identified.           Assessment/Plan  * Fever- (present on admission)  Assessment & Plan  abx as per iD    Follow cbc    Follow all cultures        Mandibular abscess- (present on admission)  Assessment & Plan  Growing strep viridans    Oral surgery dr vasquez s/p ORIF    F/u op report    Pancytopenia (HCC)- (present on admission)  Assessment & Plan  Present for a few months, unclear etiology    abx as per id    Hematology/oncology were consulted 6/28/2020  Bone marrow biopsy done      Transfuse plt on 7/3- to prepare for surgery on mandible    S/P TAVR (transcatheter aortic valve replacement)- (present on admission)  Assessment & Plan  S/P TAVR on 6/22/2020   . Continue statin  Per cardiology start B12 and folate supplements  Echocardiogram shows no vegetations  Cardiology is following    Mixed hyperlipidemia- (present on admission)  Assessment & Plan  Continue home Lovastatin    Essential hypertension- (present on admission)  Assessment & Plan    Continue Norvasc    Added lisinopril 10mg po daily 7/7    Toe fracture, right  Assessment & Plan  Splint    Pain control    HIT (heparin-induced thrombocytopenia) (HCC)  Assessment & Plan  huseyin positive    on agratraban on  7/4    Will transition to po eliquis prior to discharge    Hypokalemia  Assessment & Plan  Resolved     Panlobular emphysema (HCC)- (present on admission)  Assessment & Plan  History of tobacco use  Chest x-ray from admission shows no acute cardiopulmonary process    Tobacco dependence- (present on admission)  Assessment & Plan  Patient has been counseled on tobacco cessation  Nicotine replacement options were provided       VTE prophylaxis: scd      Check am cbc, BMP

## 2022-08-11 ENCOUNTER — TELEPHONE (OUTPATIENT)
Dept: MEDICAL GROUP | Facility: LAB | Age: 67
End: 2022-08-11
Payer: MEDICARE

## 2022-08-11 NOTE — TELEPHONE ENCOUNTER
1. Caller Name: Magalis Kumari                          Call Back Number: .      How would the patient prefer to be contacted with a response:   PT LVM regarding a bill he received from his last visit. (5/3/22)  I see he was given a vaccine. Did pt receive an injection that day?

## 2022-08-31 RX ORDER — AMLODIPINE BESYLATE 10 MG/1
TABLET ORAL
Qty: 90 TABLET | Refills: 1 | Status: SHIPPED | OUTPATIENT
Start: 2022-08-31 | End: 2023-03-01 | Stop reason: SDUPTHER

## 2022-08-31 NOTE — TELEPHONE ENCOUNTER
Received request via: Pharmacy/90 day    Was the patient seen in the last year in this department? Yes  5/3/22  Does the patient have an active prescription (recently filled or refills available) for medication(s) requested? No

## 2022-10-21 SDOH — ECONOMIC STABILITY: INCOME INSECURITY: HOW HARD IS IT FOR YOU TO PAY FOR THE VERY BASICS LIKE FOOD, HOUSING, MEDICAL CARE, AND HEATING?: NOT HARD AT ALL

## 2022-10-21 SDOH — HEALTH STABILITY: PHYSICAL HEALTH: ON AVERAGE, HOW MANY MINUTES DO YOU ENGAGE IN EXERCISE AT THIS LEVEL?: 0 MIN

## 2022-10-21 SDOH — ECONOMIC STABILITY: TRANSPORTATION INSECURITY
IN THE PAST 12 MONTHS, HAS THE LACK OF TRANSPORTATION KEPT YOU FROM MEDICAL APPOINTMENTS OR FROM GETTING MEDICATIONS?: NO

## 2022-10-21 SDOH — ECONOMIC STABILITY: FOOD INSECURITY: WITHIN THE PAST 12 MONTHS, YOU WORRIED THAT YOUR FOOD WOULD RUN OUT BEFORE YOU GOT MONEY TO BUY MORE.: NEVER TRUE

## 2022-10-21 SDOH — ECONOMIC STABILITY: INCOME INSECURITY: IN THE LAST 12 MONTHS, WAS THERE A TIME WHEN YOU WERE NOT ABLE TO PAY THE MORTGAGE OR RENT ON TIME?: NO

## 2022-10-21 SDOH — ECONOMIC STABILITY: HOUSING INSECURITY

## 2022-10-21 SDOH — ECONOMIC STABILITY: HOUSING INSECURITY
IN THE LAST 12 MONTHS, WAS THERE A TIME WHEN YOU DID NOT HAVE A STEADY PLACE TO SLEEP OR SLEPT IN A SHELTER (INCLUDING NOW)?: NO

## 2022-10-21 SDOH — ECONOMIC STABILITY: FOOD INSECURITY: WITHIN THE PAST 12 MONTHS, THE FOOD YOU BOUGHT JUST DIDN'T LAST AND YOU DIDN'T HAVE MONEY TO GET MORE.: NEVER TRUE

## 2022-10-21 SDOH — ECONOMIC STABILITY: TRANSPORTATION INSECURITY
IN THE PAST 12 MONTHS, HAS LACK OF TRANSPORTATION KEPT YOU FROM MEETINGS, WORK, OR FROM GETTING THINGS NEEDED FOR DAILY LIVING?: NO

## 2022-10-21 SDOH — ECONOMIC STABILITY: TRANSPORTATION INSECURITY
IN THE PAST 12 MONTHS, HAS LACK OF RELIABLE TRANSPORTATION KEPT YOU FROM MEDICAL APPOINTMENTS, MEETINGS, WORK OR FROM GETTING THINGS NEEDED FOR DAILY LIVING?: NO

## 2022-10-21 SDOH — HEALTH STABILITY: MENTAL HEALTH
STRESS IS WHEN SOMEONE FEELS TENSE, NERVOUS, ANXIOUS, OR CAN'T SLEEP AT NIGHT BECAUSE THEIR MIND IS TROUBLED. HOW STRESSED ARE YOU?: TO SOME EXTENT

## 2022-10-21 SDOH — HEALTH STABILITY: PHYSICAL HEALTH: ON AVERAGE, HOW MANY DAYS PER WEEK DO YOU ENGAGE IN MODERATE TO STRENUOUS EXERCISE (LIKE A BRISK WALK)?: 0 DAYS

## 2022-10-21 ASSESSMENT — SOCIAL DETERMINANTS OF HEALTH (SDOH)
HOW MANY DRINKS CONTAINING ALCOHOL DO YOU HAVE ON A TYPICAL DAY WHEN YOU ARE DRINKING: 1 OR 2
HOW OFTEN DO YOU HAVE SIX OR MORE DRINKS ON ONE OCCASION: NEVER
HOW OFTEN DO YOU ATTENT MEETINGS OF THE CLUB OR ORGANIZATION YOU BELONG TO?: NEVER
HOW OFTEN DO YOU GET TOGETHER WITH FRIENDS OR RELATIVES?: ONCE A WEEK
HOW HARD IS IT FOR YOU TO PAY FOR THE VERY BASICS LIKE FOOD, HOUSING, MEDICAL CARE, AND HEATING?: NOT HARD AT ALL
HOW OFTEN DO YOU ATTENT MEETINGS OF THE CLUB OR ORGANIZATION YOU BELONG TO?: NEVER
WITHIN THE PAST 12 MONTHS, YOU WORRIED THAT YOUR FOOD WOULD RUN OUT BEFORE YOU GOT THE MONEY TO BUY MORE: NEVER TRUE
HOW OFTEN DO YOU HAVE A DRINK CONTAINING ALCOHOL: 2-3 TIMES A WEEK
DO YOU BELONG TO ANY CLUBS OR ORGANIZATIONS SUCH AS CHURCH GROUPS UNIONS, FRATERNAL OR ATHLETIC GROUPS, OR SCHOOL GROUPS?: NO
HOW OFTEN DO YOU ATTEND CHURCH OR RELIGIOUS SERVICES?: NEVER
IN A TYPICAL WEEK, HOW MANY TIMES DO YOU TALK ON THE PHONE WITH FAMILY, FRIENDS, OR NEIGHBORS?: ONCE A WEEK
DO YOU BELONG TO ANY CLUBS OR ORGANIZATIONS SUCH AS CHURCH GROUPS UNIONS, FRATERNAL OR ATHLETIC GROUPS, OR SCHOOL GROUPS?: NO
IN A TYPICAL WEEK, HOW MANY TIMES DO YOU TALK ON THE PHONE WITH FAMILY, FRIENDS, OR NEIGHBORS?: ONCE A WEEK
HOW OFTEN DO YOU GET TOGETHER WITH FRIENDS OR RELATIVES?: ONCE A WEEK
HOW OFTEN DO YOU ATTEND CHURCH OR RELIGIOUS SERVICES?: NEVER

## 2022-10-21 ASSESSMENT — LIFESTYLE VARIABLES
HOW OFTEN DO YOU HAVE SIX OR MORE DRINKS ON ONE OCCASION: NEVER
AUDIT-C TOTAL SCORE: 3
SKIP TO QUESTIONS 9-10: 1
HOW MANY STANDARD DRINKS CONTAINING ALCOHOL DO YOU HAVE ON A TYPICAL DAY: 1 OR 2
HOW OFTEN DO YOU HAVE A DRINK CONTAINING ALCOHOL: 2-3 TIMES A WEEK

## 2022-10-24 ENCOUNTER — TELEPHONE (OUTPATIENT)
Dept: MEDICAL GROUP | Facility: LAB | Age: 67
End: 2022-10-24
Payer: MEDICARE

## 2022-10-24 NOTE — TELEPHONE ENCOUNTER
ANNUAL WELLNESS VISIT PRE-VISIT PLANNING    1.  Reviewed notes from the last office visit: Yes    2.  If any orders were ordered or intended to be done prior to visit (i.e. 6 mos follow-up), do we have results/consult notes or has patient scheduled?          Labs - Labs were not ordered at last office visit.  Note: If patient appointment is for lab review and patient did not complete labs, check with provider if OK to reschedule patient until labs completed.         Imaging - Imaging was not ordered at last office visit.         Referrals - No referrals were ordered at last office visit.    3.  Immunizations were updated in Epic using Reconcile Outside Information activity? Yes  4.  Patient is due for the following Health Maintenance Topics:   Health Maintenance Due   Topic Date Due    Annual Wellness Visit  Never done    IMM ZOSTER VACCINES (1 of 2) Never done    COLORECTAL CANCER SCREENING  11/23/2017    IMM HEP B VACCINE (4 of 4 - Hep B Twinrix risk 4-dose series) 10/12/2021    Annual Pulmonary Function Test / Spirometry  06/09/2022    COVID-19 Vaccine (5 - Booster for Moderna series) 07/22/2022    IMM INFLUENZA (1) 09/01/2022   5.  Reviewed/Updated the following with patient:          Preferred Pharmacy? Yes          Preferred Lab? Yes          Preferred Communication? Yes          Allergies? Yes          Medications? YES. Was Abstract Encounter opened and chart updated? YES    6.  Care Team Updated:          DME Company (gait device, O2, CPAP, etc.): N\A          Other Specialists (eye doctor, derm, GYN, cardiology, endo, etc): YES    7.  Patient was advised: “This is a free wellness visit. The provider will screen for medical conditions to help you stay healthy. If you have other concerns to address you may be asked to discuss these at a separate visit or there may be an additional fee.”     8.  AHA (Puls8) form printed for Provider? N/A

## 2022-10-27 ENCOUNTER — OFFICE VISIT (OUTPATIENT)
Dept: MEDICAL GROUP | Facility: LAB | Age: 67
End: 2022-10-27
Payer: MEDICARE

## 2022-10-27 VITALS
DIASTOLIC BLOOD PRESSURE: 58 MMHG | OXYGEN SATURATION: 95 % | BODY MASS INDEX: 19.13 KG/M2 | SYSTOLIC BLOOD PRESSURE: 120 MMHG | WEIGHT: 119 LBS | HEIGHT: 66 IN | TEMPERATURE: 96.6 F | RESPIRATION RATE: 16 BRPM | HEART RATE: 90 BPM

## 2022-10-27 DIAGNOSIS — E78.2 MIXED HYPERLIPIDEMIA: ICD-10-CM

## 2022-10-27 DIAGNOSIS — K40.90 NON-RECURRENT UNILATERAL INGUINAL HERNIA WITHOUT OBSTRUCTION OR GANGRENE: ICD-10-CM

## 2022-10-27 DIAGNOSIS — Z12.11 SPECIAL SCREENING FOR MALIGNANT NEOPLASM OF COLON: ICD-10-CM

## 2022-10-27 DIAGNOSIS — Z00.00 MEDICARE ANNUAL WELLNESS VISIT, SUBSEQUENT: ICD-10-CM

## 2022-10-27 DIAGNOSIS — R91.1 PULMONARY NODULE: ICD-10-CM

## 2022-10-27 DIAGNOSIS — J43.1 PANLOBULAR EMPHYSEMA (HCC): ICD-10-CM

## 2022-10-27 DIAGNOSIS — Z86.19 HISTORY OF HEPATITIS C: ICD-10-CM

## 2022-10-27 DIAGNOSIS — M79.0 RHEUMATIC DISEASE: ICD-10-CM

## 2022-10-27 DIAGNOSIS — C95.10 CHRONIC LEUKEMIA, NOT HAVING ACHIEVED REMISSION (HCC): ICD-10-CM

## 2022-10-27 DIAGNOSIS — Z95.2 S/P TAVR (TRANSCATHETER AORTIC VALVE REPLACEMENT): ICD-10-CM

## 2022-10-27 DIAGNOSIS — F51.01 PRIMARY INSOMNIA: ICD-10-CM

## 2022-10-27 DIAGNOSIS — Z23 NEED FOR VACCINATION: ICD-10-CM

## 2022-10-27 DIAGNOSIS — I25.10 CORONARY ARTERY DISEASE DUE TO LIPID RICH PLAQUE: ICD-10-CM

## 2022-10-27 DIAGNOSIS — I10 ESSENTIAL HYPERTENSION: ICD-10-CM

## 2022-10-27 DIAGNOSIS — Z12.5 SPECIAL SCREENING FOR MALIGNANT NEOPLASM OF PROSTATE: ICD-10-CM

## 2022-10-27 DIAGNOSIS — F17.200 TOBACCO DEPENDENCE: ICD-10-CM

## 2022-10-27 DIAGNOSIS — D61.818 PANCYTOPENIA (HCC): ICD-10-CM

## 2022-10-27 DIAGNOSIS — I73.9 PAD (PERIPHERAL ARTERY DISEASE) (HCC): ICD-10-CM

## 2022-10-27 DIAGNOSIS — I25.83 CORONARY ARTERY DISEASE DUE TO LIPID RICH PLAQUE: ICD-10-CM

## 2022-10-27 PROBLEM — S92.911A TOE FRACTURE, RIGHT: Status: RESOLVED | Noted: 2020-07-07 | Resolved: 2022-10-27

## 2022-10-27 PROBLEM — B17.10 ACUTE HEPATITIS C VIRUS INFECTION WITHOUT HEPATIC COMA: Status: RESOLVED | Noted: 2020-10-19 | Resolved: 2022-10-27

## 2022-10-27 PROCEDURE — 90662 IIV NO PRSV INCREASED AG IM: CPT | Performed by: NURSE PRACTITIONER

## 2022-10-27 PROCEDURE — G0008 ADMIN INFLUENZA VIRUS VAC: HCPCS | Performed by: NURSE PRACTITIONER

## 2022-10-27 PROCEDURE — G0439 PPPS, SUBSEQ VISIT: HCPCS | Performed by: NURSE PRACTITIONER

## 2022-10-27 RX ORDER — TRAZODONE HYDROCHLORIDE 100 MG/1
100-150 TABLET ORAL NIGHTLY
Qty: 90 TABLET | Refills: 3 | Status: SHIPPED | OUTPATIENT
Start: 2022-10-27

## 2022-10-27 RX ORDER — ALPRAZOLAM 0.5 MG/1
0.5 TABLET ORAL NIGHTLY PRN
Qty: 30 TABLET | Refills: 2 | Status: SHIPPED | OUTPATIENT
Start: 2022-10-27 | End: 2023-06-05

## 2022-10-27 ASSESSMENT — PATIENT HEALTH QUESTIONNAIRE - PHQ9
CLINICAL INTERPRETATION OF PHQ2 SCORE: 3
SUM OF ALL RESPONSES TO PHQ QUESTIONS 1-9: 6
5. POOR APPETITE OR OVEREATING: 0 - NOT AT ALL

## 2022-10-27 ASSESSMENT — ACTIVITIES OF DAILY LIVING (ADL): BATHING_REQUIRES_ASSISTANCE: 0

## 2022-10-27 ASSESSMENT — FIBROSIS 4 INDEX: FIB4 SCORE: 2.6

## 2022-10-27 ASSESSMENT — ENCOUNTER SYMPTOMS: GENERAL WELL-BEING: GOOD

## 2022-10-27 NOTE — PROGRESS NOTES
Chief Complaint   Patient presents with    Medicare Annual Wellness       HPI:  Miguelito is a 67 y.o. est male here for Medicare Annual Wellness Visit.  Sees Dr. Chacon every 3 months and leukemia is stable - cbc / cmp done there every visit.   Struggling with sleep - wife's xanax helped tremendously - requesting rx.  Has trazodone but it doesn't help him fall asleep or stay asleep every time he takes it at  mg.      Patient Active Problem List    Diagnosis Date Noted    Rheumatic disease - HIT syndrome 10/19/2020    Chronic leukemia, not having achieved remission (HCC) - T cell LGL - Dr. Chacon 10/19/2020    Acute hepatitis C virus infection without hepatic coma 10/19/2020    Toe fracture, right 07/07/2020    Pancytopenia (HCC) 06/23/2020    Pulmonary nodule 06/22/2020    Non-recurrent unilateral inguinal hernia 06/22/2020    S/P TAVR (transcatheter aortic valve replacement) 07/30/2019    Panlobular emphysema (HCC) 12/06/2016    PAD (peripheral artery disease) ([facility])- left femoral bruit; diminished right dt/pt pulse 11/22/2016    Coronary artery disease due to lipid rich plaque 11/22/2016    Essential hypertension 11/22/2016    Mixed hyperlipidemia 11/22/2016    Tobacco dependence 08/04/2014       Current Outpatient Medications   Medication Sig Dispense Refill    amLODIPine (NORVASC) 10 MG Tab TAKE 1 TABLET BY MOUTH EVERY NIGHT 90 Tablet 1    lovastatin (MEVACOR) 20 MG Tab TAKE 1 TABLET BY MOUTH EVERY DAY 90 Tablet 3    meloxicam (MOBIC) 15 MG tablet Take 1 Tablet by mouth every day. With food in the AM for knee pain / arthritis (Patient not taking: Reported on 10/24/2022) 30 Tablet 1    Zinc Sulfate (ZINC 15 PO) Take  by mouth.      aspirin EC (ECOTRIN) 81 MG Tablet Delayed Response Take 81 mg by mouth every day.      loratadine (CLARITIN) 10 MG Tab TAKE 1 TABLET BY MOUTH EVERY DAY IN THE AM (Patient not taking: Reported on 10/24/2022) 90 Tablet 1    methotrexate 2.5 MG Tab       traZODone (DESYREL) 50 MG Tab        albuterol 108 (90 Base) MCG/ACT Aero Soln inhalation aerosol Inhale 2 Puffs every four hours as needed for Shortness of Breath. 1 Each 5    ipratropium (ATROVENT HFA) 17 MCG/ACT Aero Soln Inhale 1 Puff every 6 hours. Every day while awake (Patient not taking: Reported on 10/24/2022) 17 g 3    IRON, FERROUS SULFATE, PO One per day      Multiple Vitamins-Minerals (ONE-A-DAY MENS 50+ ADVANTAGE PO) Take  by mouth.       No current facility-administered medications for this visit.        Patient is taking medications as noted in medication list.  Current supplements as per medication list.     Allergies: Heparin and Seasonal    Current social contact/activities: friends/ casino     Is patient current with immunizations? No, due for FLU. Patient is interested in receiving FLU today.    He  reports that he has been smoking cigarettes. He has a 11.25 pack-year smoking history. He has never used smokeless tobacco. He reports current alcohol use. He reports that he does not use drugs.  Ready to quit: Not Answered  Counseling given: Not Answered      ROS:    Gait: Uses no assistive device   Ostomy: No   Other tubes: No   Amputations: No   Chronic oxygen use No   Last eye exam 5-6 years ago   Wears hearing aids: No   : Denies any urinary leakage during the last 6 months    Screening:    Depression Screening  Little interest or pleasure in doing things?  0 - not at all  Feeling down, depressed, or hopeless? 3 - nearly every day  Trouble falling or staying asleep, or sleeping too much?  3 - nearly every day  Feeling tired or having little energy?  0 - not at all  Poor appetite or overeating?  0 - not at all  Feeling bad about yourself - or that you are a failure or have let yourself or your family down? 0 - not at all  Trouble concentrating on things, such as reading the newspaper or watching television? 0 - not at all  Moving or speaking so slowly that other people could have noticed.  Or the opposite - being so fidgety or  restless that you have been moving around a lot more than usual?  0 - not at all  Thoughts that you would be better off dead, or of hurting yourself?  0 - not at all  Patient Health Questionnaire Score: 6    If depressive symptoms identified deferred to follow up visit unless specifically addressed in assessment and plan.    Interpretation of PHQ-9 Total Score   Score Severity   1-4 No Depression   5-9 Mild Depression   10-14 Moderate Depression   15-19 Moderately Severe Depression   20-27 Severe Depression    Screening for Cognitive Impairment  Three Minute Recall (daughter, heaven, tressa)  3/3    Draw clock face with all 12 numbers and set the hands to show 10 past 11.  Yes    If cognitive concerns identified, deferred for follow up unless specifically addressed in assessment and plan.    Fall Risk Assessment  Has the patient had two or more falls in the last year or any fall with injury in the last year?  No  If fall risk identified, deferred for follow up unless specifically addressed in assessment and plan.    Safety Assessment  Throw rugs on floor.  No  Handrails on all stairs.  Yes  Good lighting in all hallways.  Yes  Difficulty hearing.  No  Patient counseled about all safety risks that were identified.    Functional Assessment ADLs  Are there any barriers preventing you from cooking for yourself or meeting nutritional needs?  No.    Are there any barriers preventing you from driving safely or obtaining transportation?  No.    Are there any barriers preventing you from using a telephone or calling for help?  No.    Are there any barriers preventing you from shopping?  No.    Are there any barriers preventing you from taking care of your own finances?  No.    Are there any barriers preventing you from managing your medications?  No.    Are there any barriers preventing you from showering, bathing or dressing yourself?  No.    Are you currently engaging in any exercise or physical activity?  Yes.     What  is your perception of your health?  Good.    Advance Care Planning  Do you have an Advance Directive, Living Will, Durable Power of , or POLST? No               Health Maintenance Summary            Overdue - Annual Wellness Visit (Every 366 Days) Overdue - never done      No completion history exists for this topic.              Overdue - IMM ZOSTER VACCINES (1 of 2) Overdue - never done      No completion history exists for this topic.              Overdue - COLORECTAL CANCER SCREENING (COLON CANCER SCREENING ANNUAL FIT - Yearly) Overdue since 11/23/2017 11/23/2016  OCCULT BLOOD FECES IMMUNOASSAY    08/04/2002  COLONOSCOPY (Reason not specified)              Overdue - IMM HEP B VACCINE (4 of 4 - Hep B Twinrix risk 4-dose series) Overdue since 10/12/2021      11/09/2020  Imm Admin: Hep A/HEP B Combined Vaccine (TwinRix)    10/19/2020  Imm Admin: Hep A/HEP B Combined Vaccine (TwinRix)    10/12/2020  Imm Admin: Hep A/HEP B Combined Vaccine (TwinRix)              Overdue - Annual Pulmonary Function Test / Spirometry (Yearly) Overdue since 6/9/2022 06/09/2021  PFT DICTATED RESULTS              Overdue - COVID-19 Vaccine (5 - Booster for Moderna series) Overdue since 7/22/2022 05/27/2022  Imm Admin: MODERNA SARS-COV-2 VACCINE (12+)    12/10/2021  Imm Admin: MODERNA SARS-COV-2 VACCINE (12+)    02/02/2021  Imm Admin: MODERNA SARS-COV-2 VACCINE (12+)    01/05/2021  Imm Admin: MODERNA SARS-COV-2 VACCINE (12+)              Overdue - IMM INFLUENZA (1) Order placed this encounter      10/01/2020  Imm Admin: Influenza, Unspecified - HISTORICAL DATA    09/30/2020  Imm Admin: Influenza Vaccine Adult HD    11/22/2016  Imm Admin: Influenza Vaccine Quad Inj (Pf)              IMM DTaP/Tdap/Td Vaccine (2 - Td or Tdap) Next due on 5/5/2031 05/05/2021  Imm Admin: Tdap Vaccine              ABDOMINAL AORTIC ANEURYSM (AAA) SCREEN  Completed      06/22/2021  US-ABDOMEN COMPLETE SURVEY    07/11/2020   CT-CHEST,ABDOMEN,PELVIS WITH    06/28/2020  CT-CHEST,ABDOMEN,PELVIS WITH    06/26/2020  US-ABDOMEN COMPLETE SURVEY    04/02/2020  CTA ABDOMEN PELVIS W & W/O POST PROCESS              IMM PNEUMOCOCCAL VACCINE: 65+ Years (Series Information) Completed      05/03/2022  Imm Admin: Pneumococcal Conjugate Vaccine (PCV20)    12/06/2016  Imm Admin: Pneumococcal polysaccharide vaccine (PPSV-23)              IMM MENINGOCOCCAL ACWY VACCINE (Series Information) Aged Out      No completion history exists for this topic.                    Patient Care Team:  MARIA GUADALUPE Sims as PCP - General (Family Medicine)  Luiz Dennis M.D. (Interventional Cardiology)  Guillaume Chacon M.D. (Medical Oncology)    Social History     Tobacco Use    Smoking status: Every Day     Packs/day: 0.25     Years: 45.00     Pack years: 11.25     Types: Cigarettes    Smokeless tobacco: Never   Vaping Use    Vaping Use: Never used   Substance Use Topics    Alcohol use: Yes     Alcohol/week: 0.0 oz     Comment: 1-2 per day occ    Drug use: No     Family History   Problem Relation Age of Onset    Heart Disease Mother     Hypertension Mother     Diabetes Mother     No Known Problems Father     Heart Disease Maternal Uncle     Psychiatric Illness Paternal Uncle     Diabetes Paternal Uncle     Heart Disease Paternal Uncle      He  has a past medical history of Dental disorder, Dental disorder, Heart valve disease, High cholesterol, Hyperlipidemia, Hypertension, and Stented coronary artery (2008).   Past Surgical History:   Procedure Laterality Date    SUBMANDIBLE ABSCESS INCISION AND DRAINAGE N/A 7/12/2020    Procedure: INCISION AND DRAINAGE, ABSCESS, SUBMANDIBULAR REGION;  Surgeon: Brenden Morrison D.D.SKym;  Location: SURGERY [facility];  Service: Oral Surgery    MANDIBLE FRACTURE ORIF Bilateral 7/3/2020    Procedure: ORIF, FRACTURE, MANDIBLE;  Surgeon: Brenden Morrison D.D.SKym;  Location: SURGERY [facility];  Service: Oral  "Surgery    TRANSCATHETER AORTIC VALVE REPLACEMENT N/A 6/22/2020    Procedure: REPLACEMENT, AORTIC VALVE, TRANSCATHETER- WITH ANY ASSOCIATED PROCEDURES;  Surgeon: Luiz Dennis M.D.;  Location: SURGERY [facility];  Service: Cardiac    JOON N/A 6/22/2020    Procedure: ECHOCARDIOGRAM,;  Surgeon: Luiz Dennis M.D.;  Location: SURGERY [facility];  Service: Cardiac    ANGIOGRAM  05/22/2020    OTHER CARDIAC SURGERY  2009    stents x3    BONE SPUR EXCISION  1993    left ankle    STAPEDECTOMY  1980s       Exam:   /58 (BP Location: Left arm, Patient Position: Sitting, BP Cuff Size: Adult)   Pulse 90   Temp 35.9 °C (96.6 °F)   Resp 16   Ht 1.676 m (5' 6\")   Wt 54 kg (119 lb)   SpO2 95%  Body mass index is 19.21 kg/m².    Hearing good.    Dentition fair  Alert, oriented in no acute distress  Eye contact is good, speech goal directed, affect calm  CV RRR  Lungs CTA bilaterally      Assessment and Plan. The following treatment and monitoring plan is recommended:    1. Medicare annual wellness visit, subsequent        2. Need for vaccination  INFLUENZA VACCINE, HIGH DOSE (65+ ONLY)      3. Tobacco dependence        4. PAD (peripheral artery disease) (HCC)- left femoral bruit; diminished right dt/pt pulse        5. Coronary artery disease due to lipid rich plaque        6. Essential hypertension        7. Mixed hyperlipidemia  Lipid Profile      8. Panlobular emphysema (HCC)        9. S/P TAVR (transcatheter aortic valve replacement)        10. Pulmonary nodule        11. Special screening for malignant neoplasm of colon  Referral to Gastroenterology      12. 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 Chacon        13. History of hepatitis C        14. Pancytopenia (HCC)        15. Non-recurrent unilateral inguinal hernia without obstruction or gangrene        16. Rheumatic disease - HIT syndrome        17. Special screening for malignant neoplasm of prostate  " PROSTATE SPECIFIC AG SCREENING      18. Primary insomnia  ALPRAZolam (XANAX) 0.5 MG Tab    traZODone (DESYREL) 100 MG Tab      BP well controlled.  Hep C cured.  Bowels / bladder moving well.  Lung nodule resolved on CT scan 2020.  UTD with Dr. Chacon and stable.    Recommend updated LP, PSA, colonoscopy.   Encouraged him to stop smoking.   Ok to alternate trazodone 100-150 mg every other night with alprazolam when needed for trouble falling / staying asleep.  Discussed chemically dependent nature and fall risk of xanax.   In prescribing controlled substances to this patient, I certify that I have obtained and reviewed the medical history of Magalis Kumari. I have also made a good tammy effort to obtain applicable records from other providers who have treated the patient and records did not demonstrate any increased risk of substance abuse that would prevent me from prescribing controlled substances.     I have conducted a physical exam and documented it. I have reviewed Mr. Kumari’s prescription history as maintained by the [place] Prescription Monitoring Program.     I have assessed the patient’s risk for abuse, dependency, and addiction using the validated Opioid Risk Tool available at https://www.mdcalc.com/loucwr-tdon-brem-ort-narcotic-abuse.       Discussion today about general wellness and lifestyle habits:    Prevent falls and reduce trip hazards; Cautioned about securing or removing rugs.  Have a working fire alarm and carbon monoxide detector;   Engage in regular physical activity and social activities.     Follow-up: 6-12 mo, sooner with any problems or concerns.

## 2022-11-08 ENCOUNTER — PATIENT MESSAGE (OUTPATIENT)
Dept: HEALTH INFORMATION MANAGEMENT | Facility: OTHER | Age: 67
End: 2022-11-08

## 2023-03-01 NOTE — TELEPHONE ENCOUNTER
Received request via: Pharmacy    Was the patient seen in the last year in this department? Yes  10/27/22  Does the patient have an active prescription (recently filled or refills available) for medication(s) requested? no    Does the patient have [facility] Plus and need 100 day supply (blood pressure, diabetes and cholesterol meds only)? Patient does not have SCP

## 2023-03-02 RX ORDER — AMLODIPINE BESYLATE 10 MG/1
TABLET ORAL
Qty: 90 TABLET | Refills: 1 | Status: SHIPPED | OUTPATIENT
Start: 2023-03-02 | End: 2023-08-28

## 2023-05-05 DIAGNOSIS — E78.2 MIXED HYPERLIPIDEMIA: ICD-10-CM

## 2023-05-05 RX ORDER — LOVASTATIN 20 MG/1
20 TABLET ORAL
Qty: 90 TABLET | Refills: 3 | Status: SHIPPED | OUTPATIENT
Start: 2023-05-05

## 2023-05-05 NOTE — TELEPHONE ENCOUNTER
Received request via: Pharmacy    Was the patient seen in the last year in this department? Yes  LOV 10/27/2022  Does the patient have an active prescription (recently filled or refills available) for medication(s) requested? No    Does the patient have assisted Plus and need 100 day supply (blood pressure, diabetes and cholesterol meds only)? Patient does not have SCP

## 2023-06-02 DIAGNOSIS — F51.01 PRIMARY INSOMNIA: ICD-10-CM

## 2023-06-02 NOTE — TELEPHONE ENCOUNTER
Received request via: Pharmacy    Was the patient seen in the last year in this department? Yes  10/27/22  Does the patient have an active prescription (recently filled or refills available) for medication(s) requested? No    Does the patient have [facility] and need 100 day supply (blood pressure, diabetes and cholesterol meds only)? Medication is not for cholesterol, blood pressure or diabetes

## 2023-06-05 RX ORDER — ALPRAZOLAM 0.5 MG/1
0.5 TABLET ORAL NIGHTLY PRN
Qty: 30 TABLET | Refills: 0 | Status: SHIPPED | OUTPATIENT
Start: 2023-06-05 | End: 2023-10-13

## 2023-06-06 DIAGNOSIS — J41.1 MUCOPURULENT CHRONIC BRONCHITIS (HCC): ICD-10-CM

## 2023-06-06 DIAGNOSIS — R06.02 SOB (SHORTNESS OF BREATH): ICD-10-CM

## 2023-06-06 RX ORDER — ALBUTEROL SULFATE 90 UG/1
2 AEROSOL, METERED RESPIRATORY (INHALATION) EVERY 4 HOURS PRN
Qty: 1 EACH | Refills: 5 | Status: SHIPPED | OUTPATIENT
Start: 2023-06-06

## 2023-06-06 NOTE — TELEPHONE ENCOUNTER
Received request via: Patient    Was the patient seen in the last year in this department? Yes    Does the patient have an active prescription (recently filled or refills available) for medication(s) requested? No    Does the patient have [facility] Plus and need 100 day supply (blood pressure, diabetes and cholesterol meds only)? Medication is not for cholesterol, blood pressure or diabetes

## 2023-08-23 ENCOUNTER — TELEPHONE (OUTPATIENT)
Dept: HEALTH INFORMATION MANAGEMENT | Facility: OTHER | Age: 68
End: 2023-08-23
Payer: MEDICARE

## 2023-08-28 RX ORDER — AMLODIPINE BESYLATE 10 MG/1
TABLET ORAL
Qty: 90 TABLET | Refills: 1 | Status: SHIPPED | OUTPATIENT
Start: 2023-08-28 | End: 2024-02-29

## 2023-08-28 NOTE — TELEPHONE ENCOUNTER
Received request via: Pharmacy    Was the patient seen in the last year in this department? Yes    Does the patient have an active prescription (recently filled or refills available) for medication(s) requested? No    Does the patient have skilled nursing Plus and need 100 day supply (blood pressure, diabetes and cholesterol meds only)? Patient does not have SCP      AMLODIPINE BESYLATE 10 MG TAB

## 2023-10-13 DIAGNOSIS — F51.01 PRIMARY INSOMNIA: ICD-10-CM

## 2023-10-13 RX ORDER — ALPRAZOLAM 0.5 MG/1
0.5 TABLET ORAL NIGHTLY PRN
Qty: 30 TABLET | Refills: 0 | Status: SHIPPED | OUTPATIENT
Start: 2023-10-13 | End: 2023-12-05

## 2023-11-13 ENCOUNTER — TELEPHONE (OUTPATIENT)
Dept: HEALTH INFORMATION MANAGEMENT | Facility: OTHER | Age: 68
End: 2023-11-13
Payer: MEDICARE

## 2023-12-04 ENCOUNTER — TELEPHONE (OUTPATIENT)
Dept: HEALTH INFORMATION MANAGEMENT | Facility: OTHER | Age: 68
End: 2023-12-04
Payer: MEDICARE

## 2023-12-04 DIAGNOSIS — F51.01 PRIMARY INSOMNIA: ICD-10-CM

## 2023-12-05 RX ORDER — ALPRAZOLAM 0.5 MG/1
0.5 TABLET ORAL NIGHTLY PRN
Qty: 15 TABLET | Refills: 0 | Status: SHIPPED | OUTPATIENT
Start: 2023-12-05 | End: 2023-12-20

## 2023-12-05 NOTE — TELEPHONE ENCOUNTER
Received request via: Pharmacy    Was the patient seen in the last year in this department? No. Last seen 10/27/2022    Does the patient have an active prescription (recently filled or refills available) for medication(s) requested? No    Does the patient have retirement Plus and need 100 day supply (blood pressure, diabetes and cholesterol meds only)? Patient does not have SCP

## 2024-02-02 NOTE — ANESTHESIA TIME REPORT
.  DATE TIME CALLED TIME ENDED MRN CSN AGE SEX UNIT   2/2/2024 [id number] 31 year old M IMAGING (PCC/XRAY/CT/MRI)    ( time) ( time)                Reason(s) for Call:  AGITATION   Assessment:  (narrative)  Pt had climbed out of the MRI machine to urinate.  Pt presenting with varun (rapid speech, flight of ideas).  He also has delusional thoughts (talking about being a \"wannabe author\" and people of power (such as the President).   Intervention(s):  DEESCALATION and MEDICATION   Outcome(s):  MOVED ROOM/MOVED NEAR RN STATION   Recommendations:   (narrative)  Pt was moved back to the ED with security, ED RN, Crisis and sitter.  Pt is currently planned for medical admit with psychiatrist on consult and eventual psych admission.  Pt was given copy of petition & certificate.       Withdrawal? Attempting to leave? Previous diagnoses   NO NO PSYCHOSIS       Medications Route Doctor(s) contacted   (separate names with commas) Restraints used   OTHER (please edit to add)Droperidol 2.5mg IM Dr. Tammy Holloway (ED MD) N/A     []  Intake consult entered (\"CON83\")    Staff Present: Marielena Colunga PSYD, Dr. Tammy Holloway, Dr. Lily Aldridge, Jaydon Galloway, RN, and pt's sitter and security.    #brt    Anesthesia Start and Stop Event Times     Date Time Event    7/12/2020 1623 Ready for Procedure     1627 Anesthesia Start     1718 Anesthesia Stop        Responsible Staff  07/12/20    Name Role Begin End    Amalia Che M.D. Anesth 1627 1718        Preop Diagnosis (Free Text):  Pre-op Diagnosis     SUBMANDIBULAR ABCESS        Preop Diagnosis (Codes):    Post op Diagnosis  Submandibular abscess      Premium Reason  E. Weekend    Comments:

## 2024-02-29 RX ORDER — AMLODIPINE BESYLATE 10 MG/1
TABLET ORAL
Qty: 90 TABLET | Refills: 0 | Status: SHIPPED | OUTPATIENT
Start: 2024-02-29

## 2024-02-29 NOTE — TELEPHONE ENCOUNTER
Received request via: Pharmacy     Was the patient seen in the last year in this department? No 10/27/22    Does the patient have an active prescription (recently filled or refills available) for medication(s) requested? No    [facility]    Does the patient have [facility] Plus and need 100 day supply (blood pressure, diabetes and cholesterol meds only)? Medication is not for cholesterol, blood pressure or diabetes

## 2024-04-22 DIAGNOSIS — F51.01 PRIMARY INSOMNIA: ICD-10-CM

## 2024-04-22 RX ORDER — ALPRAZOLAM 0.5 MG/1
0.5 TABLET ORAL NIGHTLY PRN
Qty: 15 TABLET | Refills: 0 | Status: SHIPPED | OUTPATIENT
Start: 2024-04-22 | End: 2024-05-07

## 2024-05-03 DIAGNOSIS — J41.1 MUCOPURULENT CHRONIC BRONCHITIS (HCC): ICD-10-CM

## 2024-05-03 DIAGNOSIS — R06.02 SOB (SHORTNESS OF BREATH): ICD-10-CM

## 2024-05-03 RX ORDER — ALBUTEROL SULFATE 90 UG/1
2 AEROSOL, METERED RESPIRATORY (INHALATION) EVERY 4 HOURS PRN
Qty: 18 EACH | Refills: 5 | Status: SHIPPED | OUTPATIENT
Start: 2024-05-03

## 2024-05-03 NOTE — TELEPHONE ENCOUNTER
Received request via: Pharmacy    Was the patient seen in the last year in this department? No 10/27/2022    Does the patient have an active prescription (recently filled or refills available) for medication(s) requested? No    [facility]    Does the patient have [facility] Plus and need 100 day supply (blood pressure, diabetes and cholesterol meds only)? Medication is not for cholesterol, blood pressure or diabetes

## 2024-05-08 ENCOUNTER — APPOINTMENT (OUTPATIENT)
Dept: MEDICAL GROUP | Facility: LAB | Age: 69
End: 2024-05-08
Payer: MEDICARE

## 2024-05-08 VITALS
HEIGHT: 66 IN | TEMPERATURE: 96.8 F | RESPIRATION RATE: 16 BRPM | DIASTOLIC BLOOD PRESSURE: 60 MMHG | WEIGHT: 111 LBS | OXYGEN SATURATION: 94 % | HEART RATE: 82 BPM | BODY MASS INDEX: 17.84 KG/M2 | SYSTOLIC BLOOD PRESSURE: 132 MMHG

## 2024-05-08 DIAGNOSIS — J43.1 PANLOBULAR EMPHYSEMA (HCC): ICD-10-CM

## 2024-05-08 DIAGNOSIS — Z00.00 MEDICARE ANNUAL WELLNESS VISIT, SUBSEQUENT: ICD-10-CM

## 2024-05-08 DIAGNOSIS — I25.83 CORONARY ARTERY DISEASE DUE TO LIPID RICH PLAQUE: ICD-10-CM

## 2024-05-08 DIAGNOSIS — Z95.2 S/P TAVR (TRANSCATHETER AORTIC VALVE REPLACEMENT): ICD-10-CM

## 2024-05-08 DIAGNOSIS — M79.0 RHEUMATIC DISEASE: ICD-10-CM

## 2024-05-08 DIAGNOSIS — E78.2 MIXED HYPERLIPIDEMIA: ICD-10-CM

## 2024-05-08 DIAGNOSIS — Z86.19 HISTORY OF HEPATITIS C: ICD-10-CM

## 2024-05-08 DIAGNOSIS — F17.200 TOBACCO DEPENDENCE: ICD-10-CM

## 2024-05-08 DIAGNOSIS — Z12.11 SCREEN FOR COLON CANCER: ICD-10-CM

## 2024-05-08 DIAGNOSIS — I25.10 CORONARY ARTERY DISEASE DUE TO LIPID RICH PLAQUE: ICD-10-CM

## 2024-05-08 DIAGNOSIS — J44.9 CHRONIC OBSTRUCTIVE PULMONARY DISEASE, UNSPECIFIED COPD TYPE (HCC): ICD-10-CM

## 2024-05-08 DIAGNOSIS — I73.9 PAD (PERIPHERAL ARTERY DISEASE) (HCC): ICD-10-CM

## 2024-05-08 DIAGNOSIS — D61.818 PANCYTOPENIA (HCC): ICD-10-CM

## 2024-05-08 DIAGNOSIS — F51.01 PRIMARY INSOMNIA: ICD-10-CM

## 2024-05-08 DIAGNOSIS — I10 ESSENTIAL HYPERTENSION: ICD-10-CM

## 2024-05-08 DIAGNOSIS — C95.10 CHRONIC LEUKEMIA, NOT HAVING ACHIEVED REMISSION (HCC): ICD-10-CM

## 2024-05-08 PROBLEM — M06.9 RHEUMATOID ARTHRITIS (HCC): Status: ACTIVE | Noted: 2020-10-19

## 2024-05-08 PROBLEM — C91.Z0 OTHER LYMPHOID LEUKEMIA NOT HAVING ACHIEVED REMISSION (HCC): Status: ACTIVE | Noted: 2020-10-19

## 2024-05-08 PROCEDURE — G0439 PPPS, SUBSEQ VISIT: HCPCS | Performed by: NURSE PRACTITIONER

## 2024-05-08 PROCEDURE — 3075F SYST BP GE 130 - 139MM HG: CPT | Performed by: NURSE PRACTITIONER

## 2024-05-08 PROCEDURE — 3078F DIAST BP <80 MM HG: CPT | Performed by: NURSE PRACTITIONER

## 2024-05-08 RX ORDER — MONTELUKAST SODIUM 10 MG/1
10 TABLET ORAL DAILY
Qty: 30 TABLET | Refills: 2 | Status: SHIPPED | OUTPATIENT
Start: 2024-05-08 | End: 2024-05-31 | Stop reason: SDUPTHER

## 2024-05-08 RX ORDER — LOVASTATIN 20 MG/1
20 TABLET ORAL
Qty: 90 TABLET | Refills: 3 | OUTPATIENT
Start: 2024-05-08

## 2024-05-08 RX ORDER — LOVASTATIN 20 MG/1
20 TABLET ORAL
Qty: 90 TABLET | Refills: 3 | Status: SHIPPED | OUTPATIENT
Start: 2024-05-08

## 2024-05-08 RX ORDER — AMLODIPINE BESYLATE 10 MG/1
TABLET ORAL
Qty: 90 TABLET | Refills: 3 | Status: SHIPPED | OUTPATIENT
Start: 2024-05-08

## 2024-05-08 RX ORDER — TRAZODONE HYDROCHLORIDE 100 MG/1
TABLET ORAL
Qty: 60 TABLET | Refills: 5 | Status: SHIPPED | OUTPATIENT
Start: 2024-05-08 | End: 2024-05-31 | Stop reason: SDUPTHER

## 2024-05-08 RX ORDER — FLUTICASONE PROPIONATE AND SALMETEROL 250; 50 UG/1; UG/1
1 POWDER RESPIRATORY (INHALATION) EVERY 12 HOURS
Qty: 60 EACH | Refills: 5 | Status: SHIPPED | OUTPATIENT
Start: 2024-05-08

## 2024-05-08 ASSESSMENT — ACTIVITIES OF DAILY LIVING (ADL): BATHING_REQUIRES_ASSISTANCE: 0

## 2024-05-08 ASSESSMENT — PATIENT HEALTH QUESTIONNAIRE - PHQ9: CLINICAL INTERPRETATION OF PHQ2 SCORE: 0

## 2024-05-08 ASSESSMENT — ENCOUNTER SYMPTOMS: GENERAL WELL-BEING: GOOD

## 2024-05-08 NOTE — TELEPHONE ENCOUNTER
Received request via: Pharmacy    Was the patient seen in the last year in this department? Yes  [facility]  Does the patient have an active prescription (recently filled or refills available) for medication(s) requested? No    Pharmacy Name:     Does the patient have detention Plus and need 100 day supply (blood pressure, diabetes and cholesterol meds only)? Patient does not have SCP

## 2024-05-08 NOTE — PROGRESS NOTES
Chief Complaint   Patient presents with    Medicare Annual Wellness     Verbal consent was acquired by the patient to use [employer] ambient listening note generation during this visit Yes     History of Present Illness  The patient is a 69-year-old established male here for Medicare annual wellness. He is due for colon cancer screening, last was a fecal occult blood test in 2016. He does tell me that he ordered a colon cancer screening online last year and it was negative - had a small tube that he put stool in - was told it was negative.    He is due for boosters of Twinrix, pulmonary function testing, and blood work.    The patient reports a stable condition over the past year, with no history of falls, injuries, fractures, or surgical interventions. He maintains an active lifestyle, engaging in activities such as snow shoveling, yard work, and approximately half a mile daily.    He underwent a valve replacement in 06/2020 and no longer sees cardiology.  Denies trouble breathing on exertion or CP.  He purchased a 40-dollar stool test for colon cancer screening online, which yielded negative results. He denies hematochezia or pain.     The patient reports poor sleep quality, with difficulty initiating and maintaining sleep. He alternates between alprazolam and trazodone.  Really does not feel that trazodone works.  Typically takes trazodone right before bed, about 50 mg and has been nervous to increase this.  Denies daytime sleepiness when he takes trazodone.     The patient experiences nocturnal congestion due to allergies, which are not severe during the day. He occasionally uses his albuterol inhaler during physical activity is really helpful.  He produces a significant amount of mucus and occasionally coughs during the day, usually in the evening. He occasionally coughs while watching TV, which he manages to break up the mucus. He continues to smoke 6 to 8 cigarettes per day. He constantly has a runny nose  and eyes, which he attributes to his allergies. He does not use nasal sprays.  Takes Benadryl before bed, not sure if it is helpful    The patient's appetite is good, but he is unable to gain weight. He currently weighs 111 pounds. He weighed 119 pounds in 10/2022. He weighed 135 pounds 30 years ago, which decreased to 135 pounds with physical work.  He denies hemoptysis.  Nuys nausea, vomiting, diarrhea or heartburn.    He does not wear glasses as much as he should. He had his eyes checked and they looked good, but he needs them for driving. He uses reading glasses. He has a prescription for reading glasses at home. He does not wear hearing aids. His hearing is okay. He has dentures, but he does not wear them. He shattered his lower jaw and could not put the post in for the dentures. He very seldom wears them because they bother him too much.   Drinks a few nights per week, anywhere from 1-3 drinks.  Typically beer.  Give up wine for [place].   His brother passed away from esophageal problems.   He received his shingles booster, influenza, and COVID-19 vaccines last winter.          Patient Active Problem List    Diagnosis Date Noted    History of hepatitis C 10/27/2022    Rheumatic disease - HIT syndrome 10/19/2020    Chronic leukemia, not having achieved remission ([facility]) - T cell LGL - Dr. Chacon 10/19/2020    Pancytopenia ([facility]) 06/23/2020    Non-recurrent unilateral inguinal hernia 06/22/2020    S/P TAVR (transcatheter aortic valve replacement) 07/30/2019    Panlobular emphysema (HCC) 12/06/2016    PAD (peripheral artery disease) ([facility])- left femoral bruit; diminished right dt/pt pulse 11/22/2016    Coronary artery disease due to lipid rich plaque 11/22/2016    Essential hypertension 11/22/2016    Mixed hyperlipidemia 11/22/2016    Tobacco dependence 08/04/2014       Current Outpatient Medications   Medication Sig Dispense Refill    albuterol 108 (90 Base) MCG/ACT Aero Soln inhalation aerosol INHALE 2 PUFFS EVERY 4  HOURS AS NEEDED FOR SHORTNESS OF BREATH 18 Each 5    amLODIPine (NORVASC) 10 MG Tab TAKE 1 TABLET BY MOUTH EVERY DAY AT NIGHT 90 Tablet 0    lovastatin (MEVACOR) 20 MG Tab TAKE 1 TABLET BY MOUTH EVERY DAY 90 Tablet 3    traZODone (DESYREL) 100 MG Tab Take 1-1.5 Tablets by mouth every evening. 90 Tablet 3    Zinc Sulfate (ZINC 15 PO) Take  by mouth.      aspirin EC (ECOTRIN) 81 MG Tablet Delayed Response Take 81 mg by mouth every day.      methotrexate 2.5 MG Tab       ipratropium (ATROVENT HFA) 17 MCG/ACT Aero Soln Inhale 1 Puff every 6 hours. Every day while awake (Patient not taking: Reported on 10/24/2022) 17 g 3    IRON, FERROUS SULFATE, PO One per day      Multiple Vitamins-Minerals (ONE-A-DAY MENS 50+ ADVANTAGE PO) Take  by mouth.       No current facility-administered medications for this visit.          Current supplements as per medication list.     Allergies: Heparin and Seasonal    Current social contact/activities: ileana     He  reports that he has been smoking cigarettes. He has a 11.3 pack-year smoking history. He has never used smokeless tobacco. He reports current alcohol use. He reports that he does not use drugs.  Ready to quit: Not Answered  Counseling given: Not Answered      ROS:    Gait: Uses no assistive device  Ostomy: no  Other tubes: No  Amputations: No  Chronic oxygen use: No  Last eye exam: 11/2023  Wears hearing aids: No   : Denies any urinary leakage during the last 6 months    Screening:    Depression Screening  Little interest or pleasure in doing things?  0 - not at all  Feeling down, depressed , or hopeless? 0 - not at all  Patient Health Questionnaire Score: 0     If depressive symptoms identified deferred to follow up visit unless specifically addressed in assessment and plan.    Interpretation of PHQ-9 Total Score   Score Severity   1-4 No Depression   5-9 Mild Depression   10-14 Moderate Depression   15-19 Moderately Severe Depression   20-27 Severe Depression    Screening  for Cognitive Impairment  Do you or any of your friends or family members have any concern about your memory? No  Three Minute Recall (Leader, Season, Table) 3/3    Eizo clock face with all 12 numbers and set the hands to show 10 minutes after 11.  Yes    Cognitive concerns identified deferred for follow up unless specifically addressed in assessment and plan.    Fall Risk Assessment  Has the patient had two or more falls in the last year or any fall with injury in the last year?  No    Safety Assessment  Do you always wear your seatbelt?  Yes  Any changes to home needed to function safely? No  Difficulty hearing.  No  Patient counseled about all safety risks that were identified.    Functional Assessment ADLs  Are there any barriers preventing you from cooking for yourself or meeting nutritional needs?  No.    Are there any barriers preventing you from driving safely or obtaining transportation?  No.    Are there any barriers preventing you from using a telephone or calling for help?  No    Are there any barriers preventing you from shopping?  No.    Are there any barriers preventing you from taking care of your own finances?  No    Are there any barriers preventing you from managing your medications?  No    Are there any barriers preventing you from showering, bathing or dressing yourself? No    Are there any barriers preventing you from doing housework or laundry? No  Are there any barriers preventing you from using the toilet?No  Are you currently engaging in any exercise or physical activity?  Yes.      Self-Assessment of Health  What is your perception of your health? Good  Do you sleep more than six hours a night? Yes  In the past 7 days, how much did pain keep you from doing your normal work? None  Do you spend quality time with family or friends (virtually or in person)? Yes  Do you usually eat a heart healthy diet that constists of a variety of fruits, vegetables, whole grains and fiber? Yes  Do you eat  foods high in fat and/or Fast Food more than three times per week? No    Advance Care Planning  Do you have an Advance Directive, Living Will, Durable Power of , or POLST? No                 Health Maintenance Summary            Overdue - Colorectal Cancer Screening (Colon Cancer Screening Annual FIT - Yearly) Overdue since 11/23/2017 11/23/2016  OCCULT BLOOD FECES IMMUNOASSAY    08/04/2002  Colonoscopy (Reason not specified)              Overdue - Hepatitis A Vaccine (Hep A) (4 of 4 - Hep A Twinrix risk 4-dose series) Overdue since 10/12/2021      11/09/2020  Imm Admin: Hep A/HEP B Combined Vaccine (TwinRix)    10/19/2020  Imm Admin: Hep A/HEP B Combined Vaccine (TwinRix)    10/12/2020  Imm Admin: Hep A/HEP B Combined Vaccine (TwinRix)              Overdue - Hepatitis B Vaccine (Hep B) (4 of 4 - Hep B Twinrix risk 4-dose series) Overdue since 10/12/2021      11/09/2020  Imm Admin: Hep A/HEP B Combined Vaccine (TwinRix)    10/19/2020  Imm Admin: Hep A/HEP B Combined Vaccine (TwinRix)    10/12/2020  Imm Admin: Hep A/HEP B Combined Vaccine (TwinRix)              Overdue - Annual Pulmonary Function Test / Spirometry (Yearly) Overdue since 6/9/2022 06/09/2021  PFT DICTATED RESULTS              Overdue - Annual Wellness Visit (Yearly) Overdue since 10/27/2023      10/27/2022  Visit Dx: Medicare annual wellness visit, subsequent              IMM DTaP/Tdap/Td Vaccine (2 - Td or Tdap) Next due on 5/5/2031 05/05/2021  Imm Admin: Tdap Vaccine              Abdominal Aortic Aneurysm (AAA) Screening  Tentatively Complete      06/22/2021  US-ABDOMEN COMPLETE SURVEY    07/11/2020  CT-CHEST,ABDOMEN,PELVIS WITH    06/28/2020  CT-CHEST,ABDOMEN,PELVIS WITH    06/26/2020  US-ABDOMEN COMPLETE SURVEY    04/02/2020  CTA ABDOMEN PELVIS W & W/O POST PROCESS    Only the first 5 history entries have been loaded, but more history exists.              Pneumococcal Vaccine: 65+ Years (Series Information) Completed       05/03/2022  Imm Admin: Pneumococcal Conjugate Vaccine (PCV20)    12/06/2016  Imm Admin: Pneumococcal polysaccharide vaccine (PPSV-23)              Zoster (Shingles) Vaccines (Series Information) Completed      08/09/2023  Imm Admin: Zoster Vaccine Recombinant (RZV) (SHINGRIX)    05/09/2023  Imm Admin: Zoster Vaccine Recombinant (RZV) (SHINGRIX)              Influenza Vaccine (Series Information) Completed      01/05/2024  Imm Admin: Influenza Vaccine, Quadrivalent, Adjuvanted (Pf)    10/01/2023  Imm Admin: Influenza, Unspecified - HISTORICAL DATA    10/27/2022  Imm Admin: Influenza Vaccine Adult HD    10/01/2020  Imm Admin: Influenza, Unspecified - HISTORICAL DATA    09/30/2020  Imm Admin: Influenza Vaccine Adult HD    Only the first 5 history entries have been loaded, but more history exists.              COVID-19 Vaccine (Series Information) Completed      01/05/2024  Imm Admin: Covid-19 Mrna (Spikevax) Moderna 12+ Years    11/29/2022  Imm Admin: MODERNA BIVALENT BOOSTER SARS-COV-2 VACCINE (6+)    05/27/2022  Imm Admin: MODERNA SARS-COV-2 VACCINE (12+)    12/10/2021  Imm Admin: MODERNA SARS-COV-2 VACCINE (12+)    02/02/2021  Imm Admin: MODERNA SARS-COV-2 VACCINE (12+)    Only the first 5 history entries have been loaded, but more history exists.              HPV Vaccines (Series Information) Aged Out      No completion history exists for this topic.              Polio Vaccine (Inactivated Polio) (Series Information) Aged Out      No completion history exists for this topic.              Meningococcal Immunization (Series Information) Aged Out      No completion history exists for this topic.              Discontinued - Hepatitis C Screening  Discontinued        Frequency changed to Never automatically (Topic No Longer Applies)    09/25/2020  Hepatitis C Genotyping component of HCV GENOTYPING                    Patient Care Team:  MARIA GUADALUPE Sims as PCP - General (Family Medicine)  Luiz Dennis  M.D. (Interventional Cardiology)  Guillaume Chacon M.D. (Medical Oncology)        Social History     Tobacco Use    Smoking status: Every Day     Current packs/day: 0.25     Average packs/day: 0.3 packs/day for 45.0 years (11.3 ttl pk-yrs)     Types: Cigarettes    Smokeless tobacco: Never   Vaping Use    Vaping Use: Never used   Substance Use Topics    Alcohol use: Yes     Alcohol/week: 0.0 oz     Comment: 1-2 per day occ    Drug use: No     Family History   Problem Relation Age of Onset    Heart Disease Mother     Hypertension Mother     Diabetes Mother     No Known Problems Father     Heart Disease Maternal Uncle     Psychiatric Illness Paternal Uncle     Diabetes Paternal Uncle     Heart Disease Paternal Uncle      He  has a past medical history of Dental disorder, Dental disorder, Heart valve disease, High cholesterol, Hyperlipidemia, Hypertension, and Stented coronary artery (2008).   Past Surgical History:   Procedure Laterality Date    SUBMANDIBLE ABSCESS INCISION AND DRAINAGE N/A 7/12/2020    Procedure: INCISION AND DRAINAGE, ABSCESS, SUBMANDIBULAR REGION;  Surgeon: Brenden Morrison D.D.SKym;  Location: SURGERY [facility];  Service: Oral Surgery    MANDIBLE FRACTURE ORIF Bilateral 7/3/2020    Procedure: ORIF, FRACTURE, MANDIBLE;  Surgeon: Brenden Morrison D.D.S.;  Location: SURGERY [facility];  Service: Oral Surgery    TRANSCATHETER AORTIC VALVE REPLACEMENT N/A 6/22/2020    Procedure: REPLACEMENT, AORTIC VALVE, TRANSCATHETER- WITH ANY ASSOCIATED PROCEDURES;  Surgeon: Luiz Dennis M.D.;  Location: [facility];  Service: Cardiac    JOON N/A 6/22/2020    Procedure: ECHOCARDIOGRAM,;  Surgeon: Luiz Dennis M.D.;  Location: SURGERY [facility];  Service: Cardiac    ANGIOGRAM  05/22/2020    OTHER CARDIAC SURGERY  2009    stents x3    BONE SPUR EXCISION  1993    left ankle    STAPEDECTOMY  1980s       Exam:   /60 (BP Location: Left arm, Patient Position:  "Sitting, BP Cuff Size: Adult)   Pulse 82   Temp 36 °C (96.8 °F)   Resp 16   Ht 1.676 m (5' 6\")   Wt 50.3 kg (111 lb)   SpO2 94%  Body mass index is 17.92 kg/m².    Hearing good.    Dentition poor  Alert, oriented in no acute distress.  Eye contact is good, speech goal directed, affect calm    Assessment and Plan. The following treatment and monitoring plan is recommended:    1. Medicare annual wellness visit, subsequent      2. Essential hypertension  -stable.  Continue amlodipine 10 mg daily     3. Mixed hyperlipidemia  -recommend updated LP.  Continue lovastatin.      4. Primary insomnia  -increase trazodone to 100 mg 45 min prior to bedtime for one week - if not effective, may increase to 200 mg nightly and if still ineffective after 2 weeks, will change to doxepin.  Pt to f/u with me in 2 weeks via my chart.     5. Tobacco dependence  -he has no desire to stop smoking.  He understands long-term repercussions of smoking such as increased risk of lung cancer and worsening COPD.    6. Chronic obstructive pulmonary disease, unspecified COPD type (HCC)  -persistent.  Bothered by chronic coughing and mucus production.  Declines PFT.  Would benefit from Trelegy but this is very expensive.  Wixela is generic and affordable, 1 inhalation twice daily, rinsing mouth after use.  Discussed maintenance versus rescue inhalers.  Continue to use albuterol as needed.  Added in Singulair to help with chronic cough.  Discussed side effects of Singulair.  Follow-up 2 weeks.    7. 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 Chacon  -stable  and UTD with DR. Chacon.      8. Coronary artery disease due to lipid rich plaque  -denies CP.  On statin.  On 81 mg asa    9. History of hepatitis C  -resolved.  LFTS normal with Dr. Chacon's office    10. PAD (peripheral artery disease) (HCC)- left femoral bruit; diminished right dt/pt pulse  -denies leg pain with exercise / excessive fatigue in legs.  On 81 mg " asa and statin     11. Pancytopenia (HCC)  -stable with Dr. Chacon    12. Panlobular emphysema (HCC)  -as in #6.     13. Rheumatic disease - HIT syndrome  -stable on methotrexate    14. S/P TAVR (transcatheter aortic valve replacement)  -stable.  Encouraged yearly f/u with cardiology.       Services suggested: No services needed at this time  Health Care Screening: Age-appropriate preventive services recommended by USPTF and ACIP covered by Medicare were discussed today. Services ordered if indicated and agreed upon by the patient.  Referrals offered: Community-based lifestyle interventions to reduce health risks and promote self-management and wellness, fall prevention, nutrition, physical activity, tobacco-use cessation, weight loss, and mental health services as per orders if indicated.    Discussion today about general wellness and lifestyle habits:    Prevent falls and reduce trip hazards; Cautioned about securing or removing rugs.  Have a working fire alarm and carbon monoxide detector;   Engage in regular physical activity and social activities     Follow-up: 2 weeks in regards to how he is doing on Wixela and Singulair, his wife typically uses his MyChart to let me know how this is going.  Encouraged a follow-up here every year for Medicare wellness, sooner with any problems or concerns.

## 2024-05-31 DIAGNOSIS — F51.01 PRIMARY INSOMNIA: ICD-10-CM

## 2024-05-31 DIAGNOSIS — J44.9 CHRONIC OBSTRUCTIVE PULMONARY DISEASE, UNSPECIFIED COPD TYPE (HCC): ICD-10-CM

## 2024-05-31 RX ORDER — TRAZODONE HYDROCHLORIDE 100 MG/1
TABLET ORAL
Qty: 180 TABLET | Refills: 2 | Status: SHIPPED | OUTPATIENT
Start: 2024-05-31

## 2024-05-31 RX ORDER — MONTELUKAST SODIUM 10 MG/1
10 TABLET ORAL DAILY
Qty: 90 TABLET | Refills: 1 | Status: SHIPPED | OUTPATIENT
Start: 2024-05-31

## 2024-06-17 DIAGNOSIS — F51.01 PRIMARY INSOMNIA: ICD-10-CM

## 2024-06-17 RX ORDER — ALPRAZOLAM 0.5 MG/1
0.5 TABLET ORAL NIGHTLY PRN
Qty: 15 TABLET | Refills: 0 | Status: SHIPPED | OUTPATIENT
Start: 2024-06-17 | End: 2024-07-02

## 2024-07-15 ENCOUNTER — HOSPITAL ENCOUNTER (OUTPATIENT)
Facility: MEDICAL CENTER | Age: 69
End: 2024-07-15
Attending: INTERNAL MEDICINE
Payer: MEDICARE

## 2024-07-15 LAB
BASOPHILS # BLD AUTO: 1.5 % (ref 0–1.8)
BASOPHILS # BLD: 0.07 K/UL (ref 0–0.12)
EOSINOPHIL # BLD AUTO: 0.13 K/UL (ref 0–0.51)
EOSINOPHIL NFR BLD: 2.8 % (ref 0–6.9)
ERYTHROCYTE [DISTWIDTH] IN BLOOD BY AUTOMATED COUNT: 49.8 FL (ref 35.9–50)
HCT VFR BLD AUTO: 47.3 % (ref 42–52)
HGB BLD-MCNC: 16 G/DL (ref 14–18)
IMM GRANULOCYTES # BLD AUTO: 0.02 K/UL (ref 0–0.11)
IMM GRANULOCYTES NFR BLD AUTO: 0.4 % (ref 0–0.9)
LYMPHOCYTES # BLD AUTO: 0.93 K/UL (ref 1–4.8)
LYMPHOCYTES NFR BLD: 20.2 % (ref 22–41)
MCH RBC QN AUTO: 32.2 PG (ref 27–33)
MCHC RBC AUTO-ENTMCNC: 33.8 G/DL (ref 32.3–36.5)
MCV RBC AUTO: 95.2 FL (ref 81.4–97.8)
MONOCYTES # BLD AUTO: 0.78 K/UL (ref 0–0.85)
MONOCYTES NFR BLD AUTO: 16.9 % (ref 0–13.4)
NEUTROPHILS # BLD AUTO: 2.68 K/UL (ref 1.82–7.42)
NEUTROPHILS NFR BLD: 58.2 % (ref 44–72)
NRBC # BLD AUTO: 0 K/UL
NRBC BLD-RTO: 0 /100 WBC (ref 0–0.2)
PLATELET # BLD AUTO: 200 K/UL (ref 164–446)
PMV BLD AUTO: 9.9 FL (ref 9–12.9)
RBC # BLD AUTO: 4.97 M/UL (ref 4.7–6.1)
WBC # BLD AUTO: 4.6 K/UL (ref 4.8–10.8)

## 2024-07-15 PROCEDURE — 80053 COMPREHEN METABOLIC PANEL: CPT

## 2024-07-15 PROCEDURE — 85025 COMPLETE CBC W/AUTO DIFF WBC: CPT

## 2024-07-16 LAB
ALBUMIN SERPL BCP-MCNC: 4.4 G/DL (ref 3.2–4.9)
ALBUMIN/GLOB SERPL: 1.3 G/DL
ALP SERPL-CCNC: 66 U/L (ref 30–99)
ALT SERPL-CCNC: 25 U/L (ref 2–50)
ANION GAP SERPL CALC-SCNC: 13 MMOL/L (ref 7–16)
AST SERPL-CCNC: 39 U/L (ref 12–45)
BILIRUB SERPL-MCNC: 0.5 MG/DL (ref 0.1–1.5)
BUN SERPL-MCNC: 17 MG/DL (ref 8–22)
CALCIUM ALBUM COR SERPL-MCNC: 8.7 MG/DL (ref 8.5–10.5)
CALCIUM SERPL-MCNC: 9 MG/DL (ref 8.5–10.5)
CHLORIDE SERPL-SCNC: 100 MMOL/L (ref 96–112)
CO2 SERPL-SCNC: 21 MMOL/L (ref 20–33)
CREAT SERPL-MCNC: 0.79 MG/DL (ref 0.5–1.4)
GFR SERPLBLD CREATININE-BSD FMLA CKD-EPI: 95 ML/MIN/1.73 M 2
GLOBULIN SER CALC-MCNC: 3.3 G/DL (ref 1.9–3.5)
GLUCOSE SERPL-MCNC: 131 MG/DL (ref 65–99)
POTASSIUM SERPL-SCNC: 4.9 MMOL/L (ref 3.6–5.5)
PROT SERPL-MCNC: 7.7 G/DL (ref 6–8.2)
SODIUM SERPL-SCNC: 134 MMOL/L (ref 135–145)

## 2024-08-29 ENCOUNTER — APPOINTMENT (OUTPATIENT)
Dept: MEDICAL GROUP | Facility: LAB | Age: 69
End: 2024-08-29
Payer: MEDICARE

## 2024-10-23 ENCOUNTER — HOSPITAL ENCOUNTER (OUTPATIENT)
Dept: LAB | Facility: MEDICAL CENTER | Age: 69
End: 2024-10-23
Attending: FAMILY MEDICINE
Payer: MEDICARE

## 2024-10-23 ENCOUNTER — HOSPITAL ENCOUNTER (OUTPATIENT)
Dept: CARDIOLOGY | Facility: MEDICAL CENTER | Age: 69
End: 2024-10-23
Attending: FAMILY MEDICINE
Payer: MEDICARE

## 2024-10-23 ENCOUNTER — OFFICE VISIT (OUTPATIENT)
Dept: MEDICAL GROUP | Facility: LAB | Age: 69
End: 2024-10-23
Payer: MEDICARE

## 2024-10-23 VITALS
HEIGHT: 66 IN | OXYGEN SATURATION: 92 % | RESPIRATION RATE: 16 BRPM | HEART RATE: 88 BPM | TEMPERATURE: 97.5 F | SYSTOLIC BLOOD PRESSURE: 152 MMHG | BODY MASS INDEX: 17.04 KG/M2 | DIASTOLIC BLOOD PRESSURE: 50 MMHG | WEIGHT: 106 LBS

## 2024-10-23 DIAGNOSIS — I10 ESSENTIAL HYPERTENSION: ICD-10-CM

## 2024-10-23 DIAGNOSIS — I49.8 SINUS ARRHYTHMIA: ICD-10-CM

## 2024-10-23 DIAGNOSIS — Z95.2 S/P TAVR (TRANSCATHETER AORTIC VALVE REPLACEMENT): ICD-10-CM

## 2024-10-23 DIAGNOSIS — R42 EPISODIC LIGHTHEADEDNESS: ICD-10-CM

## 2024-10-23 DIAGNOSIS — E78.2 MIXED HYPERLIPIDEMIA: ICD-10-CM

## 2024-10-23 LAB
ALBUMIN SERPL BCP-MCNC: 4.4 G/DL (ref 3.2–4.9)
ALBUMIN/GLOB SERPL: 1.2 G/DL
ALP SERPL-CCNC: 62 U/L (ref 30–99)
ALT SERPL-CCNC: 19 U/L (ref 2–50)
ANION GAP SERPL CALC-SCNC: 10 MMOL/L (ref 7–16)
AST SERPL-CCNC: 33 U/L (ref 12–45)
BASOPHILS # BLD AUTO: 1.2 % (ref 0–1.8)
BASOPHILS # BLD: 0.05 K/UL (ref 0–0.12)
BILIRUB SERPL-MCNC: 0.5 MG/DL (ref 0.1–1.5)
BUN SERPL-MCNC: 15 MG/DL (ref 8–22)
CALCIUM ALBUM COR SERPL-MCNC: 9.2 MG/DL (ref 8.5–10.5)
CALCIUM SERPL-MCNC: 9.5 MG/DL (ref 8.5–10.5)
CHLORIDE SERPL-SCNC: 101 MMOL/L (ref 96–112)
CO2 SERPL-SCNC: 27 MMOL/L (ref 20–33)
CREAT SERPL-MCNC: 0.89 MG/DL (ref 0.5–1.4)
EOSINOPHIL # BLD AUTO: 0.07 K/UL (ref 0–0.51)
EOSINOPHIL NFR BLD: 1.7 % (ref 0–6.9)
ERYTHROCYTE [DISTWIDTH] IN BLOOD BY AUTOMATED COUNT: 47.4 FL (ref 35.9–50)
GFR SERPLBLD CREATININE-BSD FMLA CKD-EPI: 91 ML/MIN/1.73 M 2
GLOBULIN SER CALC-MCNC: 3.7 G/DL (ref 1.9–3.5)
GLUCOSE SERPL-MCNC: 129 MG/DL (ref 65–99)
HCT VFR BLD AUTO: 47.5 % (ref 42–52)
HGB BLD-MCNC: 16 G/DL (ref 14–18)
IMM GRANULOCYTES # BLD AUTO: 0.01 K/UL (ref 0–0.11)
IMM GRANULOCYTES NFR BLD AUTO: 0.2 % (ref 0–0.9)
LV EJECT FRACT MOD 2C 99903: 56.77
LV EJECT FRACT MOD 4C 99902: 63.8
LV EJECT FRACT MOD BP 99901: 59.09
LYMPHOCYTES # BLD AUTO: 1.18 K/UL (ref 1–4.8)
LYMPHOCYTES NFR BLD: 28.1 % (ref 22–41)
MCH RBC QN AUTO: 31.4 PG (ref 27–33)
MCHC RBC AUTO-ENTMCNC: 33.7 G/DL (ref 32.3–36.5)
MCV RBC AUTO: 93.1 FL (ref 81.4–97.8)
MONOCYTES # BLD AUTO: 0.76 K/UL (ref 0–0.85)
MONOCYTES NFR BLD AUTO: 18.1 % (ref 0–13.4)
NEUTROPHILS # BLD AUTO: 2.13 K/UL (ref 1.82–7.42)
NEUTROPHILS NFR BLD: 50.7 % (ref 44–72)
NRBC # BLD AUTO: 0 K/UL
NRBC BLD-RTO: 0 /100 WBC (ref 0–0.2)
PLATELET # BLD AUTO: 197 K/UL (ref 164–446)
PMV BLD AUTO: 10.1 FL (ref 9–12.9)
POTASSIUM SERPL-SCNC: 5 MMOL/L (ref 3.6–5.5)
PROT SERPL-MCNC: 8.1 G/DL (ref 6–8.2)
RBC # BLD AUTO: 5.1 M/UL (ref 4.7–6.1)
SODIUM SERPL-SCNC: 138 MMOL/L (ref 135–145)
WBC # BLD AUTO: 4.2 K/UL (ref 4.8–10.8)

## 2024-10-23 PROCEDURE — 84443 ASSAY THYROID STIM HORMONE: CPT

## 2024-10-23 PROCEDURE — 3078F DIAST BP <80 MM HG: CPT | Performed by: FAMILY MEDICINE

## 2024-10-23 PROCEDURE — 93306 TTE W/DOPPLER COMPLETE: CPT | Mod: 26 | Performed by: STUDENT IN AN ORGANIZED HEALTH CARE EDUCATION/TRAINING PROGRAM

## 2024-10-23 PROCEDURE — 3077F SYST BP >= 140 MM HG: CPT | Performed by: FAMILY MEDICINE

## 2024-10-23 PROCEDURE — 36415 COLL VENOUS BLD VENIPUNCTURE: CPT

## 2024-10-23 PROCEDURE — 99214 OFFICE O/P EST MOD 30 MIN: CPT | Mod: 25 | Performed by: FAMILY MEDICINE

## 2024-10-23 PROCEDURE — 93306 TTE W/DOPPLER COMPLETE: CPT

## 2024-10-23 PROCEDURE — 85025 COMPLETE CBC W/AUTO DIFF WBC: CPT

## 2024-10-23 PROCEDURE — 80053 COMPREHEN METABOLIC PANEL: CPT

## 2024-10-23 ASSESSMENT — FIBROSIS 4 INDEX: FIB4 SCORE: 2.769

## 2024-10-24 ENCOUNTER — OFFICE VISIT (OUTPATIENT)
Dept: CARDIOLOGY | Facility: MEDICAL CENTER | Age: 69
End: 2024-10-24
Payer: MEDICARE

## 2024-10-24 VITALS
HEART RATE: 70 BPM | BODY MASS INDEX: 17.53 KG/M2 | RESPIRATION RATE: 16 BRPM | OXYGEN SATURATION: 92 % | DIASTOLIC BLOOD PRESSURE: 56 MMHG | HEIGHT: 66 IN | SYSTOLIC BLOOD PRESSURE: 120 MMHG | WEIGHT: 109.1 LBS

## 2024-10-24 DIAGNOSIS — I73.9 PAD (PERIPHERAL ARTERY DISEASE) (HCC): ICD-10-CM

## 2024-10-24 DIAGNOSIS — I25.10 CORONARY ARTERY DISEASE DUE TO LIPID RICH PLAQUE: ICD-10-CM

## 2024-10-24 DIAGNOSIS — I25.83 CORONARY ARTERY DISEASE DUE TO LIPID RICH PLAQUE: ICD-10-CM

## 2024-10-24 DIAGNOSIS — I10 ESSENTIAL HYPERTENSION: ICD-10-CM

## 2024-10-24 DIAGNOSIS — Z95.2 S/P TAVR (TRANSCATHETER AORTIC VALVE REPLACEMENT): ICD-10-CM

## 2024-10-24 DIAGNOSIS — E78.2 MIXED HYPERLIPIDEMIA: ICD-10-CM

## 2024-10-24 LAB — TSH SERPL DL<=0.005 MIU/L-ACNC: 1.3 UIU/ML (ref 0.38–5.33)

## 2024-10-24 PROCEDURE — 3078F DIAST BP <80 MM HG: CPT

## 2024-10-24 PROCEDURE — 99214 OFFICE O/P EST MOD 30 MIN: CPT

## 2024-10-24 PROCEDURE — 3074F SYST BP LT 130 MM HG: CPT

## 2024-10-24 PROCEDURE — 99212 OFFICE O/P EST SF 10 MIN: CPT

## 2024-10-24 ASSESSMENT — ENCOUNTER SYMPTOMS
SHORTNESS OF BREATH: 0
DIZZINESS: 1
NERVOUS/ANXIOUS: 0
MUSCULOSKELETAL NEGATIVE: 1
PALPITATIONS: 0
DEPRESSION: 0
PND: 0
EYES NEGATIVE: 1
ORTHOPNEA: 0
GASTROINTESTINAL NEGATIVE: 1
CONSTITUTIONAL NEGATIVE: 1

## 2024-10-24 ASSESSMENT — FIBROSIS 4 INDEX: FIB4 SCORE: 2.73

## 2024-10-28 ENCOUNTER — HOSPITAL ENCOUNTER (OUTPATIENT)
Dept: LAB | Facility: MEDICAL CENTER | Age: 69
End: 2024-10-28
Payer: MEDICARE

## 2024-10-28 DIAGNOSIS — E78.2 MIXED HYPERLIPIDEMIA: ICD-10-CM

## 2024-10-28 LAB
CHOLEST SERPL-MCNC: 153 MG/DL (ref 100–199)
FASTING STATUS PATIENT QL REPORTED: NORMAL
HDLC SERPL-MCNC: 62 MG/DL
LDLC SERPL CALC-MCNC: 76 MG/DL
TRIGL SERPL-MCNC: 73 MG/DL (ref 0–149)

## 2024-10-28 PROCEDURE — 80061 LIPID PANEL: CPT

## 2024-10-28 PROCEDURE — 36415 COLL VENOUS BLD VENIPUNCTURE: CPT

## 2024-10-29 ENCOUNTER — TELEPHONE (OUTPATIENT)
Dept: CARDIOLOGY | Facility: MEDICAL CENTER | Age: 69
End: 2024-10-29
Payer: MEDICARE

## 2024-10-29 DIAGNOSIS — I25.10 CORONARY ARTERY DISEASE DUE TO LIPID RICH PLAQUE: ICD-10-CM

## 2024-10-29 DIAGNOSIS — I25.83 CORONARY ARTERY DISEASE DUE TO LIPID RICH PLAQUE: ICD-10-CM

## 2024-10-29 DIAGNOSIS — E78.2 MIXED HYPERLIPIDEMIA: ICD-10-CM

## 2024-10-29 RX ORDER — ROSUVASTATIN CALCIUM 20 MG/1
20 TABLET, COATED ORAL EVERY EVENING
Qty: 90 TABLET | Refills: 3 | Status: SHIPPED | OUTPATIENT
Start: 2024-10-29

## 2024-11-05 ENCOUNTER — APPOINTMENT (OUTPATIENT)
Dept: MEDICAL GROUP | Facility: LAB | Age: 69
End: 2024-11-05
Payer: MEDICARE

## 2024-11-05 VITALS
BODY MASS INDEX: 17.36 KG/M2 | OXYGEN SATURATION: 92 % | DIASTOLIC BLOOD PRESSURE: 60 MMHG | HEART RATE: 88 BPM | TEMPERATURE: 96.4 F | HEIGHT: 66 IN | SYSTOLIC BLOOD PRESSURE: 118 MMHG | WEIGHT: 108 LBS | RESPIRATION RATE: 16 BRPM

## 2024-11-05 DIAGNOSIS — I73.9 PAD (PERIPHERAL ARTERY DISEASE) (HCC): ICD-10-CM

## 2024-11-05 DIAGNOSIS — J43.1 PANLOBULAR EMPHYSEMA (HCC): ICD-10-CM

## 2024-11-05 DIAGNOSIS — R42 DIZZINESS: ICD-10-CM

## 2024-11-05 DIAGNOSIS — I10 ESSENTIAL HYPERTENSION: ICD-10-CM

## 2024-11-05 DIAGNOSIS — R09.89 CHEST CONGESTION: ICD-10-CM

## 2024-11-05 DIAGNOSIS — R09.89 RUNNY NOSE: ICD-10-CM

## 2024-11-05 DIAGNOSIS — Z95.2 S/P TAVR (TRANSCATHETER AORTIC VALVE REPLACEMENT): ICD-10-CM

## 2024-11-05 DIAGNOSIS — F51.01 PRIMARY INSOMNIA: ICD-10-CM

## 2024-11-05 PROCEDURE — 3078F DIAST BP <80 MM HG: CPT | Performed by: NURSE PRACTITIONER

## 2024-11-05 PROCEDURE — 99214 OFFICE O/P EST MOD 30 MIN: CPT | Performed by: NURSE PRACTITIONER

## 2024-11-05 PROCEDURE — 3074F SYST BP LT 130 MM HG: CPT | Performed by: NURSE PRACTITIONER

## 2024-11-05 RX ORDER — TIOTROPIUM BROMIDE INHALATION SPRAY 1.56 UG/1
1 SPRAY, METERED RESPIRATORY (INHALATION)
Qty: 4 G | Refills: 5 | Status: SHIPPED | OUTPATIENT
Start: 2024-11-05

## 2024-11-05 RX ORDER — IPRATROPIUM BROMIDE 42 UG/1
2 SPRAY, METERED NASAL
Qty: 15 ML | Refills: 4 | Status: SHIPPED | OUTPATIENT
Start: 2024-11-05 | End: 2024-11-27

## 2024-11-05 ASSESSMENT — FIBROSIS 4 INDEX: FIB4 SCORE: 2.73

## 2024-11-05 NOTE — PROGRESS NOTES
Verbal consent was acquired by the patient to use [employer] ambient listening note generation during this visit Yes      Subjective   Miguelito Kumari is a 69 y.o. male who presents for f/u  History of Present Illness  The patient presents for evaluation of multiple medical concerns.    He experienced dizziness during a trip in 09/2024, which he attributes to inadequate water intake. The dizziness was particularly noticeable when he stood up quickly after bending down. After increasing his water consumption, the dizziness subsided. He reports no chest pain, neck pain, or headaches.    He has a history of allergies and has been on Claritin for 1 to 2 years, which he reports as ineffective. He experiences a runny nose throughout the day, particularly when lying down. He was prescribed Advair for a cough but discontinued its use as he felt it was not beneficial. He continues to take montelukast at night. His cough has since resolved. He experiences morning congestion and difficulty breathing, which he manages with albuterol. Once the congestion is cleared, his breathing improves. He also reports dry, watery eyes and excessive runny nose in the am. He has been using Flonase for his sinuses but is unsure of its effectiveness. He reports thick mucus production in the mornings, which he manages by drinking coffee to loosen the mucus and then spitting it out.    He has difficulty sleeping and takes trazodone 100 mg, sometimes requiring 2 to 3 tablets.     He recently had a Cologuard test and is awaiting the results.     He reports that his blood pressure was elevated during his last visit, which he attributes to consuming salt / abnormal food with his anniversary the night before.  He is up-to-date with his cardiologist.  His echo is up-to-date and stable.  Was told all is well at his cardiology office.    Review of Systems  Negative except for HPI  Objective   BP (!) 140/56 (BP Location: Right arm, Patient Position:  "Sitting, BP Cuff Size: Adult)   Pulse 88   Temp (!) 35.8 °C (96.4 °F) (Temporal)   Resp 16   Ht 1.676 m (5' 6\")   Wt 49 kg (108 lb)   SpO2 92%   Physical Exam  Gen. appears healthy in no distress   Skin appropriate for sex and age   Neck trachea is midline  Lungs unlabored breathing.  Lungs are clear to auscultation bilaterally although he has slowed expiration which is chronic for him.  Heart regular rate and rhythm  Neuro gait and station normal   Psych appropriate, calm, interactive, well-groomed       Assessment & Plan  1. Emphysema.  His symptoms of morning congestion and difficulty breathing are likely due to emphysema.  Advair, which contains a steroid and a bronchodilator, was not beneficial for his morning chest congestion. He was advised to resume Advair if he experiences long-term breathing difficulties/return of chronic coughing. Spiriva was recommended to reduce mucus production, with the expectation of noticeable improvement within a few days. Mucinex was suggested to thin the mucus. Ipratropium was prescribed for his runny nose and nasal congestion, to be used twice daily. He was advised to maintain adequate hydration. If there is no improvement in his symptoms within 2 weeks, he should inform the provider.  Discussed side effects of both Spiriva and nasal ipratropium.  Can continue ipratropium long-term.  Can continue Singulair long-term.  Has albuterol to use as needed.  As always encouraged him to stop smoking which he has no interest in.    2. Allergic Rhinitis.  He experiences nasal congestion, runny nose, and itchy eyes, which are likely due to allergies. Flonase was previously used but found to be ineffective. Ipratropium was prescribed for his runny nose and nasal congestion, to be used twice daily. He was advised to maintain adequate hydration. If there is no improvement in his symptoms within 2 weeks, he should inform the provider.    3 hypertension:  His blood pressure was slightly " elevated today, recheck by myself was 118/60.  He will keep an eye on this and let us know if at home it is consistently greater than 130/85.    4.  Insomnia: Stable with trazodone, may take up to 400 mg of trazodone if needed.  Discussed that trazodone could also help him with weight gain.  Currently taking 200 mg but occasionally needs up to 3 and we discussed that this is okay.                   Please note that this dictation was created using voice recognition software. I have made every reasonable attempt to correct obvious errors, but I expect that there are errors of grammar and possibly content that I did not discover before finalizing the note.

## 2024-11-15 ENCOUNTER — HOSPITAL ENCOUNTER (OUTPATIENT)
Facility: MEDICAL CENTER | Age: 69
End: 2024-11-15
Attending: INTERNAL MEDICINE
Payer: MEDICARE

## 2024-11-15 LAB
ALBUMIN SERPL BCP-MCNC: 4.7 G/DL (ref 3.2–4.9)
ALBUMIN/GLOB SERPL: 1.3 G/DL
ALP SERPL-CCNC: 70 U/L (ref 30–99)
ALT SERPL-CCNC: 18 U/L (ref 2–50)
ANION GAP SERPL CALC-SCNC: 14 MMOL/L (ref 7–16)
AST SERPL-CCNC: 27 U/L (ref 12–45)
BASOPHILS # BLD AUTO: 1.1 % (ref 0–1.8)
BASOPHILS # BLD: 0.05 K/UL (ref 0–0.12)
BILIRUB SERPL-MCNC: 0.5 MG/DL (ref 0.1–1.5)
BUN SERPL-MCNC: 17 MG/DL (ref 8–22)
CALCIUM ALBUM COR SERPL-MCNC: 8.8 MG/DL (ref 8.5–10.5)
CALCIUM SERPL-MCNC: 9.4 MG/DL (ref 8.5–10.5)
CHLORIDE SERPL-SCNC: 100 MMOL/L (ref 96–112)
CO2 SERPL-SCNC: 25 MMOL/L (ref 20–33)
CREAT SERPL-MCNC: 0.81 MG/DL (ref 0.5–1.4)
EOSINOPHIL # BLD AUTO: 0.07 K/UL (ref 0–0.51)
EOSINOPHIL NFR BLD: 1.6 % (ref 0–6.9)
ERYTHROCYTE [DISTWIDTH] IN BLOOD BY AUTOMATED COUNT: 47.5 FL (ref 35.9–50)
GFR SERPLBLD CREATININE-BSD FMLA CKD-EPI: 94 ML/MIN/1.73 M 2
GLOBULIN SER CALC-MCNC: 3.6 G/DL (ref 1.9–3.5)
GLUCOSE SERPL-MCNC: 95 MG/DL (ref 65–99)
HCT VFR BLD AUTO: 46.9 % (ref 42–52)
HGB BLD-MCNC: 16.1 G/DL (ref 14–18)
IMM GRANULOCYTES # BLD AUTO: 0.01 K/UL (ref 0–0.11)
IMM GRANULOCYTES NFR BLD AUTO: 0.2 % (ref 0–0.9)
LYMPHOCYTES # BLD AUTO: 1.08 K/UL (ref 1–4.8)
LYMPHOCYTES NFR BLD: 24.5 % (ref 22–41)
MCH RBC QN AUTO: 32.1 PG (ref 27–33)
MCHC RBC AUTO-ENTMCNC: 34.3 G/DL (ref 32.3–36.5)
MCV RBC AUTO: 93.6 FL (ref 81.4–97.8)
MONOCYTES # BLD AUTO: 0.83 K/UL (ref 0–0.85)
MONOCYTES NFR BLD AUTO: 18.8 % (ref 0–13.4)
NEUTROPHILS # BLD AUTO: 2.37 K/UL (ref 1.82–7.42)
NEUTROPHILS NFR BLD: 53.8 % (ref 44–72)
NRBC # BLD AUTO: 0 K/UL
NRBC BLD-RTO: 0 /100 WBC (ref 0–0.2)
PLATELET # BLD AUTO: 179 K/UL (ref 164–446)
PMV BLD AUTO: 10 FL (ref 9–12.9)
POTASSIUM SERPL-SCNC: 4.2 MMOL/L (ref 3.6–5.5)
PROT SERPL-MCNC: 8.3 G/DL (ref 6–8.2)
RBC # BLD AUTO: 5.01 M/UL (ref 4.7–6.1)
SODIUM SERPL-SCNC: 139 MMOL/L (ref 135–145)
WBC # BLD AUTO: 4.4 K/UL (ref 4.8–10.8)

## 2024-11-15 PROCEDURE — 85025 COMPLETE CBC W/AUTO DIFF WBC: CPT

## 2024-11-15 PROCEDURE — 80053 COMPREHEN METABOLIC PANEL: CPT

## 2024-11-27 DIAGNOSIS — J43.1 PANLOBULAR EMPHYSEMA (HCC): ICD-10-CM

## 2024-11-27 DIAGNOSIS — R09.89 CHEST CONGESTION: ICD-10-CM

## 2024-11-27 RX ORDER — IPRATROPIUM BROMIDE 42 UG/1
2 SPRAY, METERED NASAL
Qty: 45 ML | Refills: 2 | Status: SHIPPED | OUTPATIENT
Start: 2024-11-27

## 2024-12-28 DIAGNOSIS — J44.9 CHRONIC OBSTRUCTIVE PULMONARY DISEASE, UNSPECIFIED COPD TYPE (HCC): ICD-10-CM

## 2024-12-30 RX ORDER — MONTELUKAST SODIUM 10 MG/1
10 TABLET ORAL DAILY
Qty: 90 TABLET | Refills: 0 | Status: SHIPPED | OUTPATIENT
Start: 2024-12-30

## 2025-01-02 DIAGNOSIS — J41.1 MUCOPURULENT CHRONIC BRONCHITIS (HCC): ICD-10-CM

## 2025-01-02 DIAGNOSIS — R06.02 SOB (SHORTNESS OF BREATH): ICD-10-CM

## 2025-01-02 RX ORDER — ALBUTEROL SULFATE 90 UG/1
2 INHALANT RESPIRATORY (INHALATION) EVERY 4 HOURS PRN
Qty: 18 EACH | Refills: 5 | Status: SHIPPED | OUTPATIENT
Start: 2025-01-02

## 2025-02-20 DIAGNOSIS — F51.01 PRIMARY INSOMNIA: ICD-10-CM

## 2025-02-20 RX ORDER — TRAZODONE HYDROCHLORIDE 100 MG/1
TABLET ORAL
Qty: 180 TABLET | Refills: 2 | Status: SHIPPED | OUTPATIENT
Start: 2025-02-20

## 2025-04-08 DIAGNOSIS — E78.2 MIXED HYPERLIPIDEMIA: ICD-10-CM

## 2025-04-08 RX ORDER — ROSUVASTATIN CALCIUM 20 MG/1
20 TABLET, COATED ORAL EVERY EVENING
Qty: 100 TABLET | Refills: 2 | Status: SHIPPED | OUTPATIENT
Start: 2025-04-08

## 2025-04-08 NOTE — PROGRESS NOTES
"[facility]    Pt has seen a [facility] provider in past 12 months and in need of prescription refills per protocol.  A 100 day supply is provided whenever possible to improve adherence rates.     No results found for: \"HBA1C\"   No results found for: \"MICROALBCALC\", \"MALBCRT\", \"MALBEXCR\", \"ZZKNMP19\", \"MICROALBUR\", \"MICRALB\", \"UMICROALBUM\", \"MICROALBTIM\"   Lab Results   Component Value Date/Time    ALKPHOSPHAT 70 11/15/2024 02:10 PM    ASTSGOT 27 11/15/2024 02:10 PM    ALTSGPT 18 11/15/2024 02:10 PM    TBILIRUBIN 0.5 11/15/2024 02:10 PM      Lab Results   Component Value Date/Time    SODIUM 139 11/15/2024 02:10 PM    POTASSIUM 4.2 11/15/2024 02:10 PM    CHLORIDE 100 11/15/2024 02:10 PM    CO2 25 11/15/2024 02:10 PM    GLUCOSE 95 11/15/2024 02:10 PM    BUN 17 11/15/2024 02:10 PM    CREATININE 0.81 11/15/2024 02:10 PM        Meds refilled:  Rosuvastatin     Bo Zapien, PharmD     CC  Dr. Meza      "

## 2025-04-14 ENCOUNTER — TELEPHONE (OUTPATIENT)
Dept: HEALTH INFORMATION MANAGEMENT | Facility: OTHER | Age: 70
End: 2025-04-14

## 2025-04-16 ENCOUNTER — TELEPHONE (OUTPATIENT)
Dept: CARDIOLOGY | Facility: MEDICAL CENTER | Age: 70
End: 2025-04-16
Payer: MEDICARE

## 2025-04-24 DIAGNOSIS — J44.9 CHRONIC OBSTRUCTIVE PULMONARY DISEASE, UNSPECIFIED COPD TYPE (HCC): ICD-10-CM

## 2025-04-24 RX ORDER — MONTELUKAST SODIUM 10 MG/1
10 TABLET ORAL DAILY
Qty: 90 TABLET | Refills: 0 | Status: SHIPPED | OUTPATIENT
Start: 2025-04-24

## 2025-04-24 NOTE — TELEPHONE ENCOUNTER
Received request via: Pharmacy    Was the patient seen in the last year in this department? Yes    Does the patient have an active prescription (recently filled or refills available) for medication(s) requested? No    Pharmacy Name: [facility]    Does the patient have correction Plus and need 100-day supply? (This applies to ALL medications) Yes, quantity updated to 100 days

## 2025-04-30 NOTE — TELEPHONE ENCOUNTER
Received request via: Pharmacy    Was the patient seen in the last year in this department? Yes    Does the patient have an active prescription (recently filled or refills available) for medication(s) requested? No    Pharm[facility]    Does the patient have halfway Plus and need 100-day supply? (This applies to ALL medications) Yes, quantity updated to 100 days

## 2025-05-01 RX ORDER — AMLODIPINE BESYLATE 10 MG/1
10 TABLET ORAL EVERY EVENING
Qty: 90 TABLET | Refills: 3 | Status: SHIPPED | OUTPATIENT
Start: 2025-05-01

## 2025-07-07 ENCOUNTER — PATIENT MESSAGE (OUTPATIENT)
Dept: MEDICAL GROUP | Facility: LAB | Age: 70
End: 2025-07-07
Payer: MEDICARE

## 2025-07-07 DIAGNOSIS — J41.1 MUCOPURULENT CHRONIC BRONCHITIS (HCC): ICD-10-CM

## 2025-07-07 DIAGNOSIS — R06.02 SOB (SHORTNESS OF BREATH): ICD-10-CM

## 2025-07-07 RX ORDER — ALBUTEROL SULFATE 90 UG/1
2 INHALANT RESPIRATORY (INHALATION) EVERY 4 HOURS PRN
Qty: 18 EACH | Refills: 5 | Status: SHIPPED | OUTPATIENT
Start: 2025-07-07 | End: 2025-07-09 | Stop reason: SDUPTHER

## 2025-07-09 ENCOUNTER — OFFICE VISIT (OUTPATIENT)
Dept: MEDICAL GROUP | Facility: LAB | Age: 70
End: 2025-07-09
Payer: MEDICARE

## 2025-07-09 VITALS
TEMPERATURE: 96.5 F | WEIGHT: 105 LBS | HEART RATE: 80 BPM | SYSTOLIC BLOOD PRESSURE: 128 MMHG | OXYGEN SATURATION: 91 % | HEIGHT: 66 IN | RESPIRATION RATE: 16 BRPM | BODY MASS INDEX: 16.88 KG/M2 | DIASTOLIC BLOOD PRESSURE: 56 MMHG

## 2025-07-09 DIAGNOSIS — C91.Z0 OTHER LYMPHOID LEUKEMIA NOT HAVING ACHIEVED REMISSION (HCC): ICD-10-CM

## 2025-07-09 DIAGNOSIS — J41.1 MUCOPURULENT CHRONIC BRONCHITIS (HCC): ICD-10-CM

## 2025-07-09 DIAGNOSIS — Z95.2 S/P TAVR (TRANSCATHETER AORTIC VALVE REPLACEMENT): ICD-10-CM

## 2025-07-09 DIAGNOSIS — F51.01 PRIMARY INSOMNIA: ICD-10-CM

## 2025-07-09 DIAGNOSIS — J43.1 PANLOBULAR EMPHYSEMA (HCC): ICD-10-CM

## 2025-07-09 DIAGNOSIS — J44.9 CHRONIC OBSTRUCTIVE PULMONARY DISEASE, UNSPECIFIED COPD TYPE (HCC): ICD-10-CM

## 2025-07-09 DIAGNOSIS — I10 ESSENTIAL HYPERTENSION: ICD-10-CM

## 2025-07-09 DIAGNOSIS — Z91.09 ENVIRONMENTAL ALLERGIES: ICD-10-CM

## 2025-07-09 PROBLEM — D61.818 PANCYTOPENIA (HCC): Status: RESOLVED | Noted: 2020-06-23 | Resolved: 2025-07-09

## 2025-07-09 PROCEDURE — 3078F DIAST BP <80 MM HG: CPT | Performed by: NURSE PRACTITIONER

## 2025-07-09 PROCEDURE — 99214 OFFICE O/P EST MOD 30 MIN: CPT | Performed by: NURSE PRACTITIONER

## 2025-07-09 PROCEDURE — 3074F SYST BP LT 130 MM HG: CPT | Performed by: NURSE PRACTITIONER

## 2025-07-09 RX ORDER — MONTELUKAST SODIUM 10 MG/1
10 TABLET ORAL DAILY
Qty: 100 TABLET | Refills: 3 | Status: SHIPPED | OUTPATIENT
Start: 2025-07-09

## 2025-07-09 RX ORDER — ALBUTEROL SULFATE 90 UG/1
2 INHALANT RESPIRATORY (INHALATION) EVERY 4 HOURS PRN
Qty: 1 EACH | Refills: 11 | Status: SHIPPED | OUTPATIENT
Start: 2025-07-09

## 2025-07-09 ASSESSMENT — PATIENT HEALTH QUESTIONNAIRE - PHQ9: CLINICAL INTERPRETATION OF PHQ2 SCORE: 0

## 2025-07-09 ASSESSMENT — FIBROSIS 4 INDEX: FIB4 SCORE: 2.56

## 2025-07-09 NOTE — PROGRESS NOTES
Verbal consent was acquired by the patient to use [employer] ambient listening note generation during this visit Yes      Subjective   Miguelito Kumari is a 70 y.o. male who presents for f/u on chronic issues.   History of Present Illness  The patient is a 70-year-old male who presents for a follow-up visit. The primary reason for this visit is a mix-up with his albuterol prescription. He was last seen in November 2024 regarding emphysema, allergies, hypertension, and insomnia. He also has a diagnosis of lymphoid leukemia and is followed by Dr. Chacon.  His blood work is done every 4 months by Dr. Chacon.  He has been told that his blood work is stable.    He reports an issue with his albuterol prescription, which was not refilled as expected. He was informed that he needed to schedule an appointment for the refill. However, he was later told that the prescription had been filled on 07/07/2025. He continues to smoke half a pack of cigarettes daily and uses albuterol as needed, any where from 0-2 times per day.  He experiences shortness of breath during activities such as yard work and walking to the mailbox, necessitating rest periods. He also reports occasional dizziness and lightheadedness after using his inhaler. He does not frequently fall ill and has not required hospitalization for his lung condition. He declined the use of home oxygen.  Overall he feels that he is doing really well.  He enjoys working in his yard and around the house.    He has year-round allergies, which cause burning eyes and runny nose. He takes Benadryl at night, which helps him sleep, but does not alleviate his daytime symptoms. He also uses an ipratropium nasal spray in the morning and Flonase as needed.    He maintains a good appetite and energy level, although he experiences fatigue when winded. He has lost weight, dropping from 112 pounds to 104 pounds over the past few months. His diet includes scrambled eggs, newman, sausage,  "toast, and gravy for breakfast; sandwiches and soup for lunch; and a substantial dinner. He does not consume protein shakes.    He is under the care of Dr. Chacon for lymphoid leukemia, with whom he has regular blood work done every four months. His white blood cell count remains stable around 5, and his red blood cells and platelets are within normal limits.    He sleeps well with the aid of trazodone and has a sufficient supply of the medication. He occasionally wakes up once or twice to use the bathroom but does not have any issues with urination or bowel movements.    He last saw his cardiologist in November 2024 and had an echocardiogram done, which showed no changes from the previous one done four years ago.      SOCIAL HISTORY  He admits to smoking about half a pack a day.      Objective   /56 (BP Location: Right arm, Patient Position: Sitting, BP Cuff Size: Adult)   Pulse 80   Temp 35.8 °C (96.5 °F)   Resp 16   Ht 1.676 m (5' 6\")   Wt 47.6 kg (105 lb)   SpO2 91%   Physical Exam  Respiratory: No rhonchi, wheezing, increased work of breathing.  Slowed expiration.  Cardiovascular: Regular rate and rhythm.  General: No apparent distress.  Thin appearing.  Pleasant, interactive, well-groomed  Neuro: Alert and oriented x 3.  Results  Labs   - CBC: 11/2024, White count of 4.4         Assessment & Plan  1. Emphysema  Oxygen levels are suboptimal, likely due to emphysema. Continues to smoke about half a pack a day; experiences significant shortness of breath during activities. Uses albuterol variably, sometimes once a day and other times two to three times a day; advised to consider using an inhaler daily to help open up lung tissue and decrease inflammation.  Preventative inhalers have been expensive for him and not well-tolerated, he prefers to refrain from adding anything on.  He has not had any hospitalizations in the past year.  Overall functioning well.  Has no interest in oxygen therapy at home but " this was offered to him.  Would benefit from pulmonary function testing and overnight pulse oximetry studies which she has no interest in.  We discussed continued physical activity and putting forth efforts to quit smoking.  I did make sure that he has refills of albuterol at his pharmacy.    2. Allergies  May continue with Benadryl at night.  May continue utilizing ipratropium and Flonase during the day.  Could add on an oral antihistamine as needed.    3. Weight loss  Has experienced weight loss, likely due to high metabolism/energy demand from emphysema. Weight fluctuated from 104 lbs to 112 lbs; appetite remains good.  Treatment plan: Encouraged to consume protein shakes daily, particularly in the morning when he lacks appetite, to help prevent further weight loss. Recommended to start protein shakes to maintain weight.  Encouraged him to monitor his weight and let me know if he loses any more weight.    4. Lymphoid leukemia  Followed by Dr. Chacon; last blood work done in 11/2024 showed a white count of 4.4. White counts have been stable around the fives; red cells and platelets are also stable. Blood work reviewed; no new symptoms reported.  Clinical decision making: Continues regular follow-ups with Dr. Chacon every four months.               Please note that this dictation was created using voice recognition software. I have made every reasonable attempt to correct obvious errors, but I expect that there are errors of grammar and possibly content that I did not discover before finalizing the note.

## 2025-08-10 SDOH — HEALTH STABILITY: PHYSICAL HEALTH: ON AVERAGE, HOW MANY MINUTES DO YOU ENGAGE IN EXERCISE AT THIS LEVEL?: 10 MIN

## 2025-08-10 SDOH — ECONOMIC STABILITY: INCOME INSECURITY: HOW HARD IS IT FOR YOU TO PAY FOR THE VERY BASICS LIKE FOOD, HOUSING, MEDICAL CARE, AND HEATING?: NOT HARD AT ALL

## 2025-08-10 SDOH — HEALTH STABILITY: MENTAL HEALTH
STRESS IS WHEN SOMEONE FEELS TENSE, NERVOUS, ANXIOUS, OR CAN'T SLEEP AT NIGHT BECAUSE THEIR MIND IS TROUBLED. HOW STRESSED ARE YOU?: ONLY A LITTLE

## 2025-08-10 SDOH — ECONOMIC STABILITY: INCOME INSECURITY: IN THE LAST 12 MONTHS, WAS THERE A TIME WHEN YOU WERE NOT ABLE TO PAY THE MORTGAGE OR RENT ON TIME?: NO

## 2025-08-10 SDOH — ECONOMIC STABILITY: FOOD INSECURITY: WITHIN THE PAST 12 MONTHS, YOU WORRIED THAT YOUR FOOD WOULD RUN OUT BEFORE YOU GOT MONEY TO BUY MORE.: NEVER TRUE

## 2025-08-10 SDOH — ECONOMIC STABILITY: FOOD INSECURITY: WITHIN THE PAST 12 MONTHS, THE FOOD YOU BOUGHT JUST DIDN'T LAST AND YOU DIDN'T HAVE MONEY TO GET MORE.: NEVER TRUE

## 2025-08-10 SDOH — HEALTH STABILITY: PHYSICAL HEALTH: ON AVERAGE, HOW MANY DAYS PER WEEK DO YOU ENGAGE IN MODERATE TO STRENUOUS EXERCISE (LIKE A BRISK WALK)?: 2 DAYS

## 2025-08-10 ASSESSMENT — LIFESTYLE VARIABLES
SKIP TO QUESTIONS 9-10: 0
HOW OFTEN DO YOU HAVE A DRINK CONTAINING ALCOHOL: 2-3 TIMES A WEEK
HOW OFTEN DO YOU HAVE SIX OR MORE DRINKS ON ONE OCCASION: NEVER
AUDIT-C TOTAL SCORE: 4
HOW MANY STANDARD DRINKS CONTAINING ALCOHOL DO YOU HAVE ON A TYPICAL DAY: 3 OR 4

## 2025-08-10 ASSESSMENT — SOCIAL DETERMINANTS OF HEALTH (SDOH)
HOW MANY DRINKS CONTAINING ALCOHOL DO YOU HAVE ON A TYPICAL DAY WHEN YOU ARE DRINKING: 3 OR 4
HOW OFTEN DO YOU GET TOGETHER WITH FRIENDS OR RELATIVES?: ONCE A WEEK
HOW OFTEN DO YOU ATTEND CHURCH OR RELIGIOUS SERVICES?: NEVER
HOW OFTEN DO YOU ATTENT MEETINGS OF THE CLUB OR ORGANIZATION YOU BELONG TO?: NEVER
IN A TYPICAL WEEK, HOW MANY TIMES DO YOU TALK ON THE PHONE WITH FAMILY, FRIENDS, OR NEIGHBORS?: NEVER
IN A TYPICAL WEEK, HOW MANY TIMES DO YOU TALK ON THE PHONE WITH FAMILY, FRIENDS, OR NEIGHBORS?: NEVER
HOW OFTEN DO YOU HAVE A DRINK CONTAINING ALCOHOL: 2-3 TIMES A WEEK
HOW OFTEN DO YOU ATTEND CHURCH OR RELIGIOUS SERVICES?: NEVER
HOW HARD IS IT FOR YOU TO PAY FOR THE VERY BASICS LIKE FOOD, HOUSING, MEDICAL CARE, AND HEATING?: NOT HARD AT ALL
DO YOU BELONG TO ANY CLUBS OR ORGANIZATIONS SUCH AS CHURCH GROUPS UNIONS, FRATERNAL OR ATHLETIC GROUPS, OR SCHOOL GROUPS?: NO
HOW OFTEN DO YOU HAVE SIX OR MORE DRINKS ON ONE OCCASION: NEVER
WITHIN THE PAST 12 MONTHS, YOU WORRIED THAT YOUR FOOD WOULD RUN OUT BEFORE YOU GOT THE MONEY TO BUY MORE: NEVER TRUE
HOW OFTEN DO YOU ATTENT MEETINGS OF THE CLUB OR ORGANIZATION YOU BELONG TO?: NEVER
IN THE PAST 12 MONTHS, HAS THE ELECTRIC, GAS, OIL, OR WATER COMPANY THREATENED TO SHUT OFF SERVICE IN YOUR HOME?: NO
DO YOU BELONG TO ANY CLUBS OR ORGANIZATIONS SUCH AS CHURCH GROUPS UNIONS, FRATERNAL OR ATHLETIC GROUPS, OR SCHOOL GROUPS?: NO
HOW OFTEN DO YOU GET TOGETHER WITH FRIENDS OR RELATIVES?: ONCE A WEEK

## 2025-08-11 ENCOUNTER — APPOINTMENT (OUTPATIENT)
Dept: MEDICAL GROUP | Facility: LAB | Age: 70
End: 2025-08-11
Payer: MEDICARE

## 2025-08-11 ASSESSMENT — ACTIVITIES OF DAILY LIVING (ADL): BATHING_REQUIRES_ASSISTANCE: 0

## 2025-08-11 ASSESSMENT — FIBROSIS 4 INDEX: FIB4 SCORE: 2.56

## 2025-08-11 ASSESSMENT — ENCOUNTER SYMPTOMS: GENERAL WELL-BEING: FAIR

## 2025-08-11 ASSESSMENT — PATIENT HEALTH QUESTIONNAIRE - PHQ9: CLINICAL INTERPRETATION OF PHQ2 SCORE: 0

## 2025-08-20 DIAGNOSIS — F51.01 PRIMARY INSOMNIA: ICD-10-CM

## 2025-08-20 RX ORDER — TRAZODONE HYDROCHLORIDE 100 MG/1
TABLET ORAL
Qty: 180 TABLET | Refills: 2 | Status: SHIPPED | OUTPATIENT
Start: 2025-08-20

## 2025-08-26 ENCOUNTER — HOSPITAL ENCOUNTER (OUTPATIENT)
Dept: LAB | Facility: MEDICAL CENTER | Age: 70
End: 2025-08-26
Attending: INTERNAL MEDICINE
Payer: MEDICARE

## 2025-08-26 LAB
ALBUMIN SERPL BCP-MCNC: 4.2 G/DL (ref 3.2–4.9)
ALBUMIN/GLOB SERPL: 1.1 G/DL
ALP SERPL-CCNC: 65 U/L (ref 30–99)
ALT SERPL-CCNC: 24 U/L (ref 2–50)
ANION GAP SERPL CALC-SCNC: 13 MMOL/L (ref 7–16)
AST SERPL-CCNC: 42 U/L (ref 12–45)
BASOPHILS # BLD AUTO: 1.2 % (ref 0–1.8)
BASOPHILS # BLD: 0.05 K/UL (ref 0–0.12)
BILIRUB SERPL-MCNC: 0.5 MG/DL (ref 0.1–1.5)
BUN SERPL-MCNC: 28 MG/DL (ref 8–22)
CALCIUM ALBUM COR SERPL-MCNC: 9.1 MG/DL (ref 8.5–10.5)
CALCIUM SERPL-MCNC: 9.3 MG/DL (ref 8.5–10.5)
CHLORIDE SERPL-SCNC: 102 MMOL/L (ref 96–112)
CHOLEST SERPL-MCNC: 138 MG/DL (ref 100–199)
CO2 SERPL-SCNC: 23 MMOL/L (ref 20–33)
CREAT SERPL-MCNC: 0.96 MG/DL (ref 0.5–1.4)
EOSINOPHIL # BLD AUTO: 0.16 K/UL (ref 0–0.51)
EOSINOPHIL NFR BLD: 3.8 % (ref 0–6.9)
ERYTHROCYTE [DISTWIDTH] IN BLOOD BY AUTOMATED COUNT: 46.6 FL (ref 35.9–50)
GFR SERPLBLD CREATININE-BSD FMLA CKD-EPI: 84 ML/MIN/1.73 M 2
GLOBULIN SER CALC-MCNC: 3.7 G/DL (ref 1.9–3.5)
GLUCOSE SERPL-MCNC: 112 MG/DL (ref 65–99)
HCT VFR BLD AUTO: 46.5 % (ref 42–52)
HDLC SERPL-MCNC: 52 MG/DL
HGB BLD-MCNC: 15.7 G/DL (ref 14–18)
IMM GRANULOCYTES # BLD AUTO: 0.01 K/UL (ref 0–0.11)
IMM GRANULOCYTES NFR BLD AUTO: 0.2 % (ref 0–0.9)
LDL CALC COMMENT:: NORMAL
LDLC SERPL CALC-MCNC: 70 MG/DL (ref 0–99)
LYMPHOCYTES # BLD AUTO: 0.87 K/UL (ref 1–4.8)
LYMPHOCYTES NFR BLD: 20.6 % (ref 22–41)
MCH RBC QN AUTO: 31.3 PG (ref 27–33)
MCHC RBC AUTO-ENTMCNC: 33.8 G/DL (ref 32.3–36.5)
MCV RBC AUTO: 92.8 FL (ref 81.4–97.8)
MONOCYTES # BLD AUTO: 0.88 K/UL (ref 0–0.85)
MONOCYTES NFR BLD AUTO: 20.8 % (ref 0–13.4)
NEUTROPHILS # BLD AUTO: 2.26 K/UL (ref 1.82–7.42)
NEUTROPHILS NFR BLD: 53.4 % (ref 44–72)
NRBC # BLD AUTO: 0 K/UL
NRBC BLD-RTO: 0 /100 WBC (ref 0–0.2)
PLATELET # BLD AUTO: 202 K/UL (ref 164–446)
PMV BLD AUTO: 9.6 FL (ref 9–12.9)
POTASSIUM SERPL-SCNC: 4.7 MMOL/L (ref 3.6–5.5)
PROT SERPL-MCNC: 7.9 G/DL (ref 6–8.2)
RBC # BLD AUTO: 5.01 M/UL (ref 4.7–6.1)
SODIUM SERPL-SCNC: 138 MMOL/L (ref 135–145)
TRIGL SERPL-MCNC: 82 MG/DL (ref 0–149)
TSH SERPL DL<=0.005 MIU/L-ACNC: 1.39 UIU/ML (ref 0.45–4.5)
VLDLC SERPL CALC-MCNC: 16 MG/DL (ref 5–40)
WBC # BLD AUTO: 4.2 K/UL (ref 4.8–10.8)

## 2025-08-26 PROCEDURE — 85025 COMPLETE CBC W/AUTO DIFF WBC: CPT

## 2025-08-26 PROCEDURE — 80053 COMPREHEN METABOLIC PANEL: CPT

## 2025-08-26 PROCEDURE — 36415 COLL VENOUS BLD VENIPUNCTURE: CPT

## (undated) DEVICE — DRAPE MAYO STAND - (30/CA)

## (undated) DEVICE — INTRODUCER CATHETER  DILATOR PROTRUDING 8FR 2.5CM (10EA/BX)

## (undated) DEVICE — CANISTER SUCTION 3000ML MECHANICAL FILTER AUTO SHUTOFF MEDI-VAC NONSTERILE LF DISP  (40EA/CA)

## (undated) DEVICE — CABLE TEMPORARY PACING

## (undated) DEVICE — SUTURE 5-0 PROLENE RB-1 D/A 36 (36PK/BX)"

## (undated) DEVICE — SODIUM CHL IRRIGATION 0.9% 1000ML (12EA/CA)

## (undated) DEVICE — ELECTRODE DUAL RETURN W/ CORD - (50/PK)

## (undated) DEVICE — PAD LAP STERILE 18 X 18 - (5/PK 40PK/CA)

## (undated) DEVICE — DRILL BIT STRYK UFS 1.6X50X5 - (2UFS2+M2=6) 5MM STOP

## (undated) DEVICE — TRAY SRGPRP PVP IOD WT PRP - (20/CA)

## (undated) DEVICE — DETERGENT RENUZYME PLUS 10 OZ PACKET (50/BX)

## (undated) DEVICE — STOPCOCK IV 400 PSI 3W ROT (50EA/BX)

## (undated) DEVICE — SUTURE GENERAL

## (undated) DEVICE — BURR EGG 4.0 ORAL

## (undated) DEVICE — BAG RESUSCITATION DISPOSABLE - WITH MASK (10 EA/CA)

## (undated) DEVICE — SET LEADWIRE 5 LEAD BEDSIDE DISPOSABLE ECG (1SET OF 5/EA)

## (undated) DEVICE — CHLORAPREP 26 ML APPLICATOR - ORANGE TINT(25/CA)

## (undated) DEVICE — DRAPE SURGICAL U 77X120 - (10/CA)

## (undated) DEVICE — KIT RADIAL ARTERY 20GA W/MAX BARRIER AND BIOPATCH  (5EA/CA) #10740 IS FOR THE SET RADIAL ARTERIAL

## (undated) DEVICE — GLOVE BIOGEL INDICATOR SZ 8 SURGICAL PF LTX - (50/BX 4BX/CA)

## (undated) DEVICE — TRANSDUCER BIFURCATED MONITORING KIT (10EA/CA)

## (undated) DEVICE — TRAY SKIN SCRUB PVP WET (20EA/CA) PART #DYND70356 DISCONTINUED

## (undated) DEVICE — GOWN SURGEONS LARGE - (32/CA)

## (undated) DEVICE — DERMABOND ADVANCED - (12EA/BX)

## (undated) DEVICE — SUTURE 0 ETHIBOND MO6 C/R - (12/BX) 8-18 INCH ETHICON

## (undated) DEVICE — SPONGE GAUZESTER 4 X 4 4PLY - (128PK/CA)

## (undated) DEVICE — BLANKET UNDERBODY FULL ACCES - (5/CA)

## (undated) DEVICE — HEAD HOLDER JUNIOR/ADULT

## (undated) DEVICE — CONTAINER, SPECIMEN, STERILE

## (undated) DEVICE — CLIP MED INTNL HRZN TI ESCP - (25/BX)

## (undated) DEVICE — DRILL BIT STRYKER MMF - (2UFSMMFX2=4)

## (undated) DEVICE — BLADE SURGICAL #11 - (50/BX)

## (undated) DEVICE — TUBING CLEARLINK DUO-VENT - C-FLO (48EA/CA)

## (undated) DEVICE — CATHETER IV 14 GA X 2 ---SURG.& SDS ONLY---(200EA/CA)

## (undated) DEVICE — NEPTUNE 4 PORT MANIFOLD - (20/PK)

## (undated) DEVICE — DRAPE CLEAR W/ELASTIC BAND RAD CARM 40 X40" (20EA/CA)"

## (undated) DEVICE — BLADE SURGICAL #15 - (50/BX 3BX/CA)

## (undated) DEVICE — BOVIE BLADE COATED - (50/PK)

## (undated) DEVICE — SET EXTENSION WITH 2 PORTS (48EA/CA) ***PART #2C8610 IS A SUBSTITUTE*****

## (undated) DEVICE — SOD. CHL. INJ. 0.9% 1000 ML - (14EA/CA 60CA/PF)

## (undated) DEVICE — LACTATED RINGERS INJ 1000 ML - (14EA/CA 60CA/PF)

## (undated) DEVICE — DRAPE LARGE 3 QUARTER - (20/CA)

## (undated) DEVICE — MASK ANESTHESIA ADULT  - (100/CA)

## (undated) DEVICE — Device

## (undated) DEVICE — KIT ANESTHESIA W/CIRCUIT & 3/LT BAG W/FILTER (20EA/CA)

## (undated) DEVICE — DRESSING TRANSPARENT FILM TEGADERM 4 X 4.75" (50EA/BX)"

## (undated) DEVICE — COVER FOOT UNIVERSAL DISP. - (25EA/CA)

## (undated) DEVICE — WIRE GUIDE LUNDQST.035X180 - TSMG-35-180-4-LES ORDER BY BOX (5EA/BX)

## (undated) DEVICE — GLOVE BIOGEL PI ORTHO SZ 6 1/2 SURGICAL PF LF (40PR/BX)

## (undated) DEVICE — SUTURE 4-0 VICRYL PLUS SH - UNDYED 27 INCH (36PK/BX)

## (undated) DEVICE — SUTURE 3-0 CHROMIC GUT SH 27 (36PK/BX)"

## (undated) DEVICE — SENSOR SPO2 NEO LNCS ADHESIVE (20/BX) SEE USER NOTES

## (undated) DEVICE — STOPCOCK 3-WAY W/SWIVEL LEVER LOCK (50EA/CA)

## (undated) DEVICE — GLOVE BIOGEL SZ 7 SURGICAL PF LTX - (50PR/BX 4BX/CA)

## (undated) DEVICE — SYS DLV COST CLS RM TEMP - INJECTATE (CO-SET II) (10EA/CA)

## (undated) DEVICE — SODIUM CHL. INJ. 0.9% 500ML (24EA/CA 50CA/PF)

## (undated) DEVICE — NEEDLE PERCUTANEOUS THINWALL 7CM 18GA BSDN 18-7.0

## (undated) DEVICE — CLIP SM INTNL HRZN TI ESCP LGT - (24EA/PK 25PK/BX)

## (undated) DEVICE — GLOVE BIOGEL SZ 7.5 SURGICAL PF LTX - (50PR/BX 4BX/CA)

## (undated) DEVICE — DEVICE CLOSER PERCLOSE - (10/BX) PROGLIDE SYSTEM

## (undated) DEVICE — INTRODUCER SHEATH 6FR 2.5CM - DILATOR PROTRUDING (10/BX)

## (undated) DEVICE — DECANTER FLD BLS - (50/CA)

## (undated) DEVICE — GOWN WARMING STANDARD FLEX - (30/CA)

## (undated) DEVICE — SET FLUID WARMING STANDARD FLOW - (10/CA)

## (undated) DEVICE — SUCTION INSTRUMENT YANKAUER OPEN TIP W/O VENT (50EA/CA)

## (undated) DEVICE — WIRE GUIDE AES .035 260CM WITH 3MM J TIP"

## (undated) DEVICE — SUCTION INSTRUMENT YANKAUER BULBOUS TIP W/O VENT (50EA/CA)

## (undated) DEVICE — NEEDLE NON SAFETY 25 GA X 1 1/2 IN HYPO (100EA/BX)

## (undated) DEVICE — ELECTRODE 850 FOAM ADHESIVE - HYDROGEL RADIOTRNSPRNT (50/PK)

## (undated) DEVICE — PACK TAVR (3EA/CA)

## (undated) DEVICE — TOOTHBRUSH ADULT (72EA/CA)

## (undated) DEVICE — ELECTRODE DFBR ADLT 6.5X5IN (12PR/CA) *8900-4003 PART # FOR CA QTY. PART #8900-4004 IS EACH QTY

## (undated) DEVICE — CATHETER 6FR AL1 100CM (5/BX)

## (undated) DEVICE — SUTURE 3-0 SILK SH 30 (36PK/BX)

## (undated) DEVICE — PROTECTOR ULNA NERVE - (36PR/CA)

## (undated) DEVICE — GUIDEWIRE STARTER STRAIGHT FIXED CORE .035 150CM 4 STRAIGHT PTFE/HEPARIN COATED (10/BX)

## (undated) DEVICE — SLEEVE, VASO, THIGH, MED

## (undated) DEVICE — MICRODRIP PRIMARY VENTED 60 (48EA/CA) THIS WAS PART #2C8428 WHICH WAS DISCONTINUED

## (undated) DEVICE — CATHETER PIGTAIL 6FR 145 (5EA/BX)

## (undated) DEVICE — KIT TOURNIQUET DLP (40EA/PK)

## (undated) DEVICE — GOWN SURGEONS X-LARGE - DISP. (30/CA)

## (undated) DEVICE — TUBE CONNECT SUCTION CLEAR 120 X 1/4" (50EA/CA)"

## (undated) DEVICE — PACK MINOR BASIN - (2EA/CA)

## (undated) DEVICE — IV SET, NON-VENT PLUMB PUMP

## (undated) DEVICE — SET BIFURCATED BLOOD - (48EA/CS)

## (undated) DEVICE — ARMBOARD  SMALL IV 9 INLONG - (25EA/CA)

## (undated) DEVICE — SUTURE 2-0 VICRYL PLUS CT-1 36 (36PK/BX)"

## (undated) DEVICE — DRAPE MAGNETIC (INSTRA-MAG) - (30/CA)

## (undated) DEVICE — SUTURE 4-0 30CM STRATAFIX SPIRAL PS-2 (12EA/BX)

## (undated) DEVICE — PENCIL ELECTSURG 10FT HLSTR - WITH BLADE (50EA/CA)

## (undated) DEVICE — TOWELS CLOTH SURGICAL - (4/PK 20PK/CA)

## (undated) DEVICE — SYRINGE EAR/NOSE 3 OZ STERILE (50/CA

## (undated) DEVICE — ELECTRODE RADIOLUCNT SOLID GEL DEFIB PADS (12EA/CA)

## (undated) DEVICE — SUTURE OHS

## (undated) DEVICE — COVER LIGHT HANDLE FLEXIBLE - SOFT (2EA/PK 80PK/CA)

## (undated) DEVICE — BOVIE NEEDLE TIP 3CM COLORADO

## (undated) DEVICE — TUBING PRSS MNTR 72IN M/ M LL - (25/BX) MONIT. LINE W/MALE L/L

## (undated) DEVICE — GUIDEWIRE STARTER J CURVED FIXED CORE .035 260CM 10 3MM PTFE/HEPARIN COATED (5EA/BX)

## (undated) DEVICE — SYSTEM DELIVERY COMMANDER TAVR KIT 26MM COMPONENT (1EA)